# Patient Record
Sex: FEMALE | Race: WHITE | NOT HISPANIC OR LATINO | Employment: OTHER | URBAN - NONMETROPOLITAN AREA
[De-identification: names, ages, dates, MRNs, and addresses within clinical notes are randomized per-mention and may not be internally consistent; named-entity substitution may affect disease eponyms.]

---

## 2023-04-13 DIAGNOSIS — I10 BENIGN ESSENTIAL HYPERTENSION: ICD-10-CM

## 2023-04-13 RX ORDER — LOSARTAN POTASSIUM 100 MG/1
1 TABLET ORAL DAILY
COMMUNITY
Start: 2019-11-15 | End: 2023-04-13 | Stop reason: SDUPTHER

## 2023-04-14 RX ORDER — LOSARTAN POTASSIUM 100 MG/1
100 TABLET ORAL DAILY
Qty: 30 TABLET | Refills: 0 | Status: SHIPPED | OUTPATIENT
Start: 2023-04-14 | End: 2023-05-16 | Stop reason: SDUPTHER

## 2023-05-03 DIAGNOSIS — I10 BENIGN ESSENTIAL HYPERTENSION: ICD-10-CM

## 2023-05-03 RX ORDER — PROPRANOLOL HYDROCHLORIDE 120 MG/1
120 CAPSULE, EXTENDED RELEASE ORAL NIGHTLY
COMMUNITY
Start: 2023-02-06 | End: 2023-05-03 | Stop reason: SDUPTHER

## 2023-05-03 RX ORDER — PROPRANOLOL HYDROCHLORIDE 120 MG/1
120 CAPSULE, EXTENDED RELEASE ORAL NIGHTLY
Qty: 30 CAPSULE | Refills: 0 | Status: SHIPPED | OUTPATIENT
Start: 2023-05-03 | End: 2023-05-31 | Stop reason: SDUPTHER

## 2023-05-03 RX ORDER — AMLODIPINE BESYLATE 10 MG/1
10 TABLET ORAL DAILY
Qty: 30 TABLET | Refills: 0 | Status: SHIPPED | OUTPATIENT
Start: 2023-05-03 | End: 2023-05-25 | Stop reason: SDUPTHER

## 2023-05-03 RX ORDER — AMLODIPINE BESYLATE 10 MG/1
1 TABLET ORAL DAILY
COMMUNITY
Start: 2015-06-19 | End: 2023-05-03 | Stop reason: SDUPTHER

## 2023-05-16 DIAGNOSIS — I10 BENIGN ESSENTIAL HYPERTENSION: ICD-10-CM

## 2023-05-16 RX ORDER — LOSARTAN POTASSIUM 100 MG/1
100 TABLET ORAL DAILY
Qty: 90 TABLET | Refills: 0 | Status: SHIPPED | OUTPATIENT
Start: 2023-05-16 | End: 2023-05-31 | Stop reason: SDUPTHER

## 2023-05-18 PROBLEM — K57.90 DIVERTICULOSIS: Status: ACTIVE | Noted: 2023-05-18

## 2023-05-18 PROBLEM — I73.9 PVD (PERIPHERAL VASCULAR DISEASE) (CMS-HCC): Status: ACTIVE | Noted: 2023-05-18

## 2023-05-18 PROBLEM — E87.6 HYPOKALEMIA: Status: ACTIVE | Noted: 2023-05-18

## 2023-05-18 PROBLEM — F41.9 ANXIETY AND DEPRESSION: Status: ACTIVE | Noted: 2023-05-18

## 2023-05-18 PROBLEM — E55.9 VITAMIN D DEFICIENCY: Status: ACTIVE | Noted: 2023-05-18

## 2023-05-18 PROBLEM — R73.9 HYPERGLYCEMIA: Status: ACTIVE | Noted: 2023-05-18

## 2023-05-18 PROBLEM — F32.A ANXIETY AND DEPRESSION: Status: ACTIVE | Noted: 2023-05-18

## 2023-05-18 PROBLEM — K21.9 GERD (GASTROESOPHAGEAL REFLUX DISEASE): Status: ACTIVE | Noted: 2023-05-18

## 2023-05-18 PROBLEM — E78.5 HYPERLIPIDEMIA: Status: ACTIVE | Noted: 2023-05-18

## 2023-05-18 PROBLEM — I50.32 CHRONIC DIASTOLIC CONGESTIVE HEART FAILURE (MULTI): Status: ACTIVE | Noted: 2023-05-18

## 2023-05-18 RX ORDER — ACETAMINOPHEN 500 MG
1 TABLET ORAL DAILY
COMMUNITY
Start: 2013-09-19

## 2023-05-18 RX ORDER — PYRIDOXINE HCL (VITAMIN B6) 100 MG
1 TABLET ORAL DAILY
COMMUNITY

## 2023-05-18 RX ORDER — ASPIRIN 81 MG/1
1 TABLET ORAL DAILY
COMMUNITY
Start: 2013-09-19

## 2023-05-18 RX ORDER — LANOLIN ALCOHOL/MO/W.PET/CERES
1 CREAM (GRAM) TOPICAL DAILY
COMMUNITY

## 2023-05-18 RX ORDER — FOLIC ACID 1 MG/1
1 TABLET ORAL DAILY
COMMUNITY
Start: 2013-09-19

## 2023-05-18 RX ORDER — POTASSIUM CHLORIDE 750 MG/1
1 CAPSULE, EXTENDED RELEASE ORAL DAILY
COMMUNITY
Start: 2018-01-24 | End: 2023-10-26 | Stop reason: SINTOL

## 2023-05-18 RX ORDER — FUROSEMIDE 20 MG/1
1 TABLET ORAL DAILY
COMMUNITY
Start: 2019-11-07 | End: 2023-05-31 | Stop reason: SDUPTHER

## 2023-05-18 RX ORDER — CARBOXYMETHYLCELLULOSE SODIUM 5 MG/ML
SOLUTION/ DROPS OPHTHALMIC
COMMUNITY
Start: 2021-07-12

## 2023-05-25 DIAGNOSIS — I10 BENIGN ESSENTIAL HYPERTENSION: ICD-10-CM

## 2023-05-25 RX ORDER — AMLODIPINE BESYLATE 10 MG/1
10 TABLET ORAL DAILY
Qty: 90 TABLET | Refills: 1 | Status: SHIPPED | OUTPATIENT
Start: 2023-05-25 | End: 2023-05-31 | Stop reason: SDUPTHER

## 2023-05-31 ENCOUNTER — OFFICE VISIT (OUTPATIENT)
Dept: PRIMARY CARE | Facility: CLINIC | Age: 78
End: 2023-05-31
Payer: MEDICARE

## 2023-05-31 VITALS
WEIGHT: 250.4 LBS | SYSTOLIC BLOOD PRESSURE: 132 MMHG | BODY MASS INDEX: 40.42 KG/M2 | OXYGEN SATURATION: 98 % | TEMPERATURE: 97.3 F | HEART RATE: 66 BPM | DIASTOLIC BLOOD PRESSURE: 72 MMHG

## 2023-05-31 DIAGNOSIS — I73.9 PVD (PERIPHERAL VASCULAR DISEASE) (CMS-HCC): ICD-10-CM

## 2023-05-31 DIAGNOSIS — E78.5 HYPERLIPIDEMIA, UNSPECIFIED HYPERLIPIDEMIA TYPE: ICD-10-CM

## 2023-05-31 DIAGNOSIS — I10 HYPERTENSION, UNSPECIFIED TYPE: ICD-10-CM

## 2023-05-31 DIAGNOSIS — F41.9 ANXIETY AND DEPRESSION: ICD-10-CM

## 2023-05-31 DIAGNOSIS — E53.8 VITAMIN B12 DEFICIENCY: ICD-10-CM

## 2023-05-31 DIAGNOSIS — E66.01 OBESITY, MORBID (MULTI): Primary | ICD-10-CM

## 2023-05-31 DIAGNOSIS — Z79.899 HIGH RISK MEDICATION USE: ICD-10-CM

## 2023-05-31 DIAGNOSIS — F32.A ANXIETY AND DEPRESSION: ICD-10-CM

## 2023-05-31 DIAGNOSIS — R53.83 OTHER FATIGUE: ICD-10-CM

## 2023-05-31 DIAGNOSIS — I10 BENIGN ESSENTIAL HYPERTENSION: ICD-10-CM

## 2023-05-31 DIAGNOSIS — D17.0 LIPOMA OF FACE: ICD-10-CM

## 2023-05-31 DIAGNOSIS — E87.6 HYPOKALEMIA: ICD-10-CM

## 2023-05-31 DIAGNOSIS — E55.9 VITAMIN D DEFICIENCY, UNSPECIFIED: ICD-10-CM

## 2023-05-31 DIAGNOSIS — E78.00 HYPERCHOLESTEREMIA: ICD-10-CM

## 2023-05-31 DIAGNOSIS — I50.32 CHRONIC DIASTOLIC CONGESTIVE HEART FAILURE (MULTI): ICD-10-CM

## 2023-05-31 PROCEDURE — 1036F TOBACCO NON-USER: CPT | Performed by: INTERNAL MEDICINE

## 2023-05-31 PROCEDURE — 3078F DIAST BP <80 MM HG: CPT | Performed by: INTERNAL MEDICINE

## 2023-05-31 PROCEDURE — 3075F SYST BP GE 130 - 139MM HG: CPT | Performed by: INTERNAL MEDICINE

## 2023-05-31 PROCEDURE — 1159F MED LIST DOCD IN RCRD: CPT | Performed by: INTERNAL MEDICINE

## 2023-05-31 PROCEDURE — 99214 OFFICE O/P EST MOD 30 MIN: CPT | Performed by: INTERNAL MEDICINE

## 2023-05-31 PROCEDURE — 1160F RVW MEDS BY RX/DR IN RCRD: CPT | Performed by: INTERNAL MEDICINE

## 2023-05-31 RX ORDER — LOSARTAN POTASSIUM 100 MG/1
100 TABLET ORAL DAILY
Qty: 90 TABLET | Refills: 0 | Status: SHIPPED | OUTPATIENT
Start: 2023-05-31 | End: 2023-08-31 | Stop reason: SDUPTHER

## 2023-05-31 RX ORDER — FUROSEMIDE 20 MG/1
20 TABLET ORAL DAILY
Qty: 90 TABLET | Refills: 1 | Status: SHIPPED | OUTPATIENT
Start: 2023-05-31 | End: 2023-10-26 | Stop reason: SDUPTHER

## 2023-05-31 RX ORDER — AMLODIPINE BESYLATE 10 MG/1
10 TABLET ORAL DAILY
Qty: 90 TABLET | Refills: 1 | Status: SHIPPED | OUTPATIENT
Start: 2023-05-31 | End: 2023-10-26 | Stop reason: SDUPTHER

## 2023-05-31 RX ORDER — PROPRANOLOL HYDROCHLORIDE 120 MG/1
120 CAPSULE, EXTENDED RELEASE ORAL NIGHTLY
Qty: 90 CAPSULE | Refills: 1 | Status: SHIPPED | OUTPATIENT
Start: 2023-05-31 | End: 2023-10-26 | Stop reason: SDUPTHER

## 2023-05-31 ASSESSMENT — ENCOUNTER SYMPTOMS
HEADACHES: 0
FEVER: 0
ARTHRALGIAS: 0
BLOOD IN STOOL: 0
ABDOMINAL PAIN: 0
WHEEZING: 0
PALPITATIONS: 0
UNEXPECTED WEIGHT CHANGE: 0
FATIGUE: 0
HYPERTENSION: 1
DIZZINESS: 0
DIARRHEA: 0
SINUS PAIN: 0
DIFFICULTY URINATING: 0
BRUISES/BLEEDS EASILY: 0
COUGH: 0
SORE THROAT: 0

## 2023-05-31 NOTE — PROGRESS NOTES
Subjective   Patient ID: Elisha Flores is a 77 y.o. female who presents for Hypertension.    -Right-sided facial lump CT negative patient reassured 15 benign tumor continue monitoring conservatively  - Hypertension controlled  - Needs to complete blood work before next appointment  -Mammogram obtained within normal limits follow-up 1 year February 2024  - CHF and leg edema responded very well to compression hoses may increase Lasix to take 2 tablets daily if worsening edema for 1 week then to resume only 1 tablet a day after that follow-up progress closely  -Cervical disc disease and neuralgia seen by pain management started on topiramate patient did not tolerate it and discontinued the drug continue on conservative measures, no change  -, Arthritis and right shoulder arthropathy patient awaiting rheumatology for follow-up  -Hypercholesterolemia improving continue with current medication  -chronic migraines, controlled no recurrence continue on propranolol  -Hypokalemia on potassium 10 mEq daily blood work  Follow-up 4 months      Hypertension  Pertinent negatives include no chest pain, headaches or palpitations.          Review of Systems   Constitutional:  Negative for fatigue, fever and unexpected weight change.   HENT:  Negative for congestion, ear discharge, ear pain, mouth sores, sinus pain and sore throat.    Eyes:  Negative for visual disturbance.   Respiratory:  Negative for cough and wheezing.    Cardiovascular:  Negative for chest pain, palpitations and leg swelling.   Gastrointestinal:  Negative for abdominal pain, blood in stool and diarrhea.   Genitourinary:  Negative for difficulty urinating.   Musculoskeletal:  Negative for arthralgias.   Skin:  Negative for rash.   Neurological:  Negative for dizziness and headaches.   Hematological:  Does not bruise/bleed easily.   Psychiatric/Behavioral:  Negative for behavioral problems.    All other systems reviewed and are negative.      Objective   No  results found for: HGBA1C   /72   Pulse 66   Temp 36.3 °C (97.3 °F)   Wt 114 kg (250 lb 6.4 oz)   SpO2 98%   BMI 40.42 kg/m²   Lab Results   Component Value Date    WBC 9.9 10/12/2022    HGB 15.3 10/12/2022    HCT 44.2 10/12/2022     10/12/2022    CHOL 147 10/12/2022    TRIG 208 (H) 10/12/2022    HDL 37.0 (A) 10/12/2022    ALT 16 10/12/2022    AST 19 10/12/2022     10/12/2022    K 4.1 10/12/2022     10/12/2022    CREATININE 0.78 12/09/2022    BUN 15 12/09/2022    CO2 27 10/12/2022    TSH 2.02 10/12/2022     par   Physical Exam  Vitals and nursing note reviewed.   Constitutional:       Appearance: Normal appearance.   HENT:      Head: Normocephalic.      Nose: Nose normal.   Eyes:      Conjunctiva/sclera: Conjunctivae normal.      Pupils: Pupils are equal, round, and reactive to light.   Cardiovascular:      Rate and Rhythm: Regular rhythm.   Pulmonary:      Effort: Pulmonary effort is normal.      Breath sounds: Normal breath sounds.   Abdominal:      General: Abdomen is flat.      Palpations: Abdomen is soft.   Musculoskeletal:      Cervical back: Neck supple.   Skin:     General: Skin is warm.      Findings: Lesion (Right-sided facial lipoma) present.   Neurological:      General: No focal deficit present.      Mental Status: She is oriented to person, place, and time.   Psychiatric:         Mood and Affect: Mood normal.         Assessment/Plan   Elisha was seen today for hypertension.  Diagnoses and all orders for this visit:  Obesity, morbid (CMS/HCC) (Primary)  Benign essential hypertension  -     amLODIPine (Norvasc) 10 mg tablet; Take 1 tablet (10 mg) by mouth once daily.  -     furosemide (Lasix) 20 mg tablet; Take 1 tablet (20 mg) by mouth once daily.  -     losartan (Cozaar) 100 mg tablet; Take 1 tablet (100 mg) by mouth once daily.  -     propranolol LA (Inderal LA) 120 mg 24 hr capsule; Take 1 capsule (120 mg) by mouth once daily at bedtime.  Chronic diastolic congestive  heart failure (CMS/HCC)  PVD (peripheral vascular disease) (CMS/HCC)  High risk medication use  -     CBC and Auto Differential; Future  Hypertension, unspecified type  -     Comprehensive Metabolic Panel; Future  Hypercholesteremia  -     Lipid Panel; Future  Other fatigue  -     TSH with reflex to Free T4 if abnormal; Future  Vitamin B12 deficiency  -     Vitamin B12; Future  Vitamin D deficiency, unspecified  -     Vitamin D, Total; Future  Hyperlipidemia, unspecified hyperlipidemia type  Anxiety and depression  Hypokalemia  Lipoma of face  Other orders  -     Follow Up In Primary Care; Future  -     Follow Up In Primary Care; Future   -Right-sided facial lump CT negative patient reassured 15 benign tumor continue monitoring conservatively  - Hypertension controlled  - Needs to complete blood work before next appointment  -Mammogram obtained within normal limits follow-up 1 year February 2024  - CHF and leg edema responded very well to compression hoses may increase Lasix to take 2 tablets daily if worsening edema for 1 week then to resume only 1 tablet a day after that follow-up progress closely  -Cervical disc disease and neuralgia seen by pain management started on topiramate patient did not tolerate it and discontinued the drug continue on conservative measures, no change  -, Arthritis and right shoulder arthropathy patient awaiting rheumatology for follow-up  -Hypercholesterolemia improving continue with current medication  -chronic migraines, controlled no recurrence continue on propranolol  -Hypokalemia on potassium 10 mEq daily blood work  Follow-up 4 months

## 2023-08-31 DIAGNOSIS — I10 BENIGN ESSENTIAL HYPERTENSION: ICD-10-CM

## 2023-08-31 RX ORDER — LOSARTAN POTASSIUM 100 MG/1
100 TABLET ORAL DAILY
Qty: 90 TABLET | Refills: 0 | Status: SHIPPED | OUTPATIENT
Start: 2023-08-31 | End: 2023-10-26 | Stop reason: SDUPTHER

## 2023-09-03 PROBLEM — M79.674 PAINFUL LEGS AND MOVING TOES OF RIGHT FOOT: Status: ACTIVE | Noted: 2023-09-03

## 2023-09-03 PROBLEM — L30.9 ECZEMA: Status: ACTIVE | Noted: 2023-09-03

## 2023-09-03 PROBLEM — L03.90 CELLULITIS: Status: ACTIVE | Noted: 2023-09-03

## 2023-09-03 PROBLEM — M47.812 CERVICAL SPONDYLOARTHRITIS: Status: ACTIVE | Noted: 2023-09-03

## 2023-09-03 PROBLEM — M75.51 SUBDELTOID BURSITIS OF RIGHT SHOULDER JOINT: Status: ACTIVE | Noted: 2023-09-03

## 2023-09-03 PROBLEM — M25.512 CHRONIC PAIN OF BOTH SHOULDERS: Status: ACTIVE | Noted: 2023-09-03

## 2023-09-03 PROBLEM — M79.604 PAINFUL LEGS AND MOVING TOES OF RIGHT FOOT: Status: ACTIVE | Noted: 2023-09-03

## 2023-09-03 PROBLEM — G89.29 CHRONIC PAIN OF BOTH SHOULDERS: Status: ACTIVE | Noted: 2023-09-03

## 2023-09-03 PROBLEM — B37.2 CANDIDAL INTERTRIGO: Status: ACTIVE | Noted: 2023-09-03

## 2023-09-03 PROBLEM — N64.4 MASTODYNIA: Status: ACTIVE | Noted: 2023-09-03

## 2023-09-03 PROBLEM — G43.909 MIGRAINE, UNSPECIFIED, NOT INTRACTABLE, WITHOUT STATUS MIGRAINOSUS: Status: ACTIVE | Noted: 2023-09-03

## 2023-09-03 PROBLEM — M25.511 CHRONIC PAIN OF BOTH SHOULDERS: Status: ACTIVE | Noted: 2023-09-03

## 2023-09-03 PROBLEM — B35.1 MYCOTIC TOENAILS: Status: ACTIVE | Noted: 2023-09-03

## 2023-09-03 PROBLEM — J31.0 CHRONIC RHINITIS: Status: ACTIVE | Noted: 2023-09-03

## 2023-09-03 PROBLEM — R60.0 LOWER LEG EDEMA: Status: ACTIVE | Noted: 2023-09-03

## 2023-09-03 PROBLEM — M54.2 CERVICALGIA: Status: ACTIVE | Noted: 2023-09-03

## 2023-09-03 PROBLEM — M54.12 RADICULOPATHY, CERVICAL REGION: Status: ACTIVE | Noted: 2023-09-03

## 2023-09-03 PROBLEM — E66.812 CLASS 2 OBESITY WITH BODY MASS INDEX (BMI) OF 39.0 TO 39.9 IN ADULT: Status: ACTIVE | Noted: 2023-09-03

## 2023-09-03 PROBLEM — D48.5 NEOPLASM OF UNCERTAIN BEHAVIOR OF SKIN: Status: ACTIVE | Noted: 2022-03-22

## 2023-09-03 PROBLEM — M19.90 ARTHRITIS: Status: ACTIVE | Noted: 2023-09-03

## 2023-09-03 PROBLEM — E66.9 CLASS 2 OBESITY WITH BODY MASS INDEX (BMI) OF 39.0 TO 39.9 IN ADULT: Status: ACTIVE | Noted: 2023-09-03

## 2023-09-03 PROBLEM — L82.1 SEBORRHEIC KERATOSES: Status: ACTIVE | Noted: 2023-09-03

## 2023-09-03 RX ORDER — ROSUVASTATIN CALCIUM 10 MG/1
10 TABLET, COATED ORAL DAILY
COMMUNITY
End: 2023-10-26 | Stop reason: SDUPTHER

## 2023-09-03 RX ORDER — LOSARTAN POTASSIUM AND HYDROCHLOROTHIAZIDE 25; 100 MG/1; MG/1
1 TABLET ORAL DAILY
COMMUNITY
End: 2023-10-26 | Stop reason: ALTCHOICE

## 2023-09-03 RX ORDER — LIDOCAINE 50 MG/G
PATCH TOPICAL
COMMUNITY
Start: 2022-05-09 | End: 2023-10-26 | Stop reason: ALTCHOICE

## 2023-10-11 ENCOUNTER — LAB (OUTPATIENT)
Dept: LAB | Facility: LAB | Age: 78
End: 2023-10-11
Payer: MEDICARE

## 2023-10-11 DIAGNOSIS — I10 HYPERTENSION, UNSPECIFIED TYPE: ICD-10-CM

## 2023-10-11 DIAGNOSIS — E78.00 HYPERCHOLESTEREMIA: ICD-10-CM

## 2023-10-11 DIAGNOSIS — R53.83 OTHER FATIGUE: ICD-10-CM

## 2023-10-11 DIAGNOSIS — E53.8 VITAMIN B12 DEFICIENCY: ICD-10-CM

## 2023-10-11 DIAGNOSIS — E55.9 VITAMIN D DEFICIENCY, UNSPECIFIED: ICD-10-CM

## 2023-10-11 DIAGNOSIS — Z79.899 HIGH RISK MEDICATION USE: ICD-10-CM

## 2023-10-11 LAB
25(OH)D3 SERPL-MCNC: 57 NG/ML (ref 30–100)
ALBUMIN SERPL BCP-MCNC: 4.4 G/DL (ref 3.4–5)
ALP SERPL-CCNC: 80 U/L (ref 33–136)
ALT SERPL W P-5'-P-CCNC: 18 U/L (ref 7–45)
ANION GAP SERPL CALC-SCNC: 11 MMOL/L (ref 10–20)
AST SERPL W P-5'-P-CCNC: 23 U/L (ref 9–39)
BASOPHILS # BLD AUTO: 0.1 X10*3/UL (ref 0–0.1)
BASOPHILS NFR BLD AUTO: 1.2 %
BILIRUB SERPL-MCNC: 1.1 MG/DL (ref 0–1.2)
BUN SERPL-MCNC: 15 MG/DL (ref 6–23)
CALCIUM SERPL-MCNC: 9.4 MG/DL (ref 8.6–10.3)
CHLORIDE SERPL-SCNC: 104 MMOL/L (ref 98–107)
CHOLEST SERPL-MCNC: 151 MG/DL (ref 0–199)
CHOLESTEROL/HDL RATIO: 4.6
CO2 SERPL-SCNC: 29 MMOL/L (ref 21–32)
CREAT SERPL-MCNC: 0.7 MG/DL (ref 0.5–1.05)
EOSINOPHIL # BLD AUTO: 0.17 X10*3/UL (ref 0–0.4)
EOSINOPHIL NFR BLD AUTO: 2 %
ERYTHROCYTE [DISTWIDTH] IN BLOOD BY AUTOMATED COUNT: 14.2 % (ref 11.5–14.5)
GFR SERPL CREATININE-BSD FRML MDRD: 90 ML/MIN/1.73M*2
GLUCOSE SERPL-MCNC: 122 MG/DL (ref 74–99)
HCT VFR BLD AUTO: 45.5 % (ref 36–46)
HDLC SERPL-MCNC: 32.7 MG/DL
HGB BLD-MCNC: 15.4 G/DL (ref 12–16)
IMM GRANULOCYTES # BLD AUTO: 0.05 X10*3/UL (ref 0–0.5)
IMM GRANULOCYTES NFR BLD AUTO: 0.6 % (ref 0–0.9)
LDLC SERPL CALC-MCNC: 79 MG/DL (ref 140–190)
LYMPHOCYTES # BLD AUTO: 1.52 X10*3/UL (ref 0.8–3)
LYMPHOCYTES NFR BLD AUTO: 17.7 %
MCH RBC QN AUTO: 30.6 PG (ref 26–34)
MCHC RBC AUTO-ENTMCNC: 33.8 G/DL (ref 32–36)
MCV RBC AUTO: 90 FL (ref 80–100)
MONOCYTES # BLD AUTO: 0.76 X10*3/UL (ref 0.05–0.8)
MONOCYTES NFR BLD AUTO: 8.8 %
NEUTROPHILS # BLD AUTO: 6 X10*3/UL (ref 1.6–5.5)
NEUTROPHILS NFR BLD AUTO: 69.7 %
NON HDL CHOLESTEROL: 118 MG/DL (ref 0–149)
NRBC BLD-RTO: 0 /100 WBCS (ref 0–0)
PLATELET # BLD AUTO: 190 X10*3/UL (ref 150–450)
PMV BLD AUTO: 10 FL (ref 7.5–11.5)
POTASSIUM SERPL-SCNC: 4.3 MMOL/L (ref 3.5–5.3)
PROT SERPL-MCNC: 7.1 G/DL (ref 6.4–8.2)
RBC # BLD AUTO: 5.04 X10*6/UL (ref 4–5.2)
SODIUM SERPL-SCNC: 140 MMOL/L (ref 136–145)
TRIGL SERPL-MCNC: 197 MG/DL (ref 0–149)
TSH SERPL-ACNC: 2.14 MIU/L (ref 0.44–3.98)
VIT B12 SERPL-MCNC: 807 PG/ML (ref 211–911)
VLDL: 39 MG/DL (ref 0–40)
WBC # BLD AUTO: 8.6 X10*3/UL (ref 4.4–11.3)

## 2023-10-11 PROCEDURE — 36415 COLL VENOUS BLD VENIPUNCTURE: CPT

## 2023-10-11 PROCEDURE — 82607 VITAMIN B-12: CPT

## 2023-10-11 PROCEDURE — 82306 VITAMIN D 25 HYDROXY: CPT

## 2023-10-26 ENCOUNTER — OFFICE VISIT (OUTPATIENT)
Dept: PRIMARY CARE | Facility: CLINIC | Age: 78
End: 2023-10-26
Payer: MEDICARE

## 2023-10-26 VITALS
BODY MASS INDEX: 39.93 KG/M2 | SYSTOLIC BLOOD PRESSURE: 122 MMHG | TEMPERATURE: 96.4 F | DIASTOLIC BLOOD PRESSURE: 84 MMHG | OXYGEN SATURATION: 98 % | WEIGHT: 248.44 LBS | HEIGHT: 66 IN | HEART RATE: 71 BPM

## 2023-10-26 DIAGNOSIS — I10 BENIGN ESSENTIAL HYPERTENSION: Primary | ICD-10-CM

## 2023-10-26 DIAGNOSIS — Z78.0 ASYMPTOMATIC MENOPAUSAL STATE: ICD-10-CM

## 2023-10-26 DIAGNOSIS — I50.32 CHRONIC DIASTOLIC CONGESTIVE HEART FAILURE (MULTI): ICD-10-CM

## 2023-10-26 DIAGNOSIS — E78.5 HYPERLIPIDEMIA, UNSPECIFIED HYPERLIPIDEMIA TYPE: ICD-10-CM

## 2023-10-26 DIAGNOSIS — E78.00 HYPERCHOLESTEREMIA: ICD-10-CM

## 2023-10-26 DIAGNOSIS — E66.01 OBESITY, MORBID (MULTI): ICD-10-CM

## 2023-10-26 DIAGNOSIS — Z23 FLU VACCINE NEED: ICD-10-CM

## 2023-10-26 DIAGNOSIS — Z13.89 SCREENING FOR MULTIPLE CONDITIONS: ICD-10-CM

## 2023-10-26 DIAGNOSIS — Z12.31 ENCOUNTER FOR SCREENING MAMMOGRAM FOR BREAST CANCER: ICD-10-CM

## 2023-10-26 DIAGNOSIS — Z00.00 ROUTINE GENERAL MEDICAL EXAMINATION AT HEALTH CARE FACILITY: ICD-10-CM

## 2023-10-26 DIAGNOSIS — I10 HYPERTENSION, UNSPECIFIED TYPE: ICD-10-CM

## 2023-10-26 PROCEDURE — 1170F FXNL STATUS ASSESSED: CPT | Performed by: INTERNAL MEDICINE

## 2023-10-26 PROCEDURE — 1160F RVW MEDS BY RX/DR IN RCRD: CPT | Performed by: INTERNAL MEDICINE

## 2023-10-26 PROCEDURE — G0439 PPPS, SUBSEQ VISIT: HCPCS | Performed by: INTERNAL MEDICINE

## 2023-10-26 PROCEDURE — 99214 OFFICE O/P EST MOD 30 MIN: CPT | Performed by: INTERNAL MEDICINE

## 2023-10-26 PROCEDURE — G0008 ADMIN INFLUENZA VIRUS VAC: HCPCS | Performed by: INTERNAL MEDICINE

## 2023-10-26 PROCEDURE — 90662 IIV NO PRSV INCREASED AG IM: CPT | Performed by: INTERNAL MEDICINE

## 2023-10-26 PROCEDURE — G0444 DEPRESSION SCREEN ANNUAL: HCPCS | Performed by: INTERNAL MEDICINE

## 2023-10-26 PROCEDURE — 3079F DIAST BP 80-89 MM HG: CPT | Performed by: INTERNAL MEDICINE

## 2023-10-26 PROCEDURE — 1159F MED LIST DOCD IN RCRD: CPT | Performed by: INTERNAL MEDICINE

## 2023-10-26 PROCEDURE — 1036F TOBACCO NON-USER: CPT | Performed by: INTERNAL MEDICINE

## 2023-10-26 PROCEDURE — 3074F SYST BP LT 130 MM HG: CPT | Performed by: INTERNAL MEDICINE

## 2023-10-26 RX ORDER — PROPRANOLOL HYDROCHLORIDE 120 MG/1
120 CAPSULE, EXTENDED RELEASE ORAL NIGHTLY
Qty: 90 CAPSULE | Refills: 1 | Status: SHIPPED | OUTPATIENT
Start: 2023-10-26

## 2023-10-26 RX ORDER — FUROSEMIDE 20 MG/1
20 TABLET ORAL DAILY
Qty: 90 TABLET | Refills: 1 | Status: SHIPPED | OUTPATIENT
Start: 2023-10-26

## 2023-10-26 RX ORDER — LOSARTAN POTASSIUM 100 MG/1
100 TABLET ORAL DAILY
Qty: 90 TABLET | Refills: 0 | Status: SHIPPED | OUTPATIENT
Start: 2023-10-26 | End: 2024-01-29 | Stop reason: SDUPTHER

## 2023-10-26 RX ORDER — AMLODIPINE BESYLATE 10 MG/1
10 TABLET ORAL DAILY
Qty: 90 TABLET | Refills: 1 | Status: SHIPPED | OUTPATIENT
Start: 2023-10-26 | End: 2024-04-18 | Stop reason: SDUPTHER

## 2023-10-26 RX ORDER — ROSUVASTATIN CALCIUM 10 MG/1
10 TABLET, COATED ORAL DAILY
Qty: 90 TABLET | Refills: 1 | Status: SHIPPED | OUTPATIENT
Start: 2023-10-26

## 2023-10-26 ASSESSMENT — ACTIVITIES OF DAILY LIVING (ADL)
BATHING: INDEPENDENT
DOING_HOUSEWORK: INDEPENDENT
GROCERY_SHOPPING: INDEPENDENT
MANAGING_FINANCES: INDEPENDENT
TAKING_MEDICATION: INDEPENDENT
DRESSING: INDEPENDENT

## 2023-10-26 ASSESSMENT — ENCOUNTER SYMPTOMS
LOSS OF SENSATION IN FEET: 0
DIFFICULTY URINATING: 0
OCCASIONAL FEELINGS OF UNSTEADINESS: 0
BRUISES/BLEEDS EASILY: 0
DEPRESSION: 0
FEVER: 0
SINUS PAIN: 0
ABDOMINAL PAIN: 0
WHEEZING: 0
ARTHRALGIAS: 0
PALPITATIONS: 0
SORE THROAT: 0
COUGH: 0
DIARRHEA: 0
HYPERTENSION: 1
BLOOD IN STOOL: 0
HEADACHES: 0
UNEXPECTED WEIGHT CHANGE: 0
DIZZINESS: 0
FATIGUE: 0

## 2023-10-26 ASSESSMENT — PATIENT HEALTH QUESTIONNAIRE - PHQ9
SUM OF ALL RESPONSES TO PHQ9 QUESTIONS 1 AND 2: 0
1. LITTLE INTEREST OR PLEASURE IN DOING THINGS: NOT AT ALL
2. FEELING DOWN, DEPRESSED OR HOPELESS: NOT AT ALL

## 2023-10-26 NOTE — PROGRESS NOTES
Subjective   Reason for Visit: Elisha Flores is an 78 y.o. female here for a Medicare Wellness visit.     Past Medical, Surgical, and Family History reviewed and updated in chart.    Reviewed all medications by prescribing practitioner or clinical pharmacist (such as prescriptions, OTCs, herbal therapies and supplements) and documented in the medical record.    Annual preventive visit  - Vaccinations reviewed  High-dose flu vaccine today  Need to proceed with RSV and COVID booster  - Screening mammogram needed for February 2024 new order placed today  - Needs screening for the symmetric continue with calcium and vitamin D exercise weight lifting  - Screening for depression  I spent 15 minutes obtaining and discussing depression screening using PHQ-2 questions with results documented in the chart.  -Morbid obesity counseled about BMI  Counseled about weight loss  Follow-up  - Recent blood work reviewed with patient  -- Hypertension controlled  -- CHF and leg edema responded very well to compression hoses may increase Lasix to take 2 tablets daily if worsening edema for 1 week then to resume only 1 tablet a day after that follow-up progress closely controlled  -Cervical disc disease and neuralgia seen by pain management, doing well  -, Arthritis and right shoulder arthropathy patient awaiting rheumatology for follow-up  -Hypercholesterolemia improving continue with current medication continue low triglyceride diet continue with weight loss  -chronic migraines, controlled no recurrence continue on propranolol  -Hypokalemia on potassium 10 mEq daily blood work  Follow-up 3 months      Hypertension  Pertinent negatives include no chest pain, headaches or palpitations.   Med Refill  Pertinent negatives include no abdominal pain, arthralgias, chest pain, congestion, coughing, fatigue, fever, headaches, rash or sore throat.       Patient Care Team:  Shun Hall MD as PCP - General  Shun Hall MD as PCP - [facility]  "[facility] Attributed Provider     Review of Systems   Constitutional:  Negative for fatigue, fever and unexpected weight change.   HENT:  Negative for congestion, ear discharge, ear pain, mouth sores, sinus pain and sore throat.    Eyes:  Negative for visual disturbance.   Respiratory:  Negative for cough and wheezing.    Cardiovascular:  Negative for chest pain, palpitations and leg swelling.   Gastrointestinal:  Negative for abdominal pain, blood in stool and diarrhea.   Genitourinary:  Negative for difficulty urinating.   Musculoskeletal:  Negative for arthralgias.   Skin:  Negative for rash.   Neurological:  Negative for dizziness and headaches.   Hematological:  Does not bruise/bleed easily.   Psychiatric/Behavioral:  Negative for behavioral problems.    All other systems reviewed and are negative.      Objective   Vitals:  /84   Pulse 71   Temp 35.8 °C (96.4 °F)   Ht 1.676 m (5' 6\")   Wt 113 kg (248 lb 7 oz)   SpO2 98%   BMI 40.10 kg/m²     Lab Results   Component Value Date    WBC 8.6 10/11/2023    HGB 15.4 10/11/2023    HCT 45.5 10/11/2023     10/11/2023    CHOL 151 10/11/2023    TRIG 197 (H) 10/11/2023    HDL 32.7 10/11/2023    ALT 18 10/11/2023    AST 23 10/11/2023     10/11/2023    K 4.3 10/11/2023     10/11/2023    CREATININE 0.70 10/11/2023    BUN 15 10/11/2023    CO2 29 10/11/2023    TSH 2.14 10/11/2023     par   Physical Exam  Vitals and nursing note reviewed.   Constitutional:       Appearance: Normal appearance. She is obese.   HENT:      Head: Normocephalic.      Nose: Nose normal.   Eyes:      Conjunctiva/sclera: Conjunctivae normal.      Pupils: Pupils are equal, round, and reactive to light.   Cardiovascular:      Rate and Rhythm: Regular rhythm.   Pulmonary:      Effort: Pulmonary effort is normal.      Breath sounds: Normal breath sounds.   Abdominal:      General: Abdomen is flat.      Palpations: Abdomen is soft.   Musculoskeletal:      Cervical back: Neck supple. "   Skin:     General: Skin is warm.   Neurological:      General: No focal deficit present.      Mental Status: She is oriented to person, place, and time.   Psychiatric:         Mood and Affect: Mood normal.       Assessment/Plan   Problem List Items Addressed This Visit       Benign essential hypertension - Primary    Relevant Medications    amLODIPine (Norvasc) 10 mg tablet    furosemide (Lasix) 20 mg tablet    losartan (Cozaar) 100 mg tablet    propranolol LA (Inderal LA) 120 mg 24 hr capsule    Chronic diastolic congestive heart failure (CMS/HCC)    Relevant Medications    amLODIPine (Norvasc) 10 mg tablet    propranolol LA (Inderal LA) 120 mg 24 hr capsule    Hyperlipidemia    Relevant Medications    rosuvastatin (Crestor) 10 mg tablet    Obesity, morbid (CMS/HCC)     Other Visit Diagnoses       Flu vaccine need        Relevant Orders    Flu vaccine, quadrivalent, high-dose, preservative free, age 65y+ (FLUZONE) (Completed)    Screening for multiple conditions        Asymptomatic menopausal state        Relevant Orders    XR DEXA bone density    Encounter for screening mammogram for breast cancer        Relevant Orders    BI mammo bilateral screening tomosynthesis    Routine general medical examination at health care facility        Hypercholesteremia        Hypertension, unspecified type              Annual preventive visit  - Vaccinations reviewed  High-dose flu vaccine today  Need to proceed with RSV and COVID booster  - Screening mammogram needed for February 2024 new order placed today  - Needs screening for the symmetric continue with calcium and vitamin D exercise weight lifting  - Screening for depression  I spent 15 minutes obtaining and discussing depression screening using PHQ-2 questions with results documented in the chart.  -Morbid obesity counseled about BMI  Counseled about weight loss  Follow-up  - Recent blood work reviewed with patient  -- Hypertension controlled  -- CHF and leg edema  responded very well to compression hoses may increase Lasix to take 2 tablets daily if worsening edema for 1 week then to resume only 1 tablet a day after that follow-up progress closely controlled  -Cervical disc disease and neuralgia seen by pain management, doing well  -, Arthritis and right shoulder arthropathy patient awaiting rheumatology for follow-up  -Hypercholesterolemia improving continue with current medication continue low triglyceride diet continue with weight loss  -chronic migraines, controlled no recurrence continue on propranolol  -Hypokalemia on potassium 10 mEq daily blood work  Follow-up 3 months

## 2023-10-26 NOTE — PROGRESS NOTES
"Subjective   Patient ID: Elisha Flores is a 78 y.o. female who presents for Medicare Annual Wellness Visit Subsequent, Hypertension, Med Refill, Flu Vaccine (given), and Results.    HPI       Review of Systems    Objective   No results found for: \"HGBA1C\"   /84   Pulse 71   Temp 35.8 °C (96.4 °F)   Ht 1.676 m (5' 6\")   Wt 113 kg (248 lb 7 oz)   SpO2 98%   BMI 40.10 kg/m²     Physical Exam    Assessment/Plan   Elisha was seen today for medicare annual wellness visit subsequent, hypertension, med refill, flu vaccine and results.  Diagnoses and all orders for this visit:  Flu vaccine need  -     Flu vaccine, quadrivalent, high-dose, preservative free, age 65y+ (FLUZONE)  Benign essential hypertension  -     amLODIPine (Norvasc) 10 mg tablet; Take 1 tablet (10 mg) by mouth once daily.  -     furosemide (Lasix) 20 mg tablet; Take 1 tablet (20 mg) by mouth once daily.  -     losartan (Cozaar) 100 mg tablet; Take 1 tablet (100 mg) by mouth once daily.  -     propranolol LA (Inderal LA) 120 mg 24 hr capsule; Take 1 capsule (120 mg) by mouth once daily at bedtime.  Hyperlipidemia, unspecified hyperlipidemia type  -     rosuvastatin (Crestor) 10 mg tablet; Take 1 tablet (10 mg) by mouth once daily.     "

## 2024-01-29 ENCOUNTER — OFFICE VISIT (OUTPATIENT)
Dept: PRIMARY CARE | Facility: CLINIC | Age: 79
End: 2024-01-29
Payer: MEDICARE

## 2024-01-29 VITALS
HEART RATE: 63 BPM | OXYGEN SATURATION: 98 % | WEIGHT: 251 LBS | DIASTOLIC BLOOD PRESSURE: 90 MMHG | BODY MASS INDEX: 40.51 KG/M2 | SYSTOLIC BLOOD PRESSURE: 162 MMHG | TEMPERATURE: 96.9 F

## 2024-01-29 DIAGNOSIS — E66.01 OBESITY, MORBID (MULTI): ICD-10-CM

## 2024-01-29 DIAGNOSIS — I50.32 CHRONIC DIASTOLIC CONGESTIVE HEART FAILURE (MULTI): ICD-10-CM

## 2024-01-29 DIAGNOSIS — E78.5 HYPERLIPIDEMIA, UNSPECIFIED HYPERLIPIDEMIA TYPE: ICD-10-CM

## 2024-01-29 DIAGNOSIS — I10 BENIGN ESSENTIAL HYPERTENSION: Primary | ICD-10-CM

## 2024-01-29 DIAGNOSIS — I73.9 PVD (PERIPHERAL VASCULAR DISEASE) (CMS-HCC): ICD-10-CM

## 2024-01-29 DIAGNOSIS — E53.8 VITAMIN B12 DEFICIENCY: ICD-10-CM

## 2024-01-29 PROCEDURE — 99214 OFFICE O/P EST MOD 30 MIN: CPT | Performed by: INTERNAL MEDICINE

## 2024-01-29 PROCEDURE — 1160F RVW MEDS BY RX/DR IN RCRD: CPT | Performed by: INTERNAL MEDICINE

## 2024-01-29 PROCEDURE — 1159F MED LIST DOCD IN RCRD: CPT | Performed by: INTERNAL MEDICINE

## 2024-01-29 PROCEDURE — 3077F SYST BP >= 140 MM HG: CPT | Performed by: INTERNAL MEDICINE

## 2024-01-29 PROCEDURE — 1036F TOBACCO NON-USER: CPT | Performed by: INTERNAL MEDICINE

## 2024-01-29 PROCEDURE — 3080F DIAST BP >= 90 MM HG: CPT | Performed by: INTERNAL MEDICINE

## 2024-01-29 RX ORDER — LOSARTAN POTASSIUM 100 MG/1
100 TABLET ORAL DAILY
Qty: 90 TABLET | Refills: 1 | Status: SHIPPED | OUTPATIENT
Start: 2024-01-29

## 2024-01-29 ASSESSMENT — ENCOUNTER SYMPTOMS
HEADACHES: 0
DIARRHEA: 0
ARTHRALGIAS: 0
BRUISES/BLEEDS EASILY: 0
BLOOD IN STOOL: 0
FATIGUE: 0
PALPITATIONS: 0
SINUS PAIN: 0
HYPERTENSION: 1
COUGH: 0
ABDOMINAL PAIN: 0
DIFFICULTY URINATING: 0
UNEXPECTED WEIGHT CHANGE: 0
WHEEZING: 0
DIZZINESS: 0
SORE THROAT: 0
FEVER: 0

## 2024-01-29 NOTE — PROGRESS NOTES
Subjective   Patient ID: Elisha Flores is a 78 y.o. female who presents for Hypertension, Hyperlipidemia (Discuss Atorvastatin ), and Med Refill.    -- Hypertension uncontrolled patient using a decongestant for upper respiratory tract infection we will follow-up with patient closely and recheck patient blood pressure in 3 months patient was found to monitor blood pressure for reviewed  -- CHF and leg edema responded very well to compression hoses may increase Lasix to take 2 tablets daily if worsening edema for 1 week then to resume only 1 tablet a day after that follow-up progress closely controlled  -Cervical disc disease and neuralgia seen by pain management, doing well  -, Arthritis and right shoulder arthropathy patient awaiting rheumatology for follow-up  -Cardiovascular risk reviewed with patient risk analysis 33.4%  Hypercholesterolemia need to restart Crestor 10 mg as recommended  -chronic migraines, controlled no recurrence continue on propranolol  -Hypokalemia on potassium 10 mEq daily blood work  Morbid obesity patient BMI 40 need counseled about low-carb diet exercise  Follow-up 3 months      Hypertension  Pertinent negatives include no chest pain, headaches or palpitations.   Hyperlipidemia  Pertinent negatives include no chest pain.   Med Refill  Pertinent negatives include no abdominal pain, arthralgias, chest pain, congestion, coughing, fatigue, fever, headaches, rash or sore throat.          Review of Systems   Constitutional:  Negative for fatigue, fever and unexpected weight change.   HENT:  Negative for congestion, ear discharge, ear pain, mouth sores, sinus pain and sore throat.    Eyes:  Negative for visual disturbance.   Respiratory:  Negative for cough and wheezing.    Cardiovascular:  Negative for chest pain, palpitations and leg swelling.   Gastrointestinal:  Negative for abdominal pain, blood in stool and diarrhea.   Genitourinary:  Negative for difficulty urinating.   Musculoskeletal:   "Negative for arthralgias.   Skin:  Negative for rash.   Neurological:  Negative for dizziness and headaches.   Hematological:  Does not bruise/bleed easily.   Psychiatric/Behavioral:  Negative for behavioral problems.    All other systems reviewed and are negative.      Objective   No results found for: \"HGBA1C\"   /90   Pulse 63   Temp 36.1 °C (96.9 °F)   Wt 114 kg (251 lb)   SpO2 98%   BMI 40.51 kg/m²   Lab Results   Component Value Date    WBC 8.6 10/11/2023    HGB 15.4 10/11/2023    HCT 45.5 10/11/2023     10/11/2023    CHOL 151 10/11/2023    TRIG 197 (H) 10/11/2023    HDL 32.7 10/11/2023    ALT 18 10/11/2023    AST 23 10/11/2023     10/11/2023    K 4.3 10/11/2023     10/11/2023    CREATININE 0.70 10/11/2023    BUN 15 10/11/2023    CO2 29 10/11/2023    TSH 2.14 10/11/2023     par   Physical Exam  Vitals and nursing note reviewed.   Constitutional:       Appearance: Normal appearance.   HENT:      Head: Normocephalic.      Nose: Nose normal.   Eyes:      Conjunctiva/sclera: Conjunctivae normal.      Pupils: Pupils are equal, round, and reactive to light.   Cardiovascular:      Rate and Rhythm: Regular rhythm.   Pulmonary:      Effort: Pulmonary effort is normal.      Breath sounds: Normal breath sounds.   Abdominal:      General: Abdomen is flat.      Palpations: Abdomen is soft.   Musculoskeletal:      Cervical back: Neck supple.   Skin:     General: Skin is warm.   Neurological:      General: No focal deficit present.      Mental Status: She is oriented to person, place, and time.   Psychiatric:         Mood and Affect: Mood normal.         Assessment/Plan   Elisha was seen today for hypertension, hyperlipidemia and med refill.  Diagnoses and all orders for this visit:  Benign essential hypertension (Primary)  -     losartan (Cozaar) 100 mg tablet; Take 1 tablet (100 mg) by mouth once daily.  Chronic diastolic congestive heart failure (CMS/HCC)  PVD (peripheral vascular disease) " (CMS/[facility])  Obesity, morbid (CMS/[facility])  Hyperlipidemia, unspecified hyperlipidemia type  Vitamin B12 deficiency  Other orders  -     Follow Up In Primary Care - Established; Future   -- Hypertension uncontrolled patient using a decongestant for upper respiratory tract infection we will follow-up with patient closely and recheck patient blood pressure in 3 months patient was found to monitor blood pressure for reviewed  -- CHF and leg edema responded very well to compression hoses may increase Lasix to take 2 tablets daily if worsening edema for 1 week then to resume only 1 tablet a day after that follow-up progress closely controlled  -Cervical disc disease and neuralgia seen by pain management, doing well  -, Arthritis and right shoulder arthropathy patient awaiting rheumatology for follow-up  -Cardiovascular risk reviewed with patient risk analysis 33.4%  Hypercholesterolemia need to restart Crestor 10 mg as recommended  -chronic migraines, controlled no recurrence continue on propranolol  -Hypokalemia on potassium 10 mEq daily blood work  Morbid obesity patient BMI 40 need counseled about low-carb diet exercise  Follow-up 3 months

## 2024-02-27 ENCOUNTER — HOSPITAL ENCOUNTER (OUTPATIENT)
Dept: RADIOLOGY | Facility: HOSPITAL | Age: 79
Discharge: HOME | End: 2024-02-27
Payer: MEDICARE

## 2024-02-27 DIAGNOSIS — Z78.0 ASYMPTOMATIC MENOPAUSAL STATE: ICD-10-CM

## 2024-02-27 DIAGNOSIS — Z12.31 ENCOUNTER FOR SCREENING MAMMOGRAM FOR BREAST CANCER: ICD-10-CM

## 2024-02-27 PROCEDURE — 77063 BREAST TOMOSYNTHESIS BI: CPT | Performed by: RADIOLOGY

## 2024-02-27 PROCEDURE — 77085 DXA BONE DENSITY AXL VRT FX: CPT

## 2024-02-27 PROCEDURE — 77067 SCR MAMMO BI INCL CAD: CPT

## 2024-02-27 PROCEDURE — 77067 SCR MAMMO BI INCL CAD: CPT | Performed by: RADIOLOGY

## 2024-04-18 DIAGNOSIS — I10 BENIGN ESSENTIAL HYPERTENSION: ICD-10-CM

## 2024-04-18 RX ORDER — AMLODIPINE BESYLATE 10 MG/1
10 TABLET ORAL DAILY
Qty: 90 TABLET | Refills: 1 | Status: SHIPPED | OUTPATIENT
Start: 2024-04-18

## 2024-04-29 ENCOUNTER — OFFICE VISIT (OUTPATIENT)
Dept: PRIMARY CARE | Facility: CLINIC | Age: 79
End: 2024-04-29
Payer: MEDICARE

## 2024-04-29 VITALS
HEART RATE: 81 BPM | OXYGEN SATURATION: 99 % | WEIGHT: 253.2 LBS | DIASTOLIC BLOOD PRESSURE: 80 MMHG | SYSTOLIC BLOOD PRESSURE: 125 MMHG | TEMPERATURE: 96 F | BODY MASS INDEX: 40.87 KG/M2

## 2024-04-29 DIAGNOSIS — E78.5 HYPERLIPIDEMIA, UNSPECIFIED HYPERLIPIDEMIA TYPE: ICD-10-CM

## 2024-04-29 DIAGNOSIS — I10 BENIGN ESSENTIAL HYPERTENSION: Primary | ICD-10-CM

## 2024-04-29 DIAGNOSIS — I10 HYPERTENSION, UNSPECIFIED TYPE: ICD-10-CM

## 2024-04-29 DIAGNOSIS — E78.00 HYPERCHOLESTEREMIA: ICD-10-CM

## 2024-04-29 DIAGNOSIS — I50.32 CHRONIC DIASTOLIC CONGESTIVE HEART FAILURE (MULTI): ICD-10-CM

## 2024-04-29 DIAGNOSIS — E55.9 VITAMIN D DEFICIENCY, UNSPECIFIED: ICD-10-CM

## 2024-04-29 PROCEDURE — 1159F MED LIST DOCD IN RCRD: CPT | Performed by: INTERNAL MEDICINE

## 2024-04-29 PROCEDURE — 99214 OFFICE O/P EST MOD 30 MIN: CPT | Performed by: INTERNAL MEDICINE

## 2024-04-29 PROCEDURE — 1036F TOBACCO NON-USER: CPT | Performed by: INTERNAL MEDICINE

## 2024-04-29 PROCEDURE — 1160F RVW MEDS BY RX/DR IN RCRD: CPT | Performed by: INTERNAL MEDICINE

## 2024-04-29 PROCEDURE — 3079F DIAST BP 80-89 MM HG: CPT | Performed by: INTERNAL MEDICINE

## 2024-04-29 PROCEDURE — 3074F SYST BP LT 130 MM HG: CPT | Performed by: INTERNAL MEDICINE

## 2024-04-29 ASSESSMENT — ENCOUNTER SYMPTOMS
HEADACHES: 0
UNEXPECTED WEIGHT CHANGE: 0
DIZZINESS: 0
ARTHRALGIAS: 0
ABDOMINAL PAIN: 0
FATIGUE: 0
DIARRHEA: 0
WHEEZING: 0
HYPERTENSION: 1
DIFFICULTY URINATING: 0
SORE THROAT: 0
FEVER: 0
SINUS PAIN: 0
PALPITATIONS: 0
BRUISES/BLEEDS EASILY: 0
BLOOD IN STOOL: 0
COUGH: 0

## 2024-04-29 NOTE — PROGRESS NOTES
"Subjective   Patient ID: Elisha Flores is a 78 y.o. female who presents for Hypertension.    - Hypertension now being much better controlled patient off decongestants avoid NSAIDs increase fluid intake  --- CHF and leg edema responded very well to compression hoses may increase Lasix to take 2 tablets daily if worsening edema for 1 week then to resume only 1 tablet a day after that follow-up progress closely controlled  -Cervical disc disease and neuralgia seen by pain management, doing well  -Hypercholesterolemia need to restart Crestor 10 mg as recommended  -chronic migraines, controlled no recurrence continue on propranolol  -Hypokalemia on potassium 10 mEq daily blood work  Morbid obesity patient BMI 40 need counseled about low-carb diet exercise  Follow-up 3 months      Hypertension  Pertinent negatives include no chest pain, headaches or palpitations.          Review of Systems   Constitutional:  Negative for fatigue, fever and unexpected weight change.   HENT:  Negative for congestion, ear discharge, ear pain, mouth sores, sinus pain and sore throat.    Eyes:  Negative for visual disturbance.   Respiratory:  Negative for cough and wheezing.    Cardiovascular:  Negative for chest pain, palpitations and leg swelling.   Gastrointestinal:  Negative for abdominal pain, blood in stool and diarrhea.   Genitourinary:  Negative for difficulty urinating.   Musculoskeletal:  Negative for arthralgias.   Skin:  Negative for rash.   Neurological:  Negative for dizziness and headaches.   Hematological:  Does not bruise/bleed easily.   Psychiatric/Behavioral:  Negative for behavioral problems.    All other systems reviewed and are negative.      Objective   No results found for: \"HGBA1C\"   /80   Pulse 81   Temp 35.6 °C (96 °F)   Wt 115 kg (253 lb 3.2 oz)   SpO2 99%   BMI 40.87 kg/m²   Lab Results   Component Value Date    WBC 8.6 10/11/2023    HGB 15.4 10/11/2023    HCT 45.5 10/11/2023     10/11/2023    " CHOL 151 10/11/2023    TRIG 197 (H) 10/11/2023    HDL 32.7 10/11/2023    ALT 18 10/11/2023    AST 23 10/11/2023     10/11/2023    K 4.3 10/11/2023     10/11/2023    CREATININE 0.70 10/11/2023    BUN 15 10/11/2023    CO2 29 10/11/2023    TSH 2.14 10/11/2023     par   Physical Exam  Vitals and nursing note reviewed.   Constitutional:       Appearance: Normal appearance.   HENT:      Head: Normocephalic.      Nose: Nose normal.   Eyes:      Conjunctiva/sclera: Conjunctivae normal.      Pupils: Pupils are equal, round, and reactive to light.   Cardiovascular:      Rate and Rhythm: Regular rhythm.   Pulmonary:      Effort: Pulmonary effort is normal.      Breath sounds: Normal breath sounds.   Abdominal:      General: Abdomen is flat.      Palpations: Abdomen is soft.   Musculoskeletal:      Cervical back: Neck supple.   Skin:     General: Skin is warm.   Neurological:      General: No focal deficit present.      Mental Status: She is oriented to person, place, and time.   Psychiatric:         Mood and Affect: Mood normal.         Assessment/Plan   Elisha was seen today for hypertension.  Diagnoses and all orders for this visit:  Benign essential hypertension (Primary)  Chronic diastolic congestive heart failure (Multi)  Hypercholesteremia  Hypertension, unspecified type  Vitamin D deficiency, unspecified  Hyperlipidemia, unspecified hyperlipidemia type  Other orders  -     Follow Up In Primary Care - Established   - Hypertension now being much better controlled patient off decongestants avoid NSAIDs increase fluid intake  --- CHF and leg edema responded very well to compression hoses may increase Lasix to take 2 tablets daily if worsening edema for 1 week then to resume only 1 tablet a day after that follow-up progress closely controlled  -Cervical disc disease and neuralgia seen by pain management, doing well  -Hypercholesterolemia need to restart Crestor 10 mg as recommended  -chronic migraines,  controlled no recurrence continue on propranolol  -Hypokalemia on potassium 10 mEq daily blood work  Morbid obesity patient BMI 40 need counseled about low-carb diet exercise  Follow-up 3 months

## 2024-06-12 DIAGNOSIS — I10 BENIGN ESSENTIAL HYPERTENSION: ICD-10-CM

## 2024-06-13 RX ORDER — PROPRANOLOL HYDROCHLORIDE 120 MG/1
120 CAPSULE, EXTENDED RELEASE ORAL NIGHTLY
Qty: 90 CAPSULE | Refills: 1 | Status: SHIPPED | OUTPATIENT
Start: 2024-06-13

## 2024-07-29 ENCOUNTER — APPOINTMENT (OUTPATIENT)
Dept: PRIMARY CARE | Facility: CLINIC | Age: 79
End: 2024-07-29
Payer: MEDICARE

## 2024-07-29 VITALS
DIASTOLIC BLOOD PRESSURE: 80 MMHG | WEIGHT: 253.6 LBS | BODY MASS INDEX: 40.93 KG/M2 | OXYGEN SATURATION: 97 % | HEART RATE: 69 BPM | SYSTOLIC BLOOD PRESSURE: 148 MMHG | TEMPERATURE: 95.9 F

## 2024-07-29 DIAGNOSIS — R53.83 OTHER FATIGUE: ICD-10-CM

## 2024-07-29 DIAGNOSIS — I50.32 CHRONIC DIASTOLIC CONGESTIVE HEART FAILURE (MULTI): ICD-10-CM

## 2024-07-29 DIAGNOSIS — E53.8 VITAMIN B12 DEFICIENCY: ICD-10-CM

## 2024-07-29 DIAGNOSIS — I10 HYPERTENSION, UNSPECIFIED TYPE: ICD-10-CM

## 2024-07-29 DIAGNOSIS — Z12.31 ENCOUNTER FOR SCREENING MAMMOGRAM FOR MALIGNANT NEOPLASM OF BREAST: ICD-10-CM

## 2024-07-29 DIAGNOSIS — I10 BENIGN ESSENTIAL HYPERTENSION: ICD-10-CM

## 2024-07-29 DIAGNOSIS — E78.00 HYPERCHOLESTEREMIA: ICD-10-CM

## 2024-07-29 DIAGNOSIS — J32.9 CHRONIC SINUSITIS, UNSPECIFIED LOCATION: Primary | ICD-10-CM

## 2024-07-29 DIAGNOSIS — Z79.899 HIGH RISK MEDICATION USE: ICD-10-CM

## 2024-07-29 DIAGNOSIS — E78.5 HYPERLIPIDEMIA, UNSPECIFIED HYPERLIPIDEMIA TYPE: ICD-10-CM

## 2024-07-29 DIAGNOSIS — E55.9 VITAMIN D DEFICIENCY, UNSPECIFIED: ICD-10-CM

## 2024-07-29 PROCEDURE — 1036F TOBACCO NON-USER: CPT | Performed by: INTERNAL MEDICINE

## 2024-07-29 PROCEDURE — 3079F DIAST BP 80-89 MM HG: CPT | Performed by: INTERNAL MEDICINE

## 2024-07-29 PROCEDURE — 1159F MED LIST DOCD IN RCRD: CPT | Performed by: INTERNAL MEDICINE

## 2024-07-29 PROCEDURE — 1160F RVW MEDS BY RX/DR IN RCRD: CPT | Performed by: INTERNAL MEDICINE

## 2024-07-29 PROCEDURE — 3077F SYST BP >= 140 MM HG: CPT | Performed by: INTERNAL MEDICINE

## 2024-07-29 PROCEDURE — 99214 OFFICE O/P EST MOD 30 MIN: CPT | Performed by: INTERNAL MEDICINE

## 2024-07-29 RX ORDER — FLUTICASONE PROPIONATE 50 MCG
1 SPRAY, SUSPENSION (ML) NASAL DAILY
Qty: 16 G | Refills: 11 | Status: SHIPPED | OUTPATIENT
Start: 2024-07-29 | End: 2025-07-29

## 2024-07-29 RX ORDER — CETIRIZINE HYDROCHLORIDE 10 MG/1
10 TABLET ORAL DAILY
Qty: 30 TABLET | Refills: 2 | Status: SHIPPED | OUTPATIENT
Start: 2024-07-29 | End: 2024-10-27

## 2024-07-29 ASSESSMENT — ENCOUNTER SYMPTOMS
SINUS PAIN: 0
WHEEZING: 0
SORE THROAT: 0
FATIGUE: 0
DIFFICULTY URINATING: 0
DIARRHEA: 0
HEADACHES: 0
BRUISES/BLEEDS EASILY: 0
FEVER: 0
BLOOD IN STOOL: 0
ABDOMINAL PAIN: 0
ARTHRALGIAS: 0
HYPERTENSION: 1
PALPITATIONS: 0
UNEXPECTED WEIGHT CHANGE: 0
COUGH: 0
DIZZINESS: 1

## 2024-07-29 NOTE — PROGRESS NOTES
Subjective   Patient ID: Elisha Flores is a 78 y.o. female who presents for Hypertension, Hyperlipidemia, and Fog (Seems to be in a fog).    - Bone densitometry showed normal results continue with calcium and vitamin D exercise patient reassured  - Mammogram within normal limits repeat in 6 months  - Needs to complete blood work next appointment  -Chronic vertigo and dizziness underlying chronic sinusitis patient to start on Flonase Zyrtec every night follow-up if no improvement  -Hypertension controlled continue with current medication  --- CHF and leg edema responded very well to compression hoses may increase Lasix to take 2 tablets daily if worsening edema for 1 week then to resume only 1 tablet a day after that follow-up progress closely controlled.  -Cervical disc disease and neuralgia seen by pain management, doing well.  -Hypercholesterolemia need to restart Crestor 10 mg as recommended.  -chronic migraines, controlled no recurrence continue on propranolol.  -Hypokalemia on potassium 10 mEq daily .  Morbid obesity patient BMI 40 need counseled about low-carb diet exercisel.  Follow-up 3 months            Hypertension  Pertinent negatives include no chest pain, headaches or palpitations.   Hyperlipidemia  Pertinent negatives include no chest pain.          Review of Systems   Constitutional:  Negative for fatigue, fever and unexpected weight change.   HENT:  Negative for congestion, ear discharge, ear pain, mouth sores, sinus pain and sore throat.    Eyes:  Negative for visual disturbance.   Respiratory:  Negative for cough and wheezing.    Cardiovascular:  Negative for chest pain, palpitations and leg swelling.   Gastrointestinal:  Negative for abdominal pain, blood in stool and diarrhea.   Genitourinary:  Negative for difficulty urinating.   Musculoskeletal:  Negative for arthralgias.   Skin:  Negative for rash.   Neurological:  Positive for dizziness. Negative for headaches.   Hematological:  Does not  "bruise/bleed easily.   Psychiatric/Behavioral:  Negative for behavioral problems.    All other systems reviewed and are negative.      Objective   No results found for: \"HGBA1C\"   /80   Pulse 69   Temp 35.5 °C (95.9 °F)   Wt 115 kg (253 lb 9.6 oz)   SpO2 97%   BMI 40.93 kg/m²   Lab Results   Component Value Date    WBC 8.6 10/11/2023    HGB 15.4 10/11/2023    HCT 45.5 10/11/2023     10/11/2023    CHOL 151 10/11/2023    TRIG 197 (H) 10/11/2023    HDL 32.7 10/11/2023    ALT 18 10/11/2023    AST 23 10/11/2023     10/11/2023    K 4.3 10/11/2023     10/11/2023    CREATININE 0.70 10/11/2023    BUN 15 10/11/2023    CO2 29 10/11/2023    TSH 2.14 10/11/2023     par   Physical Exam  Vitals and nursing note reviewed.   Constitutional:       Appearance: Normal appearance. She is obese.   HENT:      Head: Normocephalic.      Nose: Nose normal.   Eyes:      Conjunctiva/sclera: Conjunctivae normal.      Pupils: Pupils are equal, round, and reactive to light.   Cardiovascular:      Rate and Rhythm: Regular rhythm.   Pulmonary:      Effort: Pulmonary effort is normal.      Breath sounds: Normal breath sounds.   Abdominal:      General: Abdomen is flat.      Palpations: Abdomen is soft.   Musculoskeletal:      Cervical back: Neck supple.      Right lower leg: Edema present.      Left lower leg: Edema present.   Skin:     General: Skin is warm.   Neurological:      General: No focal deficit present.      Mental Status: She is oriented to person, place, and time.   Psychiatric:         Mood and Affect: Mood normal.         Assessment/Plan   Elisha was seen today for hypertension, hyperlipidemia and fog.  Diagnoses and all orders for this visit:  Chronic sinusitis, unspecified location (Primary)  -     fluticasone (Flonase) 50 mcg/actuation nasal spray; Administer 1 spray into each nostril once daily. Shake gently. Before first use, prime pump. After use, clean tip and replace cap.  -     cetirizine " (ZyrTEC) 10 mg tablet; Take 1 tablet (10 mg) by mouth once daily.  Hypertension, unspecified type  -     Comprehensive Metabolic Panel; Future  Chronic diastolic congestive heart failure (Multi)  Encounter for screening mammogram for malignant neoplasm of breast  -     BI mammo bilateral screening tomosynthesis; Future  High risk medication use  -     CBC and Auto Differential; Future  Hypercholesteremia  -     Lipid Panel; Future  Other fatigue  -     TSH with reflex to Free T4 if abnormal; Future  Vitamin B12 deficiency  -     Vitamin B12; Future  Vitamin D deficiency, unspecified  -     Vitamin D 25-Hydroxy,Total (for eval of Vitamin D levels); Future  Hyperlipidemia, unspecified hyperlipidemia type  Benign essential hypertension  Other orders  -     Follow Up In Primary Care - Medicare Annual; Future   - Bone densitometry showed normal results continue with calcium and vitamin D exercise patient reassured  - Mammogram within normal limits repeat in 6 months  - Needs to complete blood work next appointment  -Chronic vertigo and dizziness underlying chronic sinusitis patient to start on Flonase Zyrtec every night follow-up if no improvement  -Hypertension controlled continue with current medication  --- CHF and leg edema responded very well to compression hoses may increase Lasix to take 2 tablets daily if worsening edema for 1 week then to resume only 1 tablet a day after that follow-up progress closely controlled.  -Cervical disc disease and neuralgia seen by pain management, doing well.  -Hypercholesterolemia need to restart Crestor 10 mg as recommended.  -chronic migraines, controlled no recurrence continue on propranolol.  -Hypokalemia on potassium 10 mEq daily .  Morbid obesity patient BMI 40 need counseled about low-carb diet exercisel.  Follow-up 3 months

## 2024-08-29 DIAGNOSIS — I10 BENIGN ESSENTIAL HYPERTENSION: ICD-10-CM

## 2024-08-30 RX ORDER — FUROSEMIDE 20 MG/1
20 TABLET ORAL DAILY
Qty: 90 TABLET | Refills: 1 | Status: SHIPPED | OUTPATIENT
Start: 2024-08-30

## 2024-08-30 RX ORDER — LOSARTAN POTASSIUM 100 MG/1
100 TABLET ORAL DAILY
Qty: 90 TABLET | Refills: 1 | Status: SHIPPED | OUTPATIENT
Start: 2024-08-30

## 2024-09-19 DIAGNOSIS — I10 BENIGN ESSENTIAL HYPERTENSION: ICD-10-CM

## 2024-09-20 RX ORDER — AMLODIPINE BESYLATE 10 MG/1
10 TABLET ORAL DAILY
Qty: 90 TABLET | Refills: 1 | Status: SHIPPED | OUTPATIENT
Start: 2024-09-20

## 2024-10-15 ENCOUNTER — LAB (OUTPATIENT)
Dept: LAB | Facility: LAB | Age: 79
End: 2024-10-15
Payer: COMMERCIAL

## 2024-10-15 DIAGNOSIS — R53.83 OTHER FATIGUE: ICD-10-CM

## 2024-10-15 DIAGNOSIS — I10 HYPERTENSION, UNSPECIFIED TYPE: ICD-10-CM

## 2024-10-15 DIAGNOSIS — E78.00 HYPERCHOLESTEREMIA: ICD-10-CM

## 2024-10-15 DIAGNOSIS — E55.9 VITAMIN D DEFICIENCY, UNSPECIFIED: ICD-10-CM

## 2024-10-15 DIAGNOSIS — E53.8 VITAMIN B12 DEFICIENCY: ICD-10-CM

## 2024-10-15 DIAGNOSIS — Z79.899 HIGH RISK MEDICATION USE: ICD-10-CM

## 2024-10-15 LAB
ALBUMIN SERPL BCP-MCNC: 4.5 G/DL (ref 3.4–5)
ALP SERPL-CCNC: 71 U/L (ref 33–136)
ALT SERPL W P-5'-P-CCNC: 18 U/L (ref 7–45)
ANION GAP SERPL CALC-SCNC: 11 MMOL/L (ref 10–20)
AST SERPL W P-5'-P-CCNC: 22 U/L (ref 9–39)
BASOPHILS # BLD AUTO: 0.09 X10*3/UL (ref 0–0.1)
BASOPHILS NFR BLD AUTO: 1 %
BILIRUB SERPL-MCNC: 1.1 MG/DL (ref 0–1.2)
BUN SERPL-MCNC: 15 MG/DL (ref 6–23)
CALCIUM SERPL-MCNC: 9.4 MG/DL (ref 8.6–10.3)
CHLORIDE SERPL-SCNC: 103 MMOL/L (ref 98–107)
CHOLEST SERPL-MCNC: 106 MG/DL (ref 0–199)
CHOLESTEROL/HDL RATIO: 2.6
CO2 SERPL-SCNC: 28 MMOL/L (ref 21–32)
CREAT SERPL-MCNC: 0.65 MG/DL (ref 0.5–1.05)
EGFRCR SERPLBLD CKD-EPI 2021: >90 ML/MIN/1.73M*2
EOSINOPHIL # BLD AUTO: 0.18 X10*3/UL (ref 0–0.4)
EOSINOPHIL NFR BLD AUTO: 1.9 %
ERYTHROCYTE [DISTWIDTH] IN BLOOD BY AUTOMATED COUNT: 15 % (ref 11.5–14.5)
GLUCOSE SERPL-MCNC: 113 MG/DL (ref 74–99)
HCT VFR BLD AUTO: 44.8 % (ref 36–46)
HDLC SERPL-MCNC: 40.5 MG/DL
HGB BLD-MCNC: 15.7 G/DL (ref 12–16)
IMM GRANULOCYTES # BLD AUTO: 0.07 X10*3/UL (ref 0–0.5)
IMM GRANULOCYTES NFR BLD AUTO: 0.8 % (ref 0–0.9)
LDLC SERPL CALC-MCNC: 35 MG/DL
LYMPHOCYTES # BLD AUTO: 2 X10*3/UL (ref 0.8–3)
LYMPHOCYTES NFR BLD AUTO: 21.6 %
MCH RBC QN AUTO: 31.5 PG (ref 26–34)
MCHC RBC AUTO-ENTMCNC: 35 G/DL (ref 32–36)
MCV RBC AUTO: 90 FL (ref 80–100)
MONOCYTES # BLD AUTO: 0.72 X10*3/UL (ref 0.05–0.8)
MONOCYTES NFR BLD AUTO: 7.8 %
NEUTROPHILS # BLD AUTO: 6.22 X10*3/UL (ref 1.6–5.5)
NEUTROPHILS NFR BLD AUTO: 66.9 %
NON HDL CHOLESTEROL: 66 MG/DL (ref 0–149)
NRBC BLD-RTO: 0 /100 WBCS (ref 0–0)
PLATELET # BLD AUTO: 174 X10*3/UL (ref 150–450)
POTASSIUM SERPL-SCNC: 4 MMOL/L (ref 3.5–5.3)
PROT SERPL-MCNC: 6.8 G/DL (ref 6.4–8.2)
RBC # BLD AUTO: 4.99 X10*6/UL (ref 4–5.2)
SODIUM SERPL-SCNC: 138 MMOL/L (ref 136–145)
TRIGL SERPL-MCNC: 155 MG/DL (ref 0–149)
TSH SERPL-ACNC: 2.06 MIU/L (ref 0.44–3.98)
VLDL: 31 MG/DL (ref 0–40)
WBC # BLD AUTO: 9.3 X10*3/UL (ref 4.4–11.3)

## 2024-10-15 PROCEDURE — 82306 VITAMIN D 25 HYDROXY: CPT

## 2024-10-15 PROCEDURE — 82607 VITAMIN B-12: CPT

## 2024-10-15 PROCEDURE — 36415 COLL VENOUS BLD VENIPUNCTURE: CPT

## 2024-10-16 LAB
25(OH)D3 SERPL-MCNC: 67 NG/ML (ref 30–100)
VIT B12 SERPL-MCNC: 756 PG/ML (ref 211–911)

## 2024-10-28 ENCOUNTER — APPOINTMENT (OUTPATIENT)
Dept: PRIMARY CARE | Facility: CLINIC | Age: 79
End: 2024-10-28
Payer: MEDICARE

## 2024-10-28 VITALS
DIASTOLIC BLOOD PRESSURE: 80 MMHG | SYSTOLIC BLOOD PRESSURE: 138 MMHG | HEART RATE: 75 BPM | TEMPERATURE: 96.3 F | BODY MASS INDEX: 40.76 KG/M2 | OXYGEN SATURATION: 98 % | WEIGHT: 253.6 LBS | HEIGHT: 66 IN

## 2024-10-28 DIAGNOSIS — Z00.00 ROUTINE GENERAL MEDICAL EXAMINATION AT HEALTH CARE FACILITY: Primary | ICD-10-CM

## 2024-10-28 DIAGNOSIS — E55.9 VITAMIN D DEFICIENCY, UNSPECIFIED: ICD-10-CM

## 2024-10-28 DIAGNOSIS — E78.5 HYPERLIPIDEMIA, UNSPECIFIED HYPERLIPIDEMIA TYPE: ICD-10-CM

## 2024-10-28 DIAGNOSIS — I10 HYPERTENSION, UNSPECIFIED TYPE: ICD-10-CM

## 2024-10-28 DIAGNOSIS — I10 BENIGN ESSENTIAL HYPERTENSION: ICD-10-CM

## 2024-10-28 DIAGNOSIS — I50.32 CHRONIC DIASTOLIC CONGESTIVE HEART FAILURE: ICD-10-CM

## 2024-10-28 DIAGNOSIS — J32.9 CHRONIC SINUSITIS, UNSPECIFIED LOCATION: ICD-10-CM

## 2024-10-28 DIAGNOSIS — Z23 IMMUNIZATION DUE: ICD-10-CM

## 2024-10-28 DIAGNOSIS — E66.01 MORBID OBESITY WITH BODY MASS INDEX (BMI) OF 40.0 TO 44.9 IN ADULT (MULTI): ICD-10-CM

## 2024-10-28 PROCEDURE — G0439 PPPS, SUBSEQ VISIT: HCPCS | Performed by: INTERNAL MEDICINE

## 2024-10-28 PROCEDURE — 3079F DIAST BP 80-89 MM HG: CPT | Performed by: INTERNAL MEDICINE

## 2024-10-28 PROCEDURE — 1170F FXNL STATUS ASSESSED: CPT | Performed by: INTERNAL MEDICINE

## 2024-10-28 PROCEDURE — 1159F MED LIST DOCD IN RCRD: CPT | Performed by: INTERNAL MEDICINE

## 2024-10-28 PROCEDURE — 1036F TOBACCO NON-USER: CPT | Performed by: INTERNAL MEDICINE

## 2024-10-28 PROCEDURE — G0008 ADMIN INFLUENZA VIRUS VAC: HCPCS | Performed by: INTERNAL MEDICINE

## 2024-10-28 PROCEDURE — 1160F RVW MEDS BY RX/DR IN RCRD: CPT | Performed by: INTERNAL MEDICINE

## 2024-10-28 PROCEDURE — 3075F SYST BP GE 130 - 139MM HG: CPT | Performed by: INTERNAL MEDICINE

## 2024-10-28 PROCEDURE — 1124F ACP DISCUSS-NO DSCNMKR DOCD: CPT | Performed by: INTERNAL MEDICINE

## 2024-10-28 PROCEDURE — 90662 IIV NO PRSV INCREASED AG IM: CPT | Performed by: INTERNAL MEDICINE

## 2024-10-28 RX ORDER — AMLODIPINE BESYLATE 10 MG/1
10 TABLET ORAL DAILY
Qty: 90 TABLET | Refills: 1 | Status: SHIPPED | OUTPATIENT
Start: 2024-10-28 | End: 2024-10-28 | Stop reason: SDUPTHER

## 2024-10-28 RX ORDER — CETIRIZINE HYDROCHLORIDE 10 MG/1
10 TABLET ORAL DAILY
Qty: 30 TABLET | Refills: 2 | Status: SHIPPED | OUTPATIENT
Start: 2024-10-28 | End: 2025-01-26

## 2024-10-28 RX ORDER — AMLODIPINE BESYLATE 10 MG/1
10 TABLET ORAL DAILY
Qty: 90 TABLET | Refills: 1 | Status: SHIPPED | OUTPATIENT
Start: 2024-10-28

## 2024-10-28 RX ORDER — FUROSEMIDE 20 MG/1
20 TABLET ORAL DAILY
Qty: 90 TABLET | Refills: 1 | Status: SHIPPED | OUTPATIENT
Start: 2024-10-28

## 2024-10-28 RX ORDER — LOSARTAN POTASSIUM 100 MG/1
100 TABLET ORAL DAILY
Qty: 90 TABLET | Refills: 1 | Status: SHIPPED | OUTPATIENT
Start: 2024-10-28

## 2024-10-28 ASSESSMENT — ENCOUNTER SYMPTOMS
BRUISES/BLEEDS EASILY: 0
DIARRHEA: 0
FEVER: 0
UNEXPECTED WEIGHT CHANGE: 0
DIFFICULTY URINATING: 0
PALPITATIONS: 0
SORE THROAT: 0
BLOOD IN STOOL: 0
WHEEZING: 0
ARTHRALGIAS: 0
FATIGUE: 0
ABDOMINAL PAIN: 0
SINUS PAIN: 0
COUGH: 0
HEADACHES: 0
DIZZINESS: 0

## 2024-10-28 ASSESSMENT — PATIENT HEALTH QUESTIONNAIRE - PHQ9
SUM OF ALL RESPONSES TO PHQ9 QUESTIONS 1 AND 2: 0
2. FEELING DOWN, DEPRESSED OR HOPELESS: NOT AT ALL
1. LITTLE INTEREST OR PLEASURE IN DOING THINGS: NOT AT ALL

## 2024-10-28 ASSESSMENT — ACTIVITIES OF DAILY LIVING (ADL)
MANAGING_FINANCES: INDEPENDENT
GROCERY_SHOPPING: INDEPENDENT
DOING_HOUSEWORK: INDEPENDENT
DRESSING: INDEPENDENT
BATHING: INDEPENDENT
TAKING_MEDICATION: INDEPENDENT

## 2024-10-31 DIAGNOSIS — E78.5 HYPERLIPIDEMIA, UNSPECIFIED HYPERLIPIDEMIA TYPE: ICD-10-CM

## 2024-10-31 RX ORDER — ROSUVASTATIN CALCIUM 10 MG/1
10 TABLET, COATED ORAL DAILY
Qty: 90 TABLET | Refills: 1 | Status: SHIPPED | OUTPATIENT
Start: 2024-10-31

## 2024-12-19 DIAGNOSIS — I10 BENIGN ESSENTIAL HYPERTENSION: ICD-10-CM

## 2024-12-19 RX ORDER — PROPRANOLOL HYDROCHLORIDE 120 MG/1
120 CAPSULE, EXTENDED RELEASE ORAL NIGHTLY
Qty: 90 CAPSULE | Refills: 1 | Status: SHIPPED | OUTPATIENT
Start: 2024-12-19

## 2025-03-03 ENCOUNTER — APPOINTMENT (OUTPATIENT)
Dept: RADIOLOGY | Facility: HOSPITAL | Age: 80
End: 2025-03-03
Payer: MEDICARE

## 2025-03-17 ENCOUNTER — HOSPITAL ENCOUNTER (OUTPATIENT)
Dept: RADIOLOGY | Facility: HOSPITAL | Age: 80
Discharge: HOME | End: 2025-03-17
Payer: MEDICARE

## 2025-03-17 VITALS — HEIGHT: 65 IN | BODY MASS INDEX: 42.15 KG/M2 | WEIGHT: 253 LBS

## 2025-03-17 DIAGNOSIS — Z12.31 ENCOUNTER FOR SCREENING MAMMOGRAM FOR MALIGNANT NEOPLASM OF BREAST: ICD-10-CM

## 2025-03-17 PROCEDURE — 77063 BREAST TOMOSYNTHESIS BI: CPT | Performed by: RADIOLOGY

## 2025-03-17 PROCEDURE — 77067 SCR MAMMO BI INCL CAD: CPT | Performed by: RADIOLOGY

## 2025-03-17 PROCEDURE — 77063 BREAST TOMOSYNTHESIS BI: CPT

## 2025-04-03 ENCOUNTER — HOSPITAL ENCOUNTER (OUTPATIENT)
Dept: RADIOLOGY | Facility: HOSPITAL | Age: 80
Discharge: HOME | End: 2025-04-03
Payer: MEDICARE

## 2025-04-03 DIAGNOSIS — R92.8 ABNORMAL MAMMOGRAM: ICD-10-CM

## 2025-04-03 DIAGNOSIS — N63.20 MASS OF LEFT BREAST, UNSPECIFIED QUADRANT: Primary | ICD-10-CM

## 2025-04-03 PROCEDURE — 76642 ULTRASOUND BREAST LIMITED: CPT | Mod: LT

## 2025-04-03 PROCEDURE — 76982 USE 1ST TARGET LESION: CPT | Mod: LT

## 2025-04-03 PROCEDURE — 77065 DX MAMMO INCL CAD UNI: CPT | Mod: LT

## 2025-04-07 ENCOUNTER — APPOINTMENT (OUTPATIENT)
Dept: RADIOLOGY | Facility: HOSPITAL | Age: 80
End: 2025-04-07
Payer: MEDICARE

## 2025-04-09 NOTE — PROGRESS NOTES
Subjective   Patient ID: Elisha Flores is a 79 y.o. female who presents for Left US breast biopsy consult, 2 lesions in Left breast, bx on 4/23.  HPI  Patient is new to clinic and presents for Left US breast biopsy consult, 2 lesions in Left breast, bx on 4/23.  Patient is referred by Dr. Shun Hall.  Patient last seen Dr. Covington in the clinic on 7/20/2017 for left breast pain.  Patient had BI mammogram bilateral screening tomosynthesis completed on 3/17/2025. Impression was A subcentimeter left anterior depth focal asymmetry should be further evaluated with ultrasound.  Patient had BI US breat limited left and BI mammogram left diagnostic tomosynthesis completed on 4/3/2025. Impression was Irregular 1.2 cm mass in the left breast at 12 o'clock 5 cm from the nipple is suspicious for malignancy. Small 7 mm mass in the left breast at 1 o'clock 1 cm from the  nipple is also suspicious. No left axillary lymphadenopathy   Patient was treated by me between 9/2006 and 1/2011 for abnormal mammograms that were benign and therefore, treated conservatively. She was offered a Mammotome biopsy from the beginning, but declined.  She recently underwent a screening mammogram with natalie at [facility] (we have disc and reports). Noted are scattered fibroglandular tissues. Left breast was WNL. Right breast revealed a possible area of focal architectural distortion involving the lateral right breast on 3D CC view. However, compression views on the right showed no evidence of findings on screening mammogram. Birads category 1-negative.    With family history of colon cancer in her mother.  Patient last colonoscopy 2019 at which time she was found to have diverticulosis.  Remotely she had a colonoscopy that revealed hyperplastic polyps.  Patient's last colonoscopy 2019 was told she did not need any more colonoscopies.    BI MAMMO LEFT DIAGNOSTIC TOMOSYNTHESIS; BI US BREAST LIMITED LEFT;     INDICATION:  Recall from screening for a  left breast finding.      ,R92.8 Other abnormal and inconclusive findings on diagnostic imaging  of breast      COMPARISON:  Prior mammograms between 02/23/2023 and 03/17/2025.      FINDINGS:  MAMMOGRAPHY: 2D and tomosynthesis images were reviewed at 1 mm slice  thickness.      Density:  There are scattered areas of fibroglandular density.      There is a new 1.2 cm irregular mass with lobulated and spiculated  margins in the left breast at 12 o'clock middle depth 5 cm from the  nipple.      There is also a small 7 mm oval, circumscribed and gently lobulated  mass with a couple of calcifications in the left breast at 12-1  o'clock anterior depth 1 cm from the nipple.      Targeted ultrasound was obtained for further evaluation of the above  findings.      ULTRASOUND: Targeted ultrasound was performed of the left breast and  left axilla by a registered sonographer with elastography.      In the left breast at 12 o'clock 5 cm from the nipple, there is 1.2 x  0.7 x 0.7 cm irregular hypoechoic mass with angular and indistinct  margins, internal vascularity on color Doppler and hard on  elastography. A subtle surrounding echogenic halo is noted. This  correlates with the mammographic finding.      In the left breast at 1 o'clock 1 cm from the nipple, there is a  taller than wide hypoechoic mass measuring 7 x 5 x 4 mm. It has  lobulated and indistinct margins and peripheral vascularity on color  Doppler. A few punctate echogenic foci in the mass seen on cine clip  images correspond with the associated calcifications seen  mammographically.      Left axillary lymph node is normal in size and morphology.      IMPRESSION:  1. Irregular 1.2 cm mass in the left breast at 12 o'clock 5 cm from  the nipple is suspicious for malignancy.  2. Small 7 mm mass in the left breast at 1 o'clock 1 cm from the  nipple is also suspicious.  3. No left axillary lymphadenopathy.      Ultrasound-guided biopsy of the masses in the left breast at  12  o'clock and 1 o'clock is recommended. Results and recommendations  were discussed with the patient by Dr. Nation at the conclusion of the  exam.      BI-RADS CATEGORY:  BI-RADS Category:  4 Suspicious.  Recommendation:  Surgical Consultation and Biopsy x 2 sites left  breast. Recommended Date:  Immediate.  Laterality:  Left.      MACRO:  Critical Finding:  See findings. Notification was initiated on  4/3/2025 at 4:59 pm by Dr. Clare Nation.  (**-YCF-**) Instructions:    BI MAMMO BILATERAL SCREENING TOMOSYNTHESIS;  3/17/2025 11:30 am      INDICATION:  Screening. History of a right breast injury from a fall 2 weeks ago  and a sister and maternal aunt with breast cancer.      Z12.31 Encounter for screening mammogram for malignant neoplasm of  breast      COMPARISON:  02/27/2024 and 02/23/2023 bilateral screening mammograms with  tomosynthesis.      FINDINGS:  2D and tomosynthesis images were reviewed at 1 mm slice thickness.      Density: There are scattered areas of fibroglandular density.      A 6-8 mm lobular circumscribed equal density focal asymmetry that  contains a few punctate calcifications near the left nipple plane at  anterior depth was not apparent in February 2023 and should be  evaluated with ultrasound, and additional views if necessary.  Innumerable right and much fewer left punctate, round and rim  calcifications are widespread. No suspicious calcifications or right  breast masses are identified.      IMPRESSION:  A subcentimeter left anterior depth focal asymmetry should be further  evaluated with ultrasound.      BI-RADS CATEGORY:  BI-RADS Category:  0 Incomplete; Need Additional Imaging Evaluation  Recommendation:  Additional Imaging.  Recommended Date:  Immediate.  Laterality:  Left.     MACRO:  None    Previous Biopsy: No. Menarche age: 15. Age of first delivery: 22. Breastfeed: No. Menopause age: 40's. HRT: No. Family history of breast cancer: Yes, see history. Family history of ovarian  cancer: No. Family history of pancreatic cancer: No.     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    Bursitis of right shoulder 11/25/2019    Subdeltoid bursitis of right shoulder joint    Encounter for general adult medical examination without abnormal findings 04/26/2018    Encounter for Medicare annual wellness exam    Halitosis 05/22/2019    Halitosis    Localized swelling, mass and lump, head 12/28/2016    Left facial swelling    Localized swelling, mass and lump, head 12/29/2022    Facial mass    Other specified anxiety disorders 12/19/2014    Depression with anxiety    Other specified disorders of nose and nasal sinuses 04/18/2017    Jacque bullosa    Personal history of diseases of the skin and subcutaneous tissue     History of psoriasis    Personal history of other diseases of the circulatory system     History of congestive heart failure    Personal history of other diseases of the circulatory system 11/15/2019    History of uncontrolled hypertension    Personal history of other diseases of the circulatory system     History of hypertension    Personal history of other diseases of the nervous system and sense organs     History of sciatica    Personal history of other diseases of the respiratory system 04/18/2017    History of deviated nasal septum    Personal history of other diseases of the respiratory system 02/27/2017    History of sinusitis    Personal history of other diseases of the respiratory system 05/28/2019    History of chronic sinusitis    Personal  "history of other diseases of the respiratory system 03/19/2014    History of acute sinusitis    Personal history of other specified conditions 03/26/2013    History of insomnia    Personal history of other specified conditions 07/09/2019    History of nasal congestion    Personal history of other specified conditions 11/17/2016    History of diarrhea    Unspecified blepharitis right eye, unspecified eyelid 01/22/2019    Blepharitis of eyelid of right eye    Unspecified mycosis 06/18/2019    Fungal sinusitis     Family History   Problem Relation Name Age of Onset    Cancer Sister branden rain 65    Breast cancer Sister branden rain 50 - 59    Breast cancer Mother's Sister  70 - 79     Past Surgical History:   Procedure Laterality Date    HYSTERECTOMY  04/04/2013    Hysterectomy    OTHER SURGICAL HISTORY  11/22/2022    Eye surgery    OTHER SURGICAL HISTORY  11/22/2022    Nose surgery    OTHER SURGICAL HISTORY  11/22/2022    Back surgery     Allergies   Allergen Reactions    Ciprofloxacin Unknown    Codeine Unknown and Swelling    Hydrochlorothiazide Unknown    Penicillins Unknown and Swelling    Tetanus Vaccines And Toxoid Swelling       Review of Systems  /90 (BP Location: Left arm, Patient Position: Sitting, BP Cuff Size: Adult)   Pulse 67   Temp 36.3 °C (97.4 °F) (Temporal)   Resp 17   Ht 1.651 m (5' 5\")   Wt 115 kg (253 lb)   SpO2 98%   BMI 42.10 kg/m²    Objective   Physical Exam  Physical Exam:  /90 (BP Location: Left arm, Patient Position: Sitting, BP Cuff Size: Adult)   Pulse 67   Temp 36.3 °C (97.4 °F) (Temporal)   Resp 17   Ht 1.651 m (5' 5\")   Wt 115 kg (253 lb)   SpO2 98%   BMI 42.10 kg/m²    GENERAL APPEARANCE: Patient appears in no acute distress.   EYES: Sclera non-icteric, PERRLA.   ENT Normal appearance of ears and nose.   NECK/THYROID: Neck: no masses. Thyroid: no masses.   LYMPH NODES: No cervical or supraclavicular lymphadenopathy.   CARDIOVASCULAR Heart: RRR, no murmurs; " Carotid bruits: none; Peripheral edema: none.   RESPIRATORY: Lungs: Bilateral inspiratory and expiratory wheezing; no respiratory distress.   BREASTS: Exam done both in upright and supine position. On inspection no skin dimpling, no peau d'orange, large pendulous breasts; Masses: none palpable in either breast.  Axillary lymphadenopathy: none.   GI (ABDOMEN) No intraabdominal mass appreciated, no hepatosplenomegaly; Hernia: small at umbilicus; Tenderness: none.   PSYCH: Patient oriented to time, place and person, normal affect.    Assessment/Plan   Problem List Items Addressed This Visit           ICD-10-CM    Personal history of hyperplastic colon polyps - Primary Z86.0102    With a remote history of hyperplastic polyps on colonoscopy most recent colonoscopy 2019 without polyps but sigmoid diverticulosis.  Patient's mother was diagnosed with colon cancer.  Patient was told by her last endoscopy she does not need any further colonoscopies.  She has not had any polyps since the first 1 remotely.  At this time recommend she continue at least with Cologuard test as she is very healthy at 79 and has a family history of colon cancer         Mass of upper outer quadrant of left breast N63.21    Patient with a 1.2 cm mass left breast 12 o'clock position and a 7 mm mass left breast 1 o'clock position.  Recommend ultrasound-guided core biopsy.  Patient to be scheduled for ultrasound guided  biopsy of the breast in the radiology department. Risk, benefits and alternatives to this plan were thoroughly discussed with the patient who agrees to proceed.  The procedure was also thoroughly discussed as well as her follow-up. She is to see me in the office in one week but I also will call as soon as I see the pathology which is usually 4 working days from procedure.          Umbilical hernia K42.9    Patient with asymptomatic umbilical hernia on exam.  Treat conservatively          Other Visit Diagnoses         Codes      Mass of  left breast, unspecified quadrant     N63.20             Breast History    Patient was treated by me between 9/2006 and 1/2011 for abnormal mammograms that were benign and therefore, treated conservatively. She was offered a Mammotome biopsy from the beginning, but declined.  2017 pain in the left breast. It has been going on for 6 months, and experiences occasional shooting pain and achiness. Patient with probable Coopers ligamet inflammation. There is no evidence of lesions on exam or on imaging. She was advised to wear a bra at night, and use heat and warm compresses. She is to follow-up with her primary for yearly mamnmograms and breast checks.   Previous Biopsy: No. Menarche age: 15. Age of first delivery: 22. Breastfeed: No. Menopause age: 40's. HRT: No. Family history of breast cancer: Yes, see history. Family history of ovarian cancer: No. Family history of pancreatic cancer: No.     Eden Covington MD 04/18/25 11:16 AM

## 2025-04-18 ENCOUNTER — OFFICE VISIT (OUTPATIENT)
Dept: SURGERY | Facility: CLINIC | Age: 80
End: 2025-04-18
Payer: MEDICARE

## 2025-04-18 VITALS
TEMPERATURE: 97.4 F | RESPIRATION RATE: 17 BRPM | WEIGHT: 253 LBS | OXYGEN SATURATION: 98 % | HEIGHT: 65 IN | SYSTOLIC BLOOD PRESSURE: 170 MMHG | HEART RATE: 67 BPM | DIASTOLIC BLOOD PRESSURE: 90 MMHG | BODY MASS INDEX: 42.15 KG/M2

## 2025-04-18 DIAGNOSIS — Z86.0102 PERSONAL HISTORY OF HYPERPLASTIC COLON POLYPS: Primary | ICD-10-CM

## 2025-04-18 DIAGNOSIS — N63.21 MASS OF UPPER OUTER QUADRANT OF LEFT BREAST: ICD-10-CM

## 2025-04-18 DIAGNOSIS — N63.20 MASS OF LEFT BREAST, UNSPECIFIED QUADRANT: ICD-10-CM

## 2025-04-18 DIAGNOSIS — K42.0 UMBILICAL HERNIA WITH OBSTRUCTION, WITHOUT GANGRENE: ICD-10-CM

## 2025-04-18 PROBLEM — K42.9 UMBILICAL HERNIA: Status: ACTIVE | Noted: 2025-04-18

## 2025-04-18 PROCEDURE — 3077F SYST BP >= 140 MM HG: CPT | Performed by: SURGERY

## 2025-04-18 PROCEDURE — 3080F DIAST BP >= 90 MM HG: CPT | Performed by: SURGERY

## 2025-04-18 PROCEDURE — 1159F MED LIST DOCD IN RCRD: CPT | Performed by: SURGERY

## 2025-04-18 PROCEDURE — 1036F TOBACCO NON-USER: CPT | Performed by: SURGERY

## 2025-04-18 PROCEDURE — 99214 OFFICE O/P EST MOD 30 MIN: CPT | Performed by: SURGERY

## 2025-04-18 PROCEDURE — 1126F AMNT PAIN NOTED NONE PRSNT: CPT | Performed by: SURGERY

## 2025-04-18 PROCEDURE — 99204 OFFICE O/P NEW MOD 45 MIN: CPT | Performed by: SURGERY

## 2025-04-18 ASSESSMENT — COLUMBIA-SUICIDE SEVERITY RATING SCALE - C-SSRS
2. HAVE YOU ACTUALLY HAD ANY THOUGHTS OF KILLING YOURSELF?: NO
6. HAVE YOU EVER DONE ANYTHING, STARTED TO DO ANYTHING, OR PREPARED TO DO ANYTHING TO END YOUR LIFE?: NO
1. IN THE PAST MONTH, HAVE YOU WISHED YOU WERE DEAD OR WISHED YOU COULD GO TO SLEEP AND NOT WAKE UP?: NO

## 2025-04-18 ASSESSMENT — LIFESTYLE VARIABLES
SKIP TO QUESTIONS 9-10: 1
AUDIT-C TOTAL SCORE: 0
HOW MANY STANDARD DRINKS CONTAINING ALCOHOL DO YOU HAVE ON A TYPICAL DAY: PATIENT DOES NOT DRINK
HOW OFTEN DO YOU HAVE SIX OR MORE DRINKS ON ONE OCCASION: NEVER
HOW OFTEN DO YOU HAVE A DRINK CONTAINING ALCOHOL: NEVER

## 2025-04-18 ASSESSMENT — ENCOUNTER SYMPTOMS
DEPRESSION: 0
OCCASIONAL FEELINGS OF UNSTEADINESS: 0
LOSS OF SENSATION IN FEET: 0

## 2025-04-18 ASSESSMENT — PATIENT HEALTH QUESTIONNAIRE - PHQ9
1. LITTLE INTEREST OR PLEASURE IN DOING THINGS: NOT AT ALL
SUM OF ALL RESPONSES TO PHQ9 QUESTIONS 1 & 2: 0
2. FEELING DOWN, DEPRESSED OR HOPELESS: NOT AT ALL

## 2025-04-18 ASSESSMENT — PAIN SCALES - GENERAL: PAINLEVEL_OUTOF10: 0-NO PAIN

## 2025-04-18 NOTE — ASSESSMENT & PLAN NOTE
With a remote history of hyperplastic polyps on colonoscopy most recent colonoscopy 2019 without polyps but sigmoid diverticulosis.  Patient's mother was diagnosed with colon cancer.  Patient was told by her last endoscopy she does not need any further colonoscopies.  She has not had any polyps since the first 1 remotely.  At this time recommend she continue at least with Cologuard test as she is very healthy at 79 and has a family history of colon cancer

## 2025-04-18 NOTE — ASSESSMENT & PLAN NOTE
Patient with a 1.2 cm mass left breast 12 o'clock position and a 7 mm mass left breast 1 o'clock position.  Recommend ultrasound-guided core biopsy.  Patient to be scheduled for ultrasound guided  biopsy of the breast in the radiology department. Risk, benefits and alternatives to this plan were thoroughly discussed with the patient who agrees to proceed.  The procedure was also thoroughly discussed as well as her follow-up. She is to see me in the office in one week but I also will call as soon as I see the pathology which is usually 4 working days from procedure.

## 2025-04-23 ENCOUNTER — HOSPITAL ENCOUNTER (OUTPATIENT)
Dept: RADIOLOGY | Facility: HOSPITAL | Age: 80
Discharge: HOME | End: 2025-04-23
Payer: MEDICARE

## 2025-04-23 DIAGNOSIS — R92.8 OTHER ABNORMAL AND INCONCLUSIVE FINDINGS ON DIAGNOSTIC IMAGING OF BREAST: ICD-10-CM

## 2025-04-23 DIAGNOSIS — N63.20 LEFT BREAST MASS: ICD-10-CM

## 2025-04-23 PROCEDURE — 19084 BX BREAST ADD LESION US IMAG: CPT | Mod: LT

## 2025-04-23 PROCEDURE — 19083 BX BREAST 1ST LESION US IMAG: CPT | Mod: LT

## 2025-04-23 PROCEDURE — 2720000007 HC OR 272 NO HCPCS

## 2025-04-23 PROCEDURE — C1819 TISSUE LOCALIZATION-EXCISION: HCPCS

## 2025-04-23 PROCEDURE — 19084 BX BREAST ADD LESION US IMAG: CPT | Mod: LEFT SIDE | Performed by: STUDENT IN AN ORGANIZED HEALTH CARE EDUCATION/TRAINING PROGRAM

## 2025-04-23 PROCEDURE — 77065 DX MAMMO INCL CAD UNI: CPT | Mod: LEFT SIDE | Performed by: STUDENT IN AN ORGANIZED HEALTH CARE EDUCATION/TRAINING PROGRAM

## 2025-04-23 PROCEDURE — 2500000004 HC RX 250 GENERAL PHARMACY W/ HCPCS (ALT 636 FOR OP/ED): Mod: JZ | Performed by: STUDENT IN AN ORGANIZED HEALTH CARE EDUCATION/TRAINING PROGRAM

## 2025-04-23 PROCEDURE — C1739 HC OR 272 NO HCPCS: HCPCS

## 2025-04-23 PROCEDURE — 2780000003 HC OR 278 NO HCPCS

## 2025-04-23 PROCEDURE — 19083 BX BREAST 1ST LESION US IMAG: CPT | Mod: LEFT SIDE | Performed by: STUDENT IN AN ORGANIZED HEALTH CARE EDUCATION/TRAINING PROGRAM

## 2025-04-23 PROCEDURE — C1739 HC OR 278 NO HCPCS: HCPCS

## 2025-04-23 RX ADMIN — Medication 8 ML: at 14:33

## 2025-04-23 ASSESSMENT — PAIN SCALES - GENERAL
PAINLEVEL_OUTOF10: 0 - NO PAIN

## 2025-04-28 ENCOUNTER — APPOINTMENT (OUTPATIENT)
Dept: PRIMARY CARE | Facility: CLINIC | Age: 80
End: 2025-04-28
Payer: MEDICARE

## 2025-04-28 VITALS
DIASTOLIC BLOOD PRESSURE: 72 MMHG | SYSTOLIC BLOOD PRESSURE: 136 MMHG | HEIGHT: 65 IN | WEIGHT: 259.2 LBS | OXYGEN SATURATION: 98 % | BODY MASS INDEX: 43.18 KG/M2 | TEMPERATURE: 96.6 F | HEART RATE: 74 BPM

## 2025-04-28 DIAGNOSIS — I50.32 CHRONIC DIASTOLIC CONGESTIVE HEART FAILURE: ICD-10-CM

## 2025-04-28 DIAGNOSIS — E78.5 HYPERLIPIDEMIA, UNSPECIFIED HYPERLIPIDEMIA TYPE: ICD-10-CM

## 2025-04-28 DIAGNOSIS — I10 HYPERTENSION, UNSPECIFIED TYPE: Primary | ICD-10-CM

## 2025-04-28 DIAGNOSIS — I10 BENIGN ESSENTIAL HYPERTENSION: ICD-10-CM

## 2025-04-28 DIAGNOSIS — E55.9 VITAMIN D DEFICIENCY, UNSPECIFIED: ICD-10-CM

## 2025-04-28 DIAGNOSIS — E66.01 OBESITY, MORBID (MULTI): ICD-10-CM

## 2025-04-28 PROCEDURE — 1036F TOBACCO NON-USER: CPT | Performed by: INTERNAL MEDICINE

## 2025-04-28 PROCEDURE — 3075F SYST BP GE 130 - 139MM HG: CPT | Performed by: INTERNAL MEDICINE

## 2025-04-28 PROCEDURE — 1160F RVW MEDS BY RX/DR IN RCRD: CPT | Performed by: INTERNAL MEDICINE

## 2025-04-28 PROCEDURE — 3078F DIAST BP <80 MM HG: CPT | Performed by: INTERNAL MEDICINE

## 2025-04-28 PROCEDURE — 1159F MED LIST DOCD IN RCRD: CPT | Performed by: INTERNAL MEDICINE

## 2025-04-28 PROCEDURE — 99214 OFFICE O/P EST MOD 30 MIN: CPT | Performed by: INTERNAL MEDICINE

## 2025-04-28 PROCEDURE — G2211 COMPLEX E/M VISIT ADD ON: HCPCS | Performed by: INTERNAL MEDICINE

## 2025-04-28 ASSESSMENT — PATIENT HEALTH QUESTIONNAIRE - PHQ9
2. FEELING DOWN, DEPRESSED OR HOPELESS: NOT AT ALL
SUM OF ALL RESPONSES TO PHQ9 QUESTIONS 1 AND 2: 0
1. LITTLE INTEREST OR PLEASURE IN DOING THINGS: NOT AT ALL

## 2025-04-28 ASSESSMENT — ENCOUNTER SYMPTOMS
WHEEZING: 0
UNEXPECTED WEIGHT CHANGE: 0
DIARRHEA: 0
SINUS PAIN: 0
PALPITATIONS: 0
BLOOD IN STOOL: 0
FATIGUE: 0
COUGH: 0
SORE THROAT: 0
BRUISES/BLEEDS EASILY: 0
HEADACHES: 0
FEVER: 0
ARTHRALGIAS: 0
DIZZINESS: 0
ABDOMINAL PAIN: 0
DIFFICULTY URINATING: 0

## 2025-04-28 NOTE — PROGRESS NOTES
"Subjective   Patient ID: Elisha Flores is a 79 y.o. female who presents for Follow-up (6 mth).    - Recent blood work and plan of care reviewed with patient  - Patient had abnormal mammogram followed up by left breast biopsy results still pending awaiting follow-up with Dr. Prado  -Increased leg edema and swelling patient comes about doubling Lasix to take 2 tablets daily for 3 days then to resume once a day symptom most likely related to salt and fluid retention and amlodipine side effect  Continue monitoring closely  -Hypercholesterolemia patient back now on cholesterol medication Crestor hyperlipidemia improved continue low-fat diet continues Crestor continue with current medication  - Chronic dizziness continue Zyrtec continue Flonase symptoms are controlled  -Hypertension controlled continue with current medication  -Cervical disc disease and neuralgia seen by pain management, doing well.  -Hypercholesterolemia need to restart Crestor 10 mg as recommended.  -chronic migraines, controlled no recurrence continue on propranolol.  -Hypokalemia on potassium 10 mEq daily .  Follow-up 3 months           Review of Systems   Constitutional:  Negative for fatigue, fever and unexpected weight change.   HENT:  Negative for congestion, ear discharge, ear pain, mouth sores, sinus pain and sore throat.    Eyes:  Negative for visual disturbance.   Respiratory:  Negative for cough and wheezing.    Cardiovascular:  Negative for chest pain, palpitations and leg swelling.   Gastrointestinal:  Negative for abdominal pain, blood in stool and diarrhea.   Genitourinary:  Negative for difficulty urinating.   Musculoskeletal:  Negative for arthralgias.   Skin:  Negative for rash.   Neurological:  Negative for dizziness and headaches.   Hematological:  Does not bruise/bleed easily.   Psychiatric/Behavioral:  Negative for behavioral problems.    All other systems reviewed and are negative.      Objective   No results found for: \"HGBA1C\" " "  /72   Pulse 74   Temp 35.9 °C (96.6 °F)   Ht 1.651 m (5' 5\")   Wt 118 kg (259 lb 3.2 oz)   SpO2 98%   BMI 43.13 kg/m²   Lab Results   Component Value Date    WBC 9.3 10/15/2024    HGB 15.7 10/15/2024    HCT 44.8 10/15/2024     10/15/2024    CHOL 106 10/15/2024    TRIG 155 (H) 10/15/2024    HDL 40.5 10/15/2024    ALT 18 10/15/2024    AST 22 10/15/2024     10/15/2024    K 4.0 10/15/2024     10/15/2024    CREATININE 0.65 10/15/2024    BUN 15 10/15/2024    CO2 28 10/15/2024    TSH 2.06 10/15/2024     par   Physical Exam  Vitals and nursing note reviewed.   Constitutional:       Appearance: Normal appearance.   HENT:      Head: Normocephalic.      Nose: Nose normal.   Eyes:      Conjunctiva/sclera: Conjunctivae normal.      Pupils: Pupils are equal, round, and reactive to light.   Cardiovascular:      Rate and Rhythm: Regular rhythm.   Pulmonary:      Effort: Pulmonary effort is normal.      Breath sounds: Normal breath sounds.   Abdominal:      General: Abdomen is flat.      Palpations: Abdomen is soft.   Musculoskeletal:      Cervical back: Neck supple.   Skin:     General: Skin is warm.   Neurological:      General: No focal deficit present.      Mental Status: She is oriented to person, place, and time.   Psychiatric:         Mood and Affect: Mood normal.         Assessment/Plan   Elisha was seen today for follow-up.  Diagnoses and all orders for this visit:  Hypertension, unspecified type (Primary)  Chronic diastolic congestive heart failure  Obesity, morbid (Multi)  Body mass index (BMI) 40.0-44.9, adult (Multi)  Benign essential hypertension  Hyperlipidemia, unspecified hyperlipidemia type  Vitamin D deficiency, unspecified  Other orders  -     Follow Up In Primary Care - Established  -     Follow Up In Primary Care - Established; Future   - Recent blood work and plan of care reviewed with patient  - Patient had abnormal mammogram followed up by left breast biopsy results still " pending awaiting follow-up with Dr. Prado  -Increased leg edema and swelling patient comes about doubling Lasix to take 2 tablets daily for 3 days then to resume once a day symptom most likely related to salt and fluid retention and amlodipine side effect  Continue monitoring closely  -Hypercholesterolemia patient back now on cholesterol medication Crestor hyperlipidemia improved continue low-fat diet continues Crestor continue with current medication  - Chronic dizziness continue Zyrtec continue Flonase symptoms are controlled  -Hypertension controlled continue with current medication  -Cervical disc disease and neuralgia seen by pain management, doing well.  -Hypercholesterolemia need to restart Crestor 10 mg as recommended.  -chronic migraines, controlled no recurrence continue on propranolol.  -Hypokalemia on potassium 10 mEq daily .  Follow-up 3 months

## 2025-04-30 DIAGNOSIS — E78.5 HYPERLIPIDEMIA, UNSPECIFIED HYPERLIPIDEMIA TYPE: ICD-10-CM

## 2025-04-30 LAB
LAB AP ASR DISCLAIMER: NORMAL
LAB AP BLOCK FOR ADDITIONAL STUDIES: NORMAL
LABORATORY COMMENT REPORT: NORMAL
PATH REPORT.FINAL DX SPEC: NORMAL
PATH REPORT.GROSS SPEC: NORMAL
PATH REPORT.RELEVANT HX SPEC: NORMAL
PATH REPORT.TOTAL CANCER: NORMAL
PATHOLOGY SYNOPTIC REPORT: NORMAL

## 2025-04-30 RX ORDER — ROSUVASTATIN CALCIUM 10 MG/1
10 TABLET, COATED ORAL DAILY
Qty: 90 TABLET | Refills: 1 | Status: SHIPPED | OUTPATIENT
Start: 2025-04-30

## 2025-05-02 DIAGNOSIS — I10 BENIGN ESSENTIAL HYPERTENSION: ICD-10-CM

## 2025-05-02 RX ORDER — AMLODIPINE BESYLATE 10 MG/1
10 TABLET ORAL DAILY
Qty: 90 TABLET | Refills: 0 | Status: SHIPPED | OUTPATIENT
Start: 2025-05-02

## 2025-05-02 NOTE — PROGRESS NOTES
Subjective   Patient ID: Elisha Flores is a 79 y.o. female who presents for follow up to discuss US guided breast localization and biopsy left results.    HPI  Patient returns to the clinic to discuss her US guided breast localization and biopsy left results from 04/23/2025.  Patient was last seen in the clinic on 04/18/2025 for a biopsy consultation.     FINAL DIAGNOSIS   A. Left breast mass, 12:00 5 cm from nipple, ultrasound guided core biopsy:   -- Invasive ductal carcinoma with mucin production, grade 3, see note.     Note:  In this limited sample, the invasive carcinoma measures up to 9 mm in greatest dimension.  ER, WV and HER2 will be reported in a synoptic report.        B. Left breast mass, 1:00 1 cm from nipple, ultrasound guided core biopsy:   -- Invasive ductal carcinoma with mucin production, grade 3, see note.     Note:  In this limited sample, the invasive carcinoma measures up to 4 mm in greatest dimension.  Immunohistochemical stains for p63 and SMMHC are negative for myoepithelial cells surrounding the invasive carcinoma.  ER, WV and HER2 will be reported in a synoptic report.      Electronically signed by Edward Myles MD on 4/30/2025 at [address] [place] [address]   By the signature on this report, the individual or group listed as making the Final Interpretation/Diagnosis certifies that they have reviewed this case.    Case Summary Report   Breast Biomarker Reporting Template   Protocol posted: 12/13/2023BREAST BIOMARKER REPORTING TEMPLATE - A  Test(s) Performed     Estrogen Receptor (ER) Status  Negative (less than 1%)     Internal control cells present and stain as expected   Test Type  Food and Drug Administration (FDA) cleared (test / vendor): Roche CONFIRM anti-(ER) (SP1) Rabbit Monoclonal Primary Antibody, Roche Multimer Detection   Primary Antibody  SP1   Scoring System  No separate scoring system used   Test(s) Performed     Progesterone Receptor (PgR) Status  Negative (less than 1%)      Internal control cells present and stain as expected   Test Type  Food and Drug Administration (FDA) cleared (test / vendor): Roche CONFIRM anti-(IA) (1E2) Rabbit Monoclonal Primary Anitbody, Roche Multimer Detection   Primary Antibody  1E2   Scoring System  No separate scoring system used   Test(s) Performed     HER2 by Immunohistochemistry  Positive (Score 3+)   Percentage of Cells with Uniform Intense Complete Membrane Staining   %   Test Type  Food and Drug Administration (FDA) cleared (test / vendor): Roche Pathway anti-HER-2/ila (4B5) Rabbit Monoclonal Primary Antibody, Roche Multimer Detection   Primary Antibody  4B5   Cold Ischemia and Fixation Times  Meet requirements specified in latest version of the ASCO / CAP Guidelines   Testing Performed on Block Number(s)  A1   METHODS   Fixative  Formalin   Image Analysis  Not performed   .   Breast Biomarker Reporting Template   Protocol posted: 12/13/2023BREAST BIOMARKER REPORTING TEMPLATE - B  Test(s) Performed     Estrogen Receptor (ER) Status  Negative (less than 1%)     Internal control cells present and stain as expected   Test Type  Food and Drug Administration (FDA) cleared (test / vendor): Roche CONFIRM anti-(ER) (SP1) Rabbit Monoclonal Primary Antibody, Roche Multimer Detection   Primary Antibody  SP1   Scoring System  No separate scoring system used   Test(s) Performed     Progesterone Receptor (PgR) Status  Negative (less than 1%)     Internal control cells present and stain as expected   Test Type  Food and Drug Administration (FDA) cleared (test / vendor): Roche CONFIRM anti-(IA) (1E2) Rabbit Monoclonal Primary Anitbody, Roche Multimer Detection   Primary Antibody  1E2   Scoring System  No separate scoring system used   Test(s) Performed     HER2 by Immunohistochemistry  Positive (Score 3+)   Percentage of Cells with Uniform Intense Complete Membrane Staining   %   Test Type  Food and Drug Administration (FDA) cleared (test / vendor):  "Roche Pathway anti-HER-2/ila (4B5) Rabbit Monoclonal Primary Antibody, Roche Multimer Detection   Primary Antibody  4B5   Cold Ischemia and Fixation Times  Meet requirements specified in latest version of the ASCO / CAP Guidelines   Testing Performed on Block Number(s)  B1   METHODS   Fixative  Formalin   Image Analysis  Not performed   .      Block for Additional Biomarkers/Molecular Studies  [facility]   Tumor Block: A1, B1   Clinical History  TRIP   N63.20, R92.8 - Left breast mass, other abnormal and inconclusive findings on diagnostic imaging of breast   Gross Description  [facility]   A: Received in formalin, labeled with the patient's name and hospital number and \"1, left breast 12:00 5 cm from nipple\", are multiple irregular/cylindrical segments of yellow-white fatty and hemorrhagic soft tissue aggregating to 1.6 x 0.5 x 0.2 cm.  The specimen is submitted in toto in one cassette.  SMS     NOTE:  Ischemia time: Not provided.  This specimen was placed into formalin at: 4/23/2025 1437.  B: Received in formalin, labeled with the patient's name and hospital number and \"2, left breast 1:00 1 cm from nipple\", are multiple irregular/cylindrical segments of yellow-white fatty soft tissue aggregating to 1.5 x 0.8 x 0.3 cm.  The specimen is submitted in toto in one cassette.  SMS     NOTE:  Ischemia time: Not provided.  This specimen was placed into formalin at: 4/23/2025 1443.     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    Bursitis of right shoulder 11/25/2019    Subdeltoid bursitis of " right shoulder joint    Encounter for general adult medical examination without abnormal findings 04/26/2018    Encounter for Medicare annual wellness exam    Halitosis 05/22/2019    Halitosis    Localized swelling, mass and lump, head 12/28/2016    Left facial swelling    Localized swelling, mass and lump, head 12/29/2022    Facial mass    Other specified anxiety disorders 12/19/2014    Depression with anxiety    Other specified disorders of nose and nasal sinuses 04/18/2017    Jacque bullosa    Personal history of diseases of the skin and subcutaneous tissue     History of psoriasis    Personal history of other diseases of the circulatory system     History of congestive heart failure    Personal history of other diseases of the circulatory system 11/15/2019    History of uncontrolled hypertension    Personal history of other diseases of the circulatory system     History of hypertension    Personal history of other diseases of the nervous system and sense organs     History of sciatica    Personal history of other diseases of the respiratory system 04/18/2017    History of deviated nasal septum    Personal history of other diseases of the respiratory system 02/27/2017    History of sinusitis    Personal history of other diseases of the respiratory system 05/28/2019    History of chronic sinusitis    Personal history of other diseases of the respiratory system 03/19/2014    History of acute sinusitis    Personal history of other specified conditions 03/26/2013    History of insomnia    Personal history of other specified conditions 07/09/2019    History of nasal congestion    Personal history of other specified conditions 11/17/2016    History of diarrhea    Unspecified blepharitis right eye, unspecified eyelid 01/22/2019    Blepharitis of eyelid of right eye    Unspecified mycosis 06/18/2019    Fungal sinusitis     Past Surgical History:   Procedure Laterality Date    BREAST BIOPSY Left 04/23/2025    HYSTERECTOMY  " 04/04/2013    Hysterectomy    OTHER SURGICAL HISTORY  11/22/2022    Eye surgery    OTHER SURGICAL HISTORY  11/22/2022    Nose surgery    OTHER SURGICAL HISTORY  11/22/2022    Back surgery     Family History   Problem Relation Name Age of Onset    Cancer Sister branden rain 65    Breast cancer Sister branden rain 50 - 59    Breast cancer Mother's Sister  70 - 79     Allergies   Allergen Reactions    Ciprofloxacin Unknown    Codeine Unknown and Swelling    Hydrochlorothiazide Unknown    Penicillins Unknown and Swelling    Tetanus Vaccines And Toxoid Swelling       Review of Systems  /86 (BP Location: Left arm, Patient Position: Sitting, BP Cuff Size: Adult)   Pulse 68   Temp 36.3 °C (97.4 °F) (Temporal)   Resp 17   Ht 1.651 m (5' 5\")   Wt 117 kg (259 lb)   SpO2 98%   BMI 43.10 kg/m²     Objective   Physical Exam  Biopsy sites healed nicely  Assessment/Plan   Problem List Items Addressed This Visit           ICD-10-CM    Invasive ductal carcinoma of breast, female, left - Primary C50.912    Patient with multi focal invasive ductal carcinoma grade 3 out of 3 ER/CO negative HER2/ila positive.  12:00 tumor measures 1.2 cm 1:00 tumor measures 0.7 cm and these 2 tumors are 3 cm apart on imaging.  At this time recommend referral to oncology for discussion of neoadjuvant therapy.  Patient to follow-up with me for surgical intervention.             Breast History     Patient was treated by me between 9/2006 and 1/2011 for abnormal mammograms that were benign and therefore, treated conservatively. She was offered a Mammotome biopsy from the beginning, but declined.  2017 pain in the left breast. It has been going on for 6 months, and experiences occasional shooting pain and achiness. Patient with probable Coopers ligamet inflammation. There is no evidence of lesions on exam or on imaging. She was advised to wear a bra at night, and use heat and warm compresses. She is to follow-up with her primary for yearly " mamnmograms and breast checks.   Previous Biopsy: No. Menarche age: 15. Age of first delivery: 22. Breastfeed: No. Menopause age: 40's. HRT: No. Family history of breast cancer: Yes, see history. Family history of ovarian cancer: No. Family history of pancreatic cancer: No.     Patient is new to clinic and presents for Left US breast biopsy consult, 2 lesions in Left breast, bx on 4/23.  Patient is referred by Dr. Shun Hall.  Patient last seen Dr. Covington in the clinic on 7/20/2017 for left breast pain.  Patient had BI mammogram bilateral screening tomosynthesis completed on 3/17/2025. Impression was A subcentimeter left anterior depth focal asymmetry should be further evaluated with ultrasound.  Patient had BI US breat limited left and BI mammogram left diagnostic tomosynthesis completed on 4/3/2025. Impression was Irregular 1.2 cm mass in the left breast at 12 o'clock 5 cm from the nipple is suspicious for malignancy. Small 7 mm mass in the left breast at 1 o'clock 1 cm from the  nipple is also suspicious. No left axillary lymphadenopathy   Patient was treated by me between 9/2006 and 1/2011 for abnormal mammograms that were benign and therefore, treated conservatively. She was offered a Mammotome biopsy from the beginning, but declined.  She recently underwent a screening mammogram with natalie at [facility] (we have disc and reports). Noted are scattered fibroglandular tissues. Left breast was WNL. Right breast revealed a possible area of focal architectural distortion involving the lateral right breast on 3D CC view. However, compression views on the right showed no evidence of findings on screening mammogram. Birads category 1-negative.  Patient with a 1.2 cm mass left breast 12 o'clock position and a 7 mm mass left breast 1 o'clock position. Recommend ultrasound-guided core biopsy. Patient to be scheduled for ultrasound guided biopsy of the breast in the radiology department. Risk, benefits and alternatives  to this plan were thoroughly discussed with the patient who agrees to proceed. The procedure was also thoroughly discussed as well as her follow-up. She is to see me in the office in one week but I also will call as soon as I see the pathology which is usually 4 working days from procedure.     Albertina Souza MA 05/08/25 1:46 PM

## 2025-05-05 ENCOUNTER — TELEPHONE (OUTPATIENT)
Dept: SURGERY | Facility: CLINIC | Age: 80
End: 2025-05-05
Payer: MEDICARE

## 2025-05-08 ENCOUNTER — OFFICE VISIT (OUTPATIENT)
Dept: SURGERY | Facility: CLINIC | Age: 80
End: 2025-05-08
Payer: MEDICARE

## 2025-05-08 VITALS
HEART RATE: 68 BPM | HEIGHT: 65 IN | SYSTOLIC BLOOD PRESSURE: 151 MMHG | TEMPERATURE: 97.4 F | BODY MASS INDEX: 43.15 KG/M2 | WEIGHT: 259 LBS | OXYGEN SATURATION: 98 % | RESPIRATION RATE: 17 BRPM | DIASTOLIC BLOOD PRESSURE: 86 MMHG

## 2025-05-08 DIAGNOSIS — C50.912 INVASIVE DUCTAL CARCINOMA OF BREAST, FEMALE, LEFT: Primary | ICD-10-CM

## 2025-05-08 PROCEDURE — 1126F AMNT PAIN NOTED NONE PRSNT: CPT | Performed by: SURGERY

## 2025-05-08 PROCEDURE — 1159F MED LIST DOCD IN RCRD: CPT | Performed by: SURGERY

## 2025-05-08 PROCEDURE — 99214 OFFICE O/P EST MOD 30 MIN: CPT | Performed by: SURGERY

## 2025-05-08 PROCEDURE — 1036F TOBACCO NON-USER: CPT | Performed by: SURGERY

## 2025-05-08 PROCEDURE — 3077F SYST BP >= 140 MM HG: CPT | Performed by: SURGERY

## 2025-05-08 PROCEDURE — 3079F DIAST BP 80-89 MM HG: CPT | Performed by: SURGERY

## 2025-05-08 ASSESSMENT — PATIENT HEALTH QUESTIONNAIRE - PHQ9
1. LITTLE INTEREST OR PLEASURE IN DOING THINGS: NOT AT ALL
2. FEELING DOWN, DEPRESSED OR HOPELESS: NOT AT ALL
SUM OF ALL RESPONSES TO PHQ9 QUESTIONS 1 & 2: 0

## 2025-05-08 ASSESSMENT — LIFESTYLE VARIABLES
HOW MANY STANDARD DRINKS CONTAINING ALCOHOL DO YOU HAVE ON A TYPICAL DAY: PATIENT DOES NOT DRINK
AUDIT-C TOTAL SCORE: 0
HOW OFTEN DO YOU HAVE A DRINK CONTAINING ALCOHOL: NEVER
SKIP TO QUESTIONS 9-10: 1
HOW OFTEN DO YOU HAVE SIX OR MORE DRINKS ON ONE OCCASION: NEVER

## 2025-05-08 ASSESSMENT — COLUMBIA-SUICIDE SEVERITY RATING SCALE - C-SSRS
6. HAVE YOU EVER DONE ANYTHING, STARTED TO DO ANYTHING, OR PREPARED TO DO ANYTHING TO END YOUR LIFE?: NO
1. IN THE PAST MONTH, HAVE YOU WISHED YOU WERE DEAD OR WISHED YOU COULD GO TO SLEEP AND NOT WAKE UP?: NO
2. HAVE YOU ACTUALLY HAD ANY THOUGHTS OF KILLING YOURSELF?: NO

## 2025-05-08 ASSESSMENT — ENCOUNTER SYMPTOMS
DEPRESSION: 0
LOSS OF SENSATION IN FEET: 0
OCCASIONAL FEELINGS OF UNSTEADINESS: 0

## 2025-05-08 ASSESSMENT — PAIN SCALES - GENERAL: PAINLEVEL_OUTOF10: 0-NO PAIN

## 2025-05-08 NOTE — ASSESSMENT & PLAN NOTE
Patient with multi focal invasive ductal carcinoma grade 3 out of 3 ER/HI negative HER2/ila positive.  12:00 tumor measures 1.2 cm 1:00 tumor measures 0.7 cm and these 2 tumors are 3 cm apart on imaging.  At this time recommend referral to oncology for discussion of neoadjuvant therapy.  Based on the size of the area of abnormality she would be amendable to a Magseed localization x 2 lumpectomy and sentinel node biopsy for surgical treatment.   Because the tumor is ER/HI negative and HER2/ila positive and over 1 cm she qualifies for neoadjuvant therapy.  Patient to follow-up after referral to oncology

## 2025-05-13 ENCOUNTER — SOCIAL WORK (OUTPATIENT)
Dept: CASE MANAGEMENT | Facility: HOSPITAL | Age: 80
End: 2025-05-13

## 2025-05-13 ENCOUNTER — OFFICE VISIT (OUTPATIENT)
Dept: HEMATOLOGY/ONCOLOGY | Facility: CLINIC | Age: 80
End: 2025-05-13
Payer: MEDICARE

## 2025-05-13 ENCOUNTER — PATIENT OUTREACH (OUTPATIENT)
Dept: HEMATOLOGY/ONCOLOGY | Facility: CLINIC | Age: 80
End: 2025-05-13
Payer: MEDICARE

## 2025-05-13 VITALS
OXYGEN SATURATION: 96 % | RESPIRATION RATE: 18 BRPM | WEIGHT: 257 LBS | HEART RATE: 72 BPM | DIASTOLIC BLOOD PRESSURE: 63 MMHG | BODY MASS INDEX: 42.77 KG/M2 | TEMPERATURE: 96.6 F | SYSTOLIC BLOOD PRESSURE: 131 MMHG

## 2025-05-13 DIAGNOSIS — C50.912 INVASIVE DUCTAL CARCINOMA OF BREAST, FEMALE, LEFT: Primary | ICD-10-CM

## 2025-05-13 LAB
ALBUMIN SERPL BCP-MCNC: 4.6 G/DL (ref 3.4–5)
ALP SERPL-CCNC: 80 U/L (ref 33–136)
ALT SERPL W P-5'-P-CCNC: 18 U/L (ref 7–45)
ANION GAP SERPL CALC-SCNC: 15 MMOL/L (ref 10–20)
AST SERPL W P-5'-P-CCNC: 23 U/L (ref 9–39)
BASOPHILS # BLD AUTO: 0.08 X10*3/UL (ref 0–0.1)
BASOPHILS NFR BLD AUTO: 1 %
BILIRUB SERPL-MCNC: 1.1 MG/DL (ref 0–1.2)
BUN SERPL-MCNC: 18 MG/DL (ref 6–23)
CALCIUM SERPL-MCNC: 9.3 MG/DL (ref 8.6–10.3)
CHLORIDE SERPL-SCNC: 103 MMOL/L (ref 98–107)
CO2 SERPL-SCNC: 25 MMOL/L (ref 21–32)
CREAT SERPL-MCNC: 0.63 MG/DL (ref 0.5–1.05)
EGFRCR SERPLBLD CKD-EPI 2021: >90 ML/MIN/1.73M*2
EOSINOPHIL # BLD AUTO: 0.11 X10*3/UL (ref 0–0.4)
EOSINOPHIL NFR BLD AUTO: 1.4 %
ERYTHROCYTE [DISTWIDTH] IN BLOOD BY AUTOMATED COUNT: 14.7 % (ref 11.5–14.5)
FERRITIN SERPL-MCNC: 167 NG/ML (ref 8–150)
GLUCOSE SERPL-MCNC: 102 MG/DL (ref 74–99)
HBV SURFACE AG SERPL QL IA: NONREACTIVE
HCT VFR BLD AUTO: 45 % (ref 36–46)
HCV AB SER QL: NONREACTIVE
HGB BLD-MCNC: 15.7 G/DL (ref 12–16)
IMM GRANULOCYTES # BLD AUTO: 0.03 X10*3/UL (ref 0–0.5)
IMM GRANULOCYTES NFR BLD AUTO: 0.4 % (ref 0–0.9)
IRON SATN MFR SERPL: 34 % (ref 25–45)
IRON SERPL-MCNC: 135 UG/DL (ref 35–150)
LYMPHOCYTES # BLD AUTO: 2 X10*3/UL (ref 0.8–3)
LYMPHOCYTES NFR BLD AUTO: 25 %
MCH RBC QN AUTO: 31.1 PG (ref 26–34)
MCHC RBC AUTO-ENTMCNC: 34.9 G/DL (ref 32–36)
MCV RBC AUTO: 89 FL (ref 80–100)
MONOCYTES # BLD AUTO: 0.64 X10*3/UL (ref 0.05–0.8)
MONOCYTES NFR BLD AUTO: 8 %
NEUTROPHILS # BLD AUTO: 5.13 X10*3/UL (ref 1.6–5.5)
NEUTROPHILS NFR BLD AUTO: 64.2 %
NRBC BLD-RTO: 0 /100 WBCS (ref 0–0)
PLATELET # BLD AUTO: 143 X10*3/UL (ref 150–450)
POTASSIUM SERPL-SCNC: 3.8 MMOL/L (ref 3.5–5.3)
PROT SERPL-MCNC: 7.1 G/DL (ref 6.4–8.2)
RBC # BLD AUTO: 5.05 X10*6/UL (ref 4–5.2)
SODIUM SERPL-SCNC: 139 MMOL/L (ref 136–145)
TIBC SERPL-MCNC: 400 UG/DL (ref 240–445)
UIBC SERPL-MCNC: 265 UG/DL (ref 110–370)
WBC # BLD AUTO: 8 X10*3/UL (ref 4.4–11.3)

## 2025-05-13 PROCEDURE — 86803 HEPATITIS C AB TEST: CPT | Performed by: INTERNAL MEDICINE

## 2025-05-13 PROCEDURE — 99205 OFFICE O/P NEW HI 60 MIN: CPT | Performed by: INTERNAL MEDICINE

## 2025-05-13 PROCEDURE — 83540 ASSAY OF IRON: CPT | Performed by: INTERNAL MEDICINE

## 2025-05-13 PROCEDURE — 85025 COMPLETE CBC W/AUTO DIFF WBC: CPT | Performed by: INTERNAL MEDICINE

## 2025-05-13 PROCEDURE — 99215 OFFICE O/P EST HI 40 MIN: CPT | Performed by: INTERNAL MEDICINE

## 2025-05-13 PROCEDURE — 87340 HEPATITIS B SURFACE AG IA: CPT | Performed by: INTERNAL MEDICINE

## 2025-05-13 PROCEDURE — 84075 ASSAY ALKALINE PHOSPHATASE: CPT | Performed by: INTERNAL MEDICINE

## 2025-05-13 PROCEDURE — 1036F TOBACCO NON-USER: CPT | Performed by: INTERNAL MEDICINE

## 2025-05-13 PROCEDURE — 82746 ASSAY OF FOLIC ACID SERUM: CPT | Performed by: INTERNAL MEDICINE

## 2025-05-13 PROCEDURE — 1159F MED LIST DOCD IN RCRD: CPT | Performed by: INTERNAL MEDICINE

## 2025-05-13 PROCEDURE — 3078F DIAST BP <80 MM HG: CPT | Performed by: INTERNAL MEDICINE

## 2025-05-13 PROCEDURE — 82607 VITAMIN B-12: CPT | Performed by: INTERNAL MEDICINE

## 2025-05-13 PROCEDURE — 1126F AMNT PAIN NOTED NONE PRSNT: CPT | Performed by: INTERNAL MEDICINE

## 2025-05-13 PROCEDURE — 86706 HEP B SURFACE ANTIBODY: CPT | Performed by: INTERNAL MEDICINE

## 2025-05-13 PROCEDURE — 86704 HEP B CORE ANTIBODY TOTAL: CPT | Performed by: INTERNAL MEDICINE

## 2025-05-13 PROCEDURE — 3075F SYST BP GE 130 - 139MM HG: CPT | Performed by: INTERNAL MEDICINE

## 2025-05-13 PROCEDURE — 82728 ASSAY OF FERRITIN: CPT | Performed by: INTERNAL MEDICINE

## 2025-05-13 RX ORDER — ALBUTEROL SULFATE 0.83 MG/ML
3 SOLUTION RESPIRATORY (INHALATION) AS NEEDED
OUTPATIENT
Start: 2025-06-27

## 2025-05-13 RX ORDER — EPINEPHRINE 0.3 MG/.3ML
0.3 INJECTION SUBCUTANEOUS EVERY 5 MIN PRN
OUTPATIENT
Start: 2025-06-06

## 2025-05-13 RX ORDER — EPINEPHRINE 0.3 MG/.3ML
0.3 INJECTION SUBCUTANEOUS EVERY 5 MIN PRN
OUTPATIENT
Start: 2025-07-18

## 2025-05-13 RX ORDER — PALONOSETRON 0.05 MG/ML
0.25 INJECTION, SOLUTION INTRAVENOUS ONCE
OUTPATIENT
Start: 2025-08-08

## 2025-05-13 RX ORDER — DIPHENHYDRAMINE HYDROCHLORIDE 50 MG/ML
50 INJECTION, SOLUTION INTRAMUSCULAR; INTRAVENOUS AS NEEDED
OUTPATIENT
Start: 2025-08-08

## 2025-05-13 RX ORDER — ALBUTEROL SULFATE 0.83 MG/ML
3 SOLUTION RESPIRATORY (INHALATION) AS NEEDED
OUTPATIENT
Start: 2025-07-18

## 2025-05-13 RX ORDER — FAMOTIDINE 10 MG/ML
20 INJECTION, SOLUTION INTRAVENOUS ONCE AS NEEDED
OUTPATIENT
Start: 2025-07-18

## 2025-05-13 RX ORDER — PROCHLORPERAZINE MALEATE 10 MG
10 TABLET ORAL EVERY 6 HOURS PRN
OUTPATIENT
Start: 2025-08-08

## 2025-05-13 RX ORDER — PROCHLORPERAZINE MALEATE 10 MG
10 TABLET ORAL EVERY 6 HOURS PRN
OUTPATIENT
Start: 2025-07-18

## 2025-05-13 RX ORDER — DIPHENHYDRAMINE HYDROCHLORIDE 50 MG/ML
50 INJECTION, SOLUTION INTRAMUSCULAR; INTRAVENOUS AS NEEDED
OUTPATIENT
Start: 2025-06-06

## 2025-05-13 RX ORDER — PALONOSETRON 0.05 MG/ML
0.25 INJECTION, SOLUTION INTRAVENOUS ONCE
OUTPATIENT
Start: 2025-06-06

## 2025-05-13 RX ORDER — PROCHLORPERAZINE EDISYLATE 5 MG/ML
10 INJECTION INTRAMUSCULAR; INTRAVENOUS EVERY 6 HOURS PRN
OUTPATIENT
Start: 2025-06-27

## 2025-05-13 RX ORDER — DIPHENHYDRAMINE HCL 25 MG
25 CAPSULE ORAL ONCE
OUTPATIENT
Start: 2025-06-27

## 2025-05-13 RX ORDER — DIPHENHYDRAMINE HYDROCHLORIDE 50 MG/ML
50 INJECTION, SOLUTION INTRAMUSCULAR; INTRAVENOUS AS NEEDED
OUTPATIENT
Start: 2025-06-27

## 2025-05-13 RX ORDER — DIPHENHYDRAMINE HYDROCHLORIDE 50 MG/ML
50 INJECTION, SOLUTION INTRAMUSCULAR; INTRAVENOUS AS NEEDED
OUTPATIENT
Start: 2025-07-18

## 2025-05-13 RX ORDER — FAMOTIDINE 10 MG/ML
20 INJECTION, SOLUTION INTRAVENOUS ONCE AS NEEDED
OUTPATIENT
Start: 2025-06-27

## 2025-05-13 RX ORDER — PROCHLORPERAZINE EDISYLATE 5 MG/ML
10 INJECTION INTRAMUSCULAR; INTRAVENOUS EVERY 6 HOURS PRN
OUTPATIENT
Start: 2025-07-18

## 2025-05-13 RX ORDER — PROCHLORPERAZINE MALEATE 10 MG
10 TABLET ORAL EVERY 6 HOURS PRN
OUTPATIENT
Start: 2025-06-27

## 2025-05-13 RX ORDER — EPINEPHRINE 0.3 MG/.3ML
0.3 INJECTION SUBCUTANEOUS EVERY 5 MIN PRN
OUTPATIENT
Start: 2025-06-27

## 2025-05-13 RX ORDER — PALONOSETRON 0.05 MG/ML
0.25 INJECTION, SOLUTION INTRAVENOUS ONCE
OUTPATIENT
Start: 2025-07-18

## 2025-05-13 RX ORDER — ALBUTEROL SULFATE 0.83 MG/ML
3 SOLUTION RESPIRATORY (INHALATION) AS NEEDED
OUTPATIENT
Start: 2025-06-06

## 2025-05-13 RX ORDER — EPINEPHRINE 0.3 MG/.3ML
0.3 INJECTION SUBCUTANEOUS EVERY 5 MIN PRN
OUTPATIENT
Start: 2025-08-08

## 2025-05-13 RX ORDER — PROCHLORPERAZINE EDISYLATE 5 MG/ML
10 INJECTION INTRAMUSCULAR; INTRAVENOUS EVERY 6 HOURS PRN
OUTPATIENT
Start: 2025-08-08

## 2025-05-13 RX ORDER — DIPHENHYDRAMINE HCL 25 MG
25 CAPSULE ORAL ONCE
OUTPATIENT
Start: 2025-07-18

## 2025-05-13 RX ORDER — PALONOSETRON 0.05 MG/ML
0.25 INJECTION, SOLUTION INTRAVENOUS ONCE
OUTPATIENT
Start: 2025-06-27

## 2025-05-13 RX ORDER — DIPHENHYDRAMINE HCL 25 MG
25 CAPSULE ORAL ONCE
OUTPATIENT
Start: 2025-08-08

## 2025-05-13 RX ORDER — PROCHLORPERAZINE EDISYLATE 5 MG/ML
10 INJECTION INTRAMUSCULAR; INTRAVENOUS EVERY 6 HOURS PRN
OUTPATIENT
Start: 2025-06-06

## 2025-05-13 RX ORDER — PROCHLORPERAZINE MALEATE 10 MG
10 TABLET ORAL EVERY 6 HOURS PRN
OUTPATIENT
Start: 2025-06-06

## 2025-05-13 RX ORDER — DIPHENHYDRAMINE HCL 25 MG
25 CAPSULE ORAL ONCE
OUTPATIENT
Start: 2025-06-06

## 2025-05-13 RX ORDER — FAMOTIDINE 10 MG/ML
20 INJECTION, SOLUTION INTRAVENOUS ONCE AS NEEDED
OUTPATIENT
Start: 2025-06-06

## 2025-05-13 RX ORDER — ALBUTEROL SULFATE 0.83 MG/ML
3 SOLUTION RESPIRATORY (INHALATION) AS NEEDED
OUTPATIENT
Start: 2025-08-08

## 2025-05-13 RX ORDER — FAMOTIDINE 10 MG/ML
20 INJECTION, SOLUTION INTRAVENOUS ONCE AS NEEDED
OUTPATIENT
Start: 2025-08-08

## 2025-05-13 SDOH — HEALTH STABILITY: PHYSICAL HEALTH: ON AVERAGE, HOW MANY DAYS PER WEEK DO YOU ENGAGE IN MODERATE TO STRENUOUS EXERCISE (LIKE A BRISK WALK)?: 7 DAYS

## 2025-05-13 SDOH — ECONOMIC STABILITY: GENERAL
WHICH OF THE FOLLOWING DO YOU KNOW HOW TO USE AND HAVE ACCESS TO EVERY DAY? (CHOOSE ALL THAT APPLY): DESKTOP COMPUTER, LAPTOP COMPUTER, OR TABLET WITH BROADBAND INTERNET CONNECTION;SMARTPHONE WITH CELLULAR DATA PLAN

## 2025-05-13 SDOH — ECONOMIC STABILITY: FOOD INSECURITY: WITHIN THE PAST 12 MONTHS, THE FOOD YOU BOUGHT JUST DIDN'T LAST AND YOU DIDN'T HAVE MONEY TO GET MORE.: NEVER TRUE

## 2025-05-13 SDOH — HEALTH STABILITY: PHYSICAL HEALTH: ON AVERAGE, HOW MANY MINUTES DO YOU ENGAGE IN EXERCISE AT THIS LEVEL?: 30 MIN

## 2025-05-13 SDOH — ECONOMIC STABILITY: FOOD INSECURITY: WITHIN THE PAST 12 MONTHS, YOU WORRIED THAT YOUR FOOD WOULD RUN OUT BEFORE YOU GOT MONEY TO BUY MORE.: NEVER TRUE

## 2025-05-13 SDOH — ECONOMIC STABILITY: INCOME INSECURITY: IN THE LAST 12 MONTHS, WAS THERE A TIME WHEN YOU WERE NOT ABLE TO PAY THE MORTGAGE OR RENT ON TIME?: NO

## 2025-05-13 SDOH — ECONOMIC STABILITY: TRANSPORTATION INSECURITY
IN THE PAST 12 MONTHS, HAS THE LACK OF TRANSPORTATION KEPT YOU FROM MEDICAL APPOINTMENTS OR FROM GETTING MEDICATIONS?: NO

## 2025-05-13 SDOH — ECONOMIC STABILITY: GENERAL
WHICH OF THE FOLLOWING WOULD YOU LIKE TO GET CONNECTED TO IN ORDER TO RECEIVE A DISCOUNT OR FOR FREE? (CHOOSE ALL THAT APPLY): NO ASSISTANCE NEEDED

## 2025-05-13 SDOH — ECONOMIC STABILITY: TRANSPORTATION INSECURITY
IN THE PAST 12 MONTHS, HAS LACK OF TRANSPORTATION KEPT YOU FROM MEETINGS, WORK, OR FROM GETTING THINGS NEEDED FOR DAILY LIVING?: NO

## 2025-05-13 ASSESSMENT — SOCIAL DETERMINANTS OF HEALTH (SDOH)
IN THE PAST 12 MONTHS, HAS THE ELECTRIC, GAS, OIL, OR WATER COMPANY THREATENED TO SHUT OFF SERVICE IN YOUR HOME?: NO
HOW HARD IS IT FOR YOU TO PAY FOR THE VERY BASICS LIKE FOOD, HOUSING, MEDICAL CARE, AND HEATING?: NOT HARD AT ALL
WITHIN THE LAST YEAR, HAVE YOU BEEN KICKED, HIT, SLAPPED, OR OTHERWISE PHYSICALLY HURT BY YOUR PARTNER OR EX-PARTNER?: NO
HOW OFTEN DO YOU GET TOGETHER WITH FRIENDS OR RELATIVES?: MORE THAN THREE TIMES A WEEK
WITHIN THE LAST YEAR, HAVE YOU BEEN AFRAID OF YOUR PARTNER OR EX-PARTNER?: NO
DO YOU BELONG TO ANY CLUBS OR ORGANIZATIONS SUCH AS CHURCH GROUPS UNIONS, FRATERNAL OR ATHLETIC GROUPS, OR SCHOOL GROUPS?: YES
IN A TYPICAL WEEK, HOW MANY TIMES DO YOU TALK ON THE PHONE WITH FAMILY, FRIENDS, OR NEIGHBORS?: MORE THAN THREE TIMES A WEEK
WITHIN THE LAST YEAR, HAVE TO BEEN RAPED OR FORCED TO HAVE ANY KIND OF SEXUAL ACTIVITY BY YOUR PARTNER OR EX-PARTNER?: NO
HOW OFTEN DO YOU ATTENT MEETINGS OF THE CLUB OR ORGANIZATION YOU BELONG TO?: MORE THAN 4 TIMES PER YEAR
HOW OFTEN DO YOU ATTEND CHURCH OR RELIGIOUS SERVICES?: NEVER
WITHIN THE LAST YEAR, HAVE YOU BEEN HUMILIATED OR EMOTIONALLY ABUSED IN OTHER WAYS BY YOUR PARTNER OR EX-PARTNER?: NO

## 2025-05-13 ASSESSMENT — PAIN SCALES - GENERAL: PAINLEVEL_OUTOF10: 0-NO PAIN

## 2025-05-13 ASSESSMENT — LIFESTYLE VARIABLES
HOW OFTEN DO YOU HAVE A DRINK CONTAINING ALCOHOL: MONTHLY OR LESS
AUDIT-C TOTAL SCORE: 1
HOW OFTEN DO YOU HAVE SIX OR MORE DRINKS ON ONE OCCASION: NEVER
SKIP TO QUESTIONS 9-10: 1
HOW MANY STANDARD DRINKS CONTAINING ALCOHOL DO YOU HAVE ON A TYPICAL DAY: 1 OR 2

## 2025-05-13 NOTE — PROGRESS NOTES
Patient ID: Elisha Flores is a 79 y.o. female.  Referring Physician: Eden Covington MD  [address] Physician Mario Burnham [address] [place], [place] [id number]  Primary Care Provider: Shun Hall MD  Referral Reason: breast cancer    Subjective:  No complaints    Heme/Onc History:    Left breast US 04/3/25 IMPRESSION:  1. Irregular 1.2 cm mass in the left breast at 12 o'clock 5 cm from  the nipple is suspicious for malignancy.  2. Small 7 mm mass in the left breast at 1 o'clock 1 cm from the  nipple is also suspicious.  3. No left axillary lymphadenopathy.    2025 breast biopsy   FINAL DIAGNOSIS   A. Left breast mass, 12:00 5 cm from nipple, ultrasound guided core biopsy:   -- Invasive ductal carcinoma with mucin production, grade 3, see note.     Note:  In this limited sample, the invasive carcinoma measures up to 9 mm in greatest dimension.  ER, CO and HER2 will be reported in a synoptic report.        B. Left breast mass, 1:00 1 cm from nipple, ultrasound guided core biopsy:   -- Invasive ductal carcinoma with mucin production, grade 3, see note.     Note:  In this limited sample, the invasive carcinoma measures up to 4 mm in greatest dimension.  Immunohistochemical stains for p63 and SMMHC are negative for myoepithelial cells surrounding the invasive carcinoma.  ER, CO and HER2 will be reported in a synoptic report.           Past Medical History: Medical History[1]  Social History:   Social History     Socioeconomic History    Marital status:      Spouse name: Not on file    Number of children: 2    Years of education: Not on file    Highest education level: Not on file   Occupational History    Not on file   Tobacco Use    Smoking status: Former     Current packs/day: 0.00     Types: Cigarettes     Quit date:      Years since quittin.3     Passive exposure: Past    Smokeless tobacco: Never   Vaping Use    Vaping status: Never Used   Substance and Sexual Activity    Alcohol use:  Never    Drug use: Never    Sexual activity: Defer   Other Topics Concern    Not on file   Social History Narrative    Not on file     Social Drivers of Health     Financial Resource Strain: Low Risk  (5/13/2025)    Overall Financial Resource Strain (CARDIA)     Difficulty of Paying Living Expenses: Not hard at all   Food Insecurity: No Food Insecurity (5/13/2025)    Hunger Vital Sign     Worried About Running Out of Food in the Last Year: Never true     Ran Out of Food in the Last Year: Never true   Transportation Needs: No Transportation Needs (5/13/2025)    PRAPARE - Transportation     Lack of Transportation (Medical): No     Lack of Transportation (Non-Medical): No   Physical Activity: Sufficiently Active (5/13/2025)    Exercise Vital Sign     Days of Exercise per Week: 7 days     Minutes of Exercise per Session: 30 min   Stress: No Stress Concern Present (5/13/2025)    [employer] [place] [employer] - Occupational Stress Questionnaire     Feeling of Stress : Not at all   Social Connections: Moderately Isolated (5/13/2025)    Social Connection and Isolation Panel [NHANES]     Frequency of Communication with Friends and Family: More than three times a week     Frequency of Social Gatherings with Friends and Family: More than three times a week     Attends Rastafarian Services: Never     Active Member of Clubs or Organizations: Yes     Attends Club or Organization Meetings: More than 4 times per year     Marital Status:    Intimate Partner Violence: Not At Risk (5/13/2025)    Humiliation, Afraid, Rape, and Kick questionnaire     Fear of Current or Ex-Partner: No     Emotionally Abused: No     Physically Abused: No     Sexually Abused: No   Housing Stability: Low Risk  (5/13/2025)    Housing Stability Vital Sign     Unable to Pay for Housing in the Last Year: No     Number of Times Moved in the Last Year: 0     Homeless in the Last Year: No     Surgical History: Surgical History[2]  Family History:  Family History[3]   reports that she quit smoking about 38 years ago. Her smoking use included cigarettes. She has been exposed to tobacco smoke. She has never used smokeless tobacco.  Oncology Family history: Cancer-related family history includes Breast cancer (age of onset: 50 - 59) in her sister; Breast cancer (age of onset: 70 - 79) in her mother's sister; Cancer (age of onset: 65) in her sister.    Review Of Systems:  As stated per in HPI; otherwise all other 12 point ROS are negative    Physical Exam:  /63   Pulse 72   Temp 35.9 °C (96.6 °F)   Resp 18   Wt 117 kg (257 lb)   SpO2 96%   BMI 42.77 kg/m²   BSA: 2.32 meters squared  General: awake/alert/oriented x3, no distress, alert and cooperative  Head: Short hair fully covering scalp. Symmetric facial expressions  Eyes: PERRL, EOMI, clear sclera, eyebrows present.  Ears/Nose/Mouth/Throat:  Oral mucous membranes moist. No oral ulcers. No palpable pre/post-auricular lymph nodes  Neck: No palpable cervical chain lymph nodes  Respiratory: unlabored breathing on room air, good chest expansion, thorax symmetric  Cardio: Regular rate and rhythm, normal S1 and S2, radial pulses symmetric  GI: Nondistended, soft, non-tender abdomen  Musculoskeletal: Normal muscle bulk and tone, ROM intact, no joint swelling.  Rises from chair and walks unassisted.  Extremities: No ankle swelling, no arm or leg wounds  Neuro: Alert, cognition intact, speech normal. Facial expressions symmetric.  No motor deficits noted. Sensation intact to touch and hot/cold.   Able to stand from seated position unassisted and walks around the room unassisted.  Psychological: Appropriate mood and behavior.  Skin: Warm and dry, no lesions, no rashes    Results:  Diagnostic Results   Lab Results   Component Value Date    WBC 9.3 10/15/2024    HGB 15.7 10/15/2024    HCT 44.8 10/15/2024    MCV 90 10/15/2024     10/15/2024     Lab Results   Component Value Date    CALCIUM 9.4 10/15/2024      10/15/2024    K 4.0 10/15/2024    CO2 28 10/15/2024     10/15/2024    BUN 15 10/15/2024    CREATININE 0.65 10/15/2024    ALT 18 10/15/2024    AST 22 10/15/2024     Current Medications[4]     Assessment/Plan:  ? Breast Cancer:    78 yo F was found to have 2 abnormal foci in left breast. 1.2 cm and 0.7 cm and 3-4 cm apart from each other based on breast US. Biopsied from both breasts showed ER/KY negative and Her2 positive, IDC, grade 3.    She is referred to oncology for neoadj tx. [id number], stage 1A.    Her sister was dx with breast cancer at age 48 and her aunt at age 55. Ambry ordered    Baseline CT CAP and NM bone scan are ordered. Port requested by IR. Baseline labs and CA 27 29 ordered    Echo baseline and repeat q 3 months    TCH vs TH is discussed. Tumor is grade 3. Her2Dx would be useful to assess prognosis but not available. She is very fit and has ECOG 0. If echo and labs come back normal, then TCH will be proceeded    NST with TCH (Carbo AUC 5, reduced from AUC 6; and Taxotere 75 mg/m2) q 3 weeks with Neulasta support followed by surgery and adjuvant RT and completion of 1 year of Herceptin maintenance is recommended    Will restage with breast US after C#3 and after C#6    RTC in 06/6/25    Diagnoses and all orders for this visit:  Invasive ductal carcinoma of breast, female, left  -     Referral To Hematology and Oncology       Performance Status: Asymptomatic    I spent more than 60 minutes for the patient today, including face-to-face conversation, pre-visit preparation, post-visit orders, and others.   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    Bursitis of right shoulder  11/25/2019    Subdeltoid bursitis of right shoulder joint    Encounter for general adult medical examination without abnormal findings 04/26/2018    Encounter for Medicare annual wellness exam    Halitosis 05/22/2019    Halitosis    Localized swelling, mass and lump, head 12/28/2016    Left facial swelling    Localized swelling, mass and lump, head 12/29/2022    Facial mass    Other specified anxiety disorders 12/19/2014    Depression with anxiety    Other specified disorders of nose and nasal sinuses 04/18/2017    Jacque bullosa    Personal history of diseases of the skin and subcutaneous tissue     History of psoriasis    Personal history of other diseases of the circulatory system     History of congestive heart failure    Personal history of other diseases of the circulatory system 11/15/2019    History of uncontrolled hypertension    Personal history of other diseases of the circulatory system     History of hypertension    Personal history of other diseases of the nervous system and sense organs     History of sciatica    Personal history of other diseases of the respiratory system 04/18/2017    History of deviated nasal septum    Personal history of other diseases of the respiratory system 02/27/2017    History of sinusitis    Personal history of other diseases of the respiratory system 05/28/2019    History of chronic sinusitis    Personal history of other diseases of the respiratory system 03/19/2014    History of acute sinusitis    Personal history of other specified conditions 03/26/2013    History of insomnia    Personal history of other specified conditions 07/09/2019    History of nasal congestion    Personal history of other specified conditions 11/17/2016    History of diarrhea    Unspecified blepharitis right eye, unspecified eyelid 01/22/2019    Blepharitis of eyelid of right eye    Unspecified mycosis 06/18/2019    Fungal sinusitis   [2]   Past Surgical History:  Procedure Laterality Date     BREAST BIOPSY Left 04/23/2025    HYSTERECTOMY  04/04/2013    Hysterectomy    OTHER SURGICAL HISTORY  11/22/2022    Eye surgery    OTHER SURGICAL HISTORY  11/22/2022    Nose surgery    OTHER SURGICAL HISTORY  11/22/2022    Back surgery   [3]   Family History  Problem Relation Name Age of Onset    Cancer Sister branden rain 65    Breast cancer Sister branden rain 50 - 59    Breast cancer Mother's Sister  70 - 79   [4]   Current Outpatient Medications:     amLODIPine (Norvasc) 10 mg tablet, Take 1 tablet (10 mg) by mouth once daily., Disp: 90 tablet, Rfl: 0    aspirin 81 mg EC tablet, Take 1 tablet (81 mg) by mouth once daily., Disp: , Rfl:     carboxymethylcellulose (Refresh Plus) 0.5 % ophthalmic solution, Administer into affected eye(s)., Disp: , Rfl:     cetirizine (ZyrTEC) 10 mg tablet, Take 1 tablet (10 mg) by mouth once daily., Disp: 30 tablet, Rfl: 2    cholecalciferol (Vitamin D-3) 5,000 Units tablet, Take 1 tablet (125 mcg) by mouth once daily., Disp: , Rfl:     cyanocobalamin (Vitamin B-12) 1,000 mcg tablet, Take 1 tablet (1,000 mcg) by mouth once daily., Disp: , Rfl:     fluticasone (Flonase) 50 mcg/actuation nasal spray, Administer 1 spray into each nostril once daily. Shake gently. Before first use, prime pump. After use, clean tip and replace cap., Disp: 16 g, Rfl: 11    folic acid (Folvite) 1 mg tablet, Take 1 tablet (1 mg) by mouth once daily., Disp: , Rfl:     furosemide (Lasix) 20 mg tablet, Take 1 tablet (20 mg) by mouth once daily., Disp: 90 tablet, Rfl: 1    losartan (Cozaar) 100 mg tablet, Take 1 tablet (100 mg) by mouth once daily., Disp: 90 tablet, Rfl: 1    MULTIVITAMIN ORAL, Take 1 tablet by mouth once daily. With food, Disp: , Rfl:     propranolol LA (Inderal LA) 120 mg 24 hr capsule, Take 1 capsule (120 mg) by mouth once daily at bedtime., Disp: 90 capsule, Rfl: 1    pyridoxine (Vitamin B-6) 100 mg tablet, Take 1 tablet (100 mg) by mouth once daily., Disp: , Rfl:     rosuvastatin (Crestor)  10 mg tablet, Take 1 tablet (10 mg) by mouth once daily., Disp: 90 tablet, Rfl: 1

## 2025-05-13 NOTE — PROGRESS NOTES
Pt seen in office today for a new patient  visit with Dr. Faustino Bustillos for management of her breast cancer.  She has been referred to our office by Dr. Eden Covington.She is without complaints today and denies pain.     Medications, pharmacy preference and allergies were reviewed with patient and updated in the medical record by MD.     Per orders, she has been consented to receive treatment TCH every 3 weeks for a total of 6 cycles, followed by surgery and then 1 year of Herceptin. She is to have a mediport placed and is to have an echo pretreatment and a bone scan and a CT c/a/p. She is to have labs obtained today including an Ambry. She is to start treatment on Friday, 6/6/25 at 7:30 chair 11.     Our contact information was given to patient and they were encouraged to contact us with any questions or concerns.    Patient verbalized understanding and agreement regarding discussed information via verbal feedback. Pt escorted to scheduling.

## 2025-05-13 NOTE — PATIENT INSTRUCTIONS
Please watch our drug therapies class video before your first treatment visit.   To watch the class, scan the QR code or go to [email or web address] and enter the code [email or web address]

## 2025-05-13 NOTE — H&P (VIEW-ONLY)
Patient ID: Elisha Flores is a 79 y.o. female.  Referring Physician: Eden Covington MD  [id number] [facility] Physician Mario Burnham [address] [place], [place] [id number]  Primary Care Provider: Shun Hall MD  Referral Reason: breast cancer    Subjective:  No complaints    Heme/Onc History:    Left breast US 04/3/25 IMPRESSION:  1. Irregular 1.2 cm mass in the left breast at 12 o'clock 5 cm from  the nipple is suspicious for malignancy.  2. Small 7 mm mass in the left breast at 1 o'clock 1 cm from the  nipple is also suspicious.  3. No left axillary lymphadenopathy.    2025 breast biopsy   FINAL DIAGNOSIS   A. Left breast mass, 12:00 5 cm from nipple, ultrasound guided core biopsy:   -- Invasive ductal carcinoma with mucin production, grade 3, see note.     Note:  In this limited sample, the invasive carcinoma measures up to 9 mm in greatest dimension.  ER, NV and HER2 will be reported in a synoptic report.        B. Left breast mass, 1:00 1 cm from nipple, ultrasound guided core biopsy:   -- Invasive ductal carcinoma with mucin production, grade 3, see note.     Note:  In this limited sample, the invasive carcinoma measures up to 4 mm in greatest dimension.  Immunohistochemical stains for p63 and SMMHC are negative for myoepithelial cells surrounding the invasive carcinoma.  ER, NV and HER2 will be reported in a synoptic report.           Past Medical History: Medical History[1]  Social History:   Social History     Socioeconomic History    Marital status:      Spouse name: Not on file    Number of children: 2    Years of education: Not on file    Highest education level: Not on file   Occupational History    Not on file   Tobacco Use    Smoking status: Former     Current packs/day: 0.00     Types: Cigarettes     Quit date:      Years since quittin.3     Passive exposure: Past    Smokeless tobacco: Never   Vaping Use    Vaping status: Never Used   Substance and Sexual Activity    Alcohol use:  Never    Drug use: Never    Sexual activity: Defer   Other Topics Concern    Not on file   Social History Narrative    Not on file     Social Drivers of Health     Financial Resource Strain: Low Risk  (5/13/2025)    Overall Financial Resource Strain (CARDIA)     Difficulty of Paying Living Expenses: Not hard at all   Food Insecurity: No Food Insecurity (5/13/2025)    Hunger Vital Sign     Worried About Running Out of Food in the Last Year: Never true     Ran Out of Food in the Last Year: Never true   Transportation Needs: No Transportation Needs (5/13/2025)    PRAPARE - Transportation     Lack of Transportation (Medical): No     Lack of Transportation (Non-Medical): No   Physical Activity: Sufficiently Active (5/13/2025)    Exercise Vital Sign     Days of Exercise per Week: 7 days     Minutes of Exercise per Session: 30 min   Stress: No Stress Concern Present (5/13/2025)    [employer] [place] [employer] - Occupational Stress Questionnaire     Feeling of Stress : Not at all   Social Connections: Moderately Isolated (5/13/2025)    Social Connection and Isolation Panel [NHANES]     Frequency of Communication with Friends and Family: More than three times a week     Frequency of Social Gatherings with Friends and Family: More than three times a week     Attends Judaism Services: Never     Active Member of Clubs or Organizations: Yes     Attends Club or Organization Meetings: More than 4 times per year     Marital Status:    Intimate Partner Violence: Not At Risk (5/13/2025)    Humiliation, Afraid, Rape, and Kick questionnaire     Fear of Current or Ex-Partner: No     Emotionally Abused: No     Physically Abused: No     Sexually Abused: No   Housing Stability: Low Risk  (5/13/2025)    Housing Stability Vital Sign     Unable to Pay for Housing in the Last Year: No     Number of Times Moved in the Last Year: 0     Homeless in the Last Year: No     Surgical History: Surgical History[2]  Family History:  Family History[3]   reports that she quit smoking about 38 years ago. Her smoking use included cigarettes. She has been exposed to tobacco smoke. She has never used smokeless tobacco.  Oncology Family history: Cancer-related family history includes Breast cancer (age of onset: 50 - 59) in her sister; Breast cancer (age of onset: 70 - 79) in her mother's sister; Cancer (age of onset: 65) in her sister.    Review Of Systems:  As stated per in HPI; otherwise all other 12 point ROS are negative    Physical Exam:  /63   Pulse 72   Temp 35.9 °C (96.6 °F)   Resp 18   Wt 117 kg (257 lb)   SpO2 96%   BMI 42.77 kg/m²   BSA: 2.32 meters squared  General: awake/alert/oriented x3, no distress, alert and cooperative  Head: Short hair fully covering scalp. Symmetric facial expressions  Eyes: PERRL, EOMI, clear sclera, eyebrows present.  Ears/Nose/Mouth/Throat:  Oral mucous membranes moist. No oral ulcers. No palpable pre/post-auricular lymph nodes  Neck: No palpable cervical chain lymph nodes  Respiratory: unlabored breathing on room air, good chest expansion, thorax symmetric  Cardio: Regular rate and rhythm, normal S1 and S2, radial pulses symmetric  GI: Nondistended, soft, non-tender abdomen  Musculoskeletal: Normal muscle bulk and tone, ROM intact, no joint swelling.  Rises from chair and walks unassisted.  Extremities: No ankle swelling, no arm or leg wounds  Neuro: Alert, cognition intact, speech normal. Facial expressions symmetric.  No motor deficits noted. Sensation intact to touch and hot/cold.   Able to stand from seated position unassisted and walks around the room unassisted.  Psychological: Appropriate mood and behavior.  Skin: Warm and dry, no lesions, no rashes    Results:  Diagnostic Results   Lab Results   Component Value Date    WBC 9.3 10/15/2024    HGB 15.7 10/15/2024    HCT 44.8 10/15/2024    MCV 90 10/15/2024     10/15/2024     Lab Results   Component Value Date    CALCIUM 9.4 10/15/2024      10/15/2024    K 4.0 10/15/2024    CO2 28 10/15/2024     10/15/2024    BUN 15 10/15/2024    CREATININE 0.65 10/15/2024    ALT 18 10/15/2024    AST 22 10/15/2024     Current Medications[4]     Assessment/Plan:  ? Breast Cancer:    78 yo F was found to have 2 abnormal foci in left breast. 1.2 cm and 0.7 cm and 3-4 cm apart from each other based on breast US. Biopsied from both breasts showed ER/ND negative and Her2 positive, IDC, grade 3.    She is referred to oncology for neoadj tx. [id number], stage 1A.    Her sister was dx with breast cancer at age 48 and her aunt at age 55. Ambry ordered    Baseline CT CAP and NM bone scan are ordered. Port requested by IR. Baseline labs and CA 27 29 ordered    Echo baseline and repeat q 3 months    TCH vs TH is discussed. Tumor is grade 3. Her2Dx would be useful to assess prognosis but not available. She is very fit and has ECOG 0. If echo and labs come back normal, then TCH will be proceeded    NST with TCH (Carbo AUC 5, reduced from AUC 6; and Taxotere 75 mg/m2) q 3 weeks with Neulasta support followed by surgery and adjuvant RT and completion of 1 year of Herceptin maintenance is recommended    Will restage with breast US after C#3 and after C#6    RTC in 06/6/25    Diagnoses and all orders for this visit:  Invasive ductal carcinoma of breast, female, left  -     Referral To Hematology and Oncology       Performance Status: Asymptomatic    I spent more than 60 minutes for the patient today, including face-to-face conversation, pre-visit preparation, post-visit orders, and others.   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    Bursitis of right shoulder  11/25/2019    Subdeltoid bursitis of right shoulder joint    Encounter for general adult medical examination without abnormal findings 04/26/2018    Encounter for Medicare annual wellness exam    Halitosis 05/22/2019    Halitosis    Localized swelling, mass and lump, head 12/28/2016    Left facial swelling    Localized swelling, mass and lump, head 12/29/2022    Facial mass    Other specified anxiety disorders 12/19/2014    Depression with anxiety    Other specified disorders of nose and nasal sinuses 04/18/2017    Jacque bullosa    Personal history of diseases of the skin and subcutaneous tissue     History of psoriasis    Personal history of other diseases of the circulatory system     History of congestive heart failure    Personal history of other diseases of the circulatory system 11/15/2019    History of uncontrolled hypertension    Personal history of other diseases of the circulatory system     History of hypertension    Personal history of other diseases of the nervous system and sense organs     History of sciatica    Personal history of other diseases of the respiratory system 04/18/2017    History of deviated nasal septum    Personal history of other diseases of the respiratory system 02/27/2017    History of sinusitis    Personal history of other diseases of the respiratory system 05/28/2019    History of chronic sinusitis    Personal history of other diseases of the respiratory system 03/19/2014    History of acute sinusitis    Personal history of other specified conditions 03/26/2013    History of insomnia    Personal history of other specified conditions 07/09/2019    History of nasal congestion    Personal history of other specified conditions 11/17/2016    History of diarrhea    Unspecified blepharitis right eye, unspecified eyelid 01/22/2019    Blepharitis of eyelid of right eye    Unspecified mycosis 06/18/2019    Fungal sinusitis   [2]   Past Surgical History:  Procedure Laterality Date     BREAST BIOPSY Left 04/23/2025    HYSTERECTOMY  04/04/2013    Hysterectomy    OTHER SURGICAL HISTORY  11/22/2022    Eye surgery    OTHER SURGICAL HISTORY  11/22/2022    Nose surgery    OTHER SURGICAL HISTORY  11/22/2022    Back surgery   [3]   Family History  Problem Relation Name Age of Onset    Cancer Sister branden rain 65    Breast cancer Sister branden rain 50 - 59    Breast cancer Mother's Sister  70 - 79   [4]   Current Outpatient Medications:     amLODIPine (Norvasc) 10 mg tablet, Take 1 tablet (10 mg) by mouth once daily., Disp: 90 tablet, Rfl: 0    aspirin 81 mg EC tablet, Take 1 tablet (81 mg) by mouth once daily., Disp: , Rfl:     carboxymethylcellulose (Refresh Plus) 0.5 % ophthalmic solution, Administer into affected eye(s)., Disp: , Rfl:     cetirizine (ZyrTEC) 10 mg tablet, Take 1 tablet (10 mg) by mouth once daily., Disp: 30 tablet, Rfl: 2    cholecalciferol (Vitamin D-3) 5,000 Units tablet, Take 1 tablet (125 mcg) by mouth once daily., Disp: , Rfl:     cyanocobalamin (Vitamin B-12) 1,000 mcg tablet, Take 1 tablet (1,000 mcg) by mouth once daily., Disp: , Rfl:     fluticasone (Flonase) 50 mcg/actuation nasal spray, Administer 1 spray into each nostril once daily. Shake gently. Before first use, prime pump. After use, clean tip and replace cap., Disp: 16 g, Rfl: 11    folic acid (Folvite) 1 mg tablet, Take 1 tablet (1 mg) by mouth once daily., Disp: , Rfl:     furosemide (Lasix) 20 mg tablet, Take 1 tablet (20 mg) by mouth once daily., Disp: 90 tablet, Rfl: 1    losartan (Cozaar) 100 mg tablet, Take 1 tablet (100 mg) by mouth once daily., Disp: 90 tablet, Rfl: 1    MULTIVITAMIN ORAL, Take 1 tablet by mouth once daily. With food, Disp: , Rfl:     propranolol LA (Inderal LA) 120 mg 24 hr capsule, Take 1 capsule (120 mg) by mouth once daily at bedtime., Disp: 90 capsule, Rfl: 1    pyridoxine (Vitamin B-6) 100 mg tablet, Take 1 tablet (100 mg) by mouth once daily., Disp: , Rfl:     rosuvastatin (Crestor)  10 mg tablet, Take 1 tablet (10 mg) by mouth once daily., Disp: 90 tablet, Rfl: 1

## 2025-05-13 NOTE — PROGRESS NOTES
Patient here for follow up visit with Dr Faustino Bustillos for Dx of breast cancer   Patient here     Medications and Allergies reviewed and reconciled this visit by MD per her request     No concerns or complaints noted at this time.     Full chemo class for THC , prn meds, port placement, bone scan reviewed.    !st treatment set for June 6th.  Education Documentation  Shortness of Breath, taught by Kristine Billy RN at 5/13/2025  3:39 PM.  Learner: Patient  Readiness: Acceptance  Method: Explanation, Teach-back  Response: Verbalizes Understanding    Changes in Appetite, taught by Kristine Billy RN at 5/13/2025  3:39 PM.  Learner: Patient  Readiness: Acceptance  Method: Explanation, Teach-back  Response: Verbalizes Understanding    Fertility Issues, taught by Kristine Billy RN at 5/13/2025  3:39 PM.  Learner: Patient  Readiness: Acceptance  Method: Explanation, Teach-back  Response: Verbalizes Understanding    Memory Problems, taught by Kristine Billy RN at 5/13/2025  3:39 PM.  Learner: Patient  Readiness: Acceptance  Method: Explanation, Teach-back  Response: Verbalizes Understanding    Skin and Nail Changes, taught by Kristine Billy RN at 5/13/2025  3:39 PM.  Learner: Patient  Readiness: Acceptance  Method: Explanation, Teach-back  Response: Verbalizes Understanding    Changes in Urination, taught by Kristine Billy RN at 5/13/2025  3:39 PM.  Learner: Patient  Readiness: Acceptance  Method: Explanation, Teach-back  Response: Verbalizes Understanding    Bleeding Precautions, taught by Kristine Billy RN at 5/13/2025  3:39 PM.  Learner: Patient  Readiness: Acceptance  Method: Explanation, Teach-back  Response: Verbalizes Understanding    Edema Management, taught by Kristine Billy RN at 5/13/2025  3:39 PM.  Learner: Patient  Readiness: Acceptance  Method: Explanation, Teach-back  Response: Verbalizes Understanding    Care of Neuropathy, taught by Kristine Billy RN at 5/13/2025  3:39 PM.  Learner: Patient  Readiness: Acceptance  Method:  Explanation, Teach-back  Response: Verbalizes Understanding    Infection Control, taught by Kristine Billy RN at 5/13/2025  3:39 PM.  Learner: Patient  Readiness: Acceptance  Method: Explanation, Teach-back  Response: Verbalizes Understanding    Alopecia, taught by Kristine Billy RN at 5/13/2025  3:39 PM.  Learner: Patient  Readiness: Acceptance  Method: Explanation, Teach-back  Response: Verbalizes Understanding    Diarrhea, taught by Kristine Billy RN at 5/13/2025  3:39 PM.  Learner: Patient  Readiness: Acceptance  Method: Explanation, Teach-back  Response: Verbalizes Understanding    Constipation, taught by Kristine Billy RN at 5/13/2025  3:39 PM.  Learner: Patient  Readiness: Acceptance  Method: Explanation, Teach-back  Response: Verbalizes Understanding    Mouth Sores, taught by Kristine Billy RN at 5/13/2025  3:39 PM.  Learner: Patient  Readiness: Acceptance  Method: Explanation, Teach-back  Response: Verbalizes Understanding    Nausea Management, taught by Kristine Billy RN at 5/13/2025  3:39 PM.  Learner: Patient  Readiness: Acceptance  Method: Explanation, Teach-back  Response: Verbalizes Understanding    Fatigue, taught by Kristine Billy RN at 5/13/2025  3:39 PM.  Learner: Patient  Readiness: Acceptance  Method: Explanation, Teach-back  Response: Verbalizes Understanding    Radiation Therapy, taught by Kristine Billy RN at 5/13/2025  3:39 PM.  Learner: Patient  Readiness: Acceptance  Method: Explanation, Teach-back  Response: Verbalizes Understanding    Post-Chemotherapy Education, taught by Kristine Billy RN at 5/13/2025  3:39 PM.  Learner: Patient  Readiness: Acceptance  Method: Explanation, Teach-back  Response: Verbalizes Understanding    Pregnancy Prevention, taught by Kristine Billy RN at 5/13/2025  3:39 PM.  Learner: Patient  Readiness: Acceptance  Method: Explanation, Teach-back  Response: Verbalizes Understanding    Chemotherapy Safety at Home, taught by Kristine Billy RN at 5/13/2025  3:39 PM.  Learner:  Patient  Readiness: Acceptance  Method: Explanation, Teach-back  Response: Verbalizes Understanding    Treatment Plan and Schedule, taught by Kristine Billy RN at 5/13/2025  3:39 PM.  Learner: Patient  Readiness: Acceptance  Method: Explanation, Teach-back  Response: Verbalizes Understanding    General Medication Information, taught by Kristine Billy RN at 5/13/2025  3:39 PM.  Learner: Patient  Readiness: Acceptance  Method: Explanation, Teach-back  Response: Verbalizes Understanding    Supportive Medications, taught by Kristine Billy RN at 5/13/2025  3:39 PM.  Learner: Patient  Readiness: Acceptance  Method: Explanation, Teach-back  Response: Verbalizes Understanding    Instructions on Use Provided, taught by Kristine Billy RN at 5/13/2025  3:39 PM.  Learner: Patient  Readiness: Acceptance  Method: Explanation, Teach-back  Response: Verbalizes Understanding    Education Comments  No comments found.           Follow up per  MD  request.    Pt. reports availability and use of mychart, Reviewed this is a good place to communicate with the team as well as review labs and upcoming orders.       No barriers to education noted, patient agrees to current plan and verbalized understanding using teach back method.

## 2025-05-14 ENCOUNTER — TELEPHONE (OUTPATIENT)
Dept: HEMATOLOGY/ONCOLOGY | Facility: CLINIC | Age: 80
End: 2025-05-14
Payer: MEDICARE

## 2025-05-14 LAB
FOLATE SERPL-MCNC: >24 NG/ML
HBV CORE AB SER QL: NONREACTIVE
HBV SURFACE AB SER-ACNC: <3.1 MIU/ML
VIT B12 SERPL-MCNC: 451 PG/ML (ref 211–911)

## 2025-05-15 ENCOUNTER — LAB (OUTPATIENT)
Dept: LAB | Facility: CLINIC | Age: 80
End: 2025-05-15
Payer: MEDICARE

## 2025-05-15 DIAGNOSIS — C50.912 INVASIVE DUCTAL CARCINOMA OF BREAST, FEMALE, LEFT: ICD-10-CM

## 2025-05-15 DIAGNOSIS — C50.912 INVASIVE DUCTAL CARCINOMA OF BREAST, FEMALE, LEFT: Primary | ICD-10-CM

## 2025-05-15 PROCEDURE — 86300 IMMUNOASSAY TUMOR CA 15-3: CPT

## 2025-05-15 PROCEDURE — 36415 COLL VENOUS BLD VENIPUNCTURE: CPT

## 2025-05-16 LAB — CANCER AG27-29 SERPL-ACNC: 18.1 U/ML (ref 0–38.6)

## 2025-05-29 ENCOUNTER — HOSPITAL ENCOUNTER (OUTPATIENT)
Dept: RADIOLOGY | Facility: HOSPITAL | Age: 80
Discharge: HOME | End: 2025-05-29
Payer: MEDICARE

## 2025-05-29 DIAGNOSIS — C50.912 INVASIVE DUCTAL CARCINOMA OF BREAST, FEMALE, LEFT: ICD-10-CM

## 2025-05-29 PROCEDURE — A9503 TC99M MEDRONATE: HCPCS | Performed by: INTERNAL MEDICINE

## 2025-05-29 PROCEDURE — 3430000001 HC RX 343 DIAGNOSTIC RADIOPHARMACEUTICALS: Performed by: INTERNAL MEDICINE

## 2025-05-29 PROCEDURE — 78306 BONE IMAGING WHOLE BODY: CPT

## 2025-05-29 RX ADMIN — TECHNETIUM TC 99M MEDRONATE 25.1 MILLICURIE: 25 INJECTION, POWDER, FOR SOLUTION INTRAVENOUS at 11:15

## 2025-06-02 ENCOUNTER — TELEPHONE (OUTPATIENT)
Dept: PRIMARY CARE | Facility: CLINIC | Age: 80
End: 2025-06-02
Payer: MEDICARE

## 2025-06-02 NOTE — PROGRESS NOTES
SW met with patient while she was here for a consultation. She resides in [place] alone. She is retired. She has 2 sons who are supportive. She has a very best friend, who she has known since age 7, that is her main support. She reported that she had 2 sisters who also have had breast cancer but who she doesn't feel are very supportive. She is independent in all areas at this time.   Pt reports that she believes she will be having chemotherapy then surgery followed possibly by more chemotherapy based on the outcome of the surgery. Possible radiation as well.   Pt does have a Living Will and SW asked her to make this available so that it can be added to her chart. SW does not find a distress screen so unsure about this. Pt does not seem overly distressed but is wary due to diagnosis and what treatment is to come. She is not asking for and referrals at this time. SW provided a business card and will remain available for future needs.

## 2025-06-02 NOTE — TELEPHONE ENCOUNTER
Patient is starting chemo infusion on Friday. Asking if she should still take her losartan 100mg, amlodipine 10mg, rosuvastatin 10mg, furosemide 20mg, and propranolol ER 120mg? States she will be doing infusions once every three weeks.

## 2025-06-03 ENCOUNTER — APPOINTMENT (OUTPATIENT)
Dept: CARDIOLOGY | Facility: HOSPITAL | Age: 80
End: 2025-06-03
Payer: MEDICARE

## 2025-06-03 ENCOUNTER — HOSPITAL ENCOUNTER (OUTPATIENT)
Dept: CARDIOLOGY | Facility: HOSPITAL | Age: 80
Discharge: HOME | End: 2025-06-03
Payer: MEDICARE

## 2025-06-03 ENCOUNTER — APPOINTMENT (OUTPATIENT)
Dept: RADIOLOGY | Facility: CLINIC | Age: 80
End: 2025-06-03
Payer: MEDICARE

## 2025-06-03 ENCOUNTER — HOSPITAL ENCOUNTER (OUTPATIENT)
Dept: RADIOLOGY | Facility: HOSPITAL | Age: 80
Discharge: HOME | End: 2025-06-03
Payer: MEDICARE

## 2025-06-03 ENCOUNTER — APPOINTMENT (OUTPATIENT)
Dept: CARDIOLOGY | Facility: CLINIC | Age: 80
End: 2025-06-03
Payer: MEDICARE

## 2025-06-03 DIAGNOSIS — C50.912 INVASIVE DUCTAL CARCINOMA OF BREAST, FEMALE, LEFT: ICD-10-CM

## 2025-06-03 LAB
AORTIC VALVE PEAK VELOCITY: 1.16 M/S
AV PEAK GRADIENT: 5 MMHG
AVA (PEAK VEL): 2.17 CM2
EJECTION FRACTION APICAL 4 CHAMBER: 66.9
EJECTION FRACTION: 63 %
LEFT ATRIUM VOLUME AREA LENGTH INDEX BSA: 34.3 ML/M2
LEFT VENTRICLE INTERNAL DIMENSION DIASTOLE: 4.72 CM (ref 3.5–6)
LEFT VENTRICULAR OUTFLOW TRACT DIAMETER: 1.8 CM
MITRAL VALVE E/A RATIO: 0.85
RIGHT VENTRICLE FREE WALL PEAK S': 8.94 CM/S
RIGHT VENTRICLE PEAK SYSTOLIC PRESSURE: 30 MMHG
TRICUSPID ANNULAR PLANE SYSTOLIC EXCURSION: 2.1 CM

## 2025-06-03 PROCEDURE — 2550000001 HC RX 255 CONTRASTS: Performed by: INTERNAL MEDICINE

## 2025-06-03 PROCEDURE — 93306 TTE W/DOPPLER COMPLETE: CPT

## 2025-06-03 PROCEDURE — 93306 TTE W/DOPPLER COMPLETE: CPT | Performed by: INTERNAL MEDICINE

## 2025-06-03 PROCEDURE — 71260 CT THORAX DX C+: CPT | Performed by: RADIOLOGY

## 2025-06-03 PROCEDURE — 74177 CT ABD & PELVIS W/CONTRAST: CPT | Performed by: RADIOLOGY

## 2025-06-03 PROCEDURE — 71260 CT THORAX DX C+: CPT

## 2025-06-03 RX ADMIN — IOHEXOL 75 ML: 350 INJECTION, SOLUTION INTRAVENOUS at 11:00

## 2025-06-04 ENCOUNTER — HOSPITAL ENCOUNTER (OUTPATIENT)
Dept: CARDIOLOGY | Facility: HOSPITAL | Age: 80
Discharge: HOME | End: 2025-06-04
Payer: MEDICARE

## 2025-06-04 VITALS
TEMPERATURE: 96.4 F | DIASTOLIC BLOOD PRESSURE: 73 MMHG | OXYGEN SATURATION: 95 % | HEART RATE: 65 BPM | BODY MASS INDEX: 42.82 KG/M2 | WEIGHT: 257 LBS | HEIGHT: 65 IN | SYSTOLIC BLOOD PRESSURE: 139 MMHG | RESPIRATION RATE: 12 BRPM

## 2025-06-04 DIAGNOSIS — C50.912 INVASIVE DUCTAL CARCINOMA OF BREAST, FEMALE, LEFT: ICD-10-CM

## 2025-06-04 LAB
POCT INTERNATIONAL NORMALIZATION RATIO: 1.1
POCT PROTHROMBIN TIME: 13.5 SECONDS

## 2025-06-04 PROCEDURE — 2500000004 HC RX 250 GENERAL PHARMACY W/ HCPCS (ALT 636 FOR OP/ED)

## 2025-06-04 PROCEDURE — 7100000009 HC PHASE TWO TIME - INITIAL BASE CHARGE

## 2025-06-04 PROCEDURE — C1788 PORT, INDWELLING, IMP: HCPCS

## 2025-06-04 PROCEDURE — 2500000004 HC RX 250 GENERAL PHARMACY W/ HCPCS (ALT 636 FOR OP/ED): Performed by: NURSE PRACTITIONER

## 2025-06-04 PROCEDURE — 76937 US GUIDE VASCULAR ACCESS: CPT

## 2025-06-04 PROCEDURE — 77001 FLUOROGUIDE FOR VEIN DEVICE: CPT

## 2025-06-04 PROCEDURE — 7100000010 HC PHASE TWO TIME - EACH INCREMENTAL 1 MINUTE

## 2025-06-04 PROCEDURE — 2500000002 HC RX 250 W HCPCS SELF ADMINISTERED DRUGS (ALT 637 FOR MEDICARE OP, ALT 636 FOR OP/ED): Performed by: NURSE PRACTITIONER

## 2025-06-04 PROCEDURE — 99152 MOD SED SAME PHYS/QHP 5/>YRS: CPT

## 2025-06-04 PROCEDURE — 36561 INSERT TUNNELED CV CATH: CPT | Mod: RT

## 2025-06-04 PROCEDURE — 2500000004 HC RX 250 GENERAL PHARMACY W/ HCPCS (ALT 636 FOR OP/ED): Performed by: RADIOLOGY

## 2025-06-04 PROCEDURE — 2500000005 HC RX 250 GENERAL PHARMACY W/O HCPCS: Performed by: RADIOLOGY

## 2025-06-04 PROCEDURE — 2720000007 HC OR 272 NO HCPCS

## 2025-06-04 PROCEDURE — 2780000003 HC OR 278 NO HCPCS

## 2025-06-04 PROCEDURE — C1894 INTRO/SHEATH, NON-LASER: HCPCS

## 2025-06-04 RX ORDER — HEPARIN SODIUM 1000 [USP'U]/ML
INJECTION, SOLUTION INTRAVENOUS; SUBCUTANEOUS AS NEEDED
Status: DISCONTINUED | OUTPATIENT
Start: 2025-06-04 | End: 2025-06-04 | Stop reason: HOSPADM

## 2025-06-04 RX ORDER — MECLIZINE HCL 25MG 25 MG/1
50 TABLET, CHEWABLE ORAL ONCE
Status: COMPLETED | OUTPATIENT
Start: 2025-06-04 | End: 2025-06-04

## 2025-06-04 RX ORDER — VANCOMYCIN 1.5 G/300ML
1500 INJECTION, SOLUTION INTRAVENOUS ONCE
Status: COMPLETED | OUTPATIENT
Start: 2025-06-04 | End: 2025-06-04

## 2025-06-04 RX ORDER — ONDANSETRON HYDROCHLORIDE 2 MG/ML
INJECTION, SOLUTION INTRAVENOUS
Status: COMPLETED
Start: 2025-06-04 | End: 2025-06-04

## 2025-06-04 RX ORDER — MIDAZOLAM HYDROCHLORIDE 1 MG/ML
INJECTION, SOLUTION INTRAMUSCULAR; INTRAVENOUS AS NEEDED
Status: DISCONTINUED | OUTPATIENT
Start: 2025-06-04 | End: 2025-06-04 | Stop reason: HOSPADM

## 2025-06-04 RX ORDER — LIDOCAINE HYDROCHLORIDE 10 MG/ML
INJECTION, SOLUTION EPIDURAL; INFILTRATION; INTRACAUDAL; PERINEURAL AS NEEDED
Status: DISCONTINUED | OUTPATIENT
Start: 2025-06-04 | End: 2025-06-04 | Stop reason: HOSPADM

## 2025-06-04 RX ORDER — FENTANYL CITRATE 50 UG/ML
INJECTION, SOLUTION INTRAMUSCULAR; INTRAVENOUS AS NEEDED
Status: DISCONTINUED | OUTPATIENT
Start: 2025-06-04 | End: 2025-06-04 | Stop reason: HOSPADM

## 2025-06-04 RX ORDER — SODIUM CHLORIDE 9 MG/ML
200 INJECTION, SOLUTION INTRAVENOUS CONTINUOUS
Status: ACTIVE | OUTPATIENT
Start: 2025-06-04 | End: 2025-06-04

## 2025-06-04 RX ORDER — ONDANSETRON HYDROCHLORIDE 2 MG/ML
4 INJECTION, SOLUTION INTRAVENOUS ONCE
Status: DISCONTINUED | OUTPATIENT
Start: 2025-06-04 | End: 2025-06-05 | Stop reason: HOSPADM

## 2025-06-04 RX ADMIN — SODIUM CHLORIDE 200 ML/HR: 9 INJECTION, SOLUTION INTRAVENOUS at 14:39

## 2025-06-04 RX ADMIN — ONDANSETRON 4 MG: 2 INJECTION, SOLUTION INTRAMUSCULAR; INTRAVENOUS at 13:51

## 2025-06-04 RX ADMIN — MIDAZOLAM 2 MG: 1 INJECTION INTRAMUSCULAR; INTRAVENOUS at 12:54

## 2025-06-04 RX ADMIN — HEPARIN SODIUM 200 UNITS: 1000 INJECTION INTRAVENOUS; SUBCUTANEOUS at 13:23

## 2025-06-04 RX ADMIN — LIDOCAINE HYDROCHLORIDE 5 ML: 10 INJECTION, SOLUTION EPIDURAL; INFILTRATION; INTRACAUDAL; PERINEURAL at 12:55

## 2025-06-04 RX ADMIN — FENTANYL CITRATE 50 MCG: 50 INJECTION, SOLUTION INTRAMUSCULAR; INTRAVENOUS at 12:54

## 2025-06-04 RX ADMIN — Medication 3 L/MIN: at 12:47

## 2025-06-04 RX ADMIN — MECLIZINE HYDROCHLORIDE 50 MG: 25 TABLET, CHEWABLE ORAL at 15:43

## 2025-06-04 RX ADMIN — VANCOMYCIN 1.5 G: 1.5 INJECTION, SOLUTION INTRAVENOUS at 12:04

## 2025-06-04 ASSESSMENT — COLUMBIA-SUICIDE SEVERITY RATING SCALE - C-SSRS
1. IN THE PAST MONTH, HAVE YOU WISHED YOU WERE DEAD OR WISHED YOU COULD GO TO SLEEP AND NOT WAKE UP?: NO
2. HAVE YOU ACTUALLY HAD ANY THOUGHTS OF KILLING YOURSELF?: NO
6. HAVE YOU EVER DONE ANYTHING, STARTED TO DO ANYTHING, OR PREPARED TO DO ANYTHING TO END YOUR LIFE?: NO

## 2025-06-04 NOTE — DISCHARGE INSTRUCTIONS
What is a Central Venous Access Port? Patient and Family Education    What is a Central Venous Access Port?  A central venous access port is a small device made up of 2 parts:  ? The port, which is a hollow metal or plastic disk with a rubber center and  ? The tube (also called a catheter) that connects to the port. The catheter is  threaded through a vein that leads to your heart.  A doctor places the port under the skin in the chest near the collarbone, or in the arm. The port  feels like a small bump. Once your port site heals it should not hurt. A port provides easy  access to a vein. A port is useful if you need frequent intravenous (IV) treatments, medicines,  blood tests or blood products. A port can stay in for months or years if it is cared for correctly  and working as it should. A port may also be called a Mediport, Power Port or Port-a-cath.    Before your Port is Placed:  ? If you take any medicine that thins your blood or helps prevent clots, talk with  your doctor. Before your port is placed, ask your doctor if your dose of these  medicines needs to be changed to help prevent bleeding problems. If your doctor tells  you to stop taking these medicines, ask him or her when you should restart them. If  your port placement is cancelled, call your doctor and ask if you need to restart your  medicine or change your dose. These medicines include, but are not limited to:  Warfarin (Coumadin), Lovenox (enoxaparin), Eliquis (apixaban), Plavix (clopidogrel),  Pradaxa (dabigatran) and Xarelto (rivaroxaban).  ? Do not eat or drink anything 8 hours before your port placement. If you have  been told to take certain medicines, take them with a sip of water.  ? Take your daily medicines, especially any cardiac (heart), high blood pressure, seizure,  and antibiotic medicines. If you take insulin for diabetes, ask your doctor how to adjust  your insulin dose the morning of your port placement. Be sure to tell your  doctor that  you have to fast for 8 hours before the port is placed.  ? Talk with your doctor, nurse or dietitian before taking probiotics. Ask if it’s safe for  you to take them when you have a central line. Some patients should avoid taking  certain probiotics because they may increase their chance of getting an infection.    The Day your Port is Placed:  ? A doctor in Radiology will place your port. The port placement takes about 60 to 90  minutes but plan on being here for 3 to 4 hours. This allows time for your check-in and  recovery.  ? A staff member will talk with you about the procedure, ask you questions and help  answer your questions. For women: Tell your doctor or technologist if there is any  chance you are pregnant.  ? You will be asked to change into a hospital gown for the procedure. You must remove  necklaces and earrings because they can get in the way during your port placement. An  IV will be placed in your arm and you will be asked to lay on a cart. Once we are  ready, we will take you to the procedure room. A family member may stay in our  waiting room while your port is placed.    In the Procedure Room:  You will get medicine through your IV to help you relax. While the port is placed, some  people fall asleep and some are awake but feel very relaxed. We will wash the area where the  port is being placed with a germ killing solution. This solution helps lower your chances of  getting an infection. Next, the doctor injects a numbing medicine into your skin, around the  port site. Once your skin is numb, the doctor makes 2 small incisions (cuts) to place the port  under your skin. The incisions are closed with skin glue or sutures and paper Band-Aids called  steri-strips. A gauze bandage is then placed over the port site.    After the Port is Placed:  ? You will be taken to the recovery area. We will check your pulse and blood pressure  often and check your port site for bleeding and swelling.  Some bruising is normal. If  you see bleeding, apply pressure with your fingertips and call your nurse right away. If  you feel swelling or more pain at the site, call your nurse.  ? You may have some soreness where your port is placed but it should improve each  day as the site heals. The incisions used to place the port are small and should heal in  10 to 14 days.  ? Once healed, you do not need to have a dressing over the site unless the port has a  needle in it.    If You go Home After Your Port is Placed:  ? You must have someone drive you home. We cannot let you drive or travel home alone in  a cab or on a bus. Do not drive for 24 hours after your port is placed.  ? Someone should stay with you through the night.  ? Resume your routine normal diet. You may resume your normal medicines but if you  take blood-thinning medicine, ask your doctor when you should start taking it again.  ? Rest until morning.   ? Lifting your arm on the same side of the mediport should be restricted to 10-15 pounds   or less for 1 week.  ? Avoid pushing, pulling, straining, or any strenuous activities.  ? You may climb stairs.  ? You may return to work when you feel you are ready at any point after surgery.  If you are not able to contact your doctor, go to the nearest Emergency Room.    Caring for Your Incisions:  ? Remove the dressing after 7 days. You do not need to replace it. Don’t try to peel  off the surgical glue or steri-strips. Let them fall off on their own, which often happens  after 2 weeks.  ? Do not let your incisions get wet until they are fully healed, which often takes 10 to 14  days. When you shower, cover your incisions with a dressing made from plastic wrap  (like Saran wrap or Glad Press n’ Seal) or a plastic bag and tape to keep the area dry.  After you shower, remove the plastic wrap and pat the incisions dry. Don’t swim or soak  in a tub or Jacuzzi until your incisions are fully healed.  ? Do not get your  port site wet if it has a needle in it. Follow the steps above to make  sure your port site is covered with plastic wrap or a plastic bag when you shower.  ? Look at your incisions each day. Call your doctor right away if you have any of the signs  of infection that are listed on the next page.    Caring for Your Port:  -A trained nurse must use a special non-coring needle, called a Navarro needle, each time they  access your port. The needle is placed through your skin and into the rubber center of the port.  -You will likely feel slight pricking when your nurse inserts the needle. The needle stays in  place for your treatments and can be removed afterwards.  -Your port needs to be flushed every 4 to 6 weeks to help prevent blood clots  from forming in the catheter. If blood clots form and cause a blockage, your  port may need to be removed. Your nurse will flush your port with saline. If  needed, they may also flush it with a blood thinning medicine called heparin.  -Port flushes are done right before the needle is taken out. Some patients are  taught how to do these flushes at home. If you need to flush your port at home,  your nurse will show you how. If your port is not being used at least once  every 4 to 6 weeks, talk with your doctor or nurse about scheduling port  flushes.    Sedation:  If you received medication for sedation, it will be active in your body for approximately 24  hours. So you may feel a little sleepy. Therefore, for the next 24 hours:  ? DO NOT drive a car, operate machinery or power tools. It is recommended that a   responsible adult be with you for the first 24 hours.  ? DO NOT drink any alcoholic beverages or take any non-prescriptive medications that   contain alcohol.  ? DO NOT make any important decisions or sign any legal/important documents.    When to Call Your Doctor:  ? Redness, swelling or drainage near the port or over the port site.  ? Drainage from the port site.  ? Shake or  chills.  ? Temperature of 100.4°F (38°C) or higher.  ? Pain, warm or soreness at the port site.  ? Swelling of your arm, check at the port site.  ? Also call if the port has been moved  ? If you have any questions about your port.     How to Reach your Doctor:    Call Dr. Bustillos at [phone] with problems or questions    This info is a general resource. It is not meant to replace your health care provider’s advice.  Ask your doctor or health care team any questions. Always follow their instructions.

## 2025-06-04 NOTE — NURSING NOTE
Pt c/o dizziness, states has intermittent vertigo at home and takes meclizine. Vitals stable, Dr. Pérez made aware. meclizine 50 po ordered

## 2025-06-05 ENCOUNTER — LAB (OUTPATIENT)
Dept: LAB | Facility: CLINIC | Age: 80
End: 2025-06-05
Payer: MEDICARE

## 2025-06-05 DIAGNOSIS — C50.912 INVASIVE DUCTAL CARCINOMA OF BREAST, FEMALE, LEFT: ICD-10-CM

## 2025-06-05 LAB
ALBUMIN SERPL BCP-MCNC: 4.3 G/DL (ref 3.4–5)
ALP SERPL-CCNC: 62 U/L (ref 33–136)
ALT SERPL W P-5'-P-CCNC: 16 U/L (ref 7–45)
ANION GAP SERPL CALC-SCNC: 12 MMOL/L (ref 10–20)
AST SERPL W P-5'-P-CCNC: 20 U/L (ref 9–39)
BASOPHILS # BLD AUTO: 0.07 X10*3/UL (ref 0–0.1)
BASOPHILS NFR BLD AUTO: 0.5 %
BILIRUB SERPL-MCNC: 1.1 MG/DL (ref 0–1.2)
BUN SERPL-MCNC: 11 MG/DL (ref 6–23)
CALCIUM SERPL-MCNC: 9 MG/DL (ref 8.6–10.3)
CHLORIDE SERPL-SCNC: 105 MMOL/L (ref 98–107)
CO2 SERPL-SCNC: 27 MMOL/L (ref 21–32)
CREAT SERPL-MCNC: 0.69 MG/DL (ref 0.5–1.05)
EGFRCR SERPLBLD CKD-EPI 2021: 90 ML/MIN/1.73M*2
EOSINOPHIL # BLD AUTO: 0.08 X10*3/UL (ref 0–0.4)
EOSINOPHIL NFR BLD AUTO: 0.6 %
ERYTHROCYTE [DISTWIDTH] IN BLOOD BY AUTOMATED COUNT: 14.6 % (ref 11.5–14.5)
GLUCOSE SERPL-MCNC: 121 MG/DL (ref 74–99)
HBV CORE AB SER QL: NONREACTIVE
HBV SURFACE AB SER-ACNC: <3.1 MIU/ML
HBV SURFACE AG SERPL QL IA: NONREACTIVE
HCT VFR BLD AUTO: 40.7 % (ref 36–46)
HGB BLD-MCNC: 14.4 G/DL (ref 12–16)
IMM GRANULOCYTES # BLD AUTO: 0.04 X10*3/UL (ref 0–0.5)
IMM GRANULOCYTES NFR BLD AUTO: 0.3 % (ref 0–0.9)
LYMPHOCYTES # BLD AUTO: 2.63 X10*3/UL (ref 0.8–3)
LYMPHOCYTES NFR BLD AUTO: 18.9 %
MCH RBC QN AUTO: 31 PG (ref 26–34)
MCHC RBC AUTO-ENTMCNC: 35.4 G/DL (ref 32–36)
MCV RBC AUTO: 88 FL (ref 80–100)
MONOCYTES # BLD AUTO: 1.22 X10*3/UL (ref 0.05–0.8)
MONOCYTES NFR BLD AUTO: 8.7 %
NEUTROPHILS # BLD AUTO: 9.91 X10*3/UL (ref 1.6–5.5)
NEUTROPHILS NFR BLD AUTO: 71 %
NRBC BLD-RTO: 0 /100 WBCS (ref 0–0)
PLATELET # BLD AUTO: 175 X10*3/UL (ref 150–450)
POTASSIUM SERPL-SCNC: 3.6 MMOL/L (ref 3.5–5.3)
PROT SERPL-MCNC: 6.5 G/DL (ref 6.4–8.2)
RBC # BLD AUTO: 4.65 X10*6/UL (ref 4–5.2)
SODIUM SERPL-SCNC: 140 MMOL/L (ref 136–145)
WBC # BLD AUTO: 14 X10*3/UL (ref 4.4–11.3)

## 2025-06-05 PROCEDURE — 86704 HEP B CORE ANTIBODY TOTAL: CPT

## 2025-06-05 PROCEDURE — 36415 COLL VENOUS BLD VENIPUNCTURE: CPT

## 2025-06-05 PROCEDURE — 87340 HEPATITIS B SURFACE AG IA: CPT

## 2025-06-05 PROCEDURE — 86706 HEP B SURFACE ANTIBODY: CPT

## 2025-06-05 PROCEDURE — 80053 COMPREHEN METABOLIC PANEL: CPT

## 2025-06-05 PROCEDURE — 85025 COMPLETE CBC W/AUTO DIFF WBC: CPT

## 2025-06-06 ENCOUNTER — INFUSION (OUTPATIENT)
Dept: HEMATOLOGY/ONCOLOGY | Facility: CLINIC | Age: 80
End: 2025-06-06
Payer: MEDICARE

## 2025-06-06 ENCOUNTER — OFFICE VISIT (OUTPATIENT)
Dept: HEMATOLOGY/ONCOLOGY | Facility: CLINIC | Age: 80
End: 2025-06-06
Payer: MEDICARE

## 2025-06-06 VITALS
WEIGHT: 255.29 LBS | OXYGEN SATURATION: 98 % | HEIGHT: 65 IN | BODY MASS INDEX: 42.53 KG/M2 | RESPIRATION RATE: 18 BRPM | SYSTOLIC BLOOD PRESSURE: 123 MMHG | DIASTOLIC BLOOD PRESSURE: 74 MMHG | TEMPERATURE: 97.7 F | HEART RATE: 62 BPM

## 2025-06-06 DIAGNOSIS — C50.912 INVASIVE DUCTAL CARCINOMA OF BREAST, FEMALE, LEFT: Primary | ICD-10-CM

## 2025-06-06 DIAGNOSIS — C50.912 INVASIVE DUCTAL CARCINOMA OF BREAST, FEMALE, LEFT: ICD-10-CM

## 2025-06-06 PROCEDURE — 2500000001 HC RX 250 WO HCPCS SELF ADMINISTERED DRUGS (ALT 637 FOR MEDICARE OP): Performed by: INTERNAL MEDICINE

## 2025-06-06 PROCEDURE — 99215 OFFICE O/P EST HI 40 MIN: CPT | Performed by: INTERNAL MEDICINE

## 2025-06-06 PROCEDURE — 96413 CHEMO IV INFUSION 1 HR: CPT

## 2025-06-06 PROCEDURE — 96375 TX/PRO/DX INJ NEW DRUG ADDON: CPT | Mod: INF

## 2025-06-06 PROCEDURE — 2500000004 HC RX 250 GENERAL PHARMACY W/ HCPCS (ALT 636 FOR OP/ED): Performed by: INTERNAL MEDICINE

## 2025-06-06 PROCEDURE — 2500000005 HC RX 250 GENERAL PHARMACY W/O HCPCS: Performed by: INTERNAL MEDICINE

## 2025-06-06 PROCEDURE — 96367 TX/PROPH/DG ADDL SEQ IV INF: CPT

## 2025-06-06 PROCEDURE — 96417 CHEMO IV INFUS EACH ADDL SEQ: CPT

## 2025-06-06 PROCEDURE — 1036F TOBACCO NON-USER: CPT | Performed by: INTERNAL MEDICINE

## 2025-06-06 PROCEDURE — 1160F RVW MEDS BY RX/DR IN RCRD: CPT | Performed by: INTERNAL MEDICINE

## 2025-06-06 PROCEDURE — 1159F MED LIST DOCD IN RCRD: CPT | Performed by: INTERNAL MEDICINE

## 2025-06-06 RX ORDER — PROCHLORPERAZINE MALEATE 10 MG
10 TABLET ORAL EVERY 6 HOURS PRN
Qty: 30 TABLET | Refills: 5 | Status: SHIPPED | OUTPATIENT
Start: 2025-06-06

## 2025-06-06 RX ORDER — OLANZAPINE 5 MG/1
5 TABLET, FILM COATED ORAL NIGHTLY
Qty: 4 TABLET | Refills: 5 | Status: SHIPPED | OUTPATIENT
Start: 2025-06-06

## 2025-06-06 RX ORDER — PROCHLORPERAZINE MALEATE 10 MG
10 TABLET ORAL EVERY 6 HOURS PRN
Status: DISCONTINUED | OUTPATIENT
Start: 2025-06-06 | End: 2025-06-06 | Stop reason: HOSPADM

## 2025-06-06 RX ORDER — HEPARIN SODIUM,PORCINE/PF 10 UNIT/ML
50 SYRINGE (ML) INTRAVENOUS AS NEEDED
Status: DISCONTINUED | OUTPATIENT
Start: 2025-06-06 | End: 2025-06-06 | Stop reason: HOSPADM

## 2025-06-06 RX ORDER — HEPARIN SODIUM,PORCINE/PF 10 UNIT/ML
50 SYRINGE (ML) INTRAVENOUS AS NEEDED
Status: CANCELLED | OUTPATIENT
Start: 2025-06-06

## 2025-06-06 RX ORDER — ALBUTEROL SULFATE 0.83 MG/ML
3 SOLUTION RESPIRATORY (INHALATION) AS NEEDED
Status: DISCONTINUED | OUTPATIENT
Start: 2025-06-06 | End: 2025-06-06 | Stop reason: HOSPADM

## 2025-06-06 RX ORDER — PALONOSETRON 0.05 MG/ML
0.25 INJECTION, SOLUTION INTRAVENOUS ONCE
Status: COMPLETED | OUTPATIENT
Start: 2025-06-06 | End: 2025-06-06

## 2025-06-06 RX ORDER — HEPARIN 100 UNIT/ML
500 SYRINGE INTRAVENOUS AS NEEDED
Status: DISCONTINUED | OUTPATIENT
Start: 2025-06-06 | End: 2025-06-06 | Stop reason: HOSPADM

## 2025-06-06 RX ORDER — FAMOTIDINE 10 MG/ML
20 INJECTION, SOLUTION INTRAVENOUS ONCE AS NEEDED
Status: DISCONTINUED | OUTPATIENT
Start: 2025-06-06 | End: 2025-06-06 | Stop reason: HOSPADM

## 2025-06-06 RX ORDER — EPINEPHRINE 0.3 MG/.3ML
0.3 INJECTION SUBCUTANEOUS EVERY 5 MIN PRN
Status: DISCONTINUED | OUTPATIENT
Start: 2025-06-06 | End: 2025-06-06 | Stop reason: HOSPADM

## 2025-06-06 RX ORDER — HEPARIN SODIUM,PORCINE/PF 10 UNIT/ML
50 SYRINGE (ML) INTRAVENOUS AS NEEDED
OUTPATIENT
Start: 2025-06-06

## 2025-06-06 RX ORDER — HEPARIN 100 UNIT/ML
500 SYRINGE INTRAVENOUS AS NEEDED
OUTPATIENT
Start: 2025-06-06

## 2025-06-06 RX ORDER — DEXAMETHASONE 4 MG/1
8 TABLET ORAL DAILY
Qty: 6 TABLET | Refills: 5 | Status: SHIPPED | OUTPATIENT
Start: 2025-06-06

## 2025-06-06 RX ORDER — DIPHENHYDRAMINE HCL 25 MG
25 CAPSULE ORAL ONCE
Status: COMPLETED | OUTPATIENT
Start: 2025-06-06 | End: 2025-06-06

## 2025-06-06 RX ORDER — HEPARIN 100 UNIT/ML
500 SYRINGE INTRAVENOUS AS NEEDED
Status: CANCELLED | OUTPATIENT
Start: 2025-06-06

## 2025-06-06 RX ORDER — PROCHLORPERAZINE EDISYLATE 5 MG/ML
10 INJECTION INTRAMUSCULAR; INTRAVENOUS EVERY 6 HOURS PRN
Status: DISCONTINUED | OUTPATIENT
Start: 2025-06-06 | End: 2025-06-06 | Stop reason: HOSPADM

## 2025-06-06 RX ORDER — ONDANSETRON 8 MG/1
8 TABLET, FILM COATED ORAL EVERY 8 HOURS PRN
Qty: 30 TABLET | Refills: 5 | Status: SHIPPED | OUTPATIENT
Start: 2025-06-06

## 2025-06-06 RX ORDER — DIPHENHYDRAMINE HYDROCHLORIDE 50 MG/ML
50 INJECTION, SOLUTION INTRAMUSCULAR; INTRAVENOUS AS NEEDED
Status: DISCONTINUED | OUTPATIENT
Start: 2025-06-06 | End: 2025-06-06 | Stop reason: HOSPADM

## 2025-06-06 RX ADMIN — PALONOSETRON HYDROCHLORIDE 0.25 MG: 0.25 INJECTION INTRAVENOUS at 08:20

## 2025-06-06 RX ADMIN — CARBOPLATIN 520 MG: 10 INJECTION, SOLUTION INTRAVENOUS at 12:05

## 2025-06-06 RX ADMIN — DEXAMETHASONE SODIUM PHOSPHATE 12 MG: 10 INJECTION, SOLUTION INTRAMUSCULAR; INTRAVENOUS at 08:20

## 2025-06-06 RX ADMIN — HEPARIN 500 UNITS: 100 SYRINGE at 12:44

## 2025-06-06 RX ADMIN — FOSAPREPITANT 150 MG: 150 INJECTION, POWDER, LYOPHILIZED, FOR SOLUTION INTRAVENOUS at 08:36

## 2025-06-06 RX ADMIN — DIPHENHYDRAMINE HYDROCHLORIDE 25 MG: 25 CAPSULE ORAL at 08:20

## 2025-06-06 RX ADMIN — TRASTUZUMAB-ANNS 900.9 MG: 420 INJECTION, POWDER, LYOPHILIZED, FOR SOLUTION INTRAVENOUS at 09:15

## 2025-06-06 RX ADMIN — DOCETAXEL 180 MG: 20 INJECTION, SOLUTION, CONCENTRATE INTRAVENOUS at 10:55

## 2025-06-06 ASSESSMENT — PAIN SCALES - GENERAL: PAINLEVEL_OUTOF10: 0-NO PAIN

## 2025-06-06 NOTE — PATIENT INSTRUCTIONS
You have antinausea medications avaliable to take at home:   Zofran (ondansetron) do not take until Monday after that may take every 8 hours as needed  Compazine (prochlorperazine) take every 6 hours as needed   (After Monday you may alternate the two medications as needed)    Zyprexa (Olanzapine) Take 1 tablet a night for next 4 nights, starting tonight   Dexamethasone (Decadron) Take 2 tablets a day for next 3 days, starting tomorrow     Call your cancer care team at [phone] if you have any of these problems. Do not send My Chart messages when you are sick.    1. Fever of 100.4°F (38°C) or higher, with or without shaking or chills  2. Nausea or vomiting that does not improve after you take your home anti-nausea medicines  3. 3 or more loose, watery bowel movements in 24 hours (diarrhea)  4. Not able to eat or drink for more than 12 hours  5. Symptoms or side effects that are new or concerning to you     Call 911 for any problems that you think are an emergency (ie.) chest pain or trouble breathing)

## 2025-06-06 NOTE — PROGRESS NOTES
Patient ID: Elisha Flores is a 79 y.o. female.  Referring Physician: Faustino Bustillos MD  [address]  [address] [place] [address]  Primary Care Provider: Shun Hall MD  Referral Reason: breast cancer    Subjective:  No complaints    Heme/Onc History:    Left breast US 04/3/25 IMPRESSION:  1. Irregular 1.2 cm mass in the left breast at 12 o'clock 5 cm from  the nipple is suspicious for malignancy.  2. Small 7 mm mass in the left breast at 1 o'clock 1 cm from the  nipple is also suspicious.  3. No left axillary lymphadenopathy.    2025 breast biopsy   FINAL DIAGNOSIS   A. Left breast mass, 12:00 5 cm from nipple, ultrasound guided core biopsy:   -- Invasive ductal carcinoma with mucin production, grade 3, see note.     Note:  In this limited sample, the invasive carcinoma measures up to 9 mm in greatest dimension.  ER, NV and HER2 will be reported in a synoptic report.        B. Left breast mass, 1:00 1 cm from nipple, ultrasound guided core biopsy:   -- Invasive ductal carcinoma with mucin production, grade 3, see note.     Note:  In this limited sample, the invasive carcinoma measures up to 4 mm in greatest dimension.  Immunohistochemical stains for p63 and SMMHC are negative for myoepithelial cells surrounding the invasive carcinoma.  ER, NV and HER2 will be reported in a synoptic report.           Past Medical History: Medical History[1]  Social History:   Social History     Socioeconomic History    Marital status:      Spouse name: Not on file    Number of children: 2    Years of education: Not on file    Highest education level: Not on file   Occupational History    Not on file   Tobacco Use    Smoking status: Former     Current packs/day: 0.00     Types: Cigarettes     Quit date:      Years since quittin.4     Passive exposure: Past    Smokeless tobacco: Never   Vaping Use    Vaping status: Never Used   Substance and Sexual Activity    Alcohol use: Never    Drug use: Never     Sexual activity: Defer   Other Topics Concern    Not on file   Social History Narrative    Not on file     Social Drivers of Health     Financial Resource Strain: Low Risk  (5/13/2025)    Overall Financial Resource Strain (CARDIA)     Difficulty of Paying Living Expenses: Not hard at all   Food Insecurity: No Food Insecurity (5/13/2025)    Hunger Vital Sign     Worried About Running Out of Food in the Last Year: Never true     Ran Out of Food in the Last Year: Never true   Transportation Needs: No Transportation Needs (5/13/2025)    PRAPARE - Transportation     Lack of Transportation (Medical): No     Lack of Transportation (Non-Medical): No   Physical Activity: Sufficiently Active (5/13/2025)    Exercise Vital Sign     Days of Exercise per Week: 7 days     Minutes of Exercise per Session: 30 min   Stress: No Stress Concern Present (5/13/2025)    [employer] [place] [place] [employer] - Occupational Stress Questionnaire     Feeling of Stress : Not at all   Social Connections: Moderately Isolated (5/13/2025)    Social Connection and Isolation Panel [NHANES]     Frequency of Communication with Friends and Family: More than three times a week     Frequency of Social Gatherings with Friends and Family: More than three times a week     Attends Hinduism Services: Never     Active Member of Clubs or Organizations: Yes     Attends Club or Organization Meetings: More than 4 times per year     Marital Status:    Intimate Partner Violence: Not At Risk (5/13/2025)    Humiliation, Afraid, Rape, and Kick questionnaire     Fear of Current or Ex-Partner: No     Emotionally Abused: No     Physically Abused: No     Sexually Abused: No   Housing Stability: Low Risk  (5/13/2025)    Housing Stability Vital Sign     Unable to Pay for Housing in the Last Year: No     Number of Times Moved in the Last Year: 0     Homeless in the Last Year: No     Surgical History: Surgical History[2]  Family History: Family History[3]   reports  that she quit smoking about 38 years ago. Her smoking use included cigarettes. She has been exposed to tobacco smoke. She has never used smokeless tobacco.  Oncology Family history: Cancer-related family history includes Breast cancer (age of onset: 50 - 59) in her sister; Breast cancer (age of onset: 70 - 79) in her mother's sister; Cancer (age of onset: 65) in her sister.    Review Of Systems:  As stated per in HPI; otherwise all other 12 point ROS are negative    Physical Exam:  There were no vitals taken for this visit.  BSA: There is no height or weight on file to calculate BSA.  General: awake/alert/oriented x3, no distress, alert and cooperative  Head: Short hair fully covering scalp. Symmetric facial expressions  Eyes: PERRL, EOMI, clear sclera, eyebrows present.  Ears/Nose/Mouth/Throat:  Oral mucous membranes moist. No oral ulcers. No palpable pre/post-auricular lymph nodes  Neck: No palpable cervical chain lymph nodes  Respiratory: unlabored breathing on room air, good chest expansion, thorax symmetric  Cardio: Regular rate and rhythm, normal S1 and S2, radial pulses symmetric  GI: Nondistended, soft, non-tender abdomen  Musculoskeletal: Normal muscle bulk and tone, ROM intact, no joint swelling.  Rises from chair and walks unassisted.  Extremities: No ankle swelling, no arm or leg wounds  Neuro: Alert, cognition intact, speech normal. Facial expressions symmetric.  No motor deficits noted. Sensation intact to touch and hot/cold.   Able to stand from seated position unassisted and walks around the room unassisted.  Psychological: Appropriate mood and behavior.  Skin: Warm and dry, no lesions, no rashes    Results:  Diagnostic Results   Lab Results   Component Value Date    WBC 14.0 (H) 06/05/2025    HGB 14.4 06/05/2025    HCT 40.7 06/05/2025    MCV 88 06/05/2025     06/05/2025     Lab Results   Component Value Date    CALCIUM 9.0 06/05/2025     06/05/2025    K 3.6 06/05/2025    CO2 27  06/05/2025     06/05/2025    BUN 11 06/05/2025    CREATININE 0.69 06/05/2025    ALT 16 06/05/2025    AST 20 06/05/2025     Current Medications[4]     Assessment/Plan:  ? Breast Cancer:    80 yo F was found to have 2 abnormal foci in left breast. 1.2 cm and 0.7 cm and 3-4 cm apart from each other based on breast US. Biopsied from both breasts showed ER/CA negative and Her2 positive, IDC, grade 3.    She is referred to oncology for neoadj tx. [id number], stage 1A.    Her sister was dx with breast cancer at age 48 and her aunt at age 55. Ambry shows POLE VUS. Genetic referral is recommended but she declined    Baseline CT CAP and NM bone scan do not show evidence of distant metastases    NST with TCH (Carbo AUC 5, reduced from AUC 6; and Taxotere 75 mg/m2) q 3 weeks with Neulasta support followed by surgery and adjuvant RT and completion of 1 year of Herceptin maintenance vs Kadcycla is recommended based on pCR status    Will restage with breast US after C#3 and after C#6    Echo due 09/3/25.    RTC ion C2D1    2- Thyroid nodule: Indeterminate 3.5 cm left lower thyroid heterogeneously enhancing  nodule. Thyroid US is ordered    Diagnoses and all orders for this visit:  Invasive ductal carcinoma of breast, female, left  -     Clinic Appointment Request       Performance Status: Asymptomatic    I spent more than 60 minutes for the patient today, including face-to-face conversation, pre-visit preparation, post-visit orders, and others.   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    Bursitis of right shoulder 11/25/2019    Subdeltoid bursitis of right shoulder joint    Encounter for general adult medical  examination without abnormal findings 04/26/2018    Encounter for Medicare annual wellness exam    Halitosis 05/22/2019    Halitosis    Localized swelling, mass and lump, head 12/28/2016    Left facial swelling    Localized swelling, mass and lump, head 12/29/2022    Facial mass    Other specified anxiety disorders 12/19/2014    Depression with anxiety    Other specified disorders of nose and nasal sinuses 04/18/2017    Jacque bullosa    Personal history of diseases of the skin and subcutaneous tissue     History of psoriasis    Personal history of other diseases of the circulatory system     History of congestive heart failure    Personal history of other diseases of the circulatory system 11/15/2019    History of uncontrolled hypertension    Personal history of other diseases of the circulatory system     History of hypertension    Personal history of other diseases of the nervous system and sense organs     History of sciatica    Personal history of other diseases of the respiratory system 04/18/2017    History of deviated nasal septum    Personal history of other diseases of the respiratory system 02/27/2017    History of sinusitis    Personal history of other diseases of the respiratory system 05/28/2019    History of chronic sinusitis    Personal history of other diseases of the respiratory system 03/19/2014    History of acute sinusitis    Personal history of other specified conditions 03/26/2013    History of insomnia    Personal history of other specified conditions 07/09/2019    History of nasal congestion    Personal history of other specified conditions 11/17/2016    History of diarrhea    Unspecified blepharitis right eye, unspecified eyelid 01/22/2019    Blepharitis of eyelid of right eye    Unspecified mycosis 06/18/2019    Fungal sinusitis   [2]   Past Surgical History:  Procedure Laterality Date    BREAST BIOPSY Left 04/23/2025    HYSTERECTOMY  04/04/2013    Hysterectomy    OTHER SURGICAL HISTORY   11/22/2022    Eye surgery    OTHER SURGICAL HISTORY  11/22/2022    Nose surgery    OTHER SURGICAL HISTORY  11/22/2022    Back surgery   [3]   Family History  Problem Relation Name Age of Onset    Cancer Sister branden rain 65    Breast cancer Sister branden rain 50 - 59    Breast cancer Mother's Sister  70 - 79   [4]   Current Outpatient Medications:     amLODIPine (Norvasc) 10 mg tablet, Take 1 tablet (10 mg) by mouth once daily., Disp: 90 tablet, Rfl: 0    aspirin 81 mg EC tablet, Take 1 tablet (81 mg) by mouth once daily., Disp: , Rfl:     carboxymethylcellulose (Refresh Plus) 0.5 % ophthalmic solution, Administer into affected eye(s)., Disp: , Rfl:     cetirizine (ZyrTEC) 10 mg tablet, Take 1 tablet (10 mg) by mouth once daily., Disp: 30 tablet, Rfl: 2    cholecalciferol (Vitamin D-3) 5,000 Units tablet, Take 1 tablet (125 mcg) by mouth once daily., Disp: , Rfl:     cyanocobalamin (Vitamin B-12) 1,000 mcg tablet, Take 1 tablet (1,000 mcg) by mouth once daily., Disp: , Rfl:     dexAMETHasone (Decadron) 4 mg tablet, Take 2 tablets (8 mg) by mouth once daily. For 3 days starting the day after treatment, Disp: 6 tablet, Rfl: 5    fluticasone (Flonase) 50 mcg/actuation nasal spray, Administer 1 spray into each nostril once daily. Shake gently. Before first use, prime pump. After use, clean tip and replace cap., Disp: 16 g, Rfl: 11    folic acid (Folvite) 1 mg tablet, Take 1 tablet (1 mg) by mouth once daily., Disp: , Rfl:     furosemide (Lasix) 20 mg tablet, Take 1 tablet (20 mg) by mouth once daily., Disp: 90 tablet, Rfl: 1    losartan (Cozaar) 100 mg tablet, Take 1 tablet (100 mg) by mouth once daily., Disp: 90 tablet, Rfl: 1    MULTIVITAMIN ORAL, Take 1 tablet by mouth once daily. With food, Disp: , Rfl:     OLANZapine (ZyPREXA) 5 mg tablet, Take 1 tablet (5 mg) by mouth once daily at bedtime. For 4 days starting the evening of treatment, Disp: 4 tablet, Rfl: 5    ondansetron (Zofran) 8 mg tablet, Take 1 tablet (8  mg) by mouth every 8 hours if needed for nausea or vomiting., Disp: 30 tablet, Rfl: 5    prochlorperazine (Compazine) 10 mg tablet, Take 1 tablet (10 mg) by mouth every 6 hours if needed for nausea or vomiting., Disp: 30 tablet, Rfl: 5    propranolol LA (Inderal LA) 120 mg 24 hr capsule, Take 1 capsule (120 mg) by mouth once daily at bedtime., Disp: 90 capsule, Rfl: 1    pyridoxine (Vitamin B-6) 100 mg tablet, Take 1 tablet (100 mg) by mouth once daily., Disp: , Rfl:     rosuvastatin (Crestor) 10 mg tablet, Take 1 tablet (10 mg) by mouth once daily., Disp: 90 tablet, Rfl: 1  No current facility-administered medications for this visit.    Facility-Administered Medications Ordered in Other Visits:     albuterol 2.5 mg /3 mL (0.083 %) nebulizer solution 3 mL, 3 mL, nebulization, PRN, Faustino Bustillos MD    CARBOplatin (Paraplatin) 520 mg in sodium chloride 0.9% 162 mL IV, 520 mg, intravenous, Once, Faustino Bustillos MD    dextrose 5 % in water (D5W) bolus 500 mL, 500 mL, intravenous, PRN, Faustino Bustillos MD    diphenhydrAMINE (BENADryl) injection 50 mg, 50 mg, intravenous, PRN, Faustino Bustillos MD    DOCEtaxeL (Taxotere) 180 mg in dextrose 5% 276 mL IV, 75 mg/m2 (Treatment Plan Recorded), intravenous, Once, Faustino Bustillos MD    EPINEPHrine (Epipen) injection syringe 0.3 mg, 0.3 mg, intramuscular, q5 min PRN, Faustino Bustillos MD    famotidine PF (Pepcid) injection 20 mg, 20 mg, intravenous, Once PRN, Faustino Bustillos MD    fosaprepitant 150 mg in sodium chloride 0.9% 250 mL IV, 150 mg, intravenous, Once, Faustino Bustillos MD, Last Rate: 500 mL/hr at 06/06/25 0836, 150 mg at 06/06/25 0836    methylPREDNISolone sod succinate (SOLU-Medrol) 40 mg/mL injection 40 mg, 40 mg, intravenous, PRN, Faustino Bustillos MD    pegfilgrastim-cbqv (Udenyca On-body) injection 6 mg, 6 mg, subcutaneous, Once, Faustino Bustillos MD    prochlorperazine (Compazine) injection 10 mg, 10 mg, intravenous, q6h PRN, Faustino Bustillos MD     prochlorperazine (Compazine) tablet 10 mg, 10 mg, oral, q6h PRN, Faustino Bustillos MD    sodium chloride 0.9 % bolus 500 mL, 500 mL, intravenous, PRN, Faustino Bustillos MD    trastuzumab-anns (Kanjinti) 900.9 mg in sodium chloride 0.9% 309.9 mL IV, 8 mg/kg (Treatment Plan Recorded), intravenous, Once, Faustino Bustillos MD

## 2025-06-06 NOTE — PROGRESS NOTES
Patient tolerated day 1 cycle 1 of TCH without adverse reactions, reviewed potential side effects and management. Reviewed AVS and schedule as well as when and how to contact the clinic if needed. All questions answered.

## 2025-06-10 ENCOUNTER — TELEPHONE (OUTPATIENT)
Dept: HEMATOLOGY/ONCOLOGY | Facility: CLINIC | Age: 80
End: 2025-06-10
Payer: MEDICARE

## 2025-06-10 NOTE — TELEPHONE ENCOUNTER
Left VM for Elisha.   I will forward this to Dr. Harmon.  IF diarrhea returns you can take 2 imodium at first liquid diarrheal stool and 1 after every stool to follow up to 8 Imodium in 24 hours.  Call back if needed.

## 2025-06-10 NOTE — TELEPHONE ENCOUNTER
Reason for Conversation  Medication Problem    Background   Patient called and said she has started diarrhea today and it is manageable but wanted to make our office aware. Patient had her first treatment on Friday- Please advise if anything needs to be done. Thank you.      Disposition   No disposition on file.

## 2025-06-13 ENCOUNTER — PHARMACY VISIT (OUTPATIENT)
Dept: PHARMACY | Facility: CLINIC | Age: 80
End: 2025-06-13
Payer: COMMERCIAL

## 2025-06-13 ENCOUNTER — HOSPITAL ENCOUNTER (EMERGENCY)
Facility: HOSPITAL | Age: 80
Discharge: HOME | End: 2025-06-13
Attending: EMERGENCY MEDICINE
Payer: MEDICARE

## 2025-06-13 VITALS
WEIGHT: 252.65 LBS | RESPIRATION RATE: 18 BRPM | TEMPERATURE: 99.3 F | SYSTOLIC BLOOD PRESSURE: 135 MMHG | OXYGEN SATURATION: 98 % | DIASTOLIC BLOOD PRESSURE: 73 MMHG | BODY MASS INDEX: 42.09 KG/M2 | HEART RATE: 82 BPM | HEIGHT: 65 IN

## 2025-06-13 DIAGNOSIS — T45.1X5A CHEMOTHERAPY INDUCED DIARRHEA: Primary | ICD-10-CM

## 2025-06-13 DIAGNOSIS — K52.1 CHEMOTHERAPY INDUCED DIARRHEA: Primary | ICD-10-CM

## 2025-06-13 DIAGNOSIS — B37.0 ORAL CANDIDIASIS: ICD-10-CM

## 2025-06-13 DIAGNOSIS — D70.9 NEUTROPENIA, UNSPECIFIED TYPE: ICD-10-CM

## 2025-06-13 LAB
ALBUMIN SERPL BCP-MCNC: 3.7 G/DL (ref 3.4–5)
ALP SERPL-CCNC: 44 U/L (ref 33–136)
ALT SERPL W P-5'-P-CCNC: 32 U/L (ref 7–45)
ANION GAP SERPL CALCULATED.3IONS-SCNC: 12 MMOL/L (ref 10–20)
APPEARANCE UR: ABNORMAL
AST SERPL W P-5'-P-CCNC: 23 U/L (ref 9–39)
BACTERIA #/AREA URNS AUTO: ABNORMAL /HPF
BASOPHILS # BLD AUTO: 0.01 X10*3/UL (ref 0–0.1)
BASOPHILS NFR BLD AUTO: 1.2 %
BILIRUB SERPL-MCNC: 1.8 MG/DL (ref 0–1.2)
BILIRUB UR STRIP.AUTO-MCNC: NEGATIVE MG/DL
BUN SERPL-MCNC: 14 MG/DL (ref 6–23)
CALCIUM SERPL-MCNC: 8.2 MG/DL (ref 8.6–10.3)
CHLORIDE SERPL-SCNC: 100 MMOL/L (ref 98–107)
CO2 SERPL-SCNC: 24 MMOL/L (ref 21–32)
COLOR UR: ABNORMAL
CREAT SERPL-MCNC: 0.56 MG/DL (ref 0.5–1.05)
EGFRCR SERPLBLD CKD-EPI 2021: >90 ML/MIN/1.73M*2
EOSINOPHIL # BLD AUTO: 0.01 X10*3/UL (ref 0–0.4)
EOSINOPHIL NFR BLD AUTO: 1.2 %
ERYTHROCYTE [DISTWIDTH] IN BLOOD BY AUTOMATED COUNT: 13 % (ref 11.5–14.5)
GLUCOSE SERPL-MCNC: 153 MG/DL (ref 74–99)
GLUCOSE UR STRIP.AUTO-MCNC: ABNORMAL MG/DL
HCT VFR BLD AUTO: 41.8 % (ref 36–46)
HGB BLD-MCNC: 15 G/DL (ref 12–16)
IMM GRANULOCYTES # BLD AUTO: 0.04 X10*3/UL (ref 0–0.5)
IMM GRANULOCYTES NFR BLD AUTO: 4.8 % (ref 0–0.9)
KETONES UR STRIP.AUTO-MCNC: ABNORMAL MG/DL
LACTATE SERPL-SCNC: 0.9 MMOL/L (ref 0.4–2)
LEUKOCYTE ESTERASE UR QL STRIP.AUTO: ABNORMAL
LYMPHOCYTES # BLD AUTO: 0.46 X10*3/UL (ref 0.8–3)
LYMPHOCYTES NFR BLD AUTO: 54.8 %
MAGNESIUM SERPL-MCNC: 1.76 MG/DL (ref 1.6–2.4)
MCH RBC QN AUTO: 30.7 PG (ref 26–34)
MCHC RBC AUTO-ENTMCNC: 35.9 G/DL (ref 32–36)
MCV RBC AUTO: 86 FL (ref 80–100)
MONOCYTES # BLD AUTO: 0.23 X10*3/UL (ref 0.05–0.8)
MONOCYTES NFR BLD AUTO: 27.4 %
MUCOUS THREADS #/AREA URNS AUTO: ABNORMAL /LPF
NEUTROPHILS # BLD AUTO: 0.09 X10*3/UL (ref 1.6–5.5)
NEUTROPHILS NFR BLD AUTO: 10.6 %
NITRITE UR QL STRIP.AUTO: NEGATIVE
NRBC BLD-RTO: 0 /100 WBCS (ref 0–0)
PH UR STRIP.AUTO: 6 [PH]
PLATELET # BLD AUTO: 169 X10*3/UL (ref 150–450)
POTASSIUM SERPL-SCNC: 4 MMOL/L (ref 3.5–5.3)
PROT SERPL-MCNC: 5.8 G/DL (ref 6.4–8.2)
PROT UR STRIP.AUTO-MCNC: ABNORMAL MG/DL
RBC # BLD AUTO: 4.89 X10*6/UL (ref 4–5.2)
RBC # UR STRIP.AUTO: ABNORMAL MG/DL
RBC #/AREA URNS AUTO: >20 /HPF
SODIUM SERPL-SCNC: 132 MMOL/L (ref 136–145)
SP GR UR STRIP.AUTO: 1.02
SQUAMOUS #/AREA URNS AUTO: ABNORMAL /HPF
UROBILINOGEN UR STRIP.AUTO-MCNC: NORMAL MG/DL
WBC # BLD AUTO: 0.8 X10*3/UL (ref 4.4–11.3)
WBC #/AREA URNS AUTO: ABNORMAL /HPF

## 2025-06-13 PROCEDURE — 85025 COMPLETE CBC W/AUTO DIFF WBC: CPT | Performed by: EMERGENCY MEDICINE

## 2025-06-13 PROCEDURE — 87075 CULTR BACTERIA EXCEPT BLOOD: CPT | Mod: TRILAB,59 | Performed by: EMERGENCY MEDICINE

## 2025-06-13 PROCEDURE — 2500000001 HC RX 250 WO HCPCS SELF ADMINISTERED DRUGS (ALT 637 FOR MEDICARE OP): Performed by: EMERGENCY MEDICINE

## 2025-06-13 PROCEDURE — 2500000004 HC RX 250 GENERAL PHARMACY W/ HCPCS (ALT 636 FOR OP/ED): Performed by: EMERGENCY MEDICINE

## 2025-06-13 PROCEDURE — 81001 URINALYSIS AUTO W/SCOPE: CPT | Performed by: EMERGENCY MEDICINE

## 2025-06-13 PROCEDURE — 87086 URINE CULTURE/COLONY COUNT: CPT | Mod: TRILAB | Performed by: EMERGENCY MEDICINE

## 2025-06-13 PROCEDURE — 83605 ASSAY OF LACTIC ACID: CPT | Performed by: EMERGENCY MEDICINE

## 2025-06-13 PROCEDURE — 99284 EMERGENCY DEPT VISIT MOD MDM: CPT | Mod: 25 | Performed by: EMERGENCY MEDICINE

## 2025-06-13 PROCEDURE — 83735 ASSAY OF MAGNESIUM: CPT | Performed by: EMERGENCY MEDICINE

## 2025-06-13 PROCEDURE — RXMED WILLOW AMBULATORY MEDICATION CHARGE

## 2025-06-13 PROCEDURE — 80053 COMPREHEN METABOLIC PANEL: CPT | Performed by: EMERGENCY MEDICINE

## 2025-06-13 PROCEDURE — 36415 COLL VENOUS BLD VENIPUNCTURE: CPT | Performed by: EMERGENCY MEDICINE

## 2025-06-13 PROCEDURE — 96360 HYDRATION IV INFUSION INIT: CPT

## 2025-06-13 PROCEDURE — 96361 HYDRATE IV INFUSION ADD-ON: CPT

## 2025-06-13 RX ORDER — NYSTATIN 100000 [USP'U]/ML
5 SUSPENSION ORAL 4 TIMES DAILY
Qty: 200 ML | Refills: 0 | Status: SHIPPED | OUTPATIENT
Start: 2025-06-13 | End: 2025-06-23

## 2025-06-13 RX ORDER — DIPHENOXYLATE HYDROCHLORIDE AND ATROPINE SULFATE 2.5; .025 MG/1; MG/1
1 TABLET ORAL 4 TIMES DAILY PRN
Qty: 40 TABLET | Refills: 0 | Status: SHIPPED | OUTPATIENT
Start: 2025-06-13 | End: 2025-06-23

## 2025-06-13 RX ORDER — NYSTATIN 100000 [USP'U]/ML
5 SUSPENSION ORAL ONCE
Status: COMPLETED | OUTPATIENT
Start: 2025-06-13 | End: 2025-06-13

## 2025-06-13 RX ADMIN — SODIUM CHLORIDE 1000 ML: 900 INJECTION, SOLUTION INTRAVENOUS at 14:28

## 2025-06-13 RX ADMIN — NYSTATIN 500000 UNITS: 100000 SUSPENSION ORAL at 14:52

## 2025-06-13 ASSESSMENT — PAIN - FUNCTIONAL ASSESSMENT: PAIN_FUNCTIONAL_ASSESSMENT: 0-10

## 2025-06-13 ASSESSMENT — PAIN SCALES - GENERAL
PAINLEVEL_OUTOF10: 0 - NO PAIN
PAINLEVEL_OUTOF10: 0 - NO PAIN

## 2025-06-13 NOTE — TELEPHONE ENCOUNTER
"Reason for Conversation  Medication Problem and Diarrhea    Background   Tct pt. I advised pt to go to ER due to bright red blood per rectum, weakness, \"body and hand shakes\", continued diarrhea. Temp 99.9 . She is going to [facility].  Dr leo aware. Teach back done     Disposition   No disposition on file.    "

## 2025-06-13 NOTE — PROGRESS NOTES
Pharmacy Medication History Review    Elisha Flores is a 79 y.o. female admitted for diarrhea. Pharmacy reviewed the patient's [id number] medications and allergies for accuracy.    Medications ADDED:  N/A  Medications CHANGED:  N/A  Medications REMOVED:   Vitamin D3 5000 units tablet   Vitamin B12 1000 mcg tablet   Multivitamin oral  Asprin 81 mg  Cetirizine 10 mg tablet    The list below reflects the updated PTA list.   Prior to Admission Medications   Prescriptions Last Dose Informant Patient Reported? Taking?   MULTIVITAMIN ORAL Not Taking  Yes No   Sig: Take 1 tablet by mouth once daily. With food   Patient not taking: Reported on 6/13/2025   OLANZapine (ZyPREXA) 5 mg tablet   No No   Sig: Take 1 tablet (5 mg) by mouth once daily at bedtime. For 4 days starting the evening of treatment   amLODIPine (Norvasc) 10 mg tablet Past Week  No Yes   Sig: Take 1 tablet (10 mg) by mouth once daily.   aspirin 81 mg EC tablet Not Taking  Yes No   Sig: Take 1 tablet (81 mg) by mouth once daily.   Patient not taking: Reported on 6/13/2025   carboxymethylcellulose (Refresh Plus) 0.5 % ophthalmic solution Past Week  Yes Yes   Sig: Administer into affected eye(s).   cetirizine (ZyrTEC) 10 mg tablet Not Taking  No No   Sig: Take 1 tablet (10 mg) by mouth once daily.   Patient not taking: Reported on 6/13/2025   cholecalciferol (Vitamin D-3) 5,000 Units tablet Not Taking  Yes No   Sig: Take 1 tablet (125 mcg) by mouth once daily.   Patient not taking: Reported on 6/13/2025   cyanocobalamin (Vitamin B-12) 1,000 mcg tablet Not Taking  Yes No   Sig: Take 1 tablet (1,000 mcg) by mouth once daily.   Patient not taking: Reported on 6/13/2025   dexAMETHasone (Decadron) 4 mg tablet Past Week  No No   Sig: Take 2 tablets (8 mg) by mouth once daily. For 3 days starting the day after treatment   fluticasone (Flonase) 50 mcg/actuation nasal spray Past Month  No Yes   Sig: Administer 1 spray into each nostril once daily. Shake gently.  Smiley Rushing, with [facility] called stating she went to complete in home eval today and pt will be starting OT services 1x/week for the next 6 weeks  Pt bp was elevated today: At rest - 156/96  With activity - 168/95    However, nurse states pt had not yet taken her morning medications including her bp medication  Nurse encouraged pt to take all of her morning medications once they were done with evaluation  Before first use, prime pump. After use, clean tip and replace cap.   folic acid (Folvite) 1 mg tablet Past Week  Yes Yes   Sig: Take 1 tablet (1 mg) by mouth once daily.   furosemide (Lasix) 20 mg tablet Past Week  No Yes   Sig: Take 1 tablet (20 mg) by mouth once daily.   losartan (Cozaar) 100 mg tablet Past Week  No Yes   Sig: Take 1 tablet (100 mg) by mouth once daily.   ondansetron (Zofran) 8 mg tablet Past Week  No Yes   Sig: Take 1 tablet (8 mg) by mouth every 8 hours if needed for nausea or vomiting.   prochlorperazine (Compazine) 10 mg tablet   No Yes   Sig: Take 1 tablet (10 mg) by mouth every 6 hours if needed for nausea or vomiting.   propranolol LA (Inderal LA) 120 mg 24 hr capsule Past Week  No Yes   Sig: Take 1 capsule (120 mg) by mouth once daily at bedtime.   pyridoxine (Vitamin B-6) 100 mg tablet Not Taking  Yes No   Sig: Take 1 tablet (100 mg) by mouth once daily.   Patient not taking: Reported on 6/13/2025   rosuvastatin (Crestor) 10 mg tablet Past Week  No Yes   Sig: Take 1 tablet (10 mg) by mouth once daily.      Facility-Administered Medications: None        The list below reflects the updated allergy list. Please review each documented allergy for additional clarification and justification.  Allergies  Reviewed by Bill Chacko, EMT on 6/13/2025        Severity Reactions Comments    Ciprofloxacin Not Specified Unknown     Codeine Not Specified Unknown, Swelling     Hydrochlorothiazide Not Specified Unknown     Penicillins Not Specified Unknown, Swelling     Tetanus Vaccines And Toxoid Not Specified Swelling             Patient accepts M2B at discharge.     Sources:   Patient Interview Moderate historian     Additional Comments:  Patient states no vitamins are being taken  Patient recently started treatment plan   Patient recently completed short-term chemotherapy related olanzapine and dexamethasone 5 mg course.      Sarai Banda  Pharmacy Technician  06/13/25

## 2025-06-13 NOTE — ED PROVIDER NOTES
EMERGENCY DEPARTMENT ENCOUNTER      Pt Name: Elisha Flores  MRN: [id number]  Birthdate 1945  Date of evaluation: 6/13/2025  ED Provider: Stephy Jorgensen DO     CHIEF COMPLAINT       Chief Complaint   Patient presents with    Diarrhea     Since Monday , had 1st round of chemo on Friday for breast cancer, nausea, diarrhea, dizzy, no vomit, no sob, no cp, has had fever and chills, no cough or congestion        HISTORY OF PRESENT ILLNESS    Elisha Flores is a 79 y.o. who presents to the emergency department with complaint of diarrhea.  She was diagnosed with stage IV breast cancer and started chemotherapy last Friday approximately 8 days ago.  Starting 3 days after the chemotherapy she started having profuse diarrhea.  She has been taking Imodium and Lomotil with minimal improvement.  She thought it felt better today however she then started to have the episodes again.  She did have 2 episodes of bloody diarrhea earlier but the diarrhea since has been nonbloody.  She has no vomiting but states her mouth is sore and her lips are cracked.  She does endorse nausea.  She feels as though she has had a fever with a Tmax of 99.5.    REVIEW OF SYSTEMS     Focused ROS performed and negative other than as listed in HPI    PAST MEDICAL HISTORY   Medical History[1]    SURGICAL HISTORY     Surgical History[2]    CURRENT MEDICATIONS       Discharge Medication List as of 6/13/2025  4:57 PM        CONTINUE these medications which have NOT CHANGED    Details   amLODIPine (Norvasc) 10 mg tablet Take 1 tablet (10 mg) by mouth once daily., Starting Fri 5/2/2025, Normal      carboxymethylcellulose (Refresh Plus) 0.5 % ophthalmic solution Administer into affected eye(s)., Starting Mon 7/12/2021, Historical Med      dexAMETHasone (Decadron) 4 mg tablet Take 2 tablets (8 mg) by mouth once daily. For 3 days starting the day after treatment, Starting Fri 6/6/2025, Normal      fluticasone (Flonase) 50 mcg/actuation nasal spray Administer 1  spray into each nostril once daily. Shake gently. Before first use, prime pump. After use, clean tip and replace cap., Starting Mon 7/29/2024, Until Tue 7/29/2025, Normal      folic acid (Folvite) 1 mg tablet Take 1 tablet (1 mg) by mouth once daily., Starting Thu 9/19/2013, Historical Med      furosemide (Lasix) 20 mg tablet Take 1 tablet (20 mg) by mouth once daily., Starting Mon 10/28/2024, Normal      losartan (Cozaar) 100 mg tablet Take 1 tablet (100 mg) by mouth once daily., Starting Mon 10/28/2024, Normal      OLANZapine (ZyPREXA) 5 mg tablet Take 1 tablet (5 mg) by mouth once daily at bedtime. For 4 days starting the evening of treatment, Starting Fri 6/6/2025, Normal      ondansetron (Zofran) 8 mg tablet Take 1 tablet (8 mg) by mouth every 8 hours if needed for nausea or vomiting., Starting Fri 6/6/2025, Normal      prochlorperazine (Compazine) 10 mg tablet Take 1 tablet (10 mg) by mouth every 6 hours if needed for nausea or vomiting., Starting Fri 6/6/2025, Normal      propranolol LA (Inderal LA) 120 mg 24 hr capsule Take 1 capsule (120 mg) by mouth once daily at bedtime., Starting Thu 12/19/2024, Normal      rosuvastatin (Crestor) 10 mg tablet Take 1 tablet (10 mg) by mouth once daily., Starting Wed 4/30/2025, Normal      aspirin 81 mg EC tablet Take 1 tablet (81 mg) by mouth once daily., Starting Thu 9/19/2013, Historical Med      cetirizine (ZyrTEC) 10 mg tablet Take 1 tablet (10 mg) by mouth once daily., Starting Mon 10/28/2024, Until Mon 4/28/2025, Normal      cholecalciferol (Vitamin D-3) 5,000 Units tablet Take 1 tablet (125 mcg) by mouth once daily., Starting Thu 9/19/2013, Historical Med      cyanocobalamin (Vitamin B-12) 1,000 mcg tablet Take 1 tablet (1,000 mcg) by mouth once daily., Historical Med      MULTIVITAMIN ORAL Take 1 tablet by mouth once daily. With food, Historical Med      pyridoxine (Vitamin B-6) 100 mg tablet Take 1 tablet (100 mg) by mouth once daily., Historical Med              ALLERGIES     Ciprofloxacin, Codeine, Hydrochlorothiazide, Penicillins, and Tetanus vaccines and toxoid    FAMILY HISTORY     Family History[3]     SOCIAL HISTORY     Social History[4]    PHYSICAL EXAM       ED Triage Vitals [06/13/25 1336]   Temperature Heart Rate Respirations BP   37 °C (98.6 °F) 88 18 (!) 183/89      Pulse Ox Temp Source Heart Rate Source Patient Position   98 % Tympanic Monitor Sitting      BP Location FiO2 (%)     Right arm --        General: Appears nontoxic no acute distress, alert  Head: Head atraumatic; normocephalic  Eyes: normal inspection; no icterus  ENT: White plaques on the tongue with scattered posterior pharyngeal petechiae on the soft palate consistent with thrush candidiasis  Neck: Normal inspection, no meningeal signs  Resp: Normal breath sounds, no wheeze or crackles; No respiratory distress  Chest Wall: no tenderness or deformity  Heart: Heart rate and rhythm regular; No Murmurs  Abdomen: Soft, Non-tender; No distention, guarding, rigidity, or rebound  MSK: Normal appearance; Moves all extremities; No Pedal edema  Neuro: Alert; no focal deficits, moves all extremities  Psych: Mood and Affect normal  Skin: Color appropriate; warm; Dry    DIAGNOSTIC RESULTS   Lab and radiology results are independently interpreted unless noted below.  RADIOLOGY (Per Emergency Physician):     Interpretation per the Radiologist below, if available at the time of this note:  No orders to display       LABS:  Abnormal Labs Reviewed   CBC WITH AUTO DIFFERENTIAL - Abnormal; Notable for the following components:       Result Value    WBC 0.8 (*)     Immature Granulocytes %, Automated 4.8 (*)     Neutrophils Absolute 0.09 (*)     Lymphocytes Absolute 0.46 (*)     All other components within normal limits   COMPREHENSIVE METABOLIC PANEL - Abnormal; Notable for the following components:    Glucose 153 (*)     Sodium 132 (*)     Calcium 8.2 (*)     Total Protein 5.8 (*)     Bilirubin, Total 1.8 (*)      All other components within normal limits   URINALYSIS WITH REFLEX CULTURE AND MICROSCOPIC - Abnormal; Notable for the following components:    Color, Urine Light-Orange (*)     Appearance, Urine Turbid (*)     Protein, Urine 50 (1+) (*)     Glucose, Urine 30 (TRACE) (*)     Blood, Urine 0.2 (2+) (*)     Ketones, Urine 40 (2+) (*)     Leukocyte Esterase, Urine 25 Devon/uL (*)     All other components within normal limits   MICROSCOPIC ONLY, URINE - Abnormal; Notable for the following components:    WBC, Urine 11-20 (*)     RBC, Urine >20 (*)     Bacteria, Urine 1+ (*)     All other components within normal limits       All other labs were within normal range or not returned as of this dictation.    EMERGENCY DEPARTMENT COURSE/MDM   Workup is initiated for this patient with likely chemotherapy-induced diarrhea.  She is given nystatin for the oral thrush.  Fluids are ordered.  She is agreeable with this plan of care.    ED Course as of 06/13/25 2213 Fri Jun 13, 2025   1658 Lab returned showing neutropenia which is expected post chemotherapy 1 week ago.  Electrolytes are stable.  Did reach out to the patient's oncologist who recommended Lomotil and discharged the patient is stable.  On reevaluation patient states that she is comfortable in no acute distress.  She is offered admission for observation for continued IV therapy however I am concerned about the risk of iatrogenic infection given her new neutropenia and no true need for admission.  Patient agrees and wishes to be discharged.  She will be discharged with prescription for Lomotil.  She does have nausea medicine at home.  She is also given a prescription for nystatin for her thrush.  She is to return if symptoms change or worsen in any way.  Discharged in stable condition. [EF]      ED Course User Index  [EF] Stephy Jorgensen DO         Diagnoses as of 06/13/25 2213   Chemotherapy induced diarrhea   Oral candidiasis   Neutropenia, unspecified type       Meds  Administered:  Medications   nystatin (Mycostatin) 100,000 unit/mL suspension 500,000 Units (500,000 Units Swish & Swallow Given 6/13/25 1552)   sodium chloride 0.9 % bolus 1,000 mL (0 mL intravenous Stopped 6/13/25 3574)       PROCEDURES   Unless otherwise noted below, none  Procedures      FINAL IMPRESSION      1. Chemotherapy induced diarrhea    2. Oral candidiasis    3. Neutropenia, unspecified type          DISPOSITION    Discharge 06/13/2025 04:55:55 PM  As a result of the work-up, the patient was discharged home.  she was informed of her diagnosis and instructed to come back with any concerns or worsening of condition.  she and was agreeable to the plan as discussed above.  she was given the opportunity to ask questions.  All of the patient's questions were answered.    Critical Care time: Not Indicated    (Comment: Please note this report has been produced using speech recognition software and may contain errors related to that system including errors in grammar, punctuation, and spelling, as well as words and phrases that may be inappropriate.  If there are any questions or concerns please feel free to contact the dictating provider for clarification.)    Stephy Jorgensen DO, MPH (electronically signed)  Emergency Medicine Physician    History provided by: Patient and Family Member  Limitations to History: None  External Records Reviewed with Brief Summary: Outpatient progress note from 6/6/25 which showed started chemotherapy for stage I breast ca  Social Determinants of Health which Significantly Impact Care: None identified   EKG Independent Interpretation: EKG not obtained  Independent Result Review and Interpretation: Relevant laboratory and radiographic results were reviewed and independently interpreted by myself.  As necessary, they are commented on in the ED Course.  Chronic conditions affecting the patient's care: As documented above in MDM  The patient was discussed with the following  consultants/services: Dr. 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    Bursitis of right shoulder 11/25/2019    Subdeltoid bursitis of right shoulder joint    Encounter for general adult medical examination without abnormal findings 04/26/2018    Encounter for Medicare annual wellness exam    Halitosis 05/22/2019    Halitosis    Localized swelling, mass and lump, head 12/28/2016    Left facial swelling    Localized swelling, mass and lump, head 12/29/2022    Facial mass    Other specified anxiety disorders 12/19/2014    Depression with anxiety    Other specified disorders of nose and nasal sinuses 04/18/2017    Jacque bullosa    Personal history of diseases of the skin and subcutaneous tissue     History of psoriasis    Personal history of other diseases of the circulatory system     History of congestive heart failure    Personal history of other diseases of the circulatory system 11/15/2019    History of uncontrolled hypertension    Personal history of other diseases of the circulatory system     History of hypertension    Personal history of other diseases of the nervous system and sense organs     History of sciatica    Personal history of other diseases of the respiratory system 04/18/2017    History of deviated nasal septum    Personal history of other diseases of the respiratory system 02/27/2017    History of sinusitis    Personal history of other diseases of the respiratory system 05/28/2019    History of chronic sinusitis    Personal history of other diseases of the respiratory system 03/19/2014    History of acute sinusitis    Personal history of other specified  conditions 2013    History of insomnia    Personal history of other specified conditions 2019    History of nasal congestion    Personal history of other specified conditions 2016    History of diarrhea    Unspecified blepharitis right eye, unspecified eyelid 2019    Blepharitis of eyelid of right eye    Unspecified mycosis 2019    Fungal sinusitis   [2]   Past Surgical History:  Procedure Laterality Date    BREAST BIOPSY Left 2025    HYSTERECTOMY  2013    Hysterectomy    OTHER SURGICAL HISTORY  2022    Eye surgery    OTHER SURGICAL HISTORY  2022    Nose surgery    OTHER SURGICAL HISTORY  2022    Back surgery   [3]   Family History  Problem Relation Name Age of Onset    Cancer Sister branden rain 65    Breast cancer Sister branden rain 50 - 59    Breast cancer Mother's Sister  70 - 79   [4]   Social History  Socioeconomic History    Marital status:     Number of children: 2   Tobacco Use    Smoking status: Former     Current packs/day: 0.00     Types: Cigarettes     Quit date:      Years since quittin.4     Passive exposure: Past    Smokeless tobacco: Never   Vaping Use    Vaping status: Never Used   Substance and Sexual Activity    Alcohol use: Never    Drug use: Never    Sexual activity: Defer     Social Drivers of Health     Financial Resource Strain: Low Risk  (2025)    Overall Financial Resource Strain (CARDIA)     Difficulty of Paying Living Expenses: Not hard at all   Food Insecurity: No Food Insecurity (2025)    Hunger Vital Sign     Worried About Running Out of Food in the Last Year: Never true     Ran Out of Food in the Last Year: Never true   Transportation Needs: No Transportation Needs (2025)    PRAPARE - Transportation     Lack of Transportation (Medical): No     Lack of Transportation (Non-Medical): No   Physical Activity: Sufficiently Active (2025)    Exercise Vital Sign     Days of Exercise per Week: 7  days     Minutes of Exercise per Session: 30 min   Stress: No Stress Concern Present (5/13/2025)    [employer] - Occupational Stress Questionnaire     Feeling of Stress : Not at all   Social Connections: Moderately Isolated (5/13/2025)    Social Connection and Isolation Panel [NHANES]     Frequency of Communication with Friends and Family: More than three times a week     Frequency of Social Gatherings with Friends and Family: More than three times a week     Attends Zoroastrianism Services: Never     Active Member of Clubs or Organizations: Yes     Attends Club or Organization Meetings: More than 4 times per year     Marital Status:    Intimate Partner Violence: Not At Risk (5/13/2025)    Humiliation, Afraid, Rape, and Kick questionnaire     Fear of Current or Ex-Partner: No     Emotionally Abused: No     Physically Abused: No     Sexually Abused: No   Housing Stability: Low Risk  (5/13/2025)    Housing Stability Vital Sign     Unable to Pay for Housing in the Last Year: No     Number of Times Moved in the Last Year: 0     Homeless in the Last Year: No        Stephy Jorgensen DO  06/13/25 1771

## 2025-06-13 NOTE — PROGRESS NOTES
Pharmacy Medication History Review    Elisha Flores is a 79 y.o. female admitted for diarrhea. Pharmacy reviewed the patient's [id number] medications and allergies for accuracy.    Medications ADDED:  N/A  Medications CHANGED:  N/A  Medications REMOVED:   Vitamin D3 5000 units tablet   Vitamin B12 1000 mcg tablet   Multivitamin oral  Asprin 81 mg  Cetirizine 10 mg tablet    The list below reflects the updated PTA list.   Prior to Admission Medications   Prescriptions Last Dose Informant Patient Reported? Taking?   MULTIVITAMIN ORAL Not Taking  Yes No   Sig: Take 1 tablet by mouth once daily. With food   Patient not taking: Reported on 6/13/2025   OLANZapine (ZyPREXA) 5 mg tablet   No No   Sig: Take 1 tablet (5 mg) by mouth once daily at bedtime. For 4 days starting the evening of treatment   amLODIPine (Norvasc) 10 mg tablet Past Week  No Yes   Sig: Take 1 tablet (10 mg) by mouth once daily.   aspirin 81 mg EC tablet Not Taking  Yes No   Sig: Take 1 tablet (81 mg) by mouth once daily.   Patient not taking: Reported on 6/13/2025   carboxymethylcellulose (Refresh Plus) 0.5 % ophthalmic solution Past Week  Yes Yes   Sig: Administer into affected eye(s).   cetirizine (ZyrTEC) 10 mg tablet Not Taking  No No   Sig: Take 1 tablet (10 mg) by mouth once daily.   Patient not taking: Reported on 6/13/2025   cholecalciferol (Vitamin D-3) 5,000 Units tablet Not Taking  Yes No   Sig: Take 1 tablet (125 mcg) by mouth once daily.   Patient not taking: Reported on 6/13/2025   cyanocobalamin (Vitamin B-12) 1,000 mcg tablet Not Taking  Yes No   Sig: Take 1 tablet (1,000 mcg) by mouth once daily.   Patient not taking: Reported on 6/13/2025   dexAMETHasone (Decadron) 4 mg tablet Past Week  No No   Sig: Take 2 tablets (8 mg) by mouth once daily. For 3 days starting the day after treatment   fluticasone (Flonase) 50 mcg/actuation nasal spray Past Month  No Yes   Sig: Administer 1 spray into each nostril once daily. Shake gently.  Before first use, prime pump. After use, clean tip and replace cap.   folic acid (Folvite) 1 mg tablet Past Week  Yes Yes   Sig: Take 1 tablet (1 mg) by mouth once daily.   furosemide (Lasix) 20 mg tablet Past Week  No Yes   Sig: Take 1 tablet (20 mg) by mouth once daily.   losartan (Cozaar) 100 mg tablet Past Week  No Yes   Sig: Take 1 tablet (100 mg) by mouth once daily.   ondansetron (Zofran) 8 mg tablet Past Week  No Yes   Sig: Take 1 tablet (8 mg) by mouth every 8 hours if needed for nausea or vomiting.   prochlorperazine (Compazine) 10 mg tablet   No Yes   Sig: Take 1 tablet (10 mg) by mouth every 6 hours if needed for nausea or vomiting.   propranolol LA (Inderal LA) 120 mg 24 hr capsule Past Week  No Yes   Sig: Take 1 capsule (120 mg) by mouth once daily at bedtime.   pyridoxine (Vitamin B-6) 100 mg tablet Not Taking  Yes No   Sig: Take 1 tablet (100 mg) by mouth once daily.   Patient not taking: Reported on 6/13/2025   rosuvastatin (Crestor) 10 mg tablet Past Week  No Yes   Sig: Take 1 tablet (10 mg) by mouth once daily.      Facility-Administered Medications: None        The list below reflects the updated allergy list. Please review each documented allergy for additional clarification and justification.  Allergies  Reviewed by Bill Chacko, EMT on 6/13/2025        Severity Reactions Comments    Ciprofloxacin Not Specified Unknown     Codeine Not Specified Unknown, Swelling     Hydrochlorothiazide Not Specified Unknown     Penicillins Not Specified Unknown, Swelling     Tetanus Vaccines And Toxoid Not Specified Swelling             Patient accepts M2B at discharge.     Sources:   Patient Interview Moderate historian     Additional Comments:  Patient states no vitamins are being taken  Patient recently started treatment plan   Patient recently completed short-term chemotherapy related olanzapine and dexamethasone 5 mg course.      Sarai Banda  Pharmacy Technician  06/13/25

## 2025-06-13 NOTE — TELEPHONE ENCOUNTER
Reason for Conversation  Medication Problem    Background   Patient has diarrhea x 3 days, looks like there is blood in her stool and has body shakes. Please advise.     Disposition   No disposition on file.

## 2025-06-15 LAB
BACTERIA BLD CULT: NORMAL
BACTERIA BLD CULT: NORMAL
BACTERIA UR CULT: NORMAL

## 2025-06-18 DIAGNOSIS — I10 BENIGN ESSENTIAL HYPERTENSION: ICD-10-CM

## 2025-06-18 LAB
BACTERIA BLD CULT: NORMAL
BACTERIA BLD CULT: NORMAL

## 2025-06-18 RX ORDER — PROPRANOLOL HYDROCHLORIDE 120 MG/1
120 CAPSULE, EXTENDED RELEASE ORAL NIGHTLY
Qty: 90 CAPSULE | Refills: 0 | Status: SHIPPED | OUTPATIENT
Start: 2025-06-18

## 2025-06-19 ENCOUNTER — HOSPITAL ENCOUNTER (EMERGENCY)
Facility: HOSPITAL | Age: 80
Discharge: HOME | End: 2025-06-19
Attending: STUDENT IN AN ORGANIZED HEALTH CARE EDUCATION/TRAINING PROGRAM
Payer: MEDICARE

## 2025-06-19 ENCOUNTER — APPOINTMENT (OUTPATIENT)
Dept: RADIOLOGY | Facility: HOSPITAL | Age: 80
End: 2025-06-19
Payer: MEDICARE

## 2025-06-19 ENCOUNTER — TELEPHONE (OUTPATIENT)
Dept: HEMATOLOGY/ONCOLOGY | Facility: CLINIC | Age: 80
End: 2025-06-19
Payer: MEDICARE

## 2025-06-19 ENCOUNTER — APPOINTMENT (OUTPATIENT)
Dept: CARDIOLOGY | Facility: HOSPITAL | Age: 80
End: 2025-06-19
Payer: MEDICARE

## 2025-06-19 VITALS
OXYGEN SATURATION: 93 % | HEART RATE: 68 BPM | BODY MASS INDEX: 42.24 KG/M2 | HEIGHT: 65 IN | RESPIRATION RATE: 19 BRPM | SYSTOLIC BLOOD PRESSURE: 149 MMHG | TEMPERATURE: 98.1 F | WEIGHT: 253.53 LBS | DIASTOLIC BLOOD PRESSURE: 75 MMHG

## 2025-06-19 DIAGNOSIS — R19.7 DIARRHEA, UNSPECIFIED TYPE: ICD-10-CM

## 2025-06-19 DIAGNOSIS — R53.1 GENERALIZED WEAKNESS: Primary | ICD-10-CM

## 2025-06-19 LAB
ALBUMIN SERPL BCP-MCNC: 3.3 G/DL (ref 3.4–5)
ALP SERPL-CCNC: 62 U/L (ref 33–136)
ALT SERPL W P-5'-P-CCNC: 54 U/L (ref 7–45)
ANION GAP SERPL CALCULATED.3IONS-SCNC: 13 MMOL/L (ref 10–20)
APPEARANCE UR: ABNORMAL
AST SERPL W P-5'-P-CCNC: 29 U/L (ref 9–39)
ATRIAL RATE: 69 BPM
BASOPHILS # BLD MANUAL: 0.19 X10*3/UL (ref 0–0.1)
BASOPHILS NFR BLD MANUAL: 1 %
BILIRUB SERPL-MCNC: 1.1 MG/DL (ref 0–1.2)
BILIRUB UR STRIP.AUTO-MCNC: NEGATIVE MG/DL
BUN SERPL-MCNC: 7 MG/DL (ref 6–23)
CALCIUM SERPL-MCNC: 8.1 MG/DL (ref 8.6–10.3)
CHLORIDE SERPL-SCNC: 101 MMOL/L (ref 98–107)
CO2 SERPL-SCNC: 22 MMOL/L (ref 21–32)
COLOR UR: ABNORMAL
CREAT SERPL-MCNC: 0.48 MG/DL (ref 0.5–1.05)
EGFRCR SERPLBLD CKD-EPI 2021: >90 ML/MIN/1.73M*2
EOSINOPHIL # BLD MANUAL: 0 X10*3/UL (ref 0–0.4)
EOSINOPHIL NFR BLD MANUAL: 0 %
ERYTHROCYTE [DISTWIDTH] IN BLOOD BY AUTOMATED COUNT: 14.5 % (ref 11.5–14.5)
GLUCOSE SERPL-MCNC: 121 MG/DL (ref 74–99)
GLUCOSE UR STRIP.AUTO-MCNC: NORMAL MG/DL
HCT VFR BLD AUTO: 40.4 % (ref 36–46)
HGB BLD-MCNC: 13.3 G/DL (ref 12–16)
IMM GRANULOCYTES # BLD AUTO: 1.43 X10*3/UL (ref 0–0.5)
IMM GRANULOCYTES NFR BLD AUTO: 7.5 % (ref 0–0.9)
KETONES UR STRIP.AUTO-MCNC: NEGATIVE MG/DL
LEUKOCYTE ESTERASE UR QL STRIP.AUTO: ABNORMAL
LIPASE SERPL-CCNC: 91 U/L (ref 9–82)
LYMPHOCYTES # BLD MANUAL: 2.47 X10*3/UL (ref 0.8–3)
LYMPHOCYTES NFR BLD MANUAL: 13 %
MAGNESIUM SERPL-MCNC: 2.06 MG/DL (ref 1.6–2.4)
MCH RBC QN AUTO: 30.4 PG (ref 26–34)
MCHC RBC AUTO-ENTMCNC: 32.9 G/DL (ref 32–36)
MCV RBC AUTO: 92 FL (ref 80–100)
MONOCYTES # BLD MANUAL: 1.71 X10*3/UL (ref 0.05–0.8)
MONOCYTES NFR BLD MANUAL: 9 %
MYELOCYTES # BLD MANUAL: 0.19 X10*3/UL
MYELOCYTES NFR BLD MANUAL: 1 %
NEUTROPHILS # BLD MANUAL: 14.44 X10*3/UL (ref 1.6–5.5)
NEUTS BAND # BLD MANUAL: 0.95 X10*3/UL (ref 0–0.5)
NEUTS BAND NFR BLD MANUAL: 5 %
NEUTS SEG # BLD MANUAL: 13.49 X10*3/UL (ref 1.6–5)
NEUTS SEG NFR BLD MANUAL: 71 %
NITRITE UR QL STRIP.AUTO: NEGATIVE
NRBC BLD-RTO: 0 /100 WBCS (ref 0–0)
P AXIS: 36 DEGREES
P OFFSET: 171 MS
P ONSET: 119 MS
PH UR STRIP.AUTO: 6.5 [PH]
PLATELET # BLD AUTO: 155 X10*3/UL (ref 150–450)
POTASSIUM SERPL-SCNC: 3.5 MMOL/L (ref 3.5–5.3)
PR INTERVAL: 176 MS
PROT SERPL-MCNC: 5.6 G/DL (ref 6.4–8.2)
PROT UR STRIP.AUTO-MCNC: ABNORMAL MG/DL
Q ONSET: 207 MS
QRS COUNT: 12 BEATS
QRS DURATION: 94 MS
QT INTERVAL: 384 MS
QTC CALCULATION(BAZETT): 411 MS
QTC FREDERICIA: 402 MS
R AXIS: -33 DEGREES
RBC # BLD AUTO: 4.38 X10*6/UL (ref 4–5.2)
RBC # UR STRIP.AUTO: ABNORMAL MG/DL
RBC #/AREA URNS AUTO: ABNORMAL /HPF
RBC MORPH BLD: ABNORMAL
SARS-COV-2 RNA RESP QL NAA+PROBE: NOT DETECTED
SODIUM SERPL-SCNC: 132 MMOL/L (ref 136–145)
SP GR UR STRIP.AUTO: 1.02
SQUAMOUS #/AREA URNS AUTO: ABNORMAL /HPF
T AXIS: -15 DEGREES
T OFFSET: 399 MS
TOTAL CELLS COUNTED BLD: 100
TRANS CELLS #/AREA UR COMP ASSIST: ABNORMAL /HPF
UROBILINOGEN UR STRIP.AUTO-MCNC: NORMAL MG/DL
VENTRICULAR RATE: 69 BPM
WBC # BLD AUTO: 19 X10*3/UL (ref 4.4–11.3)
WBC #/AREA URNS AUTO: ABNORMAL /HPF

## 2025-06-19 PROCEDURE — 2550000001 HC RX 255 CONTRASTS: Performed by: STUDENT IN AN ORGANIZED HEALTH CARE EDUCATION/TRAINING PROGRAM

## 2025-06-19 PROCEDURE — 71045 X-RAY EXAM CHEST 1 VIEW: CPT

## 2025-06-19 PROCEDURE — 2500000004 HC RX 250 GENERAL PHARMACY W/ HCPCS (ALT 636 FOR OP/ED): Performed by: NURSE PRACTITIONER

## 2025-06-19 PROCEDURE — 85007 BL SMEAR W/DIFF WBC COUNT: CPT | Performed by: NURSE PRACTITIONER

## 2025-06-19 PROCEDURE — 80053 COMPREHEN METABOLIC PANEL: CPT | Performed by: NURSE PRACTITIONER

## 2025-06-19 PROCEDURE — 96361 HYDRATE IV INFUSION ADD-ON: CPT

## 2025-06-19 PROCEDURE — 87086 URINE CULTURE/COLONY COUNT: CPT | Mod: TRILAB | Performed by: NURSE PRACTITIONER

## 2025-06-19 PROCEDURE — 99285 EMERGENCY DEPT VISIT HI MDM: CPT | Mod: 25 | Performed by: STUDENT IN AN ORGANIZED HEALTH CARE EDUCATION/TRAINING PROGRAM

## 2025-06-19 PROCEDURE — 74177 CT ABD & PELVIS W/CONTRAST: CPT | Mod: FOREIGN READ | Performed by: RADIOLOGY

## 2025-06-19 PROCEDURE — 81003 URINALYSIS AUTO W/O SCOPE: CPT | Performed by: NURSE PRACTITIONER

## 2025-06-19 PROCEDURE — 83690 ASSAY OF LIPASE: CPT | Performed by: NURSE PRACTITIONER

## 2025-06-19 PROCEDURE — 71045 X-RAY EXAM CHEST 1 VIEW: CPT | Mod: FOREIGN READ | Performed by: RADIOLOGY

## 2025-06-19 PROCEDURE — 87635 SARS-COV-2 COVID-19 AMP PRB: CPT | Performed by: NURSE PRACTITIONER

## 2025-06-19 PROCEDURE — 74177 CT ABD & PELVIS W/CONTRAST: CPT

## 2025-06-19 PROCEDURE — 83735 ASSAY OF MAGNESIUM: CPT | Performed by: NURSE PRACTITIONER

## 2025-06-19 PROCEDURE — 85027 COMPLETE CBC AUTOMATED: CPT | Performed by: NURSE PRACTITIONER

## 2025-06-19 PROCEDURE — 96374 THER/PROPH/DIAG INJ IV PUSH: CPT | Mod: 59

## 2025-06-19 PROCEDURE — 93005 ELECTROCARDIOGRAM TRACING: CPT

## 2025-06-19 PROCEDURE — 2500000004 HC RX 250 GENERAL PHARMACY W/ HCPCS (ALT 636 FOR OP/ED): Performed by: STUDENT IN AN ORGANIZED HEALTH CARE EDUCATION/TRAINING PROGRAM

## 2025-06-19 RX ORDER — ALBUTEROL SULFATE 0.83 MG/ML
3 SOLUTION RESPIRATORY (INHALATION) AS NEEDED
OUTPATIENT
Start: 2025-06-23

## 2025-06-19 RX ORDER — SODIUM CHLORIDE 9 MG/ML
1000 INJECTION, SOLUTION INTRAVENOUS CONTINUOUS
OUTPATIENT
Start: 2025-06-23

## 2025-06-19 RX ORDER — DIPHENHYDRAMINE HYDROCHLORIDE 50 MG/ML
50 INJECTION, SOLUTION INTRAMUSCULAR; INTRAVENOUS AS NEEDED
OUTPATIENT
Start: 2025-06-23

## 2025-06-19 RX ORDER — FAMOTIDINE 10 MG/ML
20 INJECTION, SOLUTION INTRAVENOUS ONCE AS NEEDED
OUTPATIENT
Start: 2025-06-23

## 2025-06-19 RX ORDER — EPINEPHRINE 0.3 MG/.3ML
0.3 INJECTION SUBCUTANEOUS EVERY 5 MIN PRN
OUTPATIENT
Start: 2025-06-23

## 2025-06-19 RX ORDER — ONDANSETRON HYDROCHLORIDE 2 MG/ML
4 INJECTION, SOLUTION INTRAVENOUS ONCE
Status: COMPLETED | OUTPATIENT
Start: 2025-06-19 | End: 2025-06-19

## 2025-06-19 RX ADMIN — SODIUM CHLORIDE 1000 ML: 900 INJECTION, SOLUTION INTRAVENOUS at 13:26

## 2025-06-19 RX ADMIN — ONDANSETRON 4 MG: 2 INJECTION, SOLUTION INTRAMUSCULAR; INTRAVENOUS at 16:08

## 2025-06-19 RX ADMIN — SODIUM CHLORIDE 1000 ML: 900 INJECTION, SOLUTION INTRAVENOUS at 15:49

## 2025-06-19 RX ADMIN — IOHEXOL 75 ML: 350 INJECTION, SOLUTION INTRAVENOUS at 14:52

## 2025-06-19 ASSESSMENT — PAIN DESCRIPTION - PAIN TYPE: TYPE: ACUTE PAIN

## 2025-06-19 ASSESSMENT — PAIN - FUNCTIONAL ASSESSMENT: PAIN_FUNCTIONAL_ASSESSMENT: 0-10

## 2025-06-19 ASSESSMENT — PAIN SCALES - GENERAL: PAINLEVEL_OUTOF10: 1

## 2025-06-19 ASSESSMENT — PAIN DESCRIPTION - LOCATION: LOCATION: OTHER (COMMENT)

## 2025-06-19 NOTE — TELEPHONE ENCOUNTER
Reason for Conversation  Fatigue    Background   Patient calling. She was in the ER Saturday. She had to get fluids. She is still feeling miserable and fatigued. She states she has a painful sore on her bottom, she has thrush and bloody stools. She just doesn't know what to do. [facility] advise [phone]    Disposition   No disposition on file.

## 2025-06-19 NOTE — ED PROVIDER NOTES
Emergency Department Provider Note              [facility] EMERGENCY MEDICINE  Emergency Department        Pt Name: Elisha Flores  MRN: [id number]  Birthdate 1945  Date of evaluation: [unfilled]    CHIEF COMPLAINT       Chief Complaint   Patient presents with    Weakness, Gen     Pt reports general weakness, body aches, was seen Friday last week, hx of breast cancer. Pt received trx for dehydration. Pt a/ox4 on arrival, NAD, VSS       OF PRESENT ILLNESS  (Location/Symptom, Timing/Onset, Context/Setting, Quality, Duration, ModifyingFactors, Severity.)      Elisha Flores is a 79 y.o. female who presents with generalized weakness, diarrhea, discharge from her nose.  Patient is a breast cancer patient follows with Dr. Harmon.  States that she has been sick for about a week and a half with diffuse diarrhea that is forceful and comes out spontaneously she states the diarrhea is actually getting better she still want to be evaluated because she has generalized weakness and fatigue.    PAST MEDICAL / SURGICAL / SOCIAL / FAMILY HISTORY      has a past medical history of Abnormal finding of blood chemistry, unspecified (09/19/2014), Allergic dermatitis of unspecified eye, unspecified eyelid (03/12/2018), Benign neoplasm, unspecified site (03/29/2017), Bursitis of right shoulder (11/25/2019), Encounter for general adult medical examination without abnormal findings (04/26/2018), Halitosis (05/22/2019), Localized swelling, mass and lump, head (12/28/2016), Localized swelling, mass and lump, head (12/29/2022), Other specified anxiety disorders (12/19/2014), Other specified disorders of nose and nasal sinuses (04/18/2017), Personal history of diseases of the skin and subcutaneous tissue, Personal history of other diseases of the circulatory system, Personal history of other diseases of the circulatory system (11/15/2019), Personal history of other diseases of the circulatory system, Personal history of other  diseases of the nervous system and sense organs, Personal history of other diseases of the respiratory system (2017), Personal history of other diseases of the respiratory system (2017), Personal history of other diseases of the respiratory system (2019), Personal history of other diseases of the respiratory system (2014), Personal history of other specified conditions (2013), Personal history of other specified conditions (2019), Personal history of other specified conditions (2016), Unspecified blepharitis right eye, unspecified eyelid (2019), and Unspecified mycosis (2019).    She has no past medical history of Personal history of irradiation.     has a past surgical history that includes Hysterectomy (2013); Other surgical history (2022); Other surgical history (2022); Other surgical history (2022); and Breast biopsy (Left, 2025).    Social History     Socioeconomic History    Marital status:      Spouse name: Not on file    Number of children: 2    Years of education: Not on file    Highest education level: Not on file   Occupational History    Not on file   Tobacco Use    Smoking status: Former     Current packs/day: 0.00     Types: Cigarettes     Quit date:      Years since quittin.4     Passive exposure: Past    Smokeless tobacco: Never   Vaping Use    Vaping status: Never Used   Substance and Sexual Activity    Alcohol use: Never    Drug use: Never    Sexual activity: Defer   Other Topics Concern    Not on file   Social History Narrative    Not on file     Social Drivers of Health     Financial Resource Strain: Low Risk  (2025)    Overall Financial Resource Strain (CARDIA)     Difficulty of Paying Living Expenses: Not hard at all   Food Insecurity: No Food Insecurity (2025)    Hunger Vital Sign     Worried About Running Out of Food in the Last Year: Never true     Ran Out of Food in the Last Year:  Never true   Transportation Needs: No Transportation Needs (5/13/2025)    PRAPARE - Transportation     Lack of Transportation (Medical): No     Lack of Transportation (Non-Medical): No   Physical Activity: Sufficiently Active (5/13/2025)    Exercise Vital Sign     Days of Exercise per Week: 7 days     Minutes of Exercise per Session: 30 min   Stress: No Stress Concern Present (5/13/2025)    [employer] - Occupational Stress Questionnaire     Feeling of Stress : Not at all   Social Connections: Moderately Isolated (5/13/2025)    Social Connection and Isolation Panel [NHANES]     Frequency of Communication with Friends and Family: More than three times a week     Frequency of Social Gatherings with Friends and Family: More than three times a week     Attends Sikhism Services: Never     Active Member of Clubs or Organizations: Yes     Attends Club or Organization Meetings: More than 4 times per year     Marital Status:    Intimate Partner Violence: Not At Risk (5/13/2025)    Humiliation, Afraid, Rape, and Kick questionnaire     Fear of Current or Ex-Partner: No     Emotionally Abused: No     Physically Abused: No     Sexually Abused: No   Housing Stability: Low Risk  (5/13/2025)    Housing Stability Vital Sign     Unable to Pay for Housing in the Last Year: No     Number of Times Moved in the Last Year: 0     Homeless in the Last Year: No       Family History[1]    Allergies:  Ciprofloxacin, Codeine, Hydrochlorothiazide, Penicillins, and Tetanus vaccines and toxoid    Home Medications:  Prior to Admission medications    Medication Sig Start Date End Date Taking? Authorizing Provider   amLODIPine (Norvasc) 10 mg tablet Take 1 tablet (10 mg) by mouth once daily. 5/2/25   Shun Hall MD   aspirin 81 mg EC tablet Take 1 tablet (81 mg) by mouth once daily.  Patient not taking: Reported on 6/13/2025 9/19/13   Historical Provider, MD   carboxymethylcellulose (Refresh Plus) 0.5 %  ophthalmic solution Administer into affected eye(s). 7/12/21   Historical Provider, MD   cetirizine (ZyrTEC) 10 mg tablet Take 1 tablet (10 mg) by mouth once daily.  Patient not taking: Reported on 6/13/2025 10/28/24 4/28/25  Shun Hall MD   cholecalciferol (Vitamin D-3) 5,000 Units tablet Take 1 tablet (125 mcg) by mouth once daily.  Patient not taking: Reported on 6/13/2025 9/19/13   Historical Provider, MD   cyanocobalamin (Vitamin B-12) 1,000 mcg tablet Take 1 tablet (1,000 mcg) by mouth once daily.  Patient not taking: Reported on 6/13/2025    Historical Provider, MD   dexAMETHasone (Decadron) 4 mg tablet Take 2 tablets (8 mg) by mouth once daily. For 3 days starting the day after treatment 6/6/25   Faustino Bustillos MD   diphenoxylate-atropine (Lomotil) 2.5-0.025 mg tablet Take 1 tablet by mouth 4 times a day as needed for diarrhea for up to 10 days. 6/13/25 6/23/25  Stephy Jorgensen DO   fluticasone (Flonase) 50 mcg/actuation nasal spray Administer 1 spray into each nostril once daily. Shake gently. Before first use, prime pump. After use, clean tip and replace cap. 7/29/24 7/29/25  Shun Hall MD   folic acid (Folvite) 1 mg tablet Take 1 tablet (1 mg) by mouth once daily. 9/19/13   Historical Provider, MD   furosemide (Lasix) 20 mg tablet Take 1 tablet (20 mg) by mouth once daily. 10/28/24   Shun Hall MD   losartan (Cozaar) 100 mg tablet Take 1 tablet (100 mg) by mouth once daily. 10/28/24   Shun Hall MD   MULTIVITAMIN ORAL Take 1 tablet by mouth once daily. With food  Patient not taking: Reported on 6/13/2025    Historical Provider, MD   nystatin (Mycostatin) 100,000 unit/mL suspension Take 5 mL (500,000 Units) by mouth 4 times a day for 10 days. 6/13/25 6/23/25  Stephy Jorgensen DO   OLANZapine (ZyPREXA) 5 mg tablet Take 1 tablet (5 mg) by mouth once daily at bedtime. For 4 days starting the evening of treatment 6/6/25   Faustino Bustillos MD   ondansetron (Zofran) 8 mg tablet Take 1  "tablet (8 mg) by mouth every 8 hours if needed for nausea or vomiting. 6/6/25   Faustino Bustillos MD   prochlorperazine (Compazine) 10 mg tablet Take 1 tablet (10 mg) by mouth every 6 hours if needed for nausea or vomiting. 6/6/25   Faustino Bustillos MD   propranolol LA (Inderal LA) 120 mg 24 hr capsule Take 1 capsule (120 mg) by mouth once daily at bedtime. 6/18/25   Shun Hall MD   pyridoxine (Vitamin B-6) 100 mg tablet Take 1 tablet (100 mg) by mouth once daily.  Patient not taking: Reported on 6/13/2025    Historical Provider, MD   rosuvastatin (Crestor) 10 mg tablet Take 1 tablet (10 mg) by mouth once daily. 4/30/25   Shun Hall MD   propranolol LA (Inderal LA) 120 mg 24 hr capsule Take 1 capsule (120 mg) by mouth once daily at bedtime. 12/19/24 6/18/25  Shun Hall MD       REVIEW OF SYSTEMS    (2-9 systems for level 4, 10 or more for level 5)     Review of Systems    PHYSICAL EXAM   (up to 7 for level 4, 8 or more for level 5)     INITIAL VITALS:   /73   Pulse 70   Temp 36.7 °C (98.1 °F) (Tympanic)   Resp 17   Ht 1.651 m (5' 5\")   Wt 115 kg (253 lb 8.5 oz)   SpO2 99%   BMI 42.19 kg/m²     Physical Exam    PHYSICAL EXAM    General: Well Appearing, non toxic, speaking in full sentences  Skin: dry, no rash  Head: Normocephalic/ atraumatic  Neck: Supple  Eye: EOMI, normal conjunctiva  ENT: Moist oral mucosa, nares patent  CVS: RRR, 2+ pulses radial  Pulm: Non labored  Back: Normal ROM  MSK: strength symmetrical upper and lower limbs  GI: non distended  Neuro: A&O x 4, no focal neuro deficits  Pysch: Appropriate affect       DIAGNOSIS         (LABS / IMAGING / EKG):  Orders Placed This Encounter   Procedures    XR chest 1 view    CT abdomen pelvis w IV contrast    CBC and Auto Differential    Comprehensive Metabolic Panel    Lipase    Urinalysis with Reflex Culture and Microscopic    Magnesium    Urinalysis with Reflex Culture and Microscopic    Extra Urine Gray Tube    SARS-CoV-2 RT " PCR    ECG 12 lead       MEDICATIONS ORDERED:  [unfilled]    RESULTS / EMERGENCY DEPARTMENT COURSE / MDM   :  Results for orders placed or performed during the hospital encounter of 06/19/25   ECG 12 lead    Collection Time: 06/19/25  1:13 PM   Result Value Ref Range    Ventricular Rate 69 BPM    Atrial Rate 69 BPM    VA Interval 176 ms    QRS Duration 94 ms    QT Interval 384 ms    QTC Calculation(Bazett) 411 ms    P Axis 36 degrees    R Axis -33 degrees    T Axis -15 degrees    QRS Count 12 beats    Q Onset 207 ms    P Onset 119 ms    P Offset 171 ms    T Offset 399 ms    QTC Fredericia 402 ms   SARS-CoV-2 RT PCR    Collection Time: 06/19/25  1:14 PM   Result Value Ref Range    Coronavirus 2019, PCR Not Detected Not Detected   CBC and Auto Differential    Collection Time: 06/19/25  1:22 PM   Result Value Ref Range    WBC 19.0 (H) 4.4 - 11.3 x10*3/uL    nRBC 0.0 0.0 - 0.0 /100 WBCs    RBC 4.38 4.00 - 5.20 x10*6/uL    Hemoglobin 13.3 12.0 - 16.0 g/dL    Hematocrit 40.4 36.0 - 46.0 %    MCV 92 80 - 100 fL    MCH 30.4 26.0 - 34.0 pg    MCHC 32.9 32.0 - 36.0 g/dL    RDW 14.5 11.5 - 14.5 %    Platelets 155 150 - 450 x10*3/uL    Immature Granulocytes %, Automated 7.5 (H) 0.0 - 0.9 %    Immature Granulocytes Absolute, Automated 1.43 (H) 0.00 - 0.50 x10*3/uL   Comprehensive Metabolic Panel    Collection Time: 06/19/25  1:22 PM   Result Value Ref Range    Glucose 121 (H) 74 - 99 mg/dL    Sodium 132 (L) 136 - 145 mmol/L    Potassium 3.5 3.5 - 5.3 mmol/L    Chloride 101 98 - 107 mmol/L    Bicarbonate 22 21 - 32 mmol/L    Anion Gap 13 10 - 20 mmol/L    Urea Nitrogen 7 6 - 23 mg/dL    Creatinine 0.48 (L) 0.50 - 1.05 mg/dL    eGFR >90 >60 mL/min/1.73m*2    Calcium 8.1 (L) 8.6 - 10.3 mg/dL    Albumin 3.3 (L) 3.4 - 5.0 g/dL    Alkaline Phosphatase 62 33 - 136 U/L    Total Protein 5.6 (L) 6.4 - 8.2 g/dL    AST 29 9 - 39 U/L    Bilirubin, Total 1.1 0.0 - 1.2 mg/dL    ALT 54 (H) 7 - 45 U/L   Lipase    Collection Time: 06/19/25  1:22 PM    Result Value Ref Range    Lipase 91 (H) 9 - 82 U/L   Magnesium    Collection Time: 06/19/25  1:22 PM   Result Value Ref Range    Magnesium 2.06 1.60 - 2.40 mg/dL   Manual Differential    Collection Time: 06/19/25  1:22 PM   Result Value Ref Range    Neutrophils %, Manual 71.0 40.0 - 80.0 %    Bands %, Manual 5.0 0.0 - 5.0 %    Lymphocytes %, Manual 13.0 13.0 - 44.0 %    Monocytes %, Manual 9.0 2.0 - 10.0 %    Eosinophils %, Manual 0.0 0.0 - 6.0 %    Basophils %, Manual 1.0 0.0 - 2.0 %    Myelocytes %, Manual 1.0 0.0 - 0.0 %    Seg Neutrophils Absolute, Manual 13.49 (H) 1.60 - 5.00 x10*3/uL    Bands Absolute, Manual 0.95 (H) 0.00 - 0.50 x10*3/uL    Lymphocytes Absolute, Manual 2.47 0.80 - 3.00 x10*3/uL    Monocytes Absolute, Manual 1.71 (H) 0.05 - 0.80 x10*3/uL    Eosinophils Absolute, Manual 0.00 0.00 - 0.40 x10*3/uL    Basophils Absolute, Manual 0.19 (H) 0.00 - 0.10 x10*3/uL    Myelocytes Absolute, Manual 0.19 0.00 - 0.00 x10*3/uL    Total Cells Counted 100     Neutrophils Absolute, Manual 14.44 (H) 1.60 - 5.50 x10*3/uL    RBC Morphology No significant RBC morphology present        IMPRESSION:     RADIOLOGY:      EKG:      POC ULTRASOUND:      EMERGENCY DEPARTMENT COURSE:  79-year-old female history of cancer active chemotherapy follows with hematology oncology Dr. Bustillos is her heme/onc  Patient has been having diarrheal illness x 1.5 weeks and feels dehydrated  Her vital signs are stable  Her abdominal exam is reassuring  However her labs show a leukocytosis and elevated lipase  CT scan was obtained for that reason  I did consult with her hematologist oncologist she agreed with the plan to rehydrate and they will see her as outpatient on Monday at 1215 chair 3 for a 2-hour hydration session  CT scan I reviewed it and do not see anything acute no toxic megacolon and she is not having any bloody diarrhea  She is feeling better we will give her a second liter bolus  I did check in her sacrum and she does have a  small ulcer that we put a Mepilex dressing  CT scan reviewed patient does have some bladder stones  She has some new right hydronephrosis without obvious urethral distraction  Slightly dilated CBD according to radiologist she has no correlating right upper quadrant pain  Patient is actually feeling better she will be discharged and she will follow-up on Monday at 1215 and she is stable to be discharged this time    ED Course as of 06/19/25 1608   Thu Jun 19, 2025   1559 EKG obtained at 1312 is normal sinus rhythm poor R wave progression left axis ventricular rate 70 [JW]      ED Course User Index  [JW] Andrew Kim MD         Diagnoses as of 06/19/25 1608   Generalized weakness   Diarrhea, unspecified type       PROCEDURES:      CONSULTS:  None    CRITICAL CARE:      FINAL IMPRESSION      1. Generalized weakness    2. Diarrhea, unspecified type          DISPOSITION / PLAN     [unfilled]    PATIENT REFERRED TO:  Shun Hall MD  [address]  [place] [address]  [phone]            DISCHARGE MEDICATIONS:  New Prescriptions    No medications on file       Andrew Kim MD  3:46 PM    Attending Emergency Physician  [facility] EMERGENCY MEDICINE    (Please note that portions of this note were completed with a voice recognition program.  Effortswere made to edit the dictations but occasionally words are mis-transcribed.)                                                                 [1]   Family History  Problem Relation Name Age of Onset    Cancer Sister branden rain 65    Breast cancer Sister branden rain 50 - 59    Breast cancer Mother's Sister  70 - 79        Andrew Kim MD  06/19/25 [id number]

## 2025-06-19 NOTE — ED TRIAGE NOTES
TRIAGE NOTE   I saw the patient as the Clinician in Triage and performed a brief history and physical exam, established acuity, and ordered appropriate tests to develop basic plan of care. Patient will be seen by an RACHEL, resident and/or physician who will independently evaluate the patient. Please see subsequent provider notes for further details and disposition.     Brief HPI: In brief, Elisha Flores is a 79 y.o. female that presents for generalized weakness.  On chemotherapy having diarrhea having a sore throat.  Was here last week diagnosed with Candida's and chemo induced diarrhea.  Was given IV fluids and felt better.    Focused Physical exam:   Nontoxic-appearing speaking in full sentences answering questions appropriately, alert and oriented x 3  Plan/MDM:   Labs urine EKG, IV fluids  Please see subsequent provider note for further details and disposition

## 2025-06-19 NOTE — DISCHARGE INSTRUCTIONS
Dr. Bustillos will have you get an infusion of saline on Monday, 2 hours long, at 12:15 pm CHAIR 3

## 2025-06-20 LAB — HOLD SPECIMEN: 293

## 2025-06-21 LAB — BACTERIA UR CULT: NORMAL

## 2025-06-23 ENCOUNTER — OFFICE VISIT (OUTPATIENT)
Dept: HEMATOLOGY/ONCOLOGY | Facility: CLINIC | Age: 80
End: 2025-06-23
Payer: MEDICARE

## 2025-06-23 ENCOUNTER — INFUSION (OUTPATIENT)
Dept: HEMATOLOGY/ONCOLOGY | Facility: CLINIC | Age: 80
End: 2025-06-23
Payer: MEDICARE

## 2025-06-23 VITALS
OXYGEN SATURATION: 97 % | BODY MASS INDEX: 42.15 KG/M2 | RESPIRATION RATE: 18 BRPM | WEIGHT: 253.31 LBS | SYSTOLIC BLOOD PRESSURE: 144 MMHG | TEMPERATURE: 97.2 F | HEART RATE: 85 BPM | DIASTOLIC BLOOD PRESSURE: 65 MMHG

## 2025-06-23 VITALS
WEIGHT: 253.31 LBS | BODY MASS INDEX: 42.15 KG/M2 | OXYGEN SATURATION: 97 % | RESPIRATION RATE: 18 BRPM | TEMPERATURE: 97.2 F

## 2025-06-23 DIAGNOSIS — I10 BENIGN ESSENTIAL HYPERTENSION: ICD-10-CM

## 2025-06-23 DIAGNOSIS — C50.912 INVASIVE DUCTAL CARCINOMA OF BREAST, FEMALE, LEFT: ICD-10-CM

## 2025-06-23 DIAGNOSIS — T45.1X5A CHEMOTHERAPY INDUCED DIARRHEA: ICD-10-CM

## 2025-06-23 DIAGNOSIS — K52.1 CHEMOTHERAPY INDUCED DIARRHEA: ICD-10-CM

## 2025-06-23 LAB
ALBUMIN SERPL BCP-MCNC: 3.2 G/DL (ref 3.4–5)
ALP SERPL-CCNC: 61 U/L (ref 33–136)
ALT SERPL W P-5'-P-CCNC: 36 U/L (ref 7–45)
ANION GAP SERPL CALC-SCNC: 12 MMOL/L (ref 10–20)
AST SERPL W P-5'-P-CCNC: 26 U/L (ref 9–39)
BASOPHILS # BLD AUTO: 0.05 X10*3/UL (ref 0–0.1)
BASOPHILS NFR BLD AUTO: 0.7 %
BILIRUB SERPL-MCNC: 0.9 MG/DL (ref 0–1.2)
BUN SERPL-MCNC: 7 MG/DL (ref 6–23)
CALCIUM SERPL-MCNC: 8.2 MG/DL (ref 8.6–10.3)
CHLORIDE SERPL-SCNC: 102 MMOL/L (ref 98–107)
CO2 SERPL-SCNC: 28 MMOL/L (ref 21–32)
CREAT SERPL-MCNC: 0.48 MG/DL (ref 0.5–1.05)
EGFRCR SERPLBLD CKD-EPI 2021: >90 ML/MIN/1.73M*2
EOSINOPHIL # BLD AUTO: 0 X10*3/UL (ref 0–0.4)
EOSINOPHIL NFR BLD AUTO: 0 %
ERYTHROCYTE [DISTWIDTH] IN BLOOD BY AUTOMATED COUNT: 14.2 % (ref 11.5–14.5)
GLUCOSE SERPL-MCNC: 114 MG/DL (ref 74–99)
HCT VFR BLD AUTO: 32.1 % (ref 36–46)
HGB BLD-MCNC: 11.4 G/DL (ref 12–16)
IMM GRANULOCYTES # BLD AUTO: 0.12 X10*3/UL (ref 0–0.5)
IMM GRANULOCYTES NFR BLD AUTO: 1.7 % (ref 0–0.9)
LYMPHOCYTES # BLD AUTO: 0.94 X10*3/UL (ref 0.8–3)
LYMPHOCYTES NFR BLD AUTO: 13.3 %
MCH RBC QN AUTO: 30.6 PG (ref 26–34)
MCHC RBC AUTO-ENTMCNC: 35.5 G/DL (ref 32–36)
MCV RBC AUTO: 86 FL (ref 80–100)
MONOCYTES # BLD AUTO: 0.5 X10*3/UL (ref 0.05–0.8)
MONOCYTES NFR BLD AUTO: 7.1 %
NEUTROPHILS # BLD AUTO: 5.46 X10*3/UL (ref 1.6–5.5)
NEUTROPHILS NFR BLD AUTO: 77.2 %
NRBC BLD-RTO: 0 /100 WBCS (ref 0–0)
PLATELET # BLD AUTO: 180 X10*3/UL (ref 150–450)
POTASSIUM SERPL-SCNC: 3.4 MMOL/L (ref 3.5–5.3)
PROT SERPL-MCNC: 5.3 G/DL (ref 6.4–8.2)
RBC # BLD AUTO: 3.73 X10*6/UL (ref 4–5.2)
SODIUM SERPL-SCNC: 139 MMOL/L (ref 136–145)
WBC # BLD AUTO: 7.1 X10*3/UL (ref 4.4–11.3)

## 2025-06-23 PROCEDURE — 1126F AMNT PAIN NOTED NONE PRSNT: CPT | Performed by: INTERNAL MEDICINE

## 2025-06-23 PROCEDURE — 2500000004 HC RX 250 GENERAL PHARMACY W/ HCPCS (ALT 636 FOR OP/ED): Performed by: INTERNAL MEDICINE

## 2025-06-23 PROCEDURE — 80053 COMPREHEN METABOLIC PANEL: CPT

## 2025-06-23 PROCEDURE — 99215 OFFICE O/P EST HI 40 MIN: CPT | Performed by: INTERNAL MEDICINE

## 2025-06-23 PROCEDURE — 96523 IRRIG DRUG DELIVERY DEVICE: CPT | Mod: 59

## 2025-06-23 PROCEDURE — 85025 COMPLETE CBC W/AUTO DIFF WBC: CPT

## 2025-06-23 PROCEDURE — 1036F TOBACCO NON-USER: CPT | Performed by: INTERNAL MEDICINE

## 2025-06-23 RX ORDER — HEPARIN SODIUM,PORCINE/PF 10 UNIT/ML
50 SYRINGE (ML) INTRAVENOUS AS NEEDED
Status: DISCONTINUED | OUTPATIENT
Start: 2025-06-23 | End: 2025-06-23 | Stop reason: HOSPADM

## 2025-06-23 RX ORDER — FUROSEMIDE 20 MG/1
20 TABLET ORAL DAILY
Qty: 90 TABLET | Refills: 1 | Status: SHIPPED | OUTPATIENT
Start: 2025-06-23

## 2025-06-23 RX ORDER — HEPARIN 100 UNIT/ML
500 SYRINGE INTRAVENOUS AS NEEDED
Status: CANCELLED | OUTPATIENT
Start: 2025-06-23

## 2025-06-23 RX ORDER — HEPARIN 100 UNIT/ML
500 SYRINGE INTRAVENOUS AS NEEDED
Status: DISCONTINUED | OUTPATIENT
Start: 2025-06-23 | End: 2025-06-23 | Stop reason: HOSPADM

## 2025-06-23 RX ORDER — HEPARIN SODIUM,PORCINE/PF 10 UNIT/ML
50 SYRINGE (ML) INTRAVENOUS AS NEEDED
Status: CANCELLED | OUTPATIENT
Start: 2025-06-23

## 2025-06-23 RX ORDER — DIPHENOXYLATE HYDROCHLORIDE AND ATROPINE SULFATE 2.5; .025 MG/1; MG/1
1 TABLET ORAL 4 TIMES DAILY PRN
Qty: 60 TABLET | Refills: 3 | Status: SHIPPED | OUTPATIENT
Start: 2025-06-23 | End: 2025-12-20

## 2025-06-23 RX ADMIN — Medication 500 UNITS: at 13:46

## 2025-06-23 ASSESSMENT — PAIN SCALES - GENERAL
PAINLEVEL_OUTOF10: 0-NO PAIN
PAINLEVEL_OUTOF10: 0-NO PAIN

## 2025-06-23 NOTE — PROGRESS NOTES
Patient ID: Elisha Flores is a 79 y.o. female.  Referring Physician: Faustino Bustillos MD  [id number]  [address] [place] [address]  Primary Care Provider: Shun Hall MD  Referral Reason: breast cancer    Subjective:  No complaints    Heme/Onc History:    Left breast US 04/3/25 IMPRESSION:  1. Irregular 1.2 cm mass in the left breast at 12 o'clock 5 cm from  the nipple is suspicious for malignancy.  2. Small 7 mm mass in the left breast at 1 o'clock 1 cm from the  nipple is also suspicious.  3. No left axillary lymphadenopathy.    2025 breast biopsy   FINAL DIAGNOSIS   A. Left breast mass, 12:00 5 cm from nipple, ultrasound guided core biopsy:   -- Invasive ductal carcinoma with mucin production, grade 3, see note.     Note:  In this limited sample, the invasive carcinoma measures up to 9 mm in greatest dimension.  ER, TN and HER2 will be reported in a synoptic report.        B. Left breast mass, 1:00 1 cm from nipple, ultrasound guided core biopsy:   -- Invasive ductal carcinoma with mucin production, grade 3, see note.     Note:  In this limited sample, the invasive carcinoma measures up to 4 mm in greatest dimension.  Immunohistochemical stains for p63 and SMMHC are negative for myoepithelial cells surrounding the invasive carcinoma.  ER, TN and HER2 will be reported in a synoptic report.           Past Medical History: Medical History[1]  Social History:   Social History     Socioeconomic History    Marital status:      Spouse name: Not on file    Number of children: 2    Years of education: Not on file    Highest education level: Not on file   Occupational History    Not on file   Tobacco Use    Smoking status: Former     Current packs/day: 0.00     Types: Cigarettes     Quit date:      Years since quittin.5     Passive exposure: Past    Smokeless tobacco: Never   Vaping Use    Vaping status: Never Used   Substance and Sexual Activity    Alcohol use: Never    Drug use: Never     Sexual activity: Defer   Other Topics Concern    Not on file   Social History Narrative    Not on file     Social Drivers of Health     Financial Resource Strain: Low Risk  (5/13/2025)    Overall Financial Resource Strain (CARDIA)     Difficulty of Paying Living Expenses: Not hard at all   Food Insecurity: No Food Insecurity (5/13/2025)    Hunger Vital Sign     Worried About Running Out of Food in the Last Year: Never true     Ran Out of Food in the Last Year: Never true   Transportation Needs: No Transportation Needs (5/13/2025)    PRAPARE - Transportation     Lack of Transportation (Medical): No     Lack of Transportation (Non-Medical): No   Physical Activity: Sufficiently Active (5/13/2025)    Exercise Vital Sign     Days of Exercise per Week: 7 days     Minutes of Exercise per Session: 30 min   Stress: No Stress Concern Present (5/13/2025)    [employer] [place] [employer] - Occupational Stress Questionnaire     Feeling of Stress : Not at all   Social Connections: Moderately Isolated (5/13/2025)    Social Connection and Isolation Panel [NHANES]     Frequency of Communication with Friends and Family: More than three times a week     Frequency of Social Gatherings with Friends and Family: More than three times a week     Attends [employer] Services: Never     Active Member of Clubs or Organizations: Yes     Attends Club or Organization Meetings: More than 4 times per year     Marital Status:    Intimate Partner Violence: Not At Risk (5/13/2025)    Humiliation, Afraid, Rape, and Kick questionnaire     Fear of Current or Ex-Partner: No     Emotionally Abused: No     Physically Abused: No     Sexually Abused: No   Housing Stability: Low Risk  (5/13/2025)    Housing Stability Vital Sign     Unable to Pay for Housing in the Last Year: No     Number of Times Moved in the Last Year: 0     Homeless in the Last Year: No     Surgical History: Surgical History[2]  Family History: Family History[3]   reports  that she quit smoking about 38 years ago. Her smoking use included cigarettes. She has been exposed to tobacco smoke. She has never used smokeless tobacco.  Oncology Family history: Cancer-related family history includes Breast cancer (age of onset: 50 - 59) in her sister; Breast cancer (age of onset: 70 - 79) in her mother's sister; Cancer (age of onset: 65) in her sister.    Review Of Systems:  As stated per in HPI; otherwise all other 12 point ROS are negative    Physical Exam:  Temp 36.2 °C (97.2 °F) (Temporal)   Resp 18   Wt 115 kg (253 lb 4.9 oz)   SpO2 97%   BMI 42.15 kg/m²   BSA: 2.3 meters squared  General: awake/alert/oriented x3, no distress, alert and cooperative  Head: Short hair fully covering scalp. Symmetric facial expressions  Eyes: PERRL, EOMI, clear sclera, eyebrows present.  Ears/Nose/Mouth/Throat:  Oral mucous membranes moist. No oral ulcers. No palpable pre/post-auricular lymph nodes  Neck: No palpable cervical chain lymph nodes  Respiratory: unlabored breathing on room air, good chest expansion, thorax symmetric  Cardio: Regular rate and rhythm, normal S1 and S2, radial pulses symmetric  GI: Nondistended, soft, non-tender abdomen  Musculoskeletal: Normal muscle bulk and tone, ROM intact, no joint swelling.  Rises from chair and walks unassisted.  Extremities: No ankle swelling, no arm or leg wounds  Neuro: Alert, cognition intact, speech normal. Facial expressions symmetric.  No motor deficits noted. Sensation intact to touch and hot/cold.   Able to stand from seated position unassisted and walks around the room unassisted.  Psychological: Appropriate mood and behavior.  Skin: Warm and dry, no lesions, no rashes    Results:  Diagnostic Results   Lab Results   Component Value Date    WBC 19.0 (H) 06/19/2025    HGB 13.3 06/19/2025    HCT 40.4 06/19/2025    MCV 92 06/19/2025     06/19/2025     Lab Results   Component Value Date    CALCIUM 8.1 (L) 06/19/2025     (L) 06/19/2025     K 3.5 06/19/2025    CO2 22 06/19/2025     06/19/2025    BUN 7 06/19/2025    CREATININE 0.48 (L) 06/19/2025    ALT 54 (H) 06/19/2025    AST 29 06/19/2025     Current Medications[4]     Assessment/Plan:  ? Breast Cancer:    78 yo F was found to have 2 abnormal foci in left breast. 1.2 cm and 0.7 cm and 3-4 cm apart from each other based on breast US. Biopsied from both breasts showed ER/UT negative and Her2 positive, IDC, grade 3.    She is referred to oncology for neoadj tx. [id number], stage 1A.    Her sister was dx with breast cancer at age 48 and her aunt at age 55. Ambry shows POLE VUS. Genetic referral is recommended but she declined    Baseline CT CAP and NM bone scan do not show evidence of distant metastases    NST with TCH (Carbo AUC 5, reduced from AUC 6; and Taxotere 75 mg/m2) q 3 weeks with Neulasta support followed by surgery and adjuvant RT and completion of 1 year of Herceptin maintenance vs Kadcycla is recommended based on pCR status    TCH C#1 was given on 06/6/25 06/19/25: presents to ER with generalized weakness, diarrhea, discharge from her nose. States that she has been sick for about a week and a half with diffuse diarrhea that is forceful and comes out spontaneously she states the diarrhea is actually getting better she still want to be evaluated because she has generalized weakness and fatigue.     Will restage with breast US after C#3 and after C#6    Echo due 09/3/25.    RTC ion C3D1    2- Thyroid nodule: Indeterminate 3.5 cm left lower thyroid heterogeneously enhancing  nodule. Thyroid US is ordered    Diagnoses and all orders for this visit:  Invasive ductal carcinoma of breast, female, left  -     Clinic Appointment Request       Performance Status: Asymptomatic    I spent more than 60 minutes for the patient today, including face-to-face conversation, pre-visit preparation, post-visit orders, and others.   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    Bursitis of right shoulder 11/25/2019    Subdeltoid bursitis of right shoulder joint    Encounter for general adult medical examination without abnormal findings 04/26/2018    Encounter for Medicare annual wellness exam    Halitosis 05/22/2019    Halitosis    Localized swelling, mass and lump, head 12/28/2016    Left facial swelling    Localized swelling, mass and lump, head 12/29/2022    Facial mass    Other specified anxiety disorders 12/19/2014    Depression with anxiety    Other specified disorders of nose and nasal sinuses 04/18/2017    Jacque bullosa    Personal history of diseases of the skin and subcutaneous tissue     History of psoriasis    Personal history of other diseases of the circulatory system     History of congestive heart failure    Personal history of other diseases of the circulatory system 11/15/2019    History of uncontrolled hypertension    Personal history of other diseases of the circulatory system     History of hypertension    Personal history of other diseases of the nervous system and sense organs     History of sciatica    Personal history of other diseases of the respiratory system 04/18/2017    History of deviated nasal septum    Personal history of other diseases of the respiratory system 02/27/2017    History of sinusitis    Personal history of other diseases of the respiratory system 05/28/2019    History of chronic sinusitis    Personal history of other diseases of the respiratory system 03/19/2014    History of acute sinusitis    Personal history of other specified conditions 03/26/2013    History of insomnia    Personal history of other specified conditions 07/09/2019    History of nasal  congestion    Personal history of other specified conditions 11/17/2016    History of diarrhea    Unspecified blepharitis right eye, unspecified eyelid 01/22/2019    Blepharitis of eyelid of right eye    Unspecified mycosis 06/18/2019    Fungal sinusitis   [2]   Past Surgical History:  Procedure Laterality Date    BREAST BIOPSY Left 04/23/2025    HYSTERECTOMY  04/04/2013    Hysterectomy    OTHER SURGICAL HISTORY  11/22/2022    Eye surgery    OTHER SURGICAL HISTORY  11/22/2022    Nose surgery    OTHER SURGICAL HISTORY  11/22/2022    Back surgery   [3]   Family History  Problem Relation Name Age of Onset    Cancer Sister branden rain 65    Breast cancer Sister branden rain 50 - 59    Breast cancer Mother's Sister  70 - 79   [4]   Current Outpatient Medications:     amLODIPine (Norvasc) 10 mg tablet, Take 1 tablet (10 mg) by mouth once daily., Disp: 90 tablet, Rfl: 0    aspirin 81 mg EC tablet, Take 1 tablet (81 mg) by mouth once daily. (Patient not taking: Reported on 6/13/2025), Disp: , Rfl:     carboxymethylcellulose (Refresh Plus) 0.5 % ophthalmic solution, Administer into affected eye(s)., Disp: , Rfl:     cetirizine (ZyrTEC) 10 mg tablet, Take 1 tablet (10 mg) by mouth once daily. (Patient not taking: Reported on 6/13/2025), Disp: 30 tablet, Rfl: 2    cholecalciferol (Vitamin D-3) 5,000 Units tablet, Take 1 tablet (125 mcg) by mouth once daily. (Patient not taking: Reported on 6/13/2025), Disp: , Rfl:     cyanocobalamin (Vitamin B-12) 1,000 mcg tablet, Take 1 tablet (1,000 mcg) by mouth once daily. (Patient not taking: Reported on 6/13/2025), Disp: , Rfl:     dexAMETHasone (Decadron) 4 mg tablet, Take 2 tablets (8 mg) by mouth once daily. For 3 days starting the day after treatment, Disp: 6 tablet, Rfl: 5    diphenoxylate-atropine (Lomotil) 2.5-0.025 mg tablet, Take 1 tablet by mouth 4 times a day as needed for diarrhea for up to 10 days., Disp: 40 tablet, Rfl: 0    fluticasone (Flonase) 50 mcg/actuation nasal  spray, Administer 1 spray into each nostril once daily. Shake gently. Before first use, prime pump. After use, clean tip and replace cap. (Patient not taking: Reported on 6/23/2025), Disp: 16 g, Rfl: 11    folic acid (Folvite) 1 mg tablet, Take 1 tablet (1 mg) by mouth once daily., Disp: , Rfl:     furosemide (Lasix) 20 mg tablet, Take 1 tablet (20 mg) by mouth once daily., Disp: 90 tablet, Rfl: 1    losartan (Cozaar) 100 mg tablet, Take 1 tablet (100 mg) by mouth once daily., Disp: 90 tablet, Rfl: 1    MULTIVITAMIN ORAL, Take 1 tablet by mouth once daily. With food (Patient not taking: Reported on 6/13/2025), Disp: , Rfl:     nystatin (Mycostatin) 100,000 unit/mL suspension, Take 5 mL (500,000 Units) by mouth 4 times a day for 10 days., Disp: 200 mL, Rfl: 0    OLANZapine (ZyPREXA) 5 mg tablet, Take 1 tablet (5 mg) by mouth once daily at bedtime. For 4 days starting the evening of treatment, Disp: 4 tablet, Rfl: 5    ondansetron (Zofran) 8 mg tablet, Take 1 tablet (8 mg) by mouth every 8 hours if needed for nausea or vomiting., Disp: 30 tablet, Rfl: 5    prochlorperazine (Compazine) 10 mg tablet, Take 1 tablet (10 mg) by mouth every 6 hours if needed for nausea or vomiting., Disp: 30 tablet, Rfl: 5    propranolol LA (Inderal LA) 120 mg 24 hr capsule, Take 1 capsule (120 mg) by mouth once daily at bedtime., Disp: 90 capsule, Rfl: 0    pyridoxine (Vitamin B-6) 100 mg tablet, Take 1 tablet (100 mg) by mouth once daily. (Patient not taking: Reported on 6/13/2025), Disp: , Rfl:     rosuvastatin (Crestor) 10 mg tablet, Take 1 tablet (10 mg) by mouth once daily., Disp: 90 tablet, Rfl: 1

## 2025-06-23 NOTE — PROGRESS NOTES
Patient presents to infusion today for hydration. Pt tells RN she has been to the ED 2 times in the past week, one time for rectal bleeding and again for bloody nose. Pt states the bleeding has since resolved. 3-4+ pitting edema noted to BL legs, per previous charting 6/6 edema 1+ at that time. Pt tells RN she has not been taking her lasix since end of May, because she thought she was dehydrated, pt has not been eating or drinking much d/t change in taste and loss of appetite.   Dr. Harmon in to see patient. Education provided on continuing to take Lasix and Imodium/lomotil. RN reinforced teaching and pt and friend provided teach back.

## 2025-06-24 ENCOUNTER — TELEPHONE (OUTPATIENT)
Dept: HEMATOLOGY/ONCOLOGY | Facility: CLINIC | Age: 80
End: 2025-06-24
Payer: MEDICARE

## 2025-06-24 NOTE — TELEPHONE ENCOUNTER
Reason for Conversation  Med Refill    Background   Refill Diphenoxylate Tammy Ponce Pt needs this before hr Friday infusion.    Disposition   No disposition on file.

## 2025-06-26 ENCOUNTER — APPOINTMENT (OUTPATIENT)
Dept: HEMATOLOGY/ONCOLOGY | Facility: HOSPITAL | Age: 80
End: 2025-06-26
Payer: MEDICARE

## 2025-06-27 ENCOUNTER — SOCIAL WORK (OUTPATIENT)
Dept: CASE MANAGEMENT | Facility: HOSPITAL | Age: 80
End: 2025-06-27

## 2025-06-27 ENCOUNTER — INFUSION (OUTPATIENT)
Dept: HEMATOLOGY/ONCOLOGY | Facility: CLINIC | Age: 80
End: 2025-06-27
Payer: MEDICARE

## 2025-06-27 ENCOUNTER — APPOINTMENT (OUTPATIENT)
Dept: HEMATOLOGY/ONCOLOGY | Facility: CLINIC | Age: 80
End: 2025-06-27
Payer: MEDICARE

## 2025-06-27 VITALS
BODY MASS INDEX: 41.29 KG/M2 | SYSTOLIC BLOOD PRESSURE: 123 MMHG | TEMPERATURE: 96.8 F | HEART RATE: 60 BPM | RESPIRATION RATE: 18 BRPM | OXYGEN SATURATION: 98 % | WEIGHT: 248.13 LBS | DIASTOLIC BLOOD PRESSURE: 70 MMHG

## 2025-06-27 DIAGNOSIS — C50.912 INVASIVE DUCTAL CARCINOMA OF BREAST, FEMALE, LEFT: ICD-10-CM

## 2025-06-27 PROCEDURE — 2500000005 HC RX 250 GENERAL PHARMACY W/O HCPCS: Performed by: INTERNAL MEDICINE

## 2025-06-27 PROCEDURE — 96377 APPLICATON ON-BODY INJECTOR: CPT

## 2025-06-27 PROCEDURE — 96375 TX/PRO/DX INJ NEW DRUG ADDON: CPT | Mod: INF

## 2025-06-27 PROCEDURE — 96417 CHEMO IV INFUS EACH ADDL SEQ: CPT

## 2025-06-27 PROCEDURE — 96367 TX/PROPH/DG ADDL SEQ IV INF: CPT

## 2025-06-27 PROCEDURE — 2500000001 HC RX 250 WO HCPCS SELF ADMINISTERED DRUGS (ALT 637 FOR MEDICARE OP): Performed by: INTERNAL MEDICINE

## 2025-06-27 PROCEDURE — 2500000004 HC RX 250 GENERAL PHARMACY W/ HCPCS (ALT 636 FOR OP/ED): Mod: TB | Performed by: INTERNAL MEDICINE

## 2025-06-27 PROCEDURE — 96413 CHEMO IV INFUSION 1 HR: CPT

## 2025-06-27 RX ORDER — HEPARIN 100 UNIT/ML
500 SYRINGE INTRAVENOUS AS NEEDED
OUTPATIENT
Start: 2025-06-27

## 2025-06-27 RX ORDER — DIPHENHYDRAMINE HCL 25 MG
25 CAPSULE ORAL ONCE
Status: COMPLETED | OUTPATIENT
Start: 2025-06-27 | End: 2025-06-27

## 2025-06-27 RX ORDER — EPINEPHRINE 0.3 MG/.3ML
0.3 INJECTION SUBCUTANEOUS EVERY 5 MIN PRN
Status: DISCONTINUED | OUTPATIENT
Start: 2025-06-27 | End: 2025-06-27 | Stop reason: HOSPADM

## 2025-06-27 RX ORDER — FAMOTIDINE 10 MG/ML
20 INJECTION, SOLUTION INTRAVENOUS ONCE AS NEEDED
Status: DISCONTINUED | OUTPATIENT
Start: 2025-06-27 | End: 2025-06-27 | Stop reason: HOSPADM

## 2025-06-27 RX ORDER — ALBUTEROL SULFATE 0.83 MG/ML
3 SOLUTION RESPIRATORY (INHALATION) AS NEEDED
Status: DISCONTINUED | OUTPATIENT
Start: 2025-06-27 | End: 2025-06-27 | Stop reason: HOSPADM

## 2025-06-27 RX ORDER — HEPARIN SODIUM,PORCINE/PF 10 UNIT/ML
50 SYRINGE (ML) INTRAVENOUS AS NEEDED
Status: DISCONTINUED | OUTPATIENT
Start: 2025-06-27 | End: 2025-06-27 | Stop reason: HOSPADM

## 2025-06-27 RX ORDER — PROCHLORPERAZINE MALEATE 10 MG
10 TABLET ORAL EVERY 6 HOURS PRN
Status: DISCONTINUED | OUTPATIENT
Start: 2025-06-27 | End: 2025-06-27 | Stop reason: HOSPADM

## 2025-06-27 RX ORDER — PALONOSETRON 0.05 MG/ML
0.25 INJECTION, SOLUTION INTRAVENOUS ONCE
Status: COMPLETED | OUTPATIENT
Start: 2025-06-27 | End: 2025-06-27

## 2025-06-27 RX ORDER — DIPHENHYDRAMINE HYDROCHLORIDE 50 MG/ML
50 INJECTION, SOLUTION INTRAMUSCULAR; INTRAVENOUS AS NEEDED
Status: DISCONTINUED | OUTPATIENT
Start: 2025-06-27 | End: 2025-06-27 | Stop reason: HOSPADM

## 2025-06-27 RX ORDER — PROCHLORPERAZINE EDISYLATE 5 MG/ML
10 INJECTION INTRAMUSCULAR; INTRAVENOUS EVERY 6 HOURS PRN
Status: DISCONTINUED | OUTPATIENT
Start: 2025-06-27 | End: 2025-06-27 | Stop reason: HOSPADM

## 2025-06-27 RX ORDER — HEPARIN 100 UNIT/ML
500 SYRINGE INTRAVENOUS AS NEEDED
Status: DISCONTINUED | OUTPATIENT
Start: 2025-06-27 | End: 2025-06-27 | Stop reason: HOSPADM

## 2025-06-27 RX ORDER — HEPARIN SODIUM,PORCINE/PF 10 UNIT/ML
50 SYRINGE (ML) INTRAVENOUS AS NEEDED
OUTPATIENT
Start: 2025-06-27

## 2025-06-27 RX ADMIN — DOCETAXEL 180 MG: 20 INJECTION, SOLUTION, CONCENTRATE INTRAVENOUS at 09:30

## 2025-06-27 RX ADMIN — PEGFILGRASTIM-CBQV 6 MG: 6 INJECTION, SOLUTION SUBCUTANEOUS at 11:15

## 2025-06-27 RX ADMIN — TRASTUZUMAB-ANNS 720.3 MG: 420 INJECTION, POWDER, LYOPHILIZED, FOR SOLUTION INTRAVENOUS at 08:53

## 2025-06-27 RX ADMIN — SODIUM CHLORIDE 150 MG: 0.9 INJECTION, SOLUTION INTRAVENOUS at 08:15

## 2025-06-27 RX ADMIN — DEXAMETHASONE SODIUM PHOSPHATE 12 MG: 10 INJECTION, SOLUTION INTRAMUSCULAR; INTRAVENOUS at 08:00

## 2025-06-27 RX ADMIN — DIPHENHYDRAMINE HYDROCHLORIDE 25 MG: 25 CAPSULE ORAL at 08:24

## 2025-06-27 RX ADMIN — CARBOPLATIN 520 MG: 10 INJECTION, SOLUTION INTRAVENOUS at 10:34

## 2025-06-27 RX ADMIN — HEPARIN 500 UNITS: 100 SYRINGE at 11:13

## 2025-06-27 RX ADMIN — PALONOSETRON HYDROCHLORIDE 0.25 MG: 0.25 INJECTION INTRAVENOUS at 08:00

## 2025-06-27 ASSESSMENT — PAIN SCALES - GENERAL: PAINLEVEL_OUTOF10: 0-NO PAIN

## 2025-06-27 NOTE — PROGRESS NOTES
DARCIE completed FMLA nd physician signed this. DARCIE called Pt for further instructions as there was no information with apers as to where they should be sent. SW left VM for patient.

## 2025-06-27 NOTE — PROGRESS NOTES
Patient here for treatment. Reviewed labs and weight, dose decrease per MD, due to fatigue and diarrhea. Reviewed signs and symptoms of when to call MD, and when to head to ER. Reviewed ciro hose and elevating BLE, and reviewed pressure ulcer relief techniques and to let md know if she can't get it to heal.   Tolerated infusion without complication. Reviewed schedule, and when to remove on body tomorrow, verbalized understanding via teachback.

## 2025-06-30 ENCOUNTER — TELEPHONE (OUTPATIENT)
Dept: HEMATOLOGY/ONCOLOGY | Facility: CLINIC | Age: 80
End: 2025-06-30
Payer: MEDICARE

## 2025-06-30 DIAGNOSIS — C50.912 INVASIVE DUCTAL CARCINOMA OF BREAST, FEMALE, LEFT: Primary | ICD-10-CM

## 2025-07-01 ENCOUNTER — HOSPITAL ENCOUNTER (EMERGENCY)
Facility: HOSPITAL | Age: 80
Discharge: HOME | End: 2025-07-01
Payer: MEDICARE

## 2025-07-01 ENCOUNTER — PATIENT MESSAGE (OUTPATIENT)
Dept: HEMATOLOGY/ONCOLOGY | Facility: CLINIC | Age: 80
End: 2025-07-01
Payer: MEDICARE

## 2025-07-01 ENCOUNTER — APPOINTMENT (OUTPATIENT)
Dept: RADIOLOGY | Facility: HOSPITAL | Age: 80
End: 2025-07-01
Payer: MEDICARE

## 2025-07-01 VITALS
TEMPERATURE: 98.6 F | SYSTOLIC BLOOD PRESSURE: 109 MMHG | HEIGHT: 65 IN | DIASTOLIC BLOOD PRESSURE: 60 MMHG | HEART RATE: 61 BPM | RESPIRATION RATE: 18 BRPM | WEIGHT: 248 LBS | OXYGEN SATURATION: 100 % | BODY MASS INDEX: 41.32 KG/M2

## 2025-07-01 DIAGNOSIS — I87.2 VENOUS STASIS DERMATITIS OF BOTH LOWER EXTREMITIES: ICD-10-CM

## 2025-07-01 DIAGNOSIS — N20.0 KIDNEY STONE: ICD-10-CM

## 2025-07-01 DIAGNOSIS — L89.152 PRESSURE INJURY OF SACRAL REGION, STAGE 2 (MULTI): ICD-10-CM

## 2025-07-01 DIAGNOSIS — L03.119 CELLULITIS OF LOWER EXTREMITY, UNSPECIFIED LATERALITY: Primary | ICD-10-CM

## 2025-07-01 LAB
ALBUMIN SERPL BCP-MCNC: 3.6 G/DL (ref 3.4–5)
ALP SERPL-CCNC: 84 U/L (ref 33–136)
ALT SERPL W P-5'-P-CCNC: 35 U/L (ref 7–45)
ANION GAP SERPL CALC-SCNC: 14 MMOL/L (ref 10–20)
APPEARANCE UR: CLEAR
AST SERPL W P-5'-P-CCNC: 32 U/L (ref 9–39)
BASO STIPL BLD QL SMEAR: PRESENT
BASOPHILS # BLD MANUAL: 0 X10*3/UL (ref 0–0.1)
BASOPHILS NFR BLD MANUAL: 0 %
BILIRUB SERPL-MCNC: 2.2 MG/DL (ref 0–1.2)
BILIRUB UR STRIP.AUTO-MCNC: NEGATIVE MG/DL
BNP SERPL-MCNC: 32 PG/ML (ref 0–99)
BUN SERPL-MCNC: 21 MG/DL (ref 6–23)
CALCIUM SERPL-MCNC: 8.6 MG/DL (ref 8.6–10.3)
CARDIAC TROPONIN I PNL SERPL HS: 5 NG/L (ref 0–13)
CARDIAC TROPONIN I PNL SERPL HS: 6 NG/L (ref 0–13)
CHLORIDE SERPL-SCNC: 94 MMOL/L (ref 98–107)
CO2 SERPL-SCNC: 27 MMOL/L (ref 21–32)
COLOR UR: YELLOW
CREAT SERPL-MCNC: 0.63 MG/DL (ref 0.5–1.05)
DACRYOCYTES BLD QL SMEAR: ABNORMAL
DOHLE BOD BLD QL SMEAR: PRESENT
EGFRCR SERPLBLD CKD-EPI 2021: >90 ML/MIN/1.73M*2
EOSINOPHIL # BLD MANUAL: 0 X10*3/UL (ref 0–0.4)
EOSINOPHIL NFR BLD MANUAL: 0 %
ERYTHROCYTE [DISTWIDTH] IN BLOOD BY AUTOMATED COUNT: 14.9 % (ref 11.5–14.5)
GIANT PLATELETS BLD QL SMEAR: ABNORMAL
GLUCOSE SERPL-MCNC: 140 MG/DL (ref 74–99)
GLUCOSE UR STRIP.AUTO-MCNC: NORMAL MG/DL
HCT VFR BLD AUTO: 32.9 % (ref 36–46)
HGB BLD-MCNC: 11.7 G/DL (ref 12–16)
HYALINE CASTS #/AREA URNS AUTO: ABNORMAL /LPF
IMM GRANULOCYTES # BLD AUTO: 1.58 X10*3/UL (ref 0–0.5)
IMM GRANULOCYTES NFR BLD AUTO: 20.9 % (ref 0–0.9)
KETONES UR STRIP.AUTO-MCNC: ABNORMAL MG/DL
LACTATE SERPL-SCNC: 1.6 MMOL/L (ref 0.4–2)
LEUKOCYTE ESTERASE UR QL STRIP.AUTO: ABNORMAL
LYMPHOCYTES # BLD MANUAL: 0.15 X10*3/UL (ref 0.8–3)
LYMPHOCYTES NFR BLD MANUAL: 2 %
MCH RBC QN AUTO: 31.1 PG (ref 26–34)
MCHC RBC AUTO-ENTMCNC: 35.6 G/DL (ref 32–36)
MCV RBC AUTO: 88 FL (ref 80–100)
METAMYELOCYTES # BLD MANUAL: 0.15 X10*3/UL
METAMYELOCYTES NFR BLD MANUAL: 2 %
MONOCYTES # BLD MANUAL: 0.38 X10*3/UL (ref 0.05–0.8)
MONOCYTES NFR BLD MANUAL: 5 %
MUCOUS THREADS #/AREA URNS AUTO: ABNORMAL /LPF
NEUTROPHILS # BLD MANUAL: 6.92 X10*3/UL (ref 1.6–5.5)
NEUTS BAND # BLD MANUAL: 0.46 X10*3/UL (ref 0–0.5)
NEUTS BAND NFR BLD MANUAL: 6 %
NEUTS SEG # BLD MANUAL: 6.46 X10*3/UL (ref 1.6–5)
NEUTS SEG NFR BLD MANUAL: 85 %
NITRITE UR QL STRIP.AUTO: NEGATIVE
NRBC BLD-RTO: 0 /100 WBCS (ref 0–0)
OVALOCYTES BLD QL SMEAR: ABNORMAL
PH UR STRIP.AUTO: 6.5 [PH]
PLATELET # BLD AUTO: 108 X10*3/UL (ref 150–450)
POTASSIUM SERPL-SCNC: 3.4 MMOL/L (ref 3.5–5.3)
PROT SERPL-MCNC: 5.8 G/DL (ref 6.4–8.2)
PROT UR STRIP.AUTO-MCNC: ABNORMAL MG/DL
RBC # BLD AUTO: 3.76 X10*6/UL (ref 4–5.2)
RBC # UR STRIP.AUTO: NEGATIVE MG/DL
RBC #/AREA URNS AUTO: ABNORMAL /HPF
RBC MORPH BLD: ABNORMAL
SCHISTOCYTES BLD QL SMEAR: ABNORMAL
SODIUM SERPL-SCNC: 132 MMOL/L (ref 136–145)
SP GR UR STRIP.AUTO: 1.05
SQUAMOUS #/AREA URNS AUTO: ABNORMAL /HPF
STOMATOCYTES BLD QL SMEAR: ABNORMAL
TOTAL CELLS COUNTED BLD: 100
TRANS CELLS #/AREA UR COMP ASSIST: ABNORMAL /HPF
UROBILINOGEN UR STRIP.AUTO-MCNC: ABNORMAL MG/DL
WBC # BLD AUTO: 7.6 X10*3/UL (ref 4.4–11.3)
WBC #/AREA URNS AUTO: ABNORMAL /HPF
WBC CLUMPS #/AREA URNS AUTO: ABNORMAL /HPF

## 2025-07-01 PROCEDURE — 85027 COMPLETE CBC AUTOMATED: CPT | Performed by: PHYSICIAN ASSISTANT

## 2025-07-01 PROCEDURE — 2500000002 HC RX 250 W HCPCS SELF ADMINISTERED DRUGS (ALT 637 FOR MEDICARE OP, ALT 636 FOR OP/ED): Performed by: PHYSICIAN ASSISTANT

## 2025-07-01 PROCEDURE — 84484 ASSAY OF TROPONIN QUANT: CPT | Performed by: PHYSICIAN ASSISTANT

## 2025-07-01 PROCEDURE — 2500000001 HC RX 250 WO HCPCS SELF ADMINISTERED DRUGS (ALT 637 FOR MEDICARE OP)

## 2025-07-01 PROCEDURE — 84075 ASSAY ALKALINE PHOSPHATASE: CPT | Performed by: PHYSICIAN ASSISTANT

## 2025-07-01 PROCEDURE — 2500000004 HC RX 250 GENERAL PHARMACY W/ HCPCS (ALT 636 FOR OP/ED)

## 2025-07-01 PROCEDURE — 85007 BL SMEAR W/DIFF WBC COUNT: CPT | Performed by: PHYSICIAN ASSISTANT

## 2025-07-01 PROCEDURE — 87086 URINE CULTURE/COLONY COUNT: CPT | Mod: GENLAB | Performed by: PHYSICIAN ASSISTANT

## 2025-07-01 PROCEDURE — 99285 EMERGENCY DEPT VISIT HI MDM: CPT | Mod: 25

## 2025-07-01 PROCEDURE — 36415 COLL VENOUS BLD VENIPUNCTURE: CPT | Performed by: PHYSICIAN ASSISTANT

## 2025-07-01 PROCEDURE — 87040 BLOOD CULTURE FOR BACTERIA: CPT | Mod: GENLAB | Performed by: PHYSICIAN ASSISTANT

## 2025-07-01 PROCEDURE — 81001 URINALYSIS AUTO W/SCOPE: CPT | Performed by: PHYSICIAN ASSISTANT

## 2025-07-01 PROCEDURE — 2550000001 HC RX 255 CONTRASTS: Performed by: PHYSICIAN ASSISTANT

## 2025-07-01 PROCEDURE — 83880 ASSAY OF NATRIURETIC PEPTIDE: CPT | Performed by: PHYSICIAN ASSISTANT

## 2025-07-01 PROCEDURE — 74177 CT ABD & PELVIS W/CONTRAST: CPT | Mod: FOREIGN READ | Performed by: RADIOLOGY

## 2025-07-01 PROCEDURE — 83605 ASSAY OF LACTIC ACID: CPT | Performed by: PHYSICIAN ASSISTANT

## 2025-07-01 PROCEDURE — 74177 CT ABD & PELVIS W/CONTRAST: CPT

## 2025-07-01 RX ORDER — POTASSIUM CHLORIDE 20 MEQ/1
40 TABLET, EXTENDED RELEASE ORAL DAILY
Status: DISCONTINUED | OUTPATIENT
Start: 2025-07-01 | End: 2025-07-01 | Stop reason: HOSPADM

## 2025-07-01 RX ORDER — DOXYCYCLINE HYCLATE 50 MG/1
100 CAPSULE, GELATIN COATED ORAL ONCE
Status: COMPLETED | OUTPATIENT
Start: 2025-07-01 | End: 2025-07-01

## 2025-07-01 RX ORDER — DOXYCYCLINE HYCLATE 50 MG/1
CAPSULE, GELATIN COATED ORAL
Status: COMPLETED
Start: 2025-07-01 | End: 2025-07-01

## 2025-07-01 RX ORDER — HEPARIN 100 UNIT/ML
5 SYRINGE INTRAVENOUS ONCE AS NEEDED
Status: COMPLETED | OUTPATIENT
Start: 2025-07-01 | End: 2025-07-01

## 2025-07-01 RX ORDER — HEPARIN 100 UNIT/ML
SYRINGE INTRAVENOUS
Status: COMPLETED
Start: 2025-07-01 | End: 2025-07-01

## 2025-07-01 RX ORDER — DOXYCYCLINE 100 MG/1
100 TABLET ORAL 2 TIMES DAILY
Qty: 14 TABLET | Refills: 0 | Status: ON HOLD | OUTPATIENT
Start: 2025-07-01 | End: 2025-07-08

## 2025-07-01 RX ADMIN — POTASSIUM CHLORIDE 40 MEQ: 1500 TABLET, EXTENDED RELEASE ORAL at 18:01

## 2025-07-01 RX ADMIN — IOHEXOL 75 ML: 350 INJECTION, SOLUTION INTRAVENOUS at 16:18

## 2025-07-01 RX ADMIN — HEPARIN 500 UNITS: 100 SYRINGE at 20:20

## 2025-07-01 RX ADMIN — DOXYCYCLINE HYCLATE 100 MG: 50 CAPSULE, GELATIN COATED ORAL at 18:01

## 2025-07-01 RX ADMIN — DOXYCYCLINE HYCLATE 100 MG: 50 CAPSULE ORAL at 18:01

## 2025-07-01 ASSESSMENT — PAIN DESCRIPTION - FREQUENCY: FREQUENCY: CONSTANT/CONTINUOUS

## 2025-07-01 ASSESSMENT — PAIN DESCRIPTION - PAIN TYPE: TYPE: ACUTE PAIN

## 2025-07-01 ASSESSMENT — PAIN DESCRIPTION - LOCATION: LOCATION: BUTTOCKS

## 2025-07-01 ASSESSMENT — PAIN - FUNCTIONAL ASSESSMENT: PAIN_FUNCTIONAL_ASSESSMENT: 0-10

## 2025-07-01 ASSESSMENT — PAIN SCALES - GENERAL: PAINLEVEL_OUTOF10: 5 - MODERATE PAIN

## 2025-07-01 ASSESSMENT — PAIN DESCRIPTION - ORIENTATION: ORIENTATION: MID

## 2025-07-01 ASSESSMENT — PAIN DESCRIPTION - DESCRIPTORS: DESCRIPTORS: BURNING

## 2025-07-01 NOTE — ED TRIAGE NOTES
Pt here with complaints of pain in her sacrum area where she has a wound. She is currently getting chemo treatment for breast cancer. The wound started about 4 weeks ago and she thinks it may be infected.

## 2025-07-01 NOTE — PROGRESS NOTES
"Spoke to Elisha..  Her wound pictures of her sacrum appear to be rather deep with exudate.  Per Dr. Harmon see your PCP ASAP.  We also made a referral to the [facility] at .  She denies fever or chills.  States she has had this wound for \"a long time.\"  Elisha stated she will call Dr. Camacho now.  "

## 2025-07-01 NOTE — TELEPHONE ENCOUNTER
"Spoke to Elisha   c/o open red oozing \"small amount of pus\" sore on coccyx area.  It is not healing.  Wanted to notify Dr. Harmon.  Denies fever, chills, etc.  Dr. Harmon notified.  "

## 2025-07-02 LAB — HOLD SPECIMEN: NORMAL

## 2025-07-02 NOTE — ED PROVIDER NOTES
HPI   Chief Complaint   Patient presents with   • Wound Infection     Pt here with complaints of pain in her sacrum area where she has a wound. She is currently getting chemo treatment for breast cancer. The wound started about 4 weeks ago and she thinks it may be infected.       History of present illness:  79-year-old female presents emergency room for complaints of pain over her sacrum and request for a wound check.  She states that she first noticed a wound on her sacrum a couple weeks ago playing possibly few weeks ago but she is not completely sure.  She states that she is concerned that might be infected at this time.  She states that she is currently being treated for breast cancer and states that she just finished her second round of chemotherapy.  She states it is made her very tired she states that she tries to get up and walk as much she can throughout the day but she spends a lot of time sitting down or laying in bed recently.  She denies any nausea vomiting chest pain shortness of breath or any other symptoms at this time.  Past medical history of hypertension and  heart failure per the EMR.  She states that she went to [facility] emergency room on 2 separate occasions and was told that she need to follow-up with her primary care doctor and that there is nothing for them to do?      Social history: Negative for alcohol and drug use.    Review of systems:   Gen.: No weight loss, fever.   Eyes: No vision loss, double vision, drainage, eye pain.   ENT: No pharyngitis, dry mouth.   Cardiac: No chest pain, palpitations, syncope, near syncope.   Pulmonary: No shortness of breath, cough, hemoptysis.   Heme/lymph: No swollen glands, fever, bleeding.   GI: No abdominal pain, change in bowel habits, melena, hematemesis, hematochezia, nausea, vomiting, diarrhea.   : No discharge, dysuria, frequency, urgency, hematuria.   Musculoskeletal: No limb pain, joint pain, joint swelling.   Skin: rash on legs  Review of  systems is otherwise negative unless stated above or in history of present illness.        Physical exam:  General: Vitals noted, no distress. Afebrile.  Morbidly obese  EENT: No lymphadenopathy appreciated  Cardiac: Regular, rate, rhythm, no murmur.   Pulmonary: Lungs clear bilaterally with good aeration. No adventitious breath sounds.   Abdomen: Soft, nonsurgical. Nontender. No peritoneal signs. Normoactive bowel sounds.   Back: There appears to be a pressure ulcer present as stage II at this time over the sacrum  Extremities: +2 pitting edema present in the legs at this time up to the mid calf, there appears to be some stasis dermatitis present as well but the areas are also warm to the touch concerning for possible early cellulitis no abscess appreciated no open wounds appreciated this time pulses are present in feet bilaterally  Skin: No rash.   Neuro: No focal neurologic deficits        Medical decision making:   Testing: Urinalysis showed 21-50 white blood cells as well as red blood cells present no bacteria seen troponin x 2 was negative CBC showed normal leukocytes BNP was 32 lactate 1.6 CMP showed potassium of 3.4 that was replaced with oral potassium  Treatment: CT scan abdomen pelvis with contrast showed a large stone in the ureter vascular junction at 1.1 cm x 1.9 cm  Plan: Home-going.  Discussed differential. Will follow-up with the primary physician in the next 2-3 days. Return if worse. They understand return precautions and discharge instructions. Patient and family/friend/caregiver are in agreement with this plan. 79-year-old female presents emergency room for complaints of pain over her sacrum and request for a wound check.  She states that she first noticed a wound on her sacrum a couple weeks ago playing possibly few weeks ago but she is not completely sure.  She states that she is concerned that might be infected at this time.  She states that she is currently being treated for breast cancer  and states that she just finished her second round of chemotherapy.  She states it is made her very tired she states that she tries to get up and walk as much she can throughout the day but she spends a lot of time sitting down or laying in bed recently.  She denies any nausea vomiting chest pain shortness of breath or any other symptoms at this time.  Past medical history of hypertension and  heart failure per the EMR.  She states that she went to [facility] emergency room on 2 separate occasions and was told that she need to follow-up with her primary care doctor and that there is nothing for them to do?  On physical exam there is a pressure ulcer that is 3 cm x 2 cm that is stage II present on the sacrum at this time no obvious signs of cellulitis present or any obvious infection present there, there appears to be some peripheral edema present which was initially concerning for fluid overload but the patient's vitals are completely normal and her lab work is also very comforting and does not show any concerns for heart failure at this time that is obvious.  The patient explained to me that she is taking Lasix at this time but she has not been wearing compression stockings during the day.  I explained to her that it be very important for her to please follow closely with wound care at this time and per the EMR it appears that her cancer team has been aware of this and is trying to get her into a wound care clinic at this time.  Asking for referral to general surgery at this time who also performs wound care at this hospital.  I explained to her that be important to wear the compression stockings during the day but is also very important that she does not lay on her back or sit down often and if she needed to lay down that she should do so on 1 side or the other and rotate frequently as well as get up and walk on a regular basis.  Nursing staff gave her Mirapex dressings at this time as well for home.  She was in  agreement of this plan and I encouraged her to please follow-up closely.  I also explained her to be setting her home for short course of doxycycline at this time.  I also spoke with urology in regards to her kidney stones and they explained to me that be happy to see the patient this week and schedule her for a lithotripsy as well to remove the stones.  We discussed signs and symptoms to return to the emergency room for.  Impression:   1.  Cellulitis  2.  Venous stasis dermatitis  3.  Kidney stones  4.  Pressure ulcer          History provided by:  Patient   used: No            Patient History   Medical History[1]  Surgical History[2]  Family History[3]  Social History[4]    Physical Exam   ED Triage Vitals [07/01/25 1459]   Temperature Heart Rate Respirations BP   37 °C (98.6 °F) 65 16 120/62      Pulse Ox Temp Source Heart Rate Source Patient Position   100 % Temporal Monitor --      BP Location FiO2 (%)     -- --       Physical Exam      ED Course & MDM   Diagnoses as of 07/02/25 1259   Cellulitis of lower extremity, unspecified laterality   Venous stasis dermatitis of both lower extremities   Kidney stone   Pressure injury of sacral region, stage 2 (Multi)                 No data recorded     Jackson Coma Scale Score: 15 (07/01/25 1601 : Sasha Oseguera, TANVI)                           Medical Decision Making      Procedure  Procedures         [1]  Past Medical History:  Diagnosis Date   • Abnormal finding of blood chemistry, unspecified 09/19/2014    Abnormal blood chemistry   • Allergic dermatitis of unspecified eye, unspecified eyelid 03/12/2018    Allergic blepharitis   • Benign neoplasm, 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   • Bursitis of right shoulder 11/25/2019    Subdeltoid bursitis of right shoulder joint   • Cancer (Multi)    • Encounter for general adult medical examination without abnormal findings 04/26/2018    Encounter for Medicare annual wellness exam   •  Halitosis 05/22/2019    Halitosis   • Localized swelling, mass and lump, head 12/28/2016    Left facial swelling   • Localized swelling, mass and lump, head 12/29/2022    Facial mass   • Other specified anxiety disorders 12/19/2014    Depression with anxiety   • Other specified disorders of nose and nasal sinuses 04/18/2017    Jacque bullosa   • Personal history of diseases of the skin and subcutaneous tissue     History of psoriasis   • Personal history of other diseases of the circulatory system     History of congestive heart failure   • Personal history of other diseases of the circulatory system 11/15/2019    History of uncontrolled hypertension   • Personal history of other diseases of the circulatory system     History of hypertension   • Personal history of other diseases of the nervous system and sense organs     History of sciatica   • Personal history of other diseases of the respiratory system 04/18/2017    History of deviated nasal septum   • Personal history of other diseases of the respiratory system 02/27/2017    History of sinusitis   • Personal history of other diseases of the respiratory system 05/28/2019    History of chronic sinusitis   • Personal history of other diseases of the respiratory system 03/19/2014    History of acute sinusitis   • Personal history of other specified conditions 03/26/2013    History of insomnia   • Personal history of other specified conditions 07/09/2019    History of nasal congestion   • Personal history of other specified conditions 11/17/2016    History of diarrhea   • Unspecified blepharitis right eye, unspecified eyelid 01/22/2019    Blepharitis of eyelid of right eye   • Unspecified mycosis 06/18/2019    Fungal sinusitis   [2]  Past Surgical History:  Procedure Laterality Date   • BREAST BIOPSY Left 04/23/2025   • HYSTERECTOMY  04/04/2013    Hysterectomy   • OTHER SURGICAL HISTORY  11/22/2022    Eye surgery   • OTHER SURGICAL HISTORY  11/22/2022    Nose surgery    • OTHER SURGICAL HISTORY  2022    Back surgery   [3]  Family History  Problem Relation Name Age of Onset   • Cancer Sister branden rain 65   • Breast cancer Sister branden rain 50 - 59   • Breast cancer Mother's Sister  70 - 79   [4]  Social History  Tobacco Use   • Smoking status: Former     Current packs/day: 0.00     Types: Cigarettes     Quit date:      Years since quittin.5     Passive exposure: Past   • Smokeless tobacco: Never   Vaping Use   • Vaping status: Never Used   Substance Use Topics   • Alcohol use: Never   • Drug use: Never      Hector Pina PA-C  25 1259

## 2025-07-03 ENCOUNTER — SOCIAL WORK (OUTPATIENT)
Dept: CASE MANAGEMENT | Facility: HOSPITAL | Age: 80
End: 2025-07-03
Payer: MEDICARE

## 2025-07-03 LAB — BACTERIA UR CULT: NORMAL

## 2025-07-03 NOTE — PROGRESS NOTES
SW called patient back again. Forms left for FMLA have been completed and [employer] has been awaiting information about where they wanted them sent. [employer] was able to reach Pt's son today who requested that they be emailed to him.

## 2025-07-06 ENCOUNTER — APPOINTMENT (OUTPATIENT)
Dept: RADIOLOGY | Facility: HOSPITAL | Age: 80
DRG: 871 | End: 2025-07-06
Payer: MEDICARE

## 2025-07-06 ENCOUNTER — HOSPITAL ENCOUNTER (INPATIENT)
Facility: HOSPITAL | Age: 80
DRG: 871 | End: 2025-07-06
Attending: EMERGENCY MEDICINE | Admitting: INTERNAL MEDICINE
Payer: MEDICARE

## 2025-07-06 ENCOUNTER — APPOINTMENT (OUTPATIENT)
Dept: CARDIOLOGY | Facility: HOSPITAL | Age: 80
DRG: 871 | End: 2025-07-06
Payer: MEDICARE

## 2025-07-06 DIAGNOSIS — K26.9 DUODENAL ULCER: ICD-10-CM

## 2025-07-06 DIAGNOSIS — E83.42 HYPOMAGNESEMIA: ICD-10-CM

## 2025-07-06 DIAGNOSIS — E66.01 OBESITY, MORBID (MULTI): ICD-10-CM

## 2025-07-06 DIAGNOSIS — R60.0 LOWER LEG EDEMA: ICD-10-CM

## 2025-07-06 DIAGNOSIS — K92.1 GASTROINTESTINAL HEMORRHAGE WITH MELENA: Primary | ICD-10-CM

## 2025-07-06 DIAGNOSIS — K52.9 COLITIS: ICD-10-CM

## 2025-07-06 DIAGNOSIS — R19.7 DIARRHEA, UNSPECIFIED TYPE: ICD-10-CM

## 2025-07-06 DIAGNOSIS — E87.6 HYPOKALEMIA: ICD-10-CM

## 2025-07-06 DIAGNOSIS — Z79.69 ON ANTINEOPLASTIC CHEMOTHERAPY: ICD-10-CM

## 2025-07-06 DIAGNOSIS — N20.0 KIDNEY STONE ON RIGHT SIDE: ICD-10-CM

## 2025-07-06 DIAGNOSIS — T45.1X5A CHEMOTHERAPY INDUCED DIARRHEA: ICD-10-CM

## 2025-07-06 DIAGNOSIS — A41.9 SEPSIS WITHOUT ACUTE ORGAN DYSFUNCTION, DUE TO UNSPECIFIED ORGANISM (MULTI): ICD-10-CM

## 2025-07-06 DIAGNOSIS — C50.912 INVASIVE DUCTAL CARCINOMA OF BREAST, FEMALE, LEFT: ICD-10-CM

## 2025-07-06 DIAGNOSIS — R53.1 GENERALIZED WEAKNESS: ICD-10-CM

## 2025-07-06 DIAGNOSIS — K52.1 CHEMOTHERAPY INDUCED DIARRHEA: ICD-10-CM

## 2025-07-06 LAB
ALBUMIN SERPL BCP-MCNC: 2.2 G/DL (ref 3.4–5)
ALP SERPL-CCNC: 64 U/L (ref 33–136)
ALT SERPL W P-5'-P-CCNC: 24 U/L (ref 7–45)
ANION GAP SERPL CALC-SCNC: 12 MMOL/L (ref 10–20)
AST SERPL W P-5'-P-CCNC: 19 U/L (ref 9–39)
BACTERIA BLD CULT: NORMAL
BACTERIA BLD CULT: NORMAL
BASOPHILS # BLD MANUAL: 0 X10*3/UL (ref 0–0.1)
BASOPHILS NFR BLD MANUAL: 0 %
BILIRUB SERPL-MCNC: 0.7 MG/DL (ref 0–1.2)
BUN SERPL-MCNC: 42 MG/DL (ref 6–23)
CALCIUM SERPL-MCNC: 6.1 MG/DL (ref 8.6–10.3)
CARDIAC TROPONIN I PNL SERPL HS: 13 NG/L (ref 0–13)
CARDIAC TROPONIN I PNL SERPL HS: 15 NG/L (ref 0–13)
CHLORIDE SERPL-SCNC: 105 MMOL/L (ref 98–107)
CO2 SERPL-SCNC: 19 MMOL/L (ref 21–32)
CREAT SERPL-MCNC: 0.9 MG/DL (ref 0.5–1.05)
EGFRCR SERPLBLD CKD-EPI 2021: 66 ML/MIN/1.73M*2
EOSINOPHIL # BLD MANUAL: 0 X10*3/UL (ref 0–0.4)
EOSINOPHIL NFR BLD MANUAL: 0 %
ERYTHROCYTE [DISTWIDTH] IN BLOOD BY AUTOMATED COUNT: 15.7 % (ref 11.5–14.5)
GLUCOSE SERPL-MCNC: 130 MG/DL (ref 74–99)
HCT VFR BLD AUTO: 29.9 % (ref 36–46)
HGB BLD-MCNC: 10.5 G/DL (ref 12–16)
IMM GRANULOCYTES # BLD AUTO: 3.02 X10*3/UL (ref 0–0.5)
IMM GRANULOCYTES NFR BLD AUTO: 11.6 % (ref 0–0.9)
LACTATE SERPL-SCNC: 0.7 MMOL/L (ref 0.4–2)
LIPASE SERPL-CCNC: 70 U/L (ref 9–82)
LYMPHOCYTES # BLD MANUAL: 2.61 X10*3/UL (ref 0.8–3)
LYMPHOCYTES NFR BLD MANUAL: 10 %
MAGNESIUM SERPL-MCNC: 1.17 MG/DL (ref 1.6–2.4)
MCH RBC QN AUTO: 31.1 PG (ref 26–34)
MCHC RBC AUTO-ENTMCNC: 35.1 G/DL (ref 32–36)
MCV RBC AUTO: 89 FL (ref 80–100)
METAMYELOCYTES # BLD MANUAL: 0.26 X10*3/UL
METAMYELOCYTES NFR BLD MANUAL: 1 %
MONOCYTES # BLD MANUAL: 1.83 X10*3/UL (ref 0.05–0.8)
MONOCYTES NFR BLD MANUAL: 7 %
MYELOCYTES # BLD MANUAL: 0.26 X10*3/UL
MYELOCYTES NFR BLD MANUAL: 1 %
NEUTROPHILS # BLD MANUAL: 21.14 X10*3/UL (ref 1.6–5.5)
NEUTS BAND # BLD MANUAL: 2.35 X10*3/UL (ref 0–0.5)
NEUTS BAND NFR BLD MANUAL: 9 %
NEUTS SEG # BLD MANUAL: 18.79 X10*3/UL (ref 1.6–5)
NEUTS SEG NFR BLD MANUAL: 72 %
NRBC BLD-RTO: 0.1 /100 WBCS (ref 0–0)
PLATELET # BLD AUTO: 147 X10*3/UL (ref 150–450)
POLYCHROMASIA BLD QL SMEAR: ABNORMAL
POTASSIUM SERPL-SCNC: 2.7 MMOL/L (ref 3.5–5.3)
PROT SERPL-MCNC: 3.7 G/DL (ref 6.4–8.2)
RBC # BLD AUTO: 3.38 X10*6/UL (ref 4–5.2)
RBC MORPH BLD: ABNORMAL
SCHISTOCYTES BLD QL SMEAR: ABNORMAL
SODIUM SERPL-SCNC: 133 MMOL/L (ref 136–145)
TOTAL CELLS COUNTED BLD: 100
TSH SERPL-ACNC: 2.19 MIU/L (ref 0.44–3.98)
WBC # BLD AUTO: 26.1 X10*3/UL (ref 4.4–11.3)

## 2025-07-06 PROCEDURE — 85027 COMPLETE CBC AUTOMATED: CPT | Performed by: EMERGENCY MEDICINE

## 2025-07-06 PROCEDURE — 83735 ASSAY OF MAGNESIUM: CPT | Performed by: EMERGENCY MEDICINE

## 2025-07-06 PROCEDURE — 99291 CRITICAL CARE FIRST HOUR: CPT | Mod: 25 | Performed by: EMERGENCY MEDICINE

## 2025-07-06 PROCEDURE — 2500000002 HC RX 250 W HCPCS SELF ADMINISTERED DRUGS (ALT 637 FOR MEDICARE OP, ALT 636 FOR OP/ED): Mod: IPSPLIT | Performed by: PHYSICIAN ASSISTANT

## 2025-07-06 PROCEDURE — 96367 TX/PROPH/DG ADDL SEQ IV INF: CPT

## 2025-07-06 PROCEDURE — 84484 ASSAY OF TROPONIN QUANT: CPT | Performed by: EMERGENCY MEDICINE

## 2025-07-06 PROCEDURE — 71260 CT THORAX DX C+: CPT | Performed by: RADIOLOGY

## 2025-07-06 PROCEDURE — 96365 THER/PROPH/DIAG IV INF INIT: CPT | Mod: 59

## 2025-07-06 PROCEDURE — 2550000001 HC RX 255 CONTRASTS: Performed by: EMERGENCY MEDICINE

## 2025-07-06 PROCEDURE — 2500000001 HC RX 250 WO HCPCS SELF ADMINISTERED DRUGS (ALT 637 FOR MEDICARE OP): Mod: IPSPLIT | Performed by: PHYSICIAN ASSISTANT

## 2025-07-06 PROCEDURE — 96366 THER/PROPH/DIAG IV INF ADDON: CPT

## 2025-07-06 PROCEDURE — 83605 ASSAY OF LACTIC ACID: CPT | Performed by: EMERGENCY MEDICINE

## 2025-07-06 PROCEDURE — 74177 CT ABD & PELVIS W/CONTRAST: CPT | Performed by: RADIOLOGY

## 2025-07-06 PROCEDURE — 2500000004 HC RX 250 GENERAL PHARMACY W/ HCPCS (ALT 636 FOR OP/ED): Performed by: EMERGENCY MEDICINE

## 2025-07-06 PROCEDURE — 83690 ASSAY OF LIPASE: CPT | Performed by: EMERGENCY MEDICINE

## 2025-07-06 PROCEDURE — 80053 COMPREHEN METABOLIC PANEL: CPT | Performed by: EMERGENCY MEDICINE

## 2025-07-06 PROCEDURE — 1200000002 HC GENERAL ROOM WITH TELEMETRY DAILY: Mod: IPSPLIT

## 2025-07-06 PROCEDURE — 2500000004 HC RX 250 GENERAL PHARMACY W/ HCPCS (ALT 636 FOR OP/ED)

## 2025-07-06 PROCEDURE — 93005 ELECTROCARDIOGRAM TRACING: CPT | Mod: IPSPLIT

## 2025-07-06 PROCEDURE — 96361 HYDRATE IV INFUSION ADD-ON: CPT

## 2025-07-06 PROCEDURE — 99291 CRITICAL CARE FIRST HOUR: CPT | Mod: 25

## 2025-07-06 PROCEDURE — 2500000001 HC RX 250 WO HCPCS SELF ADMINISTERED DRUGS (ALT 637 FOR MEDICARE OP): Performed by: EMERGENCY MEDICINE

## 2025-07-06 PROCEDURE — 74177 CT ABD & PELVIS W/CONTRAST: CPT

## 2025-07-06 PROCEDURE — 87040 BLOOD CULTURE FOR BACTERIA: CPT | Mod: GENLAB | Performed by: EMERGENCY MEDICINE

## 2025-07-06 PROCEDURE — 96375 TX/PRO/DX INJ NEW DRUG ADDON: CPT | Mod: 59

## 2025-07-06 PROCEDURE — 84443 ASSAY THYROID STIM HORMONE: CPT | Performed by: EMERGENCY MEDICINE

## 2025-07-06 PROCEDURE — 36415 COLL VENOUS BLD VENIPUNCTURE: CPT | Performed by: EMERGENCY MEDICINE

## 2025-07-06 PROCEDURE — 2500000004 HC RX 250 GENERAL PHARMACY W/ HCPCS (ALT 636 FOR OP/ED): Mod: IPSPLIT | Performed by: NURSE PRACTITIONER

## 2025-07-06 PROCEDURE — 94760 N-INVAS EAR/PLS OXIMETRY 1: CPT | Mod: IPSPLIT

## 2025-07-06 PROCEDURE — 2500000002 HC RX 250 W HCPCS SELF ADMINISTERED DRUGS (ALT 637 FOR MEDICARE OP, ALT 636 FOR OP/ED): Performed by: EMERGENCY MEDICINE

## 2025-07-06 PROCEDURE — 85007 BL SMEAR W/DIFF WBC COUNT: CPT | Performed by: EMERGENCY MEDICINE

## 2025-07-06 RX ORDER — PROPRANOLOL HYDROCHLORIDE 60 MG/1
120 CAPSULE, EXTENDED RELEASE ORAL NIGHTLY
Status: DISCONTINUED | OUTPATIENT
Start: 2025-07-06 | End: 2025-07-14 | Stop reason: HOSPADM

## 2025-07-06 RX ORDER — ACETAMINOPHEN 650 MG/1
650 SUPPOSITORY RECTAL EVERY 4 HOURS PRN
Status: DISCONTINUED | OUTPATIENT
Start: 2025-07-06 | End: 2025-07-14 | Stop reason: HOSPADM

## 2025-07-06 RX ORDER — POTASSIUM CHLORIDE 14.9 MG/ML
20 INJECTION INTRAVENOUS ONCE
Status: COMPLETED | OUTPATIENT
Start: 2025-07-06 | End: 2025-07-06

## 2025-07-06 RX ORDER — POTASSIUM CHLORIDE 20 MEQ/1
40 TABLET, EXTENDED RELEASE ORAL ONCE
Status: COMPLETED | OUTPATIENT
Start: 2025-07-06 | End: 2025-07-06

## 2025-07-06 RX ORDER — AMLODIPINE BESYLATE 10 MG/1
10 TABLET ORAL DAILY
Status: DISCONTINUED | OUTPATIENT
Start: 2025-07-06 | End: 2025-07-14 | Stop reason: HOSPADM

## 2025-07-06 RX ORDER — ACETAMINOPHEN 500 MG
10 TABLET ORAL DAILY PRN
Status: DISCONTINUED | OUTPATIENT
Start: 2025-07-06 | End: 2025-07-14 | Stop reason: HOSPADM

## 2025-07-06 RX ORDER — FOLIC ACID 1 MG/1
1 TABLET ORAL DAILY
Status: DISCONTINUED | OUTPATIENT
Start: 2025-07-06 | End: 2025-07-14 | Stop reason: HOSPADM

## 2025-07-06 RX ORDER — ACETAMINOPHEN 325 MG/1
650 TABLET ORAL ONCE
Status: COMPLETED | OUTPATIENT
Start: 2025-07-06 | End: 2025-07-06

## 2025-07-06 RX ORDER — VANCOMYCIN HYDROCHLORIDE 1 G/200ML
INJECTION, SOLUTION INTRAVENOUS
Status: COMPLETED
Start: 2025-07-06 | End: 2025-07-06

## 2025-07-06 RX ORDER — LOSARTAN POTASSIUM 50 MG/1
100 TABLET ORAL DAILY
Status: DISCONTINUED | OUTPATIENT
Start: 2025-07-06 | End: 2025-07-14 | Stop reason: HOSPADM

## 2025-07-06 RX ORDER — CEFEPIME HYDROCHLORIDE 2 G/1
INJECTION, POWDER, FOR SOLUTION INTRAVENOUS
Status: COMPLETED
Start: 2025-07-06 | End: 2025-07-06

## 2025-07-06 RX ORDER — ACETAMINOPHEN 325 MG/1
650 TABLET ORAL EVERY 4 HOURS PRN
Status: DISCONTINUED | OUTPATIENT
Start: 2025-07-06 | End: 2025-07-14 | Stop reason: HOSPADM

## 2025-07-06 RX ORDER — METRONIDAZOLE 500 MG/100ML
500 INJECTION, SOLUTION INTRAVENOUS ONCE
Status: COMPLETED | OUTPATIENT
Start: 2025-07-06 | End: 2025-07-06

## 2025-07-06 RX ORDER — ONDANSETRON HYDROCHLORIDE 2 MG/ML
4 INJECTION, SOLUTION INTRAVENOUS ONCE
Status: COMPLETED | OUTPATIENT
Start: 2025-07-06 | End: 2025-07-06

## 2025-07-06 RX ORDER — ASPIRIN 81 MG/1
81 TABLET ORAL DAILY
Status: DISCONTINUED | OUTPATIENT
Start: 2025-07-06 | End: 2025-07-14 | Stop reason: HOSPADM

## 2025-07-06 RX ORDER — ALUMINUM HYDROXIDE, MAGNESIUM HYDROXIDE, AND SIMETHICONE 1200; 120; 1200 MG/30ML; MG/30ML; MG/30ML
20 SUSPENSION ORAL EVERY 4 HOURS PRN
Status: DISCONTINUED | OUTPATIENT
Start: 2025-07-06 | End: 2025-07-14 | Stop reason: HOSPADM

## 2025-07-06 RX ORDER — ACETAMINOPHEN 160 MG/5ML
650 SOLUTION ORAL EVERY 4 HOURS PRN
Status: DISCONTINUED | OUTPATIENT
Start: 2025-07-06 | End: 2025-07-14 | Stop reason: HOSPADM

## 2025-07-06 RX ORDER — POLYETHYLENE GLYCOL 3350 17 G/17G
17 POWDER, FOR SOLUTION ORAL DAILY
Status: DISCONTINUED | OUTPATIENT
Start: 2025-07-06 | End: 2025-07-08

## 2025-07-06 RX ORDER — FUROSEMIDE 20 MG/1
20 TABLET ORAL DAILY
Status: DISCONTINUED | OUTPATIENT
Start: 2025-07-06 | End: 2025-07-14 | Stop reason: HOSPADM

## 2025-07-06 RX ORDER — MAGNESIUM SULFATE HEPTAHYDRATE 40 MG/ML
2 INJECTION, SOLUTION INTRAVENOUS ONCE
Status: COMPLETED | OUTPATIENT
Start: 2025-07-06 | End: 2025-07-06

## 2025-07-06 RX ORDER — VANCOMYCIN HYDROCHLORIDE 1 G/200ML
1 INJECTION, SOLUTION INTRAVENOUS
Status: COMPLETED | OUTPATIENT
Start: 2025-07-06 | End: 2025-07-06

## 2025-07-06 RX ORDER — ENOXAPARIN SODIUM 100 MG/ML
40 INJECTION SUBCUTANEOUS EVERY 12 HOURS SCHEDULED
Status: DISCONTINUED | OUTPATIENT
Start: 2025-07-06 | End: 2025-07-14 | Stop reason: HOSPADM

## 2025-07-06 RX ORDER — ONDANSETRON HYDROCHLORIDE 2 MG/ML
4 INJECTION, SOLUTION INTRAVENOUS EVERY 4 HOURS PRN
Status: DISCONTINUED | OUTPATIENT
Start: 2025-07-06 | End: 2025-07-14 | Stop reason: HOSPADM

## 2025-07-06 RX ORDER — ROSUVASTATIN CALCIUM 10 MG/1
10 TABLET, COATED ORAL NIGHTLY
Status: DISCONTINUED | OUTPATIENT
Start: 2025-07-06 | End: 2025-07-14 | Stop reason: HOSPADM

## 2025-07-06 RX ORDER — SODIUM CHLORIDE 9 MG/ML
150 INJECTION, SOLUTION INTRAVENOUS CONTINUOUS
Status: ACTIVE | OUTPATIENT
Start: 2025-07-06 | End: 2025-07-07

## 2025-07-06 RX ADMIN — SODIUM CHLORIDE 150 ML/HR: 9 INJECTION, SOLUTION INTRAVENOUS at 16:19

## 2025-07-06 RX ADMIN — FOLIC ACID 1 MG: 1 TABLET ORAL at 20:48

## 2025-07-06 RX ADMIN — VANCOMYCIN HYDROCHLORIDE 1 G: 1 INJECTION, SOLUTION INTRAVENOUS at 16:44

## 2025-07-06 RX ADMIN — MAGNESIUM SULFATE HEPTAHYDRATE 2 G: 40 INJECTION, SOLUTION INTRAVENOUS at 13:44

## 2025-07-06 RX ADMIN — ACETAMINOPHEN 650 MG: 325 TABLET, FILM COATED ORAL at 16:20

## 2025-07-06 RX ADMIN — ENOXAPARIN SODIUM 40 MG: 40 INJECTION SUBCUTANEOUS at 20:48

## 2025-07-06 RX ADMIN — ONDANSETRON 4 MG: 2 INJECTION INTRAMUSCULAR; INTRAVENOUS at 14:16

## 2025-07-06 RX ADMIN — VANCOMYCIN HYDROCHLORIDE 1 G: 1 INJECTION, SOLUTION INTRAVENOUS at 15:15

## 2025-07-06 RX ADMIN — SODIUM CHLORIDE 1000 ML: 9 INJECTION, SOLUTION INTRAVENOUS at 13:10

## 2025-07-06 RX ADMIN — ROSUVASTATIN CALCIUM 10 MG: 10 TABLET, FILM COATED ORAL at 20:48

## 2025-07-06 RX ADMIN — POTASSIUM CHLORIDE 40 MEQ: 1500 TABLET, EXTENDED RELEASE ORAL at 16:43

## 2025-07-06 RX ADMIN — CEFEPIME HYDROCHLORIDE 2 G: 2 INJECTION, POWDER, FOR SOLUTION INTRAVENOUS at 14:35

## 2025-07-06 RX ADMIN — POTASSIUM CHLORIDE 20 MEQ: 14.9 INJECTION, SOLUTION INTRAVENOUS at 13:53

## 2025-07-06 RX ADMIN — IOHEXOL 75 ML: 350 INJECTION, SOLUTION INTRAVENOUS at 12:43

## 2025-07-06 RX ADMIN — SODIUM CHLORIDE 150 ML/HR: 9 INJECTION, SOLUTION INTRAVENOUS at 20:50

## 2025-07-06 RX ADMIN — METRONIDAZOLE 500 MG: 500 INJECTION, SOLUTION INTRAVENOUS at 13:30

## 2025-07-06 SDOH — ECONOMIC STABILITY: FOOD INSECURITY: WITHIN THE PAST 12 MONTHS, YOU WORRIED THAT YOUR FOOD WOULD RUN OUT BEFORE YOU GOT THE MONEY TO BUY MORE.: NEVER TRUE

## 2025-07-06 SDOH — ECONOMIC STABILITY: HOUSING INSECURITY: IN THE PAST 12 MONTHS, HOW MANY TIMES HAVE YOU MOVED WHERE YOU WERE LIVING?: 0

## 2025-07-06 SDOH — HEALTH STABILITY: PHYSICAL HEALTH: ON AVERAGE, HOW MANY MINUTES DO YOU ENGAGE IN EXERCISE AT THIS LEVEL?: 30 MIN

## 2025-07-06 SDOH — ECONOMIC STABILITY: TRANSPORTATION INSECURITY: IN THE PAST 12 MONTHS, HAS LACK OF TRANSPORTATION KEPT YOU FROM MEDICAL APPOINTMENTS OR FROM GETTING MEDICATIONS?: NO

## 2025-07-06 SDOH — SOCIAL STABILITY: SOCIAL NETWORK: HOW OFTEN DO YOU ATTEND CHURCH OR RELIGIOUS SERVICES?: NEVER

## 2025-07-06 SDOH — SOCIAL STABILITY: SOCIAL INSECURITY: WITHIN THE LAST YEAR, HAVE YOU BEEN HUMILIATED OR EMOTIONALLY ABUSED IN OTHER WAYS BY YOUR PARTNER OR EX-PARTNER?: NO

## 2025-07-06 SDOH — ECONOMIC STABILITY: FOOD INSECURITY: HOW HARD IS IT FOR YOU TO PAY FOR THE VERY BASICS LIKE FOOD, HOUSING, MEDICAL CARE, AND HEATING?: NOT HARD AT ALL

## 2025-07-06 SDOH — SOCIAL STABILITY: SOCIAL INSECURITY: ARE YOU MARRIED, WIDOWED, DIVORCED, SEPARATED, NEVER MARRIED, OR LIVING WITH A PARTNER?: WIDOWED

## 2025-07-06 SDOH — SOCIAL STABILITY: SOCIAL INSECURITY
WITHIN THE LAST YEAR, HAVE YOU BEEN RAPED OR FORCED TO HAVE ANY KIND OF SEXUAL ACTIVITY BY YOUR PARTNER OR EX-PARTNER?: NO

## 2025-07-06 SDOH — SOCIAL STABILITY: SOCIAL NETWORK: HOW OFTEN DO YOU GET TOGETHER WITH FRIENDS OR RELATIVES?: MORE THAN THREE TIMES A WEEK

## 2025-07-06 SDOH — HEALTH STABILITY: PHYSICAL HEALTH: ON AVERAGE, HOW MANY DAYS PER WEEK DO YOU ENGAGE IN MODERATE TO STRENUOUS EXERCISE (LIKE A BRISK WALK)?: 7 DAYS

## 2025-07-06 SDOH — HEALTH STABILITY: MENTAL HEALTH
DO YOU FEEL STRESS - TENSE, RESTLESS, NERVOUS, OR ANXIOUS, OR UNABLE TO SLEEP AT NIGHT BECAUSE YOUR MIND IS TROUBLED ALL THE TIME - THESE DAYS?: NOT AT ALL

## 2025-07-06 SDOH — SOCIAL STABILITY: SOCIAL NETWORK
DO YOU BELONG TO ANY CLUBS OR ORGANIZATIONS SUCH AS CHURCH GROUPS, UNIONS, FRATERNAL OR ATHLETIC GROUPS, OR SCHOOL GROUPS?: YES

## 2025-07-06 SDOH — ECONOMIC STABILITY: HOUSING INSECURITY: AT ANY TIME IN THE PAST 12 MONTHS, WERE YOU HOMELESS OR LIVING IN A SHELTER (INCLUDING NOW)?: NO

## 2025-07-06 SDOH — SOCIAL STABILITY: SOCIAL INSECURITY: WITHIN THE LAST YEAR, HAVE YOU BEEN AFRAID OF YOUR PARTNER OR EX-PARTNER?: NO

## 2025-07-06 SDOH — ECONOMIC STABILITY: INCOME INSECURITY: IN THE PAST 12 MONTHS HAS THE ELECTRIC, GAS, OIL, OR WATER COMPANY THREATENED TO SHUT OFF SERVICES IN YOUR HOME?: NO

## 2025-07-06 SDOH — SOCIAL STABILITY: SOCIAL INSECURITY
WITHIN THE LAST YEAR, HAVE YOU BEEN KICKED, HIT, SLAPPED, OR OTHERWISE PHYSICALLY HURT BY YOUR PARTNER OR EX-PARTNER?: NO

## 2025-07-06 SDOH — SOCIAL STABILITY: SOCIAL INSECURITY: WERE YOU ABLE TO COMPLETE ALL THE BEHAVIORAL HEALTH SCREENINGS?: YES

## 2025-07-06 SDOH — ECONOMIC STABILITY: HOUSING INSECURITY: IN THE LAST 12 MONTHS, WAS THERE A TIME WHEN YOU WERE NOT ABLE TO PAY THE MORTGAGE OR RENT ON TIME?: NO

## 2025-07-06 SDOH — SOCIAL STABILITY: SOCIAL NETWORK: HOW OFTEN DO YOU ATTEND MEETINGS OF THE CLUBS OR ORGANIZATIONS YOU BELONG TO?: MORE THAN 4 TIMES PER YEAR

## 2025-07-06 SDOH — ECONOMIC STABILITY: FOOD INSECURITY: WITHIN THE PAST 12 MONTHS, THE FOOD YOU BOUGHT JUST DIDN'T LAST AND YOU DIDN'T HAVE MONEY TO GET MORE.: NEVER TRUE

## 2025-07-06 SDOH — SOCIAL STABILITY: SOCIAL INSECURITY: DO YOU FEEL UNSAFE GOING BACK TO THE PLACE WHERE YOU ARE LIVING?: NO

## 2025-07-06 SDOH — SOCIAL STABILITY: SOCIAL INSECURITY: DO YOU FEEL ANYONE HAS EXPLOITED OR TAKEN ADVANTAGE OF YOU FINANCIALLY OR OF YOUR PERSONAL PROPERTY?: NO

## 2025-07-06 SDOH — SOCIAL STABILITY: SOCIAL INSECURITY: HAVE YOU HAD THOUGHTS OF HARMING ANYONE ELSE?: YES

## 2025-07-06 SDOH — SOCIAL STABILITY: SOCIAL INSECURITY: ARE YOU OR HAVE YOU BEEN THREATENED OR ABUSED PHYSICALLY, EMOTIONALLY, OR SEXUALLY BY ANYONE?: NO

## 2025-07-06 SDOH — SOCIAL STABILITY: SOCIAL NETWORK
IN A TYPICAL WEEK, HOW MANY TIMES DO YOU TALK ON THE PHONE WITH FAMILY, FRIENDS, OR NEIGHBORS?: MORE THAN THREE TIMES A WEEK

## 2025-07-06 SDOH — SOCIAL STABILITY: SOCIAL INSECURITY: HAS ANYONE EVER THREATENED TO HURT YOUR FAMILY OR YOUR PETS?: NO

## 2025-07-06 SDOH — SOCIAL STABILITY: SOCIAL INSECURITY: ABUSE: ADULT

## 2025-07-06 SDOH — SOCIAL STABILITY: SOCIAL INSECURITY: HAVE YOU HAD ANY THOUGHTS OF HARMING ANYONE ELSE?: NO

## 2025-07-06 SDOH — SOCIAL STABILITY: SOCIAL INSECURITY: ARE THERE ANY APPARENT SIGNS OF INJURIES/BEHAVIORS THAT COULD BE RELATED TO ABUSE/NEGLECT?: NO

## 2025-07-06 ASSESSMENT — ACTIVITIES OF DAILY LIVING (ADL)
BATHING: NEEDS ASSISTANCE
HEARING - RIGHT EAR: FUNCTIONAL
HEARING - LEFT EAR: FUNCTIONAL
PATIENT'S MEMORY ADEQUATE TO SAFELY COMPLETE DAILY ACTIVITIES?: YES
WALKS IN HOME: NEEDS ASSISTANCE
FEEDING YOURSELF: NEEDS ASSISTANCE
ADEQUATE_TO_COMPLETE_ADL: NO
GROOMING: NEEDS ASSISTANCE
DRESSING YOURSELF: NEEDS ASSISTANCE
LACK_OF_TRANSPORTATION: NO
TOILETING: NEEDS ASSISTANCE
JUDGMENT_ADEQUATE_SAFELY_COMPLETE_DAILY_ACTIVITIES: NO

## 2025-07-06 ASSESSMENT — LIFESTYLE VARIABLES
HOW MANY STANDARD DRINKS CONTAINING ALCOHOL DO YOU HAVE ON A TYPICAL DAY: PATIENT DOES NOT DRINK
HOW OFTEN DO YOU HAVE A DRINK CONTAINING ALCOHOL: NEVER
AUDIT-C TOTAL SCORE: 0
SKIP TO QUESTIONS 9-10: 1
AUDIT-C TOTAL SCORE: 0
HOW OFTEN DO YOU HAVE 6 OR MORE DRINKS ON ONE OCCASION: NEVER

## 2025-07-06 ASSESSMENT — PAIN SCALES - GENERAL
PAINLEVEL_OUTOF10: 5 - MODERATE PAIN
PAINLEVEL_OUTOF10: 5 - MODERATE PAIN

## 2025-07-06 ASSESSMENT — PATIENT HEALTH QUESTIONNAIRE - PHQ9
2. FEELING DOWN, DEPRESSED OR HOPELESS: NOT AT ALL
1. LITTLE INTEREST OR PLEASURE IN DOING THINGS: NOT AT ALL
SUM OF ALL RESPONSES TO PHQ9 QUESTIONS 1 & 2: 0

## 2025-07-06 ASSESSMENT — PAIN DESCRIPTION - PAIN TYPE: TYPE: ACUTE PAIN

## 2025-07-06 ASSESSMENT — PAIN DESCRIPTION - DESCRIPTORS: DESCRIPTORS: DISCOMFORT

## 2025-07-06 ASSESSMENT — PAIN DESCRIPTION - LOCATION: LOCATION: ABDOMEN

## 2025-07-06 ASSESSMENT — PAIN - FUNCTIONAL ASSESSMENT
PAIN_FUNCTIONAL_ASSESSMENT: 0-10
PAIN_FUNCTIONAL_ASSESSMENT: 0-10

## 2025-07-06 ASSESSMENT — COGNITIVE AND FUNCTIONAL STATUS - GENERAL: PATIENT BASELINE BEDBOUND: YES

## 2025-07-06 NOTE — ED PROVIDER NOTES
HPI   Chief Complaint   Patient presents with    Weakness, Gen     Patient is a chemo patient who has been feeling very weak and having terrible diarrhea       79-year-old female with left breast cancer on chemotherapy (last chemo treatment was 6/27/2025) presents for evaluation of profuse diarrhea weakness and fatigue.  She has had copious amounts of diarrhea.  She has no energy and is weak fatigued and lightheaded with walking and standing.      History provided by:  Patient and medical records          Patient History   Medical History[1]  Surgical History[2]  Family History[3]  Social History[4]    Physical Exam   ED Triage Vitals [07/06/25 1131]   Temperature Heart Rate Respirations BP   36 °C (96.8 °F) 67 18 (!) 113/100      Pulse Ox Temp Source Heart Rate Source Patient Position   100 % Temporal -- --      BP Location FiO2 (%)     -- --       Physical Exam  Vitals and nursing note reviewed.   Constitutional:       General: She is not in acute distress.     Appearance: Normal appearance. She is ill-appearing.   HENT:      Head: Normocephalic and atraumatic.      Nose: Nose normal.      Mouth/Throat:      Mouth: Mucous membranes are moist.   Eyes:      Extraocular Movements: Extraocular movements intact.      Pupils: Pupils are equal, round, and reactive to light.   Cardiovascular:      Rate and Rhythm: Normal rate and regular rhythm.   Pulmonary:      Effort: Pulmonary effort is normal.      Breath sounds: Normal breath sounds.   Abdominal:      Palpations: Abdomen is soft.      Tenderness: There is no abdominal tenderness.   Musculoskeletal:         General: No deformity.      Cervical back: Normal range of motion. No rigidity.   Skin:     General: Skin is warm and dry.   Neurological:      General: No focal deficit present.      Mental Status: She is alert and oriented to person, place, and time.      Cranial Nerves: No cranial nerve deficit.      Motor: No weakness.   Psychiatric:         Mood and Affect:  Mood normal.           ED Course & MDM   ED Course as of 07/06/25 1527   Sun Jul 06, 2025   1240 79-year-old female presents with diarrhea weakness fatigue.  She is on chemo for breast cancer.  Labs urinalysis saline bolus.  CT chest abdomen pelvis.  Stool studies.  Placed in sepsis care path.  Will treat with empiric antibiotics with cefepime vancomycin and Flagyl pending workup.  IV Flagyl will treat for C. difficile. [BT]   1250 ECG 12 lead  EKG interpreted by me shows sinus rhythm with rate of 60.  Normal axis.  Normal intervals.  No acute injury pattern. [BT]   1250 WBC(!): 26.1  Leukocytosis noted with white count of 26.1. [BT]   1321 POTASSIUM(!!): 2.7  Potassium 2.7.  Replaced with oral and IV potassium. [BT]   1322 MAGNESIUM(!): 1.17  Magnesium 1.17.  Replaced with IV magnesium. [BT]   1447 CT shows [BT]   1455 CT chest abdomen pelvis w IV contrast  Colitis.    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 Several stones in aggregate measuring  2.1 cm transversely near the right UVJ again seen however increased  now moderate lower pole hydroureteronephrosis. Subcentimeter fat  attenuation lesion in the posterior right kidney is similar to the  prior exam. No new left renal mass or hydronephrosis within limits of  nephrogram phase images.   [BT]   1501 Patient requires hospitalization for colitis, likely C. difficile colitis.  She has white count 26,000 and potassium 2.7 and magnesium 1.17.  She on CT abdomen pelvis has evidence of colitis.  Additionally, she has several stones in aggregate measuring 2.1 cm near the right UVJ with increased moderate lower pole hydroureteronephrosis.  Will discuss the case with urology. [BT]   1521 I spoke with urology Dr. Zapata.  We reviewed imaging results with 2.1 cm right UVJ stone with upstream increasing moderate lower pole hydroureteronephrosis.  She has no flank or right lower quadrant abdominal pain.  Her  renal function is normal.  The stone does not need addressed urgently.  She can follow-up with him in the office after her colitis and electrolyte issues are addressed. [BT]   1522 I spoke with medicine hospitalist ADRIANA Bliss who accepted patient for admission on behalf of Dr. Meza [BT]   1524 Lactate: 0.7  Normal lactate [BT]      ED Course User Index  [BT] Phil An,          Diagnoses as of 07/06/25 1527   Generalized weakness   Diarrhea, unspecified type   On antineoplastic chemotherapy   Invasive ductal carcinoma of breast, female, left   Hypokalemia   Colitis   Kidney stone on right side   Sepsis without acute organ dysfunction, due to unspecified organism (Multi)     CT chest abdomen pelvis w IV contrast   Final Result   CHEST:        No CT evidence of acute chest process. Interval right anterior chest   wall port placement since 06/03/2025. additional findings as above   similar to the previous exam.        ABDOMEN AND PELVIS:        Rectal air-fluid level consistent with diarrhea and probable distal   sigmoid wall thickening/enhancement consistent with nonspecific   colitis. Clinical correlation suggested.        Multiple bladder calculi near the right UVJ again seen with increased   now moderate hydroureteronephrosis.        Flattening of the upper abdominal IVC which can be seen with   dehydration.        Other findings as described above.        MACRO:   None.        Signed by: Rosario Herr 7/6/2025 1:21 PM   Dictation workstation:   [id number]        SEP-1 CORE MEASURE DATA      I suspected infection with no organ dysfunction on 7/6/2025 12:13 PM.    Visit Vitals  /62   Pulse 61   Temp 36 °C (96.8 °F) (Temporal)   Resp 15   SpO2 100%        Lab Results   Component Value Date/Time    WBC 26.1 (H) 07/06/2025 1223    Bands %, Manual 9.0 07/06/2025 1223    Lactate 0.7 07/06/2025 1223    Creatinine 0.90 07/06/2025 1223    Bilirubin, Total 0.7 07/06/2025 1223    Platelets 147 (L) 07/06/2025  1223    Glucose 130 (H) 07/06/2025 1223        A targeted fluid bolus of 1000mL (as patient does not meet severe sepsis or septic shock criteria at this time) was given.    Suspected infection source   GI    Patient recently received an antibiotic (last 24 hours)       Date/Time Action Medication Dose Rate    07/06/25 1515 New Bag    vancomycin (Vancocin) 1 g in dextrose 5%  mL 1 g 200 mL/hr    07/06/25 1435 New Bag    cefepime (Maxipime) 2 g in dextrose 5% 100 mL IV 2 g     07/06/25 1330 New Bag    metroNIDAZOLE (Flagyl) 500 mg in sodium chloride (iso)  mL 500 mg             Medical decision making/complexity  Colitis, on chemo for breast cancer, leukocytosis, diarrhea      I performed a sepsis reperfusion exam on Elisha Gonzalezh on 07/06/25 at 1525.    Well perfused.  Normal capillary refill.  Normal BP.  No tachycardia.                No data recorded     Marcelino Coma Scale Score: 15 (07/06/25 1133 : Miroslava Sandoval RN)                           Medical Decision Making      Procedure  Critical Care    Performed by: Phil An DO  Authorized by: Phil An DO    Critical care provider statement:     Critical care time (minutes):  40    Critical care time was exclusive of:  Separately billable procedures and treating other patients and teaching time    Critical care was necessary to treat or prevent imminent or life-threatening deterioration of the following conditions:  Sepsis    Critical care was time spent personally by me on the following activities:  Ordering and performing treatments and interventions, ordering and review of laboratory studies, ordering and review of radiographic studies, pulse oximetry, review of old charts, re-evaluation of patient's condition, obtaining history from patient or surrogate, examination of patient, evaluation of patient's response to treatment, development of treatment plan with patient or surrogate and discussions with consultants    Care  discussed with: admitting provider             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    Bursitis of right shoulder 11/25/2019    Subdeltoid bursitis of right shoulder joint    Cancer (Multi)     Encounter for general adult medical examination without abnormal findings 04/26/2018    Encounter for Medicare annual wellness exam    Halitosis 05/22/2019    Halitosis    Localized swelling, mass and lump, head 12/28/2016    Left facial swelling    Localized swelling, mass and lump, head 12/29/2022    Facial mass    Other specified anxiety disorders 12/19/2014    Depression with anxiety    Other specified disorders of nose and nasal sinuses 04/18/2017    Jacque bullosa    Personal history of diseases of the skin and subcutaneous tissue     History of psoriasis    Personal history of other diseases of the circulatory system     History of congestive heart failure    Personal history of other diseases of the circulatory system 11/15/2019    History of uncontrolled hypertension    Personal history of other diseases of the circulatory system     History of hypertension    Personal history of other diseases of the nervous system and sense organs     History of sciatica    Personal history of other diseases of the respiratory system 04/18/2017    History of deviated nasal septum    Personal history of other diseases of the respiratory system 02/27/2017    History of sinusitis    Personal history of other diseases of the respiratory system 05/28/2019    History of chronic sinusitis    Personal history of other diseases of the respiratory system 03/19/2014    History of acute sinusitis    Personal history of other specified conditions 03/26/2013    History of insomnia     Personal history of other specified conditions 2019    History of nasal congestion    Personal history of other specified conditions 2016    History of diarrhea    Unspecified blepharitis right eye, unspecified eyelid 2019    Blepharitis of eyelid of right eye    Unspecified mycosis 2019    Fungal sinusitis   [2]   Past Surgical History:  Procedure Laterality Date    BREAST BIOPSY Left 2025    HYSTERECTOMY  2013    Hysterectomy    OTHER SURGICAL HISTORY  2022    Eye surgery    OTHER SURGICAL HISTORY  2022    Nose surgery    OTHER SURGICAL HISTORY  2022    Back surgery   [3]   Family History  Problem Relation Name Age of Onset    Cancer Sister branden rain 65    Breast cancer Sister branden rain 50 - 59    Breast cancer Mother's Sister  70 - 79   [4]   Social History  Tobacco Use    Smoking status: Former     Current packs/day: 0.00     Types: Cigarettes     Quit date:      Years since quittin.5     Passive exposure: Past    Smokeless tobacco: Never   Vaping Use    Vaping status: Never Used   Substance Use Topics    Alcohol use: Never    Drug use: Never        Phil An DO  25 1527

## 2025-07-07 PROBLEM — R53.1 WEAKNESS: Status: ACTIVE | Noted: 2025-07-07

## 2025-07-07 PROBLEM — N20.0 KIDNEY STONES: Status: ACTIVE | Noted: 2025-07-07

## 2025-07-07 PROBLEM — N39.0 URINARY TRACT INFECTION: Status: ACTIVE | Noted: 2025-07-07

## 2025-07-07 PROBLEM — E86.0 DEHYDRATION: Status: ACTIVE | Noted: 2025-07-07

## 2025-07-07 PROBLEM — K62.5 RECTAL BLEEDING: Status: ACTIVE | Noted: 2025-07-07

## 2025-07-07 PROBLEM — E83.42 HYPOMAGNESEMIA: Status: ACTIVE | Noted: 2025-07-07

## 2025-07-07 LAB
ABO GROUP (TYPE) IN BLOOD: NORMAL
ABO GROUP (TYPE) IN BLOOD: NORMAL
ALBUMIN SERPL BCP-MCNC: 2.2 G/DL (ref 3.4–5)
ALP SERPL-CCNC: 63 U/L (ref 33–136)
ALT SERPL W P-5'-P-CCNC: 23 U/L (ref 7–45)
ANION GAP SERPL CALC-SCNC: 10 MMOL/L (ref 10–20)
ANION GAP SERPL CALC-SCNC: 10 MMOL/L (ref 10–20)
ANTIBODY SCREEN: NORMAL
APPEARANCE UR: CLEAR
AST SERPL W P-5'-P-CCNC: 21 U/L (ref 9–39)
BACTERIA #/AREA URNS AUTO: ABNORMAL /HPF
BILIRUB SERPL-MCNC: 0.6 MG/DL (ref 0–1.2)
BILIRUB UR STRIP.AUTO-MCNC: NEGATIVE MG/DL
BLOOD EXPIRATION DATE: NORMAL
BUN SERPL-MCNC: 20 MG/DL (ref 6–23)
BUN SERPL-MCNC: 27 MG/DL (ref 6–23)
CALCIUM SERPL-MCNC: 7.1 MG/DL (ref 8.6–10.3)
CALCIUM SERPL-MCNC: 7.6 MG/DL (ref 8.6–10.3)
CHLORIDE SERPL-SCNC: 100 MMOL/L (ref 98–107)
CHLORIDE SERPL-SCNC: 105 MMOL/L (ref 98–107)
CO2 SERPL-SCNC: 23 MMOL/L (ref 21–32)
CO2 SERPL-SCNC: 27 MMOL/L (ref 21–32)
COLOR UR: ABNORMAL
CREAT SERPL-MCNC: 0.55 MG/DL (ref 0.5–1.05)
CREAT SERPL-MCNC: 0.59 MG/DL (ref 0.5–1.05)
DISPENSE STATUS: NORMAL
EGFRCR SERPLBLD CKD-EPI 2021: >90 ML/MIN/1.73M*2
EGFRCR SERPLBLD CKD-EPI 2021: >90 ML/MIN/1.73M*2
ERYTHROCYTE [DISTWIDTH] IN BLOOD BY AUTOMATED COUNT: 14.6 % (ref 11.5–14.5)
ERYTHROCYTE [DISTWIDTH] IN BLOOD BY AUTOMATED COUNT: 15.7 % (ref 11.5–14.5)
ERYTHROCYTE [DISTWIDTH] IN BLOOD BY AUTOMATED COUNT: 15.7 % (ref 11.5–14.5)
GLUCOSE SERPL-MCNC: 101 MG/DL (ref 74–99)
GLUCOSE SERPL-MCNC: 213 MG/DL (ref 74–99)
GLUCOSE UR STRIP.AUTO-MCNC: NORMAL MG/DL
HCT VFR BLD AUTO: 19.9 % (ref 36–46)
HCT VFR BLD AUTO: 25.3 % (ref 36–46)
HCT VFR BLD AUTO: 27.1 % (ref 36–46)
HEMOCCULT SP1 STL QL: POSITIVE
HGB BLD-MCNC: 7 G/DL (ref 12–16)
HGB BLD-MCNC: 8.7 G/DL (ref 12–16)
HGB BLD-MCNC: 9.4 G/DL (ref 12–16)
INR PPP: 1.7 (ref 0.9–1.1)
KETONES UR STRIP.AUTO-MCNC: ABNORMAL MG/DL
LEUKOCYTE ESTERASE UR QL STRIP.AUTO: NEGATIVE
MAGNESIUM SERPL-MCNC: 1.83 MG/DL (ref 1.6–2.4)
MCH RBC QN AUTO: 30.3 PG (ref 26–34)
MCH RBC QN AUTO: 30.7 PG (ref 26–34)
MCH RBC QN AUTO: 31.3 PG (ref 26–34)
MCHC RBC AUTO-ENTMCNC: 34.4 G/DL (ref 32–36)
MCHC RBC AUTO-ENTMCNC: 34.7 G/DL (ref 32–36)
MCHC RBC AUTO-ENTMCNC: 35.2 G/DL (ref 32–36)
MCV RBC AUTO: 88 FL (ref 80–100)
MCV RBC AUTO: 89 FL (ref 80–100)
MCV RBC AUTO: 89 FL (ref 80–100)
MUCOUS THREADS #/AREA URNS AUTO: ABNORMAL /LPF
NITRITE UR QL STRIP.AUTO: NEGATIVE
NRBC BLD-RTO: 0.1 /100 WBCS (ref 0–0)
NRBC BLD-RTO: 0.2 /100 WBCS (ref 0–0)
NRBC BLD-RTO: 0.3 /100 WBCS (ref 0–0)
PH UR STRIP.AUTO: 6 [PH]
PLATELET # BLD AUTO: 138 X10*3/UL (ref 150–450)
PLATELET # BLD AUTO: 161 X10*3/UL (ref 150–450)
PLATELET # BLD AUTO: 170 X10*3/UL (ref 150–450)
POTASSIUM SERPL-SCNC: 3.1 MMOL/L (ref 3.5–5.3)
POTASSIUM SERPL-SCNC: 3.5 MMOL/L (ref 3.5–5.3)
PRODUCT BLOOD TYPE: 5100
PRODUCT CODE: NORMAL
PROT SERPL-MCNC: 3.5 G/DL (ref 6.4–8.2)
PROT UR STRIP.AUTO-MCNC: ABNORMAL MG/DL
PROTHROMBIN TIME: 18.4 SECONDS (ref 9.8–12.4)
RBC # BLD AUTO: 2.24 X10*6/UL (ref 4–5.2)
RBC # BLD AUTO: 2.87 X10*6/UL (ref 4–5.2)
RBC # BLD AUTO: 3.06 X10*6/UL (ref 4–5.2)
RBC # UR STRIP.AUTO: ABNORMAL MG/DL
RBC #/AREA URNS AUTO: >20 /HPF
RH FACTOR (ANTIGEN D): NORMAL
RH FACTOR (ANTIGEN D): NORMAL
SODIUM SERPL-SCNC: 134 MMOL/L (ref 136–145)
SODIUM SERPL-SCNC: 134 MMOL/L (ref 136–145)
SP GR UR STRIP.AUTO: 1.03
UNIT ABO: NORMAL
UNIT NUMBER: NORMAL
UNIT RH: NORMAL
UNIT VOLUME: 279
UNIT VOLUME: 287
UNIT VOLUME: 291
UROBILINOGEN UR STRIP.AUTO-MCNC: NORMAL MG/DL
WBC # BLD AUTO: 20.7 X10*3/UL (ref 4.4–11.3)
WBC # BLD AUTO: 28.7 X10*3/UL (ref 4.4–11.3)
WBC # BLD AUTO: 47.9 X10*3/UL (ref 4.4–11.3)
WBC #/AREA URNS AUTO: ABNORMAL /HPF
XM INTEP: NORMAL

## 2025-07-07 PROCEDURE — 2500000002 HC RX 250 W HCPCS SELF ADMINISTERED DRUGS (ALT 637 FOR MEDICARE OP, ALT 636 FOR OP/ED): Mod: IPSPLIT | Performed by: NURSE PRACTITIONER

## 2025-07-07 PROCEDURE — 2500000004 HC RX 250 GENERAL PHARMACY W/ HCPCS (ALT 636 FOR OP/ED): Mod: IPSPLIT | Performed by: EMERGENCY MEDICINE

## 2025-07-07 PROCEDURE — 85027 COMPLETE CBC AUTOMATED: CPT | Mod: IPSPLIT | Performed by: NURSE PRACTITIONER

## 2025-07-07 PROCEDURE — 36430 TRANSFUSION BLD/BLD COMPNT: CPT | Mod: IPSPLIT

## 2025-07-07 PROCEDURE — P9016 RBC LEUKOCYTES REDUCED: HCPCS | Mod: IPSPLIT

## 2025-07-07 PROCEDURE — 87506 IADNA-DNA/RNA PROBE TQ 6-11: CPT | Mod: GENLAB | Performed by: EMERGENCY MEDICINE

## 2025-07-07 PROCEDURE — 2500000002 HC RX 250 W HCPCS SELF ADMINISTERED DRUGS (ALT 637 FOR MEDICARE OP, ALT 636 FOR OP/ED): Mod: IPSPLIT | Performed by: SURGERY

## 2025-07-07 PROCEDURE — 83735 ASSAY OF MAGNESIUM: CPT | Mod: IPSPLIT | Performed by: NURSE PRACTITIONER

## 2025-07-07 PROCEDURE — 94760 N-INVAS EAR/PLS OXIMETRY 1: CPT | Mod: IPSPLIT

## 2025-07-07 PROCEDURE — 37799 UNLISTED PX VASCULAR SURGERY: CPT | Mod: IPSPLIT | Performed by: NURSE PRACTITIONER

## 2025-07-07 PROCEDURE — 85610 PROTHROMBIN TIME: CPT | Mod: IPSPLIT | Performed by: NURSE PRACTITIONER

## 2025-07-07 PROCEDURE — 87493 C DIFF AMPLIFIED PROBE: CPT | Mod: GENLAB | Performed by: EMERGENCY MEDICINE

## 2025-07-07 PROCEDURE — 2500000004 HC RX 250 GENERAL PHARMACY W/ HCPCS (ALT 636 FOR OP/ED): Mod: IPSPLIT | Performed by: NURSE PRACTITIONER

## 2025-07-07 PROCEDURE — 2500000005 HC RX 250 GENERAL PHARMACY W/O HCPCS: Mod: IPSPLIT | Performed by: INTERNAL MEDICINE

## 2025-07-07 PROCEDURE — 82374 ASSAY BLOOD CARBON DIOXIDE: CPT | Mod: IPSPLIT | Performed by: NURSE PRACTITIONER

## 2025-07-07 PROCEDURE — P9040 RBC LEUKOREDUCED IRRADIATED: HCPCS | Mod: IPSPLIT

## 2025-07-07 PROCEDURE — 36415 COLL VENOUS BLD VENIPUNCTURE: CPT | Mod: IPSPLIT | Performed by: NURSE PRACTITIONER

## 2025-07-07 PROCEDURE — 99223 1ST HOSP IP/OBS HIGH 75: CPT | Performed by: NURSE PRACTITIONER

## 2025-07-07 PROCEDURE — 2020000001 HC ICU ROOM DAILY: Mod: IPSPLIT

## 2025-07-07 PROCEDURE — 99233 SBSQ HOSP IP/OBS HIGH 50: CPT | Performed by: SURGERY

## 2025-07-07 PROCEDURE — 87086 URINE CULTURE/COLONY COUNT: CPT | Mod: GENLAB | Performed by: INTERNAL MEDICINE

## 2025-07-07 PROCEDURE — 84075 ASSAY ALKALINE PHOSPHATASE: CPT | Mod: IPSPLIT | Performed by: NURSE PRACTITIONER

## 2025-07-07 PROCEDURE — 87329 GIARDIA AG IA: CPT | Performed by: EMERGENCY MEDICINE

## 2025-07-07 PROCEDURE — 81001 URINALYSIS AUTO W/SCOPE: CPT | Mod: IPSPLIT | Performed by: EMERGENCY MEDICINE

## 2025-07-07 PROCEDURE — 2500000004 HC RX 250 GENERAL PHARMACY W/ HCPCS (ALT 636 FOR OP/ED): Mod: IPSPLIT | Performed by: INTERNAL MEDICINE

## 2025-07-07 PROCEDURE — 82270 OCCULT BLOOD FECES: CPT | Mod: IPSPLIT | Performed by: NURSE PRACTITIONER

## 2025-07-07 PROCEDURE — 86923 COMPATIBILITY TEST ELECTRIC: CPT | Mod: IPSPLIT

## 2025-07-07 PROCEDURE — 97166 OT EVAL MOD COMPLEX 45 MIN: CPT | Mod: GO,IPSPLIT

## 2025-07-07 PROCEDURE — 87328 CRYPTOSPORIDIUM AG IA: CPT | Performed by: EMERGENCY MEDICINE

## 2025-07-07 PROCEDURE — 2500000004 HC RX 250 GENERAL PHARMACY W/ HCPCS (ALT 636 FOR OP/ED): Mod: IPSPLIT | Performed by: HOSPITALIST

## 2025-07-07 PROCEDURE — 97161 PT EVAL LOW COMPLEX 20 MIN: CPT | Mod: GP,IPSPLIT | Performed by: PHYSICAL THERAPIST

## 2025-07-07 PROCEDURE — 86901 BLOOD TYPING SEROLOGIC RH(D): CPT | Mod: IPSPLIT | Performed by: NURSE PRACTITIONER

## 2025-07-07 RX ORDER — SODIUM CHLORIDE 9 MG/ML
150 INJECTION, SOLUTION INTRAVENOUS CONTINUOUS
Status: DISCONTINUED | OUTPATIENT
Start: 2025-07-07 | End: 2025-07-07

## 2025-07-07 RX ORDER — VANCOMYCIN 1.5 G/300ML
1500 INJECTION, SOLUTION INTRAVENOUS EVERY 12 HOURS
Status: DISCONTINUED | OUTPATIENT
Start: 2025-07-07 | End: 2025-07-08

## 2025-07-07 RX ORDER — NOREPINEPHRINE BITARTRATE/D5W 8 MG/250ML
0-.5 PLASTIC BAG, INJECTION (ML) INTRAVENOUS CONTINUOUS
Status: DISCONTINUED | OUTPATIENT
Start: 2025-07-07 | End: 2025-07-08

## 2025-07-07 RX ORDER — CEFEPIME HYDROCHLORIDE 2 G/50ML
2 INJECTION, SOLUTION INTRAVENOUS ONCE
Status: COMPLETED | OUTPATIENT
Start: 2025-07-07 | End: 2025-07-07

## 2025-07-07 RX ORDER — VANCOMYCIN HYDROCHLORIDE 1 G/200ML
1000 INJECTION, SOLUTION INTRAVENOUS ONCE
Status: DISCONTINUED | OUTPATIENT
Start: 2025-07-07 | End: 2025-07-07

## 2025-07-07 RX ORDER — PANTOPRAZOLE SODIUM 40 MG/1
40 TABLET, DELAYED RELEASE ORAL
Status: DISCONTINUED | OUTPATIENT
Start: 2025-07-08 | End: 2025-07-08

## 2025-07-07 RX ORDER — CEFEPIME HYDROCHLORIDE 1 G/50ML
1 INJECTION, SOLUTION INTRAVENOUS EVERY 8 HOURS
Status: DISCONTINUED | OUTPATIENT
Start: 2025-07-07 | End: 2025-07-09

## 2025-07-07 RX ORDER — VANCOMYCIN HYDROCHLORIDE 1 G/20ML
INJECTION, POWDER, LYOPHILIZED, FOR SOLUTION INTRAVENOUS DAILY PRN
Status: DISCONTINUED | OUTPATIENT
Start: 2025-07-07 | End: 2025-07-09

## 2025-07-07 RX ORDER — METRONIDAZOLE 500 MG/100ML
500 INJECTION, SOLUTION INTRAVENOUS EVERY 8 HOURS
Status: DISCONTINUED | OUTPATIENT
Start: 2025-07-07 | End: 2025-07-08

## 2025-07-07 RX ORDER — POTASSIUM CHLORIDE 20 MEQ/1
40 TABLET, EXTENDED RELEASE ORAL ONCE
Status: COMPLETED | OUTPATIENT
Start: 2025-07-07 | End: 2025-07-07

## 2025-07-07 RX ORDER — PHYTONADIONE 5 MG/1
2.5 TABLET ORAL ONCE
Status: COMPLETED | OUTPATIENT
Start: 2025-07-07 | End: 2025-07-07

## 2025-07-07 RX ORDER — SODIUM CHLORIDE 9 MG/ML
10 INJECTION, SOLUTION INTRAVENOUS CONTINUOUS PRN
Status: DISCONTINUED | OUTPATIENT
Start: 2025-07-07 | End: 2025-07-14 | Stop reason: HOSPADM

## 2025-07-07 RX ORDER — METOCLOPRAMIDE HYDROCHLORIDE 5 MG/ML
10 INJECTION INTRAMUSCULAR; INTRAVENOUS EVERY 6 HOURS PRN
Status: DISCONTINUED | OUTPATIENT
Start: 2025-07-07 | End: 2025-07-08

## 2025-07-07 RX ADMIN — ENOXAPARIN SODIUM 40 MG: 40 INJECTION SUBCUTANEOUS at 08:59

## 2025-07-07 RX ADMIN — NOREPINEPHRINE BITARTRATE 0.01 MCG/KG/MIN: 8 INJECTION, SOLUTION INTRAVENOUS at 20:36

## 2025-07-07 RX ADMIN — VANCOMYCIN 1.5 G: 1.5 INJECTION, SOLUTION INTRAVENOUS at 21:26

## 2025-07-07 RX ADMIN — METRONIDAZOLE 500 MG: 5 INJECTION, SOLUTION INTRAVENOUS at 16:52

## 2025-07-07 RX ADMIN — PHYTONADIONE 2.5 MG: 5 TABLET ORAL at 18:27

## 2025-07-07 RX ADMIN — VANCOMYCIN 1.5 G: 1.5 INJECTION, SOLUTION INTRAVENOUS at 10:06

## 2025-07-07 RX ADMIN — METOCLOPRAMIDE HYDROCHLORIDE 10 MG: 5 INJECTION INTRAMUSCULAR; INTRAVENOUS at 21:17

## 2025-07-07 RX ADMIN — SODIUM CHLORIDE 500 ML: 0.9 INJECTION, SOLUTION INTRAVENOUS at 16:44

## 2025-07-07 RX ADMIN — CEFEPIME HYDROCHLORIDE 1 G: 1 INJECTION, SOLUTION INTRAVENOUS at 17:16

## 2025-07-07 RX ADMIN — POTASSIUM CHLORIDE 40 MEQ: 1500 TABLET, EXTENDED RELEASE ORAL at 08:59

## 2025-07-07 RX ADMIN — SODIUM CHLORIDE 150 ML/HR: 0.9 INJECTION, SOLUTION INTRAVENOUS at 19:23

## 2025-07-07 RX ADMIN — CEFEPIME HYDROCHLORIDE 2 G: 2 INJECTION, SOLUTION INTRAVENOUS at 08:59

## 2025-07-07 RX ADMIN — ONDANSETRON 4 MG: 2 INJECTION INTRAMUSCULAR; INTRAVENOUS at 20:36

## 2025-07-07 RX ADMIN — SODIUM CHLORIDE 150 ML/HR: 9 INJECTION, SOLUTION INTRAVENOUS at 10:06

## 2025-07-07 RX ADMIN — SODIUM CHLORIDE 1000 ML: 0.9 INJECTION, SOLUTION INTRAVENOUS at 22:16

## 2025-07-07 ASSESSMENT — COGNITIVE AND FUNCTIONAL STATUS - GENERAL
STANDING UP FROM CHAIR USING ARMS: A LITTLE
WALKING IN HOSPITAL ROOM: A LOT
MOBILITY SCORE: 16
DRESSING REGULAR UPPER BODY CLOTHING: A LITTLE
CLIMB 3 TO 5 STEPS WITH RAILING: A LOT
TURNING FROM BACK TO SIDE WHILE IN FLAT BAD: A LITTLE
EATING MEALS: A LITTLE
HELP NEEDED FOR BATHING: A LOT
DRESSING REGULAR LOWER BODY CLOTHING: A LITTLE
TOILETING: A LOT
MOVING FROM LYING ON BACK TO SITTING ON SIDE OF FLAT BED WITH BEDRAILS: A LITTLE
MOVING TO AND FROM BED TO CHAIR: A LITTLE
PERSONAL GROOMING: A LITTLE
DAILY ACTIVITIY SCORE: 16

## 2025-07-07 ASSESSMENT — ENCOUNTER SYMPTOMS
PALPITATIONS: 0
VOMITING: 0
CHILLS: 0
FATIGUE: 1
ACTIVITY CHANGE: 1
APNEA: 0
STRIDOR: 0
ABDOMINAL DISTENTION: 0
CHEST TIGHTNESS: 0
DYSURIA: 0
DIZZINESS: 1
APPETITE CHANGE: 1
VOICE CHANGE: 0
TREMORS: 0
SHORTNESS OF BREATH: 0
BACK PAIN: 0
COUGH: 0
DIFFICULTY URINATING: 0
FEVER: 0
AGITATION: 0
NAUSEA: 0
CONFUSION: 0
WHEEZING: 0
WEAKNESS: 1
NAUSEA: 1
TROUBLE SWALLOWING: 0
ARTHRALGIAS: 0
CONSTIPATION: 0
PHOTOPHOBIA: 0
ADENOPATHY: 0
ANAL BLEEDING: 1
CHOKING: 0
DIARRHEA: 1
LIGHT-HEADEDNESS: 1

## 2025-07-07 ASSESSMENT — PAIN SCALES - GENERAL
PAINLEVEL_OUTOF10: 0 - NO PAIN

## 2025-07-07 ASSESSMENT — PAIN - FUNCTIONAL ASSESSMENT
PAIN_FUNCTIONAL_ASSESSMENT: 0-10

## 2025-07-07 ASSESSMENT — ACTIVITIES OF DAILY LIVING (ADL)
BATHING_ASSISTANCE: MODERATE
LACK_OF_TRANSPORTATION: NO
ADL_ASSISTANCE: INDEPENDENT

## 2025-07-07 NOTE — H&P
History Of Present Illness  Elisha Flores is a 79 y.o. female presenting with diarrhea.      Elisha Flores is a 79-year-old female with past medical history of breast cancer, undergoing chemotherapy with last chemo treatment 6/27/2025, congestive heart failure, depression, and hypertension.  Presents to the emergency department with diarrhea, weakness, lightheadedness with walking and standing and fatigue.    ED course  VS:  /62   Pulse 61   Temp 36 °C (96.8 °F) (Temporal)   Resp 15   SpO2 100%     EKG: NSR with ventricular rate 60    UA: 500 Leukocyte Esterase, 21-50 WBC, Occasional casts,    Significant labs: WBC 26.1, K 2.7, Magnesium 1.17, Lactate 0.7, K 2.7    Diagnostics: CT abdomen: No CT evidence of acute chest process. .   ABDOMEN AND PELVIS: Rectal air-fluid level consistent with diarrhea and probable distal sigmoid wall thickening/enhancement consistent with nonspecific colitis. Clinical correlation suggested.  Multiple bladder calculi near the right UVJ again seen with increased  now moderate hydroureteronephrosis.Flattening of the upper abdominal IVC which can be seen with dehydration.    Received: 1 L NS, Potassium 20mEq IV and 40mEq PO, Magensium 2 grams IV, Zofran, Cefepime, Vancomycin, and Flagyl    Patient was admitted to Medicine for further treatment and evaluation and close monitoring of hemodynamic status.     ADDENDUM:   Patient started to have increased watery stool.  A rectal tube was placed.  She developed rectal bleeding with stool becoming more maroon and red. CBC, CMP,  PT/INR,  type and screen,  and occult blood obtained. Occult blood came back positive PT 18.4, INR 1.7 hemoglobin 7.0/hematocrit 19.9. Dr. Pérez Acute Surgery consulted.  Patient consented to blood products, vitamin K given, 3 units RBC ordered.  After each unit CBC to be drawn, additional unit to be given if hemoglobin less than 7.4 or patient still bleeding. After, CBC to be drawn q 6 hours.           Past  "Medical History  Medical History[1]    Surgical History  Surgical History[2]     Social History  She reports that she quit smoking about 38 years ago. Her smoking use included cigarettes. She has been exposed to tobacco smoke. She has never used smokeless tobacco. She reports that she does not drink alcohol and does not use drugs.    Family History  Family History[3]     Allergies  Ciprofloxacin, Codeine, Hydrochlorothiazide, Penicillins, and Tetanus vaccines and toxoid    Review of Systems   Constitutional:  Positive for activity change, appetite change and fatigue.   HENT:  Negative for congestion, trouble swallowing and voice change.    Eyes:  Negative for photophobia and visual disturbance.   Respiratory:  Negative for apnea, cough, choking, chest tightness, shortness of breath, wheezing and stridor.    Cardiovascular:  Negative for chest pain and palpitations.   Gastrointestinal:  Positive for diarrhea and nausea. Negative for abdominal distention.        \"Dry- Heaves\"   Genitourinary:  Negative for difficulty urinating, dysuria and urgency.   Musculoskeletal:  Negative for arthralgias and back pain.   Skin:  Negative for rash.   Neurological:  Positive for dizziness, weakness and light-headedness. Negative for tremors.   Hematological:  Negative for adenopathy.   Psychiatric/Behavioral:  Negative for agitation and confusion.         Physical Exam  Constitutional:       General: She is not in acute distress.     Appearance: She is ill-appearing.   HENT:      Head: Normocephalic and atraumatic.      Nose: No congestion or rhinorrhea.      Mouth/Throat:      Mouth: Mucous membranes are moist.   Eyes:      General: No scleral icterus.        Right eye: No discharge.         Left eye: No discharge.      Conjunctiva/sclera: Conjunctivae normal.   Cardiovascular:      Rate and Rhythm: Normal rate and regular rhythm.      Pulses: Normal pulses.      Heart sounds: Normal heart sounds.   Pulmonary:      Effort: " "Pulmonary effort is normal. No respiratory distress.      Breath sounds: Normal breath sounds. No stridor. No wheezing or rales.      Comments: Diminished bilateral bases   Abdominal:      General: Abdomen is flat. Bowel sounds are normal. There is no distension.      Palpations: Abdomen is soft.      Tenderness: There is no abdominal tenderness.   Musculoskeletal:         General: Swelling present.      Cervical back: No rigidity or tenderness.      Comments: Bilateral lower leg swelling   Skin:     General: Skin is warm.      Capillary Refill: Capillary refill takes less than 2 seconds.      Coloration: Skin is pale.      Findings: Erythema present.      Comments: Bilateral leg erythema, warmth   Neurological:      General: No focal deficit present.      Mental Status: She is alert and oriented to person, place, and time.      Motor: Weakness present.   Psychiatric:         Mood and Affect: Mood normal.          Last Recorded Vitals  Blood pressure (!) 112/48, pulse 66, temperature 36.5 °C (97.7 °F), temperature source Temporal, resp. rate 16, height 1.651 m (5' 5\"), weight 112 kg (246 lb 14.6 oz), SpO2 96%.    Relevant Results  Scheduled medications  Scheduled Medications[4]  Continuous medications  Continuous Medications[5]  PRN medications  PRN Medications[6]      Latest Reference Range & Units 07/06/25 12:23 07/06/25 13:36 07/07/25 05:29   GLUCOSE 74 - 99 mg/dL 130 (H)  101 (H)   SODIUM 136 - 145 mmol/L 133 (L)  134 (L)   POTASSIUM 3.5 - 5.3 mmol/L 2.7 (LL)  3.1 (L)   CHLORIDE 98 - 107 mmol/L 105  100   Bicarbonate 21 - 32 mmol/L 19 (L)  27   Anion Gap 10 - 20 mmol/L 12  10   Blood Urea Nitrogen 6 - 23 mg/dL 42 (H)  27 (H)   Creatinine 0.50 - 1.05 mg/dL 0.90  0.59   EGFR >60 mL/min/1.73m*2 66  >90   Calcium 8.6 - 10.3 mg/dL 6.1 (L)  7.6 (L)   Albumin 3.4 - 5.0 g/dL 2.2 (L)     Alkaline Phosphatase 33 - 136 U/L 64     ALT 7 - 45 U/L 24     AST 9 - 39 U/L 19     Bilirubin Total 0.0 - 1.2 mg/dL 0.7     Total " Protein 6.4 - 8.2 g/dL 3.7 (L)     MAGNESIUM 1.60 - 2.40 mg/dL 1.17 (L)  1.83   Lactate 0.4 - 2.0 mmol/L 0.7     LIPASE 9 - 82 U/L 70     Troponin I, High Sensitivity 0 - 13 ng/L 13 15 (H)    Thyroid Stimulating Hormone 0.44 - 3.98 mIU/L 2.19     WBC 4.4 - 11.3 x10*3/uL 26.1 (H)  20.7 (H)   nRBC 0.0 - 0.0 /100 WBCs 0.1 (H)  0.1 (H)   RBC 4.00 - 5.20 x10*6/uL 3.38 (L)  3.06 (L)   HEMOGLOBIN 12.0 - 16.0 g/dL 10.5 (L)  9.4 (L)   HEMATOCRIT 36.0 - 46.0 % 29.9 (L)  27.1 (L)   MCV 80 - 100 fL 89  89   MCH 26.0 - 34.0 pg 31.1  30.7   MCHC 32.0 - 36.0 g/dL 35.1  34.7   RED CELL DISTRIBUTION WIDTH 11.5 - 14.5 % 15.7 (H)  15.7 (H)   Platelets 150 - 450 x10*3/uL 147 (L)  138 (L)   Immature Granulocytes %, Automated 0.0 - 0.9 % 11.6 (H)     Immature Granulocytes Absolute, Automated 0.00 - 0.50 x10*3/uL 3.02 (H)     Neutrophils %, Manual 40.0 - 80.0 % 72.0     Bands %, Manual 0.0 - 5.0 % 9.0     Lymphocytes %, Manual 13.0 - 44.0 % 10.0     Monocytes %, Manual 2.0 - 10.0 % 7.0     Eosinophils %, Manual 0.0 - 6.0 % 0.0     Basophils %, Manual 0.0 - 2.0 % 0.0     Metamyelocytes % 0.0 - 0.0 % 1.0     Myelocytes %, Manual 0.0 - 0.0 % 1.0     Seg Neutrophils Absolute, Manual 1.60 - 5.00 x10*3/uL 18.79 (H)     Bands Absolute, Manual 0.00 - 0.50 x10*3/uL 2.35 (H)     Lymphocytes Absolute, Manual 0.80 - 3.00 x10*3/uL 2.61     Monocytes Absolute, Manual 0.05 - 0.80 x10*3/uL 1.83 (H)     Eosinophils Absolute, Manual 0.00 - 0.40 x10*3/uL 0.00     Basophils Absolute, Manual 0.00 - 0.10 x10*3/uL 0.00     Metamyelocytes Absolute 0.00 - 0.00 x10*3/uL 0.26     Myelocytes Absolute 0.00 - 0.00 x10*3/uL 0.26     Total Cells Counted  100     Neutrophils Absolute, Manual 1.60 - 5.50 x10*3/uL 21.14 (H)     RBC Morphology  See Below     Polychromasia  Mild     RBC Fragments  Few     (LL): Data is critically low  (H): Data is abnormally high  (L): Data is abnormally low     Latest Reference Range & Units 07/01/25 19:31   Color, Urine Light-Yellow,  Yellow, Dark-Yellow  Yellow   Appearance, Urine Clear  Clear   Specific Gravity, Urine 1.005 - 1.035  1.050 !   pH, Urine 5.0, 5.5, 6.0, 6.5, 7.0, 7.5, 8.0  6.5   Protein, Urine NEGATIVE, 10 (TRACE), 20 (TRACE) mg/dL 10 (TRACE)   Glucose, Urine Normal mg/dL Normal   Blood, Urine NEGATIVE mg/dL NEGATIVE   Ketones, Urine NEGATIVE mg/dL TRACE !   Bilirubin, Urine NEGATIVE mg/dL NEGATIVE   Urobilinogen, Urine Normal mg/dL 2 (1+) !   Nitrite, Urine NEGATIVE  NEGATIVE   Leukocyte Esterase, Urine NEGATIVE  500 Devon/uL !   WBC, Urine 1-5, NONE /HPF 21-50 !   WBC Clumps, Urine Reference range not established. /HPF RARE   RBC, Urine NONE, 1-2, 3-5 /HPF 6-10 !   Squamous Epithelial Cells, Urine Reference range not established. /HPF 1-9 (SPARSE)   Transitional Epithelial Cells, Urine Reference range not established. /HPF 1-2 (FEW)   Mucus, Urine Reference range not established. /LPF FEW   Hyaline Casts, Urine NONE /LPF OCCASIONAL !   !: Data is abnormal    CT chest abdomen pelvis w IV contrast  Result Date: 7/6/2025  CHEST:   No CT evidence of acute chest process. Interval right anterior chest wall port placement since 06/03/2025. additional findings as above similar to the previous exam.   ABDOMEN AND PELVIS:   Rectal air-fluid level consistent with diarrhea and probable distal sigmoid wall thickening/enhancement consistent with nonspecific colitis. Clinical correlation suggested.   Multiple bladder calculi near the right UVJ again seen with increased now moderate hydroureteronephrosis.   Flattening of the upper abdominal IVC which can be seen with dehydration.   Other findings as described above.   MACRO: None.   Signed by: Rosario Herr 7/6/2025 1:21 PM Dictation workstation:   [id number]    CT abdomen pelvis w IV contrast  Result Date: 7/1/2025  Partially duplicated right renal collecting system with moderate right-sided hydronephrosis and hydroureter secondary to obstructing 1.1 cm and 1.9 cm calculi at the right  ureterovesicular junction. Minimal distal colonic diverticulosis. Additional chronic changes as detailed in the body of the report. Signed by Tay Arroyo      TRANSTHORACIC ECHOCARDIOGRAM REPORT    Study date 6/3/2025  PHYSICIAN INTERPRETATION:  Left Ventricle: Left ventricular ejection fraction is normal by visual estimate at 60-65%. There are no regional left ventricular wall motion abnormalities. The left ventricular cavity size is normal. There is normal septal and normal posterior left ventricular wall thickness. Spectral Doppler shows a Grade I (impaired relaxation pattern) of left ventricular diastolic filling with normal left atrial filling pressure.  Left Atrium: The left atrium is mildly dilated.  Right Ventricle: The right ventricle is normal in size. There is normal right ventricular global systolic function.  Right Atrium: The right atrium is normal in size.  Aortic Valve: There is no evidence of aortic valve regurgitation.  Mitral Valve: The mitral valve is normal in structure. There is mild mitral valve regurgitation. The E Vmax is 0.80 m/s.  Tricuspid Valve: The tricuspid valve is structurally normal. There is trace tricuspid regurgitation. The doppler estimated RVSP is within normal limits with a right ventricular systolic pressure of 30 mmHg.  Pulmonic Valve: The pulmonic valve is not well visualized. There is physiologic pulmonic valve regurgitation.  Pericardium: There is no pericardial effusion noted.  Aorta: The aortic root is normal.  Systemic Veins: The inferior vena cava appears normal in size, with IVC inspiratory collapse greater than 50%.        CONCLUSIONS:   1. Left ventricular ejection fraction is normal by visual estimate at 60-65%.   2. Spectral Doppler shows a Grade I (impaired relaxation pattern) of left ventricular diastolic filling with normal left atrial filling pressure.   3. There is normal right ventricular global systolic function.   4. The left atrium is mildly dilated.    5. The doppler estimated RVSP is within normal limits with a right ventricular systolic pressure of 30 mmHg.   6. The inferior vena cava appears normal in size, with IVC inspiratory collapse greater than 50%.             Assessment & Plan  Diarrhea, unspecified type    Benign essential hypertension    Cellulitis    GERD (gastroesophageal reflux disease)    Hyperlipidemia    Hyperglycemia    Hypokalemia    Urinary tract infection    Weakness    Kidney stones    Dehydration    Hypomagnesemia    Rectal bleeding      Diarrhea  Weakness  Kidney stone  Dehydration  Urinary tract infection  Cellulitis  -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 Clinical correlation suggested.Multiple bladder calculi near the right UVJ again seen with increased  now moderate hydroureteronephrosis. Flattening of the upper abdominal IVC which can be seen with dehydration.  -Right UVJ stones- can follow up with DR. Zapata- urology  OP   -C-diff pending  -Stool pathogens pending  -1 L NS given in the ED  -Cefepime, Flagyl, vancomycin given in the ED  -Start cefepime (Maxipime) 1 g in dextrose 5% IV 50 mL   -Start metroNIDAZOLE (Flagyl) 500 mg in sodium chloride  -Start vancomycin (Xellia) 1.5 g in diluent combination  mL    -WBC 26.1 > 20.7  -ID consulted, appreciate recommendations  -Intake and Output  -Daily weights  -Rectal tube  -sodium chloride 0.9% infusion 150 ml/hr  -Urine culture pending   -Monitor fever and WBC  -Daily CBC  -sodium chloride 0.9% infusion  150 ml/hr    Rectal Bleeding   -Began to have profuse bleeding  -Dr. Pérez consulted  -Hemoglobin 9.4 > 7.0  -500ml NS bolus   -Lovenox, ASA held  -PT/ INR 18.4/ 1.7   -Vitamin K given  -3 units RBC ordered   -CBC between each unit   -CBC  q 6  hours  -Monitor      Hypertension  Hyperlipidemia  -Amlodipine , lasix, and Cozaar on hold  -Hold aspirin EC tablet 81 mg daily  -Continue rosuvastatin (Crestor) tablet 10 mg daily         Hyperglycemia  -Glucose 101  -Monitor   -Daily BMP    Hypomagnesemia  -Magnesium 1.7 > 1.83  -2 Grams Mag given in the ED  -Monitor  -Daily BMP      Hypokalemia  - K 2.7 > 3.1   - 20 mEq potassium IV given in ED along with 40 mEq p.o.  -40 mEq potassium p.o. given 7/7  - Monitor  -Daily BMP  -Cardiac monitoring          GI ppx: PPI  DVT ppx: Enoxaparin on hold, SCD's  Fluids: As needed   Electrolytes: replace as needed  Nutrition: Regular   Adjuncts: PIV  Code Status: Full    Pt requires inpatient stay at this time.    MINDA Mayes-CNP    Attending Attestation:    I was present with the MINDA-CNP who participated in the documentation of this note. I have personally seen and re-examined the patient and performed the medical decision-making components (assessment and plan of care). I have reviewed the documentation and verified the findings in the note as written with additions or exceptions as stated in the body of this note.    79-year-old female who initially presented to the hospital for diarrhea lightheadedness and generalized weakness.  She does have medical history of breast cancer who is undergoing chemotherapy with last chemotherapy treatment dated 6/27/2025, congestive heart failure, depression and hypertension.  In the emergency department her vitals were stable.  Significant labs were positive for UTI, WBC 26.1, K 2.7, magnesium 1.17 and lactate 0.7.  CT scan of abdomen and pelvis is suggestive of probable distal sigmoid wall thickening/enhancement consistent with nonspecific colitis.  Patient also has multiple bladder calculi near the right UVJ with moderate hydroureteronephrosis.  In the emergency room patient was given 1 L of normal saline, potassium 20 mill equivalent IV and 40 mEq p.o., magnesium 2 g IV, IV  Zofran, cefepime, vancomycin and Flagyl.    Patient was admitted to medical floor for further treatment and close monitoring.    Admitting diagnoses are:    Chemotherapy and colitis induced diarrhea-started on metronidazole, vancomycin and cefepime.  Dehydration-continue IV hydration with sodium chloride 0.9% infusion 150 cc/h.  Hypokalemia and hypomagnesemia-replace electrolyte intravenously at this time.  UTI with hydronephrosis-continue IV antibiotics  Rectal bleeding-Dr. Pérez has been consulted.    DVT prophylaxis-hold anticoagulation at this time and continue SCD,    Sarthak Davis MD  Internal Medicine.       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    Bursitis of right shoulder 11/25/2019    Subdeltoid bursitis of right shoulder joint    Cancer (Multi)     Encounter for general adult medical examination without abnormal findings 04/26/2018    Encounter for Medicare annual wellness exam    Halitosis 05/22/2019    Halitosis    Localized swelling, mass and lump, head 12/28/2016    Left facial swelling    Localized swelling, mass and lump, head 12/29/2022    Facial mass    Other specified anxiety disorders 12/19/2014    Depression with anxiety    Other specified disorders of nose and nasal sinuses 04/18/2017    Jacque bullosa    Personal history of diseases of the skin and subcutaneous tissue     History of psoriasis    Personal history of other diseases of the circulatory system     History of congestive heart failure    Personal history of other diseases of the circulatory system 11/15/2019    History of uncontrolled hypertension    Personal history of other diseases of the circulatory system     History of hypertension    Personal history of other diseases  of the nervous system and sense organs     History of sciatica    Personal history of other diseases of the respiratory system 04/18/2017    History of deviated nasal septum    Personal history of other diseases of the respiratory system 02/27/2017    History of sinusitis    Personal history of other diseases of the respiratory system 05/28/2019    History of chronic sinusitis    Personal history of other diseases of the respiratory system 03/19/2014    History of acute sinusitis    Personal history of other specified conditions 03/26/2013    History of insomnia    Personal history of other specified conditions 07/09/2019    History of nasal congestion    Personal history of other specified conditions 11/17/2016    History of diarrhea    Unspecified blepharitis right eye, unspecified eyelid 01/22/2019    Blepharitis of eyelid of right eye    Unspecified mycosis 06/18/2019    Fungal sinusitis   [2]   Past Surgical History:  Procedure Laterality Date    BREAST BIOPSY Left 04/23/2025    HYSTERECTOMY  04/04/2013    Hysterectomy    OTHER SURGICAL HISTORY  11/22/2022    Eye surgery    OTHER SURGICAL HISTORY  11/22/2022    Nose surgery    OTHER SURGICAL HISTORY  11/22/2022    Back surgery   [3]   Family History  Problem Relation Name Age of Onset    Cancer Sister branden rain 65    Breast cancer Sister branden rain 50 - 59    Breast cancer Mother's Sister  70 - 79   [4] [Held by provider] amLODIPine, 10 mg, oral, Daily  aspirin, 81 mg, oral, Daily  enoxaparin, 40 mg, subcutaneous, q12h MARIOLA  folic acid, 1 mg, oral, Daily  [Held by provider] furosemide, 20 mg, oral, Daily  [Held by provider] losartan, 100 mg, oral, Daily  polyethylene glycol, 17 g, oral, Daily  [Held by provider] propranolol LA, 120 mg, oral, Nightly  rosuvastatin, 10 mg, oral, Nightly  vancomycin, 1,500 mg, intravenous, q12h  [5] sodium chloride 0.9%, 150 mL/hr, Last Rate: 150 mL/hr (07/07/25 1006)  sodium chloride 0.9%, 10 mL/hr  [6] PRN medications:  acetaminophen **OR** acetaminophen **OR** acetaminophen, alum-mag hydroxide-simeth, benzocaine-menthol, melatonin, ondansetron, sodium chloride 0.9%, vancomycin

## 2025-07-07 NOTE — CONSULTS
Vancomycin Dosing by Pharmacy- INITIAL    Elisha Flores is a 79 y.o. year old female who Pharmacy has been consulted for vancomycin dosing for other Sepsis. Based on the patient's indication and renal status this patient will be dosed based on a goal AUC of 400-600.     Renal function is currently stable.    Visit Vitals  /58 (BP Location: Left arm, Patient Position: Lying)   Pulse 71   Temp 36.5 °C (97.7 °F) (Temporal)   Resp 16        Lab Results   Component Value Date    CREATININE 0.59 2025    CREATININE 0.90 2025    CREATININE 0.63 2025    CREATININE 0.48 (L) 2025        Patient weight is as follows:   Vitals:    25 1131   Weight: 112 kg (246 lb 14.6 oz)       Cultures:  No results found for the encounter in last 14 days.        I/O last 3 completed shifts:  In: - (0 mL/kg)   Out: 1350 (12.1 mL/kg) [Urine:1350 (0.3 mL/kg/hr)]  Weight: 112 kg   I/O during current shift:  No intake/output data recorded.    Temp (24hrs), Av.3 °C (97.4 °F), Min:35.9 °C (96.6 °F), Max:36.6 °C (97.9 °F)         Assessment/Plan     Patient has already been given a loading dose of 2000 mg.  Will initiate vancomycin maintenance, 1500 mg every 12 hours.    This dosing regimen is predicted by [employer] to result in the following pharmacokinetic parameters:  Loading dose: N/A  Regimen: 1500 mg IV every 12 hours.  Start time: 08:19 on 2025  Exposure target: AUC24 (range) 400-600 mg/L.hr   [id number]: 569 mg/L.hr  AUC24,ss: 579 mg/L.hr  Probability of AUC24 > 400: 76 %  Ctrough,ss: 15.2 mg/L  Probability of Ctrough,ss > 20: 38 %    Follow-up level will be ordered on 25 at 05:00 unless clinically indicated sooner.  Will continue to monitor renal function daily while on vancomycin and order serum creatinine at least every 48 hours if not already ordered.  Follow for continued vancomycin needs, clinical response, and signs/symptoms of toxicity.       Saige Peter, PharmD

## 2025-07-07 NOTE — CONSULTS
Weakness, Gen  Associated symptoms include fatigue.     This is a consult requested for patient with diarrhea and lower gastrointestinal bleed.  Patient is 79-year-old female who is receiving neoadjuvant chemotherapy for a her 3 positive breast cancer who presented to the emergency room profuse diarrhea and weakness.  She been feeling unwell and fatigued.  A CT scan performed in the emergency room which confirmed distal wall thickening of the sigmoid colon.  She was admitted to the intensive care unit then began to develop active maroon bleeding.  She had profuse bleeding with a hemoglobin dropping to 7.0.  The patient had a previous colonoscopy in 2019 which was noted to have multiple diverticula.  She denies any abdominal pain.  Medical History[1]     Current Medications[2]     RX Allergies[3]     Review of Systems  Review of Systems   Constitutional:  Positive for fatigue.   Gastrointestinal:  Positive for anal bleeding.       Objective     Vital signs for last 24 hours:  Temp:  [36.4 °C (97.5 °F)-37.5 °C (99.5 °F)] 36.6 °C (97.9 °F)  Heart Rate:  [] 126  Resp:  [12-25] 25  BP: ()/(38-81) 85/60    Intake/Output this shift:  I/O this shift:  In: 2950 [I.V.:2900; IV Piggyback:50]  Out: 615 [Urine:615]    Physical Exam  Physical Exam  Vitals reviewed. Exam conducted with a chaperone present.   Constitutional:       Appearance: She is ill-appearing.      Comments: Pale   HENT:      Head: Normocephalic.   Eyes:      Comments: Pale conjunctiva.   Cardiovascular:      Rate and Rhythm: Normal rate and regular rhythm.      Heart sounds: Normal heart sounds.   Pulmonary:      Effort: Pulmonary effort is normal.      Breath sounds: Normal breath sounds.   Abdominal:      General: Abdomen is flat.      Palpations: Abdomen is soft. There is no mass.      Tenderness: There is no abdominal tenderness. There is no guarding.      Comments: Fecal bag in place with maroon stool present.   Musculoskeletal:          General: Normal range of motion.   Skin:     General: Skin is cool.      Comments: Cool with poor capillary refill.   Neurological:      General: No focal deficit present.   Psychiatric:         Mood and Affect: Mood normal.         Labs & Radiology      Labs Reviewed   OCCULT BLOOD X1, STOOL - Abnormal       Result Value    Occult Blood, Stool X1 Positive (*)    CBC WITH AUTO DIFFERENTIAL - Abnormal    WBC 26.1 (*)     nRBC 0.1 (*)     RBC 3.38 (*)     Hemoglobin 10.5 (*)     Hematocrit 29.9 (*)     MCV 89      MCH 31.1      MCHC 35.1      RDW 15.7 (*)     Platelets 147 (*)     Immature Granulocytes %, Automated 11.6 (*)     Immature Granulocytes Absolute, Automated 3.02 (*)     Narrative:     The previously reported component Neutrophils % is no longer being reported.  The previously reported component Lymphocytes % is no longer being reported.  The previously reported component Monocytes % is no longer being reported.  The previously                   reported component Eosinophils % is no longer being reported.  The previously reported component Basophils % is no longer being reported.  The previously reported component Absolute Neutrophils is no longer being reported.  The previously reported                   component Absolute Lymphocytes is no longer being reported.  The previously reported component Absolute Monocytes is no longer being reported.  The previously reported component Absolute Eosinophils is no longer being reported.  The previously reported                   component Absolute Basophils is no longer being reported.   COMPREHENSIVE METABOLIC PANEL - Abnormal    Glucose 130 (*)     Sodium 133 (*)     Potassium 2.7 (*)     Chloride 105      Bicarbonate 19 (*)     Anion Gap 12      Urea Nitrogen 42 (*)     Creatinine 0.90      eGFR 66      Calcium 6.1 (*)     Albumin 2.2 (*)     Alkaline Phosphatase 64      Total Protein 3.7 (*)     AST 19      Bilirubin, Total 0.7      ALT 24     URINALYSIS WITH  REFLEX CULTURE AND MICROSCOPIC - Abnormal    Color, Urine Light-Yellow      Appearance, Urine Clear      Specific Gravity, Urine 1.028      pH, Urine 6.0      Protein, Urine 30 (1+) (*)     Glucose, Urine Normal      Blood, Urine 0.1 (1+) (*)     Ketones, Urine TRACE (*)     Bilirubin, Urine NEGATIVE      Urobilinogen, Urine Normal      Nitrite, Urine NEGATIVE      Leukocyte Esterase, Urine NEGATIVE     MAGNESIUM - Abnormal    Magnesium 1.17 (*)    SERIAL TROPONIN, 1 HOUR - Abnormal    Troponin I, High Sensitivity 15 (*)     Narrative:     Less than 99th percentile of normal range cutoff-                  Female and children under 18 years old <14 ng/L; Male <21 ng/L: Negative                  Repeat testing should be performed if clinically indicated.                                     Female and children under 18 years old 14-50 ng/L; Male 21-50 ng/L:                  Consistent with possible cardiac damage and possible increased clinical                   risk. Serial measurements may help to assess extent of myocardial damage.                                     >50 ng/L: Consistent with cardiac damage, increased clinical risk and                  myocardial infarction. Serial measurements may help assess extent of                   myocardial damage.                                      NOTE: Children less than 1 year old may have higher baseline troponin                   levels and results should be interpreted in conjunction with the overall                   clinical context.                                     NOTE: Troponin I testing is performed using a different                   testing methodology at [facility] than at other                   [facility]. Direct result comparisons should only                   be made within the same method.   CBC - Abnormal    WBC 20.7 (*)     nRBC 0.1 (*)     RBC 3.06 (*)     Hemoglobin 9.4 (*)     Hematocrit 27.1 (*)     MCV 89      MCH 30.7       MCHC 34.7      RDW 15.7 (*)     Platelets 138 (*)    BASIC METABOLIC PANEL - Abnormal    Glucose 101 (*)     Sodium 134 (*)     Potassium 3.1 (*)     Chloride 100      Bicarbonate 27      Anion Gap 10      Urea Nitrogen 27 (*)     Creatinine 0.59      eGFR >90      Calcium 7.6 (*)    CBC - Abnormal    WBC 28.7 (*)     nRBC 0.2 (*)     RBC 2.24 (*)     Hemoglobin 7.0 (*)     Hematocrit 19.9 (*)     MCV 89      MCH 31.3      MCHC 35.2      RDW 15.7 (*)     Platelets 170     PROTIME-INR - Abnormal    Protime 18.4 (*)     INR 1.7 (*)    MANUAL DIFFERENTIAL - Abnormal    Neutrophils %, Manual 72.0      Bands %, Manual 9.0      Lymphocytes %, Manual 10.0      Monocytes %, Manual 7.0      Eosinophils %, Manual 0.0      Basophils %, Manual 0.0      Metamyelocytes %, Manual 1.0      Myelocytes %, Manual 1.0      Seg Neutrophils Absolute, Manual 18.79 (*)     Bands Absolute, Manual 2.35 (*)     Lymphocytes Absolute, Manual 2.61      Monocytes Absolute, Manual 1.83 (*)     Eosinophils Absolute, Manual 0.00      Basophils Absolute, Manual 0.00      Metamyelocytes Absolute, Manual 0.26      Myelocytes Absolute, Manual 0.26      Total Cells Counted 100      Neutrophils Absolute, Manual 21.14 (*)     RBC Morphology See Below      Polychromasia Mild      RBC Fragments Few     URINALYSIS MICROSCOPIC WITH REFLEX CULTURE - Abnormal    WBC, Urine 6-10 (*)     RBC, Urine >20 (*)     Bacteria, Urine 1+ (*)     Mucus, Urine FEW     BLOOD CULTURE - Normal    Blood Culture Loaded on Instrument - Culture in progress     BLOOD CULTURE - Normal    Blood Culture Loaded on Instrument - Culture in progress     LACTATE - Normal    Lactate 0.7      Narrative:     Venipuncture immediately after or during the administration of Metamizole may lead to falsely low results. Testing should be performed immediately prior to Metamizole dosing.   LIPASE - Normal    Lipase 70      Narrative:     Venipuncture immediately after or during the administration  of Metamizole may lead to falsely low results. Testing should be performed immediately prior to Metamizole dosing.   TSH WITH REFLEX TO FREE T4 IF ABNORMAL - Normal    Thyroid Stimulating Hormone 2.19      Narrative:     TSH testing is performed using different testing methodology at [facility] than at other [facility]. Direct result comparisons should only be made within the same method.                     SERIAL TROPONIN-INITIAL - Normal    Troponin I, High Sensitivity 13      Narrative:     Less than 99th percentile of normal range cutoff-                  Female and children under 18 years old <14 ng/L; Male <21 ng/L: Negative                  Repeat testing should be performed if clinically indicated.                                     Female and children under 18 years old 14-50 ng/L; Male 21-50 ng/L:                  Consistent with possible cardiac damage and possible increased clinical                   risk. Serial measurements may help to assess extent of myocardial damage.                                     >50 ng/L: Consistent with cardiac damage, increased clinical risk and                  myocardial infarction. Serial measurements may help assess extent of                   myocardial damage.                                      NOTE: Children less than 1 year old may have higher baseline troponin                   levels and results should be interpreted in conjunction with the overall                   clinical context.                                     NOTE: Troponin I testing is performed using a different                   testing methodology at [facility] than at other                   [facility]. Direct result comparisons should only                   be made within the same method.   MAGNESIUM - Normal    Magnesium 1.83     C. DIFFICILE, PCR   STOOL PATHOGEN PANEL, PCR   URINE CULTURE   TROPONIN SERIES- (INITIAL, 1 HR)    Narrative:     The  following orders were created for panel order Troponin I Series, High Sensitivity (0, 1 HR).                  Procedure                               Abnormality         Status                                     ---------                               -----------         ------                                     Troponin I, High Sensiti...[[id number]]  Normal              Final result                               Troponin, High Sensitivi...[[id number]]  Abnormal            Final result                                                 Please view results for these tests on the individual orders.   OVA/PARA + GIARDIA/CRYPTOSPORIDIUM ANTIGEN    Narrative:     The following orders were created for panel order Ova/Para + Giardia/Cryptosporidium Antigen.                  Procedure                               Abnormality         Status                                     ---------                               -----------         ------                                     Ova and Parasite Examina...[[id number]]                      In process                                 Giardia Antigen[[id number]]                                  In process                                 Cryptosporidium Antigen,...[[id number]]                      In process                                                   Please view results for these tests on the individual orders.   URINALYSIS WITH REFLEX CULTURE AND MICROSCOPIC    Narrative:     The following orders were created for panel order Urinalysis with Reflex Culture and Microscopic.                  Procedure                               Abnormality         Status                                     ---------                               -----------         ------                                     Urinalysis with Reflex C...[[id number]]  Abnormal            Final result                               Extra Urine Gray Tube[[id number]]                            In process                                                    Please view results for these tests on the individual orders.   OVA AND PARASITE EXAMINATION   GIARDIA ANTIGEN   CRYPTOSPORIDIUM ANTIGEN, STOOL   EXTRA URINE GRAY TUBE   TYPE AND SCREEN   PREPARE RBC     CT abdomen pelvis w IV contrast 7/6/2025  IMPRESSION:  CHEST:      No CT evidence of acute chest process. Interval right anterior chest  wall port placement since 06/03/2025. additional findings as above  similar to the previous exam.      ABDOMEN AND PELVIS:      Rectal air-fluid level consistent with diarrhea and probable distal  sigmoid wall thickening/enhancement consistent with nonspecific  colitis. Clinical correlation suggested.      Multiple bladder calculi near the right UVJ again seen with increased  now moderate hydroureteronephrosis.      Flattening of the upper abdominal IVC which can be seen with  dehydration.        Impression  Diarrhea, rectal bleeding, dehydration, likely diverticulosis as cause of bleeding.  Elevated INR  Plan   Aggressive resuscitation, intravenous fluids, recommend type and cross and transfusion of at least 2 to 3 units of blood cells.  80% of diverticular bleeds will eventually stop.  Recommend oral vitamin K for the elevated INR at 1.7.  Noninvasive hemoglobin every 6 hours.  Follow closely.  Discussed with the medical team who will sign patient out to the virtual critical care service.  Will follow.         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    Bursitis of right shoulder 11/25/2019    Subdeltoid bursitis of right shoulder joint    Cancer (Multi)     Cellulitis 09/03/2023    Diarrhea, unspecified  type 07/06/2025    Diarrhea, unspecified type 07/06/2025    Encounter for general adult medical examination without abnormal findings 04/26/2018    Encounter for Medicare annual wellness exam    GERD (gastroesophageal reflux disease) 05/18/2023    Halitosis 05/22/2019    Halitosis    Hyperglycemia 05/18/2023    Hyperlipidemia 05/18/2023    Hypokalemia 05/18/2023    Localized swelling, mass and lump, head 12/28/2016    Left facial swelling    Localized swelling, mass and lump, head 12/29/2022    Facial mass    Other specified anxiety disorders 12/19/2014    Depression with anxiety    Other specified disorders of nose and nasal sinuses 04/18/2017    Jacque bullosa    Personal history of diseases of the skin and subcutaneous tissue     History of psoriasis    Personal history of other diseases of the circulatory system     History of congestive heart failure    Personal history of other diseases of the circulatory system 11/15/2019    History of uncontrolled hypertension    Personal history of other diseases of the circulatory system     History of hypertension    Personal history of other diseases of the nervous system and sense organs     History of sciatica    Personal history of other diseases of the respiratory system 04/18/2017    History of deviated nasal septum    Personal history of other diseases of the respiratory system 02/27/2017    History of sinusitis    Personal history of other diseases of the respiratory system 05/28/2019    History of chronic sinusitis    Personal history of other diseases of the respiratory system 03/19/2014    History of acute sinusitis    Personal history of other specified conditions 03/26/2013    History of insomnia    Personal history of other specified conditions 07/09/2019    History of nasal congestion    Personal history of other specified conditions 11/17/2016    History of diarrhea    Unspecified blepharitis right eye, unspecified eyelid 01/22/2019    Blepharitis of  eyelid of right eye    Unspecified mycosis 06/18/2019    Fungal sinusitis    Vitamin D deficiency 05/18/2023   [2]   Current Facility-Administered Medications:     acetaminophen (Tylenol) tablet 650 mg, 650 mg, oral, q4h PRN **OR** acetaminophen (Tylenol) oral liquid 650 mg, 650 mg, oral, q4h PRN **OR** acetaminophen (Tylenol) suppository 650 mg, 650 mg, rectal, q4h PRN, SAMUEL Pascal    alum-mag hydroxide-simeth (Mylanta) 200-200-20 mg/5 mL oral suspension 20 mL, 20 mL, oral, q4h PRN, Clemente Lai DO    [Held by provider] amLODIPine (Norvasc) tablet 10 mg, 10 mg, oral, Daily, Edyta Us PA-C    [Held by provider] aspirin EC tablet 81 mg, 81 mg, oral, Daily, Edyta Us PA-C    benzocaine-menthol (Cepastat Sore Throat) lozenge 1 lozenge, 1 lozenge, Mouth/Throat, q2h PRN, Clemente Lai DO    cefepime (Maxipime) 1 g in dextrose 5% IV 50 mL, 1 g, intravenous, q8h, SAMUEL Mayes    [Held by provider] enoxaparin (Lovenox) syringe 40 mg, 40 mg, subcutaneous, q12h MARIOLA, SAMUEL Pascal, 40 mg at 07/07/25 0859    folic acid (Folvite) tablet 1 mg, 1 mg, oral, Daily, Edyta Us PA-C, 1 mg at 07/06/25 2048    [Held by provider] furosemide (Lasix) tablet 20 mg, 20 mg, oral, Daily, Edyta Us PA-C    [Held by provider] losartan (Cozaar) tablet 100 mg, 100 mg, oral, Daily, Edyta Us PA-C    melatonin tablet 10 mg, 10 mg, oral, Daily PRN, Clemente Lai DO    metroNIDAZOLE (Flagyl) 500 mg in sodium chloride (iso)  mL, 500 mg, intravenous, q8h, SAMUEL Mayes, 500 mg at 07/07/25 1652    ondansetron (Zofran) injection 4 mg, 4 mg, intravenous, q4h PRN, Clemente Lai DO    [START ON 7/8/2025] pantoprazole (ProtoNix) EC tablet 40 mg, 40 mg, oral, Daily before breakfast, SAMUEL Mayes    phytonadione (Vitamin K) tablet 2.5 mg, 2.5 mg, oral, Once, Jigar Pérez MD    polyethylene glycol (Glycolax, Miralax) packet 17  g, 17 g, oral, Daily, SAMUEL Pascal    [Held by provider] propranolol LA (Inderal LA) 24 hr capsule 120 mg, 120 mg, oral, Nightly, Edyta Us PA-C    rosuvastatin (Crestor) tablet 10 mg, 10 mg, oral, Nightly, Edyta Us PA-C, 10 mg at 07/06/25 2048    sodium chloride 0.9 % bolus 500 mL, 500 mL, intravenous, Once, SAMUEL Oconnell, Last Rate: 250 mL/hr at 07/07/25 1644, 500 mL at 07/07/25 1644    sodium chloride 0.9% infusion, 10 mL/hr, intravenous, Continuous PRN, Carmine Tee MD    vancomycin (Vancocin) pharmacy to dose - pharmacy monitoring, , miscellaneous, Daily PRN, SAMUEL Mayes    vancomycin (Xellia) 1.5 g in diluent combination  mL, 1,500 mg, intravenous, q12h, SAMUEL Mayes, Stopped at 07/07/25 1152  [3]   Allergies  Allergen Reactions    Ciprofloxacin Unknown    Codeine Unknown and Swelling    Hydrochlorothiazide Unknown    Penicillins Unknown and Swelling    Tetanus Vaccines And Toxoid Swelling

## 2025-07-07 NOTE — CONSULTS
"Nutrition Initial Assessment:   Nutrition Assessment       Malnutrition Screening Tool (MST)  Have you recently lost weight without trying?: No  Weight Loss Score: 0  Have you been eating poorly because of a decreased appetite?: Yes  Malnutrition Score: 1    Patient is a 79 y.o. female presenting with Diarrhea, unspecified type.    PMH: Diverticulosis with hemorrhage, colitis, dehydration, hyperglycemia, hypokalemia, hypomagnesemia, GERD, and HTN.    Nutrition History:  Energy Intake: Poor < 50 %  Pain affecting nutrition status: N/A  Food and Nutrient History: Visited patient at bedside. Her close friend of 73 years was visiting during the assessment. When asked questions, the patient curled up and became quiet, and she shared that she hasn’t had much of an appetite. Her friend confirmed that this has been an ongoing issue for a long time, even before she started chemotherapy.  The patient reported that she enjoys the Italian ice. Reviewed the option of adding Gelatein Plus to help increase protein intake while she is on a clear liquid diet. Provided encouragement and explained how this can support her nutrition needs despite reduced appetite.  Food Allergy:  (NKFA)       Anthropometrics:  Height: 165.1 cm (5' 5\")   Weight: 121 kg (267 lb 3.2 oz)   BMI (Calculated): 44.46  IBW/kg (Dietitian Calculated): 62.73 kg (IBW based on BMI of 23)  Percent of IBW: 195 %       Weight History:   Wt Readings from Last 10 Encounters:   07/09/25 121 kg (267 lb 3.2 oz)   07/01/25 112 kg (248 lb)   06/27/25 113 kg (248 lb 2 oz)   06/23/25 115 kg (253 lb 4.9 oz)   06/23/25 115 kg (253 lb 4.9 oz)   06/19/25 115 kg (253 lb 8.5 oz)   06/13/25 115 kg (252 lb 10.4 oz)   06/06/25 116 kg (255 lb 4.7 oz)   06/04/25 117 kg (257 lb)   05/13/25 117 kg (257 lb)     Weight Change %:  Weight History / % Weight Change: 07/08/25 (122 kg) to 07/01/25 (112 kg), +8.9% gain in ~1 week. 07/08/25 (122 kg) to 06/06/25 (116 kg), +5.2% gain in ~1 " month.  Significant Weight Loss: No    Nutrition Focused Physical Exam Findings:    Subcutaneous Fat Loss:   Defer Subcutaneous Fat Loss Assessment: Defer all  Defer All Reason: Pt resting and did not want exam at this time  Muscle Wasting:  Defer Muscle Wasting Assessment: Defer all  Edema:  Edema: +3 moderate, +2 mild  Edema Location: Generalized Edema: Non-pitting.  RLE Edema: Deep pitting, indentation remains for a short time.  LLE Edema: Deep pitting, indentation remains for a short time.  Physical Findings:  Eyes: Negative  Digestive System Findings: Loose stool    Nutrition Significant Labs:  CBC Trend:   Results from last 7 days   Lab Units 07/09/25  0534 07/08/25  2351 07/08/25  1723 07/08/25  1249   WBC AUTO x10*3/uL 26.4* 33.7* 42.1* 44.1*   RBC AUTO x10*6/uL 2.46* 2.74* 3.12* 3.47*   HEMOGLOBIN g/dL 7.5* 8.3* 9.5* 10.6*   HEMATOCRIT % 21.0* 23.1* 26.1* 29.0*   MCV fL 85 84 84 84   PLATELETS AUTO x10*3/uL 71* 74* 94* 77*    , BMP Trend:   Results from last 7 days   Lab Units 07/09/25  0534 07/08/25  0408 07/07/25  1627 07/07/25  0529   GLUCOSE mg/dL 137* 168* 213* 101*   CALCIUM mg/dL 6.9* 6.9* 7.1* 7.6*   SODIUM mmol/L 134* 135* 134* 134*   POTASSIUM mmol/L 2.9* 3.3* 3.5 3.1*   CO2 mmol/L 19* 20* 23 27   CHLORIDE mmol/L 110* 109* 105 100   BUN mg/dL 20 27* 20 27*   CREATININE mg/dL 0.67 0.75 0.55 0.59   BG POCT trend:   Results from last 7 days   Lab Units 07/08/25  0438   POCT GLUCOSE mg/dL 139*     Nutrition Specific Medications:  Scheduled medications  Scheduled Medications[1]  Continuous medications  Continuous Medications[2]    I/O:   Last BM Date: 07/09/25; Stool Appearance: Bloody (07/09/25 0900)    Dietary Orders (From admission, onward)       Start     Ordered    07/08/25 0659  Adult diet Clear Liquid  Diet effective now        Question:  Diet type  Answer:  Clear Liquid    07/08/25 0658    07/06/25 1825  May Participate in Room Service With Assistance  ( ROOM SERVICE MAY PARTICIPATE WITH  ASSISTANCE)  Once        Question:  .  Answer:  Yes    07/06/25 8305           Estimated Needs:   Total Energy Estimated Needs in 24 hours (kCal): 1881 kCal  Method for Estimating Needs: ~30 kcal/kg of IBW  Total Protein Estimated Needs in 24 Hours (g):  (75-88)  Method for Estimating 24 Hour Protein Needs: 1.2-1.4 g/kg of IBW  Total Fluid Estimated Needs in 24 Hours (mL): 1880 mL  Method for Estimating 24 Hour Fluid Needs: ~1 mL/kcal or per medical team.  Patient on Order Fluid Restriction: No        Nutrition Diagnosis        Nutrition Diagnosis  Patient has Nutrition Diagnosis: Yes  Diagnosis Status (1): New  Nutrition Diagnosis 1: Inadequate oral intake  Related to (1): decreased appetitie and recent nausea  As Evidenced by (1): Clear liquid diet, less than 50% of needs being consumed.       Nutrition Interventions/Recommendations   Nutrition prescription for oral nutrition    Nutrition Recommendations:  Individualized Nutrition Prescription Provided for : Clear liquid diet per provider order and Gelatein Plus    Nutrition Interventions/Goals:   Meals and Snacks: Other (Comment) (Clear liquid diet, increase or evaluate other nutiriton support within 5 days.)  Medical Food Supplement: Commercial food medical food supplement therapy  Goal: Gelatein Plus (160kcal and 20g protein per 4oz)  Coordination of Care with Providers: Provider (IDT rounds; MINDA Oconnell-CNP)    Education Documentation  Nutrition Related Education, taught by Makenna Aguilar, RUFUSN, LD at 7/9/2025 10:36 AM.  Learner: Patient  Readiness: Acceptance  Method: Explanation  Response: Needs Reinforcement, Verbalizes Understanding              Nutrition Monitoring and Evaluation   Intake / Amount of food: Other criteria, Consumes > or equal to 70% of supplement  Criteria: Diet increased within 5 days    Body Weight: Body weight - Maintain stable weight, Body weight - Weight reduction from fluids, as needed         Digestive System  Finding: Loose stool    Goal Status: New goal(s) identified    Time Spent (min): 75 minutes              [1] [Held by provider] amLODIPine, 10 mg, oral, Daily  [Held by provider] aspirin, 81 mg, oral, Daily  cefepime, 1 g, intravenous, q8h  [Held by provider] enoxaparin, 40 mg, subcutaneous, q12h MARIOLA  folic acid, 1 mg, oral, Daily  [Held by provider] furosemide, 20 mg, oral, Daily  [Held by provider] losartan, 100 mg, oral, Daily  metroNIDAZOLE, 500 mg, intravenous, q12h  pantoprazole, 40 mg, intravenous, Daily  phytonadione, 2 mg, intravenous, Once  phytonadione, 5 mg, oral, Once  potassium chloride, 20 mEq, intravenous, q2h  [Held by provider] propranolol LA, 120 mg, oral, Nightly  rosuvastatin, 10 mg, oral, Nightly  sodium chloride 0.9%, 10 mL, intravenous, q12h MARIOLA  vancomycin, 1,250 mg, intravenous, q12h     [2] sodium chloride 0.9%, 10 mL/hr

## 2025-07-07 NOTE — PROGRESS NOTES
Occupational Therapy    Evaluation    Patient Name: Elisha Flores  MRN: [id number]  Department: [facility]  Room: 01/01-A  Today's Date: 7/7/2025  Time Calculation  Start Time: 1227  Stop Time: 1252  Time Calculation (min): 25 min    Assessment  IP OT Assessment  OT Assessment: RN cleared. OT/PT co-eval due to pt complexity and maximize pt safety. OT orders received and occupational profile established via interview method, data gathering, and review of medical records to determine mod complexity and establish OT POC. Pt's prior function included complete ADL/IADL and ambulation independence. Currently, pt presents with deficits including diminished- ADL independence, functional mobility, and task tolerance. Pt would benefit from OT services to address deficit areas to restore QOL and improve safety prior to d/c. Pt positioned back into bed, side lying per request, with alarm on. Needs met, gingerale and ice cold water provided. Emesis bag nearby, cold cloth applied to forehead. RN notified.  Prognosis: Good  Barriers to Discharge Home: Caregiver assistance, Physical needs  Caregiver Assistance: Caregiver assistance needed per identified barriers - however, level of patient's required assistance exceeds assistance available at home  Physical Needs: Stair navigation into home limited by function/safety, Ambulating household distances limited by function/safety, Intermittent mobility assistance needed, Intermittent ADL assistance needed, High falls risk due to function or environment  Evaluation/Treatment Tolerance: Patient limited by fatigue (nausea)  Medical Staff Made Aware: Yes  End of Session Communication: Bedside nurse  End of Session Patient Position: Bed, 3 rail up, Alarm on    Plan:  Treatment Interventions: ADL retraining, UE strengthening/ROM, Functional transfer training, Endurance training, Patient/family training, Neuromuscular reeducation  OT Frequency: 3 times per week (During this acute inpatient  hospitalization)  OT Discharge Recommendations: Moderate intensity level of continued care (Based on current functional status and rehab potential, patient is anticipated to tolerate and benefit from 5 or more days per week of skilled rehabilitative therapy after discharge from this acute inpatient hospitalization.)  Equipment Recommended upon Discharge: Bedside commode  OT Recommended Transfer Status: Stand by assist, Assist of 1  OT - OK to Discharge: Yes    Subjective   Current Problem:  1. Diarrhea, unspecified type        2. Generalized weakness        3. On antineoplastic chemotherapy        4. Invasive ductal carcinoma of breast, female, left        5. Hypokalemia        6. Colitis        7. Kidney stone on right side        8. Sepsis without acute organ dysfunction, due to unspecified organism (Multi)        9. Obesity, morbid (Multi)  Inpatient Consult to Wound and Ostomy Nurse      10. Lower leg edema  Inpatient Consult to Wound and Ostomy Nurse        OT Visit Info:  OT Received On: 07/07/25  General Visit Info:  General  Reason for Referral: Impaired ADLs  Referred By: MINDA Mayes-CNP  Past Medical History Relevant to Rehab: PMH includes-  history of L breast cancer, s/p chemotherapy, last dose on 6/27/2025. Recent admission with c/c of diarrhea.  Co-Treatment: PT  Co-Treatment Reason: pt complexity  Prior to Session Communication: Bedside nurse  Patient Position Received: Bed, 2 rail up, Alarm on  Preferred Learning Style: auditory, verbal  General Comment: Pt side lying in bed, agreeable to work with therapy. Nauseous throughout however cooperative and pleasant.    Precautions:  Medical Precautions: Fall precautions, Infection precautions  Precautions Comment: chemo; r/o C-Diff. Pt cannot lay flat d/t vetigo.     Date/Time Vitals Session Patient Position Pulse Resp SpO2 BP MAP (mmHg)    07/07/25 1600 --  --  119  19  100 %  91/70  78     07/07/25 1630 --  --  113  19  100 %  65/57  62      "07/07/25 1645 --  --  115  21  99 %  81/70  76     07/07/25 1700 --  --  126  25  100 %  85/60  69     07/07/25 1715 --  --  127  27  100 %  84/75  80           Pain:  Pain Assessment  Pain Assessment: 0-10  0-10 (Numeric) Pain Score: 0 - No pain    Objective   Cognition:  Overall Cognitive Status: Impaired  Orientation Level: Disoriented to time (\"2024, no\")  Attention: Within Functional Limits  Memory: Within Funtional Limits  Problem Solving: Within Functional Limits  Abstract Reasoning: Within Functional Limits  Safety/Judgement: Within Functional Limits  Insight: Within function limits    Home Living:  Type of Home: House  Lives With: Adult children (Son)  Home Adaptive Equipment: Walker rolling or standard, Cane (rollator)  Home Layout: One level  Home Access: Stairs to enter with rails  Entrance Stairs-Rails:  (one)  Entrance Stairs-Number of Steps: 5  Bathroom Shower/Tub: Walk-in shower  Bathroom Toilet: Handicapped height  Bathroom Equipment: Grab bars in shower, Shower chair with back  Bathroom Accessibility: No concerns.  Home Living Comments: Laundry first level. Sleeps in reg bed.     Prior Function:  Level of [place]: Independent with ADLs and functional transfers, Independent with homemaking with ambulation  Receives Help From: Family (Son lives with her)  ADL Assistance: Independent  Homemaking Assistance: Independent  Ambulatory Assistance: Independent (Recent use of AD, typically does not use unless absolutely needed.)  Hand Dominance: Left  Prior Function Comments: Over the last few weeks, pt explains increased weakness and fatigue including diarrhea causing daily routine difficulty. Prior to recent admission, pt reports complete ADL/IADL independence and ambulation as well. Intermittent AD use. No falls. Has vertigo, has dealt with since a child. Pt drives, grocery shops, runs errands as needed. Completes outdoor care at home.    IADL History:  Homemaking Responsibilities: Yes  Meal Prep " Responsibility: Primary  Laundry Responsibility: Primary  Cleaning Responsibility: Primary  Bill Paying/Finance Responsibility: Primary  Shopping Responsibility: Primary  Current License: Yes  Mode of Transportation: Car, Family    ADL:  Eating Assistance: Stand by (anticipated)  Eating Deficit: Setup  Grooming Assistance: Stand by (anticipated)  Grooming Deficit: Setup  Bathing Assistance: Moderate (anticipated)  Bathing Deficit: Setup, Supervision/safety, Increased time to complete  (LB>UB)  UE Dressing Assistance: Minimal  UE Dressing Deficit: Fasteners, Pull around back  LE Dressing Assistance: Minimal  LE Dressing Deficit: Don/doff R shoe, Don/doff L shoe  Toileting Assistance with Device: Maximal (anticipated)  Toileting Deficit: Setup, Supervison/safety, Perineal hygiene, Clothing management up, Clothing management down, Increased time to complete    Activity Tolerance:  Endurance: Decreased tolerance for upright activites (limited 2/2 nausea + vertigo s/s)    Bed Mobility/Transfers:   Bed Mobility  Bed Mobility: Yes  Bed Mobility 1  Bed Mobility 1: Supine to sitting, Sitting to supine  Level of Assistance 1: Minimum assistance  Bed Mobility Comments 1: Min A provided to support trunk (OOB), support BLE elevate into bed and use of chucks to pull into more comfortable side-lying position per pt request/comfort (return to bed)  Bed Mobility 2  Bed Mobility  2: Scooting  Level of Assistance 2: Contact guard    Transfers  Transfer: Yes  Transfer 1  Transfer From 1: Bed to  Transfer to 1: Stand  Technique 1: Sit to stand, Stand to sit  Transfer Device 1: Walker, Gait belt  Transfer Level of Assistance 1: Contact guard  Trials/Comments 1: cueing for hand placement/safety with AD    Sitting Balance:  Static Sitting Balance  Static Sitting-Balance Support: Feet supported  Static Sitting-Level of Assistance: Close supervision  Dynamic Sitting Balance  Dynamic Sitting-Balance Support: Feet supported  Dynamic  Sitting-Level of Assistance: Contact guard    Standing Balance:  Static Standing Balance  Static Standing-Balance Support: Bilateral upper extremity supported  Static Standing-Level of Assistance: Contact guard    IADL's:   Homemaking Responsibilities: Yes  Meal Prep Responsibility: Primary  Laundry Responsibility: Primary  Cleaning Responsibility: Primary  Bill Paying/Finance Responsibility: Primary  Shopping Responsibility: Primary  Current License: Yes  Mode of Transportation: Car, Family    Sensation:  Light Touch: No apparent deficits    Strength:  Strength Comments: Needs assessed-- plan prior at EOB but d/t nausea pt return to bed side lying    Coordination:  Movements are Fluid and Coordinated: Yes     Hand Function:  Hand Function  Gross Grasp: Functional  Coordination: Functional    Extremities:   RUE   RUE : Within Functional Limits and LUE   LUE: Within Functional Limits    Outcome Measures:   [facility] Daily Activity  Putting on and taking off regular lower body clothing: A little  Bathing (including washing, rinsing, drying): A lot  Putting on and taking off regular upper body clothing: A little  Toileting, which includes using toilet, bedpan or urinal: A lot  Taking care of personal grooming such as brushing teeth: A little  Eating Meals: A little  Daily Activity - Total Score: 16    Education Documentation  No documentation found.  Education Comments  No comments found.      Goals:   Encounter Problems       Encounter Problems (Active)       ADLs       Patient will perform UB and LB bathing seated/or sponge bath with stand by assist level of assistance.       Start:  07/07/25    Expected End:  07/21/25            Patient with complete upper body dressing with stand by assist level of assistance donning and doffing all UE clothes with PRN adaptive equipment while supported sitting       Start:  07/07/25    Expected End:  07/21/25            Patient with complete lower body dressing with stand by assist  level of assistance donning and doffing all LE clothes  with PRN adaptive equipment while supported sitting       Start:  07/07/25    Expected End:  07/21/25            Patient will complete daily grooming tasks with set-up level of assistance and PRN adaptive equipment while supported sitting.       Start:  07/07/25    Expected End:  07/21/25            Patient will complete toileting including hygiene clothing management/hygiene with stand by assist level of assistance and raised toilet seat, grab bars, and bedside commode.       Start:  07/07/25    Expected End:  07/21/25               BALANCE       Pt will maintain dynamic standing balance during ADL task with supervision level of assistance in order to demonstrate decreased risk of falling and improved postural control.       Start:  07/07/25    Expected End:  07/21/25               TRANSFERS       Patient will perform bed mobility modified independent level of assistance and bed rails in order to improve safety and independence with mobility       Start:  07/07/25    Expected End:  07/21/25            Patient will complete sit to stand transfer with supervision level of assistance and least restrictive device in order to improve safety and prepare for out of bed mobility.       Start:  07/07/25    Expected End:  07/21/25

## 2025-07-07 NOTE — CONSULTS
Inpatient consult to Infectious Diseases  Consult performed by: Ismael Rao MD  Consult ordered by: Danyelle Guo, MINDA-CNP            Primary MD: Shun Hall MD    Reason For Consult  Urinary tract infection    History Of Present Illness  Elisha Flores is a 79 y.o. female presenting with diarrhea.  She has history of breast cancer, s/p chemotherapy, last dose on 6/27/2025.  Onset was a couple of days prior to presentation, gradual, intermittent, with interval worsening.  Chart associated fatigue.  She came to the hospital for further evaluation and management.  She had CT abdomen pelvis which was remarkable for thickening of the colon.  She is on vancomycin, cefepime and Flagyl.  She is still having diarrhea.  She denies any fever or chills.       Past Medical History  She has a past medical history of Abnormal finding of blood chemistry, unspecified (09/19/2014), Allergic dermatitis of unspecified eye, unspecified eyelid (03/12/2018), Benign neoplasm, unspecified site (03/29/2017), Bursitis of right shoulder (11/25/2019), Cancer (Multi), Encounter for general adult medical examination without abnormal findings (04/26/2018), Halitosis (05/22/2019), Localized swelling, mass and lump, head (12/28/2016), Localized swelling, mass and lump, head (12/29/2022), Other specified anxiety disorders (12/19/2014), Other specified disorders of nose and nasal sinuses (04/18/2017), Personal history of diseases of the skin and subcutaneous tissue, Personal history of other diseases of the circulatory system, Personal history of other diseases of the circulatory system (11/15/2019), Personal history of other diseases of the circulatory system, Personal history of other diseases of the nervous system and sense organs, Personal history of other diseases of the respiratory system (04/18/2017), Personal history of other diseases of the respiratory system (02/27/2017), Personal history of other diseases of the  respiratory system (2019), Personal history of other diseases of the respiratory system (2014), Personal history of other specified conditions (2013), Personal history of other specified conditions (2019), Personal history of other specified conditions (2016), Unspecified blepharitis right eye, unspecified eyelid (2019), and Unspecified mycosis (2019).    She has no past medical history of Personal history of irradiation.    Surgical History  She has a past surgical history that includes Hysterectomy (2013); Other surgical history (2022); Other surgical history (2022); Other surgical history (2022); and Breast biopsy (Left, 2025).     Social History     Occupational History    Not on file   Tobacco Use    Smoking status: Former     Current packs/day: 0.00     Types: Cigarettes     Quit date:      Years since quittin.5     Passive exposure: Past    Smokeless tobacco: Never   Vaping Use    Vaping status: Never Used   Substance and Sexual Activity    Alcohol use: Never    Drug use: Never    Sexual activity: Defer     Travel History   Travel since 27    No documented travel since 27           Family History  Family History[1]  Allergies  Ciprofloxacin, Codeine, Hydrochlorothiazide, Penicillins, and Tetanus vaccines and toxoid     Immunization History   Administered Date(s) Administered    COVID-19, mRNA, LNP-S, PF, 30 mcg/0.3 mL dose 2021, 2021, 2021    Flu vaccine (IIV4), preservative free *Check age/dose* 10/23/2015    Flu vaccine, quadrivalent, high-dose, preservative free, age 65y+ (FLUZONE) 10/26/2023    Flu vaccine, trivalent, preservative free, HIGH-DOSE, age 65y+ (Fluzone) 2013, 2019, 10/08/2020, 10/12/2021, 10/25/2022, 10/28/2024    Flu vaccine, trivalent, preservative free, age 6 months and greater (Fluarix/Fluzone/Flulaval) 10/07/2014    Influenza, Unspecified 2009, 2011,  "10/07/2014    Influenza, injectable, quadrivalent 10/19/2018    Pfizer COVID-19 vaccine, 12 years and older, (30mcg/0.3mL) (Comirnaty) 11/27/2023    Pfizer COVID-19 vaccine, bivalent, age 12 years and older (30 mcg/0.3 mL) 11/08/2022    Pfizer Gray Cap SARS-CoV-2 07/11/2022    Pfizer Purple Cap SARS-CoV-2 11/08/2022    Pneumococcal conjugate vaccine, 13-valent (PREVNAR 13) 11/15/2018    Pneumococcal polysaccharide vaccine, 23-valent, age 2 years and older (PNEUMOVAX 23) 08/23/2012    SARS-CoV-2, Unspecified 07/11/2022    Zoster vaccine, recombinant, adult (SHINGRIX) 05/03/2018, 07/21/2018, 08/19/2018    Zoster, Unspecified 05/03/2018, 07/21/2018     Medications  Home medications:  Prescriptions Prior to Admission[2]  Current medications:  Scheduled medications  Scheduled Medications[3]  Continuous medications  Continuous Medications[4]  PRN medications  PRN Medications[5]    Review of Systems   Constitutional:  Negative for chills and fever.   Gastrointestinal:  Positive for diarrhea. Negative for constipation, nausea and vomiting.   All other systems reviewed and are negative.       Objective  Range of Vitals (last 24 hours)  Heart Rate:  [56-74]   Temp:  [35.9 °C (96.6 °F)-36.6 °C (97.9 °F)]   Resp:  [12-34]   BP: ()/()   Height:  [165.1 cm (5' 5\")]   Weight:  [112 kg (246 lb 14.6 oz)]   SpO2:  [94 %-100 %]   Daily Weight  07/06/25 : 112 kg (246 lb 14.6 oz)    Body mass index is 41.09 kg/m².     Physical Exam  Constitutional:       Appearance: Normal appearance.   HENT:      Head: Normocephalic and atraumatic.      Right Ear: External ear normal.      Left Ear: External ear normal.      Nose: Nose normal.   Eyes:      General: No scleral icterus.     Extraocular Movements: Extraocular movements intact.      Conjunctiva/sclera: Conjunctivae normal.   Cardiovascular:      Rate and Rhythm: Normal rate and regular rhythm.      Heart sounds: Normal heart sounds.   Pulmonary:      Effort: Pulmonary effort is " "normal.      Breath sounds: Normal breath sounds.   Abdominal:      General: Bowel sounds are normal.      Palpations: Abdomen is soft.   Musculoskeletal:      Cervical back: Normal range of motion and neck supple.      Right lower leg: No edema.      Left lower leg: No edema.   Skin:     General: Skin is warm and dry.   Neurological:      Mental Status: She is alert and oriented to person, place, and time.   Psychiatric:         Behavior: Behavior normal.          Relevant Results  Outside Hospital Results    Labs  Results from last 72 hours   Lab Units 07/07/25  0529 07/06/25  1223   WBC AUTO x10*3/uL 20.7* 26.1*   HEMOGLOBIN g/dL 9.4* 10.5*   HEMATOCRIT % 27.1* 29.9*   PLATELETS AUTO x10*3/uL 138* 147*   LYMPHO PCT MAN %  --  10.0   MONO PCT MAN %  --  7.0   EOSINO PCT MAN %  --  0.0     Results from last 72 hours   Lab Units 07/07/25  0529 07/06/25  1223   SODIUM mmol/L 134* 133*   POTASSIUM mmol/L 3.1* 2.7*   CHLORIDE mmol/L 100 105   CO2 mmol/L 27 19*   BUN mg/dL 27* 42*   CREATININE mg/dL 0.59 0.90   GLUCOSE mg/dL 101* 130*   CALCIUM mg/dL 7.6* 6.1*   ANION GAP mmol/L 10 12   EGFR mL/min/1.73m*2 >90 66     Results from last 72 hours   Lab Units 07/06/25  1223   ALK PHOS U/L 64   BILIRUBIN TOTAL mg/dL 0.7   PROTEIN TOTAL g/dL 3.7*   ALT U/L 24   AST U/L 19   ALBUMIN g/dL 2.2*     Estimated Creatinine Clearance: 99.6 mL/min (by C-G formula based on SCr of 0.59 mg/dL).  No results found for: \"CRP\", \"SEDRATE\"  No results found for: \"HIV1X2\", \"HIVCONF\", \"DVAZIO4DL\"  Hepatitis C AB   Date Value Ref Range Status   05/13/2025 Nonreactive Nonreactive Final     Comment:     Results from patients taking biotin supplements or receiving high-dose biotin therapy should be interpreted with caution due to possible interference with this test. Providers may contact their local laboratory for further information.     Microbiology  Reviewed-blood cultures pending  Imaging  CT chest abdomen pelvis w IV contrast  Result Date: " 7/6/2025  Interpreted By:  Rosario Herr, STUDY: CT CHEST ABDOMEN PELVIS W IV CONTRAST;  7/6/2025 12:48 pm   INDICATION: Signs/Symptoms:weakness, chemo patient, profuse diarhea, sepsis.     COMPARISON: CT chest 06/03/2025, CT abdomen pelvis 07/01/2025   ACCESSION NUMBER(S): [id number]   ORDERING CLINICIAN: MIKE VILLARREAL   TECHNIQUE: CT of the chest, abdomen, and pelvis was performed. Sagittal and coronal reconstructions were generated.  75 ML Omnipaque 350 intravenous contrast given for the examination.   FINDINGS: CHEST:       CHEST WALL AND LOWER NECK: New right anterior chest wall port with catheter extending to the distal SVC. Slight asymmetric increased density in the lateral right breast similar to the prior exam. No significant axillary lymphadenopathy. Enlarged heterogenous left thyroid lobe similar to the previous exam.   MEDIASTINUM AND GOVIND:  No significant mediastinal or hilar lymphadenopathy. Probable small hiatal hernia.   HEART AND VESSELS:  The heart is normal in size. No significant pericardial effusion. Atherosclerotic calcifications including the coronary arteries.   LUNGS, PLEURA, LARGE AIRWAYS:  Azygous lobe in the medial right upper chest, anatomic variant, again seen. The central airways are patent. Linear opacities in the anterior left mid chest and medial right lung base similar to the prior exam. No new focal airspace consolidation or pleural effusion.   BONES: Smooth likely remote fracture deformity of a lateral right lower ribs similar to the previous exam. Kyphosis and degenerative changes of the thoracic spine.     ABDOMEN/PELVIS:       ABDOMINAL ORGANS:   LIVER: No focal lesion   GALL BLADDER AND BILIARY TREE: Tiny gas containing gallstones. Mild gallbladder wall thickening.   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 Several stones in  aggregate measuring 2.1 cm transversely near the right UVJ again seen however increased now moderate lower pole hydroureteronephrosis. Subcentimeter fat attenuation lesion in the posterior right kidney is similar to the prior exam. No new left renal mass or hydronephrosis within limits of nephrogram phase images.   BOWEL: Mild fluid distention of left mid abdominal small bowel loops. Distal colon diverticulosis. Probable mild distal sigmoid wall thickening and enhancement and air-fluid level in the rectum.   PERITONEUM, RETROPERITONEUM, NODES: No significant free fluid. No free air. No significant retroperitoneal lymphadenopathy.   VESSELS:  Scattered atherosclerotic calcifications. No abdominal aortic aneurysm. Flattening of the upper abdominal IVC which can be seen with dehydration.   PELVIS: Urinary bladder is moderately distended without focal wall thickening. Again conglomeration of stones near the right UVJ similar to the previous exam. 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   ABDOMINAL WALL: No sizable abdominal wall hernia.   BONES: Degenerative changes of the lumbar spine with mild spondylolisthesis and multilevel vacuum discs similar to the previous exam.       CHEST:   No CT evidence of acute chest process. Interval right anterior chest wall port placement since 06/03/2025. additional findings as above similar to the previous exam.   ABDOMEN AND PELVIS:   Rectal air-fluid level consistent with diarrhea and probable distal sigmoid wall thickening/enhancement consistent with nonspecific colitis. Clinical correlation suggested.   Multiple bladder calculi near the right UVJ again seen with increased now moderate hydroureteronephrosis.   Flattening of the upper abdominal IVC which can be seen with dehydration.   Other findings as described above.   MACRO: None.   Signed by: Rosario Herr 7/6/2025 1:21 PM Dictation workstation:   [id number]    CT abdomen pelvis w IV contrast  Result Date: 7/1/2025  STUDY: CT  Abdomen and Pelvis with IV Contrast; 7/1/2025 16:19 INDICATION: Sacral pressure ulcer, diarrhea, abdominal cramping beginning today. COMPARISON: 6/19/2025 CT Abdomen and Pelvis, 6/3/2025 CT Chest Abdomen and Pelvis. ACCESSION NUMBER(S): [id number] ORDERING CLINICIAN: FELI PARK TECHNIQUE: CT of the abdomen and pelvis was performed.  Contiguous axial images were obtained at 3 mm slice thickness through the abdomen and pelvis. Coronal and sagittal reconstructions at 3 mm slice thickness were performed.  Omnipaque 350 75 mL was administered intravenously.  FINDINGS: LOWER CHEST: The cardiac size is normal.  Catheter tip is seen at the cavoatrial junction.  Mild to moderate calcified coronary plaque is noted.  No pericardial effusion.  Lung bases are clear.  ABDOMEN:  LIVER: No hepatomegaly.  Smooth surface contour.  Normal attenuation.  BILE DUCTS: No intrahepatic or extrahepatic biliary ductal dilatation.  GALLBLADDER: Gallbladder is distended and contains noncalcified stones and sludge but is otherwise unremarkable.  STOMACH: No abnormalities identified.  PANCREAS: No masses or ductal dilatation.  SPLEEN: No splenomegaly or focal splenic lesion.  ADRENAL GLANDS: No thickening or nodules.  KIDNEYS AND URETERS: Kidneys are normal in size and location.  There is mild renal cortical thinning bilaterally.  Fat density 1 cm lesion is seen in the upper pole of the right kidney, consistent with an angiomyolipoma.  There is a partially duplicated right renal collecting system with mild right-sided hydronephrosis and hydroureter of the two ureters.  Large calculi are seen at the right ureterovesicular junction measuring 1.1 cm and 1.9 cm in diameter.  PELVIS:  BLADDER: Urinary bladder is moderately distended.  REPRODUCTIVE ORGANS: No abnormalities identified.  BOWEL: Appendix is not identified.  Terminal ileum is unremarkable. Diverticulosis is seen in the sigmoid colon.  VESSELS: Mild calcified plaque is seen in the  abdominal aorta and iliac arteries.  Abdominal aorta is normal in caliber.  PERITONEUM/RETROPERITONEUM/LYMPH NODES: No pneumoperitoneum. No lymphadenopathy.  ABDOMINAL WALL: No abnormalities identified. SOFT TISSUES: No significant abnormal soft tissue lesion or fluid collection is identified in the pelvis adjacent to the sacrum to suggest abscess or large decubitus ulcer.  BONES: Generalized osseous demineralization is noted with moderate to severe disc disease in the thoracic spine and mild to moderate disc disease in the lumbar spine.  Grade 1 retrolisthesis is seen of L1 on L2 with grade 1 anterolisthesis of L2 on L3 and L3 on L4.  Moderate lumbar spine facet arthrosis is seen bilaterally.  No acute fracture or aggressive osseous lesion.    Partially duplicated right renal collecting system with moderate right-sided hydronephrosis and hydroureter secondary to obstructing 1.1 cm and 1.9 cm calculi at the right ureterovesicular junction. Minimal distal colonic diverticulosis. Additional chronic changes as detailed in the body of the report. Signed by Tay Arroyo    ECG 12 lead  Result Date: 6/19/2025  Normal sinus rhythm Left axis deviation Nonspecific T wave abnormality Abnormal ECG No previous ECGs available Confirmed by Clemente Orlando ([id number]) on 6/19/2025 9:33:26 PM    CT abdomen pelvis w IV contrast  Result Date: 6/19/2025  STUDY: CT Abdomen and Pelvis with IV Contrast; 6/19/25 at 2:57 PM INDICATION: Diarrhea. Elevated lipase. Pancreatitis. Weakness. Left breast CA. COMPARISON: Chest XR same date. CT CAP 6/3/25. ACCESSION NUMBER(S): [id number] ORDERING CLINICIAN: RUBI QUIGLEY TECHNIQUE: CT of the abdomen and pelvis was performed.  Contiguous axial images were obtained at 3 mm slice thickness through the abdomen and pelvis. Coronal and sagittal reconstructions at 3 mm slice thickness were performed.  Omnipaque 350 75 mL was administered intravenously.   FINDINGS: LOWER CHEST: No cardiomegaly.  No  pericardial effusion.  Lung bases are clear.  ABDOMEN:  LIVER: No hepatomegaly.  Smooth surface contour.  Normal attenuation.  BILE DUCTS: Common bile duct diameter is 0.8 cm and previously measures 0.3 cm  GALLBLADDER: The gallbladder is present and contains multiple low-density stones. STOMACH: No abnormalities identified.  PANCREAS: No masses or ductal dilatation.  SPLEEN: No splenomegaly or focal splenic lesion.  ADRENAL GLANDS: No thickening or nodules.  KIDNEYS AND URETERS: Kidneys are normal in size and location.  No renal or ureteral calculi. Duplicated right renal collecting system. Mild hydronephrosis lower pole right kidney. Mild right hydroureter.  PELVIS:  BLADDER: Multiple bladder stones. A 0.9 cm stone near the right ureteral orifice. Largest bladder stone measures 1.8 cm  REPRODUCTIVE ORGANS: The uterus is absent  BOWEL: Multiple air and fluid-filled loops of small bowel. Colonic diverticulosis. Liquid stool content within the large bowel  VESSELS: No abnormalities identified.  Abdominal aorta is normal in caliber.  PERITONEUM/RETROPERITONEUM/LYMPH NODES: No free fluid.  No pneumoperitoneum. No lymphadenopathy.  ABDOMINAL WALL: No abnormalities identified. SOFT TISSUES: No abnormalities identified.  BONES: No acute fracture or aggressive osseous lesion.    Prominence of the common bile duct currently measures 0.8 cm and previously measured 0.3 cm on CT scan 6/3/2025. Cholelithiasis. No definitive CT findings of acute pancreatitis though the CT findings can lag behind clinical findings. Mild right hydroureteronephrosis is new. Multiple bladder stones. A 0.9 cm stone is located near the right ureteral orifice. Liquid stool content within the ascending colon. Multiple air and fluid-filled loops of small bowel are present. Findings may represent enterocolitis/diarrhea. Signed by Bill Merrill MD    XR chest 1 view  Result Date: 6/19/2025  STUDY: Chest Radiograph;  [6-; 2:09 pm] INDICATION:  Weakness. COMPARISON: None Available ACCESSION NUMBER(S): [id number] ORDERING CLINICIAN: CLAUDY ANAND TECHNIQUE:  Frontal chest was obtained at 14:08 hours. FINDINGS: There is a port entering from the right with the tip in the superior vena cava. CARDIOMEDIASTINAL SILHOUETTE: Cardiomediastinal silhouette is normal in size and configuration.  LUNGS: There is haziness to the lung bases most likely related overlying soft tissue.  Lungs are clear.  ABDOMEN: No remarkable upper abdominal findings.  BONES: No acute osseous changes.    No evidence of acute infiltrate or effusion. Signed by Apollo Centeno MD     Assessment/Plan   Diarrhea, etiology unclear  Resolving leukocytosis  Colitis, per CT  Multiple bowel calculi with right-sided moderate hydroureteronephrosis  Penicillin allergy    Stool workup-C. difficile, pathogen PCR  Follow-up blood cultures  Urine culture  Urology evaluation  Supportive care  Monitor temperature and WBC  Discontinue isolation if stool workup is negative for contagious pathogen    This is a complex infectious disease issue and the following was performed today (for more details please see the above note): Management decisions reflecting the added complexity (e.g., changes in antimicrobial therapy, infection control strategies).   Ismael Rao MD         [1]   Family History  Problem Relation Name Age of Onset    Cancer Sister branden rain 65    Breast cancer Sister branden rain 50 - 59    Breast cancer Mother's Sister  70 - 79   [2]   Medications Prior to Admission   Medication Sig Dispense Refill Last Dose/Taking    amLODIPine (Norvasc) 10 mg tablet Take 1 tablet (10 mg) by mouth once daily. 90 tablet 0 7/5/2025    doxycycline (Adoxa) 100 mg tablet Take 1 tablet (100 mg) by mouth 2 times a day for 7 days. Take with a full glass of water and do not lie down for at least 30 minutes after 14 tablet 0 7/5/2025    folic acid (Folvite) 1 mg tablet Take 1 tablet (1 mg) by mouth once daily.    7/5/2025    furosemide (Lasix) 20 mg tablet Take 1 tablet (20 mg) by mouth once daily. 90 tablet 1 7/5/2025    losartan (Cozaar) 100 mg tablet Take 1 tablet (100 mg) by mouth once daily. 90 tablet 1 7/5/2025    ondansetron (Zofran) 8 mg tablet Take 1 tablet (8 mg) by mouth every 8 hours if needed for nausea or vomiting. 30 tablet 5 7/5/2025    propranolol LA (Inderal LA) 120 mg 24 hr capsule Take 1 capsule (120 mg) by mouth once daily at bedtime. 90 capsule 0 7/5/2025    rosuvastatin (Crestor) 10 mg tablet Take 1 tablet (10 mg) by mouth once daily. 90 tablet 1 7/5/2025    aspirin 81 mg EC tablet Take 1 tablet (81 mg) by mouth once daily. (Patient not taking: Reported on 6/13/2025)       cetirizine (ZyrTEC) 10 mg tablet Take 1 tablet (10 mg) by mouth once daily. (Patient not taking: Reported on 6/13/2025) 30 tablet 2     cholecalciferol (Vitamin D-3) 5,000 Units tablet Take 1 tablet (125 mcg) by mouth once daily. (Patient not taking: Reported on 6/13/2025)       cyanocobalamin (Vitamin B-12) 1,000 mcg tablet Take 1 tablet (1,000 mcg) by mouth once daily. (Patient not taking: Reported on 6/13/2025)       diphenoxylate-atropine (Lomotil) 2.5-0.025 mg tablet Take 1 tablet by mouth 4 times a day as needed for diarrhea. 60 tablet 3 Unknown    fluticasone (Flonase) 50 mcg/actuation nasal spray Administer 1 spray into each nostril once daily. Shake gently. Before first use, prime pump. After use, clean tip and replace cap. (Patient not taking: Reported on 6/23/2025) 16 g 11     MULTIVITAMIN ORAL Take 1 tablet by mouth once daily. With food (Patient not taking: Reported on 6/13/2025)       OLANZapine (ZyPREXA) 5 mg tablet Take 1 tablet (5 mg) by mouth once daily at bedtime. For 4 days starting the evening of treatment 4 tablet 5     prochlorperazine (Compazine) 10 mg tablet Take 1 tablet (10 mg) by mouth every 6 hours if needed for nausea or vomiting. 30 tablet 5     pyridoxine (Vitamin B-6) 100 mg tablet Take 1 tablet (100  mg) by mouth once daily. (Patient not taking: Reported on 6/13/2025)      [3] [Held by provider] amLODIPine, 10 mg, oral, Daily  aspirin, 81 mg, oral, Daily  enoxaparin, 40 mg, subcutaneous, q12h MARIOLA  folic acid, 1 mg, oral, Daily  [Held by provider] furosemide, 20 mg, oral, Daily  [Held by provider] losartan, 100 mg, oral, Daily  polyethylene glycol, 17 g, oral, Daily  [Held by provider] propranolol LA, 120 mg, oral, Nightly  rosuvastatin, 10 mg, oral, Nightly  vancomycin, 1,500 mg, intravenous, q12h    [4] sodium chloride 0.9%, 150 mL/hr, Last Rate: 150 mL/hr (07/07/25 1006)  sodium chloride 0.9%, 10 mL/hr    [5] PRN medications: acetaminophen **OR** acetaminophen **OR** acetaminophen, alum-mag hydroxide-simeth, benzocaine-menthol, melatonin, ondansetron, sodium chloride 0.9%, vancomycin

## 2025-07-07 NOTE — PROGRESS NOTES
"Confirmed address and pharmacy with patient.  PCP is Dr. Shun Hall.  [facility] following for any home going needs.        07/07/25 1210   Discharge Planning   Living Arrangements Children  (son)   Support Systems Children   Assistance Needed Patient lives in a 1 story house with her son and cats.  She says she is independent with ADLS, IADLS, ambulates with a cane sometimes and doesn't drive.  She says she is usually \"healthy as a horse\".    Patient doesn't use home oxygen or CPAP/BiPAP. Her son helps with transportation and should be able to pick her up on discharge.   Type of Residence Private residence   Number of Stairs to Enter Residence 3   Number of Stairs Within Residence 0   Do you have animals or pets at home? Yes   Type of Animals or Pets cats   Home or Post Acute Services None   Expected Discharge Disposition Home   Does the patient need discharge transport arranged? No   Financial Resource Strain   How hard is it for you to pay for the very basics like food, housing, medical care, and heating? Not very   Housing Stability   In the last 12 months, was there a time when you were not able to pay the mortgage or rent on time? N   In the past 12 months, how many times have you moved where you were living? 0   At any time in the past 12 months, were you homeless or living in a shelter (including now)? N   Transportation Needs   In the past 12 months, has lack of transportation kept you from medical appointments or from getting medications? no   In the past 12 months, has lack of transportation kept you from meetings, work, or from getting things needed for daily living? No   Stroke Family Assessment   Stroke Family Assessment Needed No        07/07/25 1553   Discharge Planning   Home or Post Acute Services Post acute facilities (Rehab/SNF/etc)   Type of Post Acute Facility Services Skilled nursing   Expected Discharge Disposition SNF  (Discussed MOD intensity w/AMPAC 16 from PT with patient.  She would like to " think about it and see how she feels tomorrow.  Patient admitted 7/6-Medicare-List of SNFs given to patient.)

## 2025-07-07 NOTE — NURSING NOTE
0700: assumed care of patient  0715: Dr. Davis at bedside for rounds   0845: Danyelle Guo CNP aware of potassium of 3.1, see new orders, and aware of platelets of 138 and states to continue lovenox at this time.   1230: Patient working with PT/OT at this time   1545: Noted patient to have bright matias blood from rectum with clots, called Danyelle Guo CNP to bedside. Patient afebrile, now tachycardic in the 120's, diastolic BP in the 40's skin color more pallor compared to this morning. See new orders.  1600: Stool occult obtained and walked to lab   1615: Dr. Pérez at bedside for consult  1616: Lab at bedside to obtain CBC, type and screen  1830: Son at bedside

## 2025-07-07 NOTE — PROGRESS NOTES
Physical Therapy    Physical Therapy Evaluation    Patient Name: Elisha Flores  MRN: [id number]  Department: [facility]  Room: 01/01-A  Today's Date: 7/8/2025   Time Calculation  Start Time: 1228  Stop Time: 1246  Time Calculation (min): 18 min    Assessment/Plan   PT Assessment  PT Assessment Results: Decreased strength, Impaired balance, Decreased mobility, Decreased endurance, Obesity, Decreased skin integrity, Decreased cognition  Rehab Prognosis: Good  Barriers to Discharge Home: Caregiver assistance  Caregiver Assistance: Caregiver assistance needed per identified barriers - however, level of patient's required assistance exceeds assistance available at home  Evaluation/Treatment Tolerance: Patient limited by fatigue (dizziness/nausea)  Medical Staff Made Aware: Yes  Strengths: Ability to acquire knowledge  Barriers to Participation: Comorbidities  End of Session Communication: Bedside nurse  Assessment Comment: Adriano 79 y.o presents with weakness and impaired mobility. Pt. lives  with son and is normally IND (has been using cane for past couple of weeks 2/2 weakness). Pt. currently requires modA for sit to supine and would benefit from additional PT to address above noted limitations and prevent further decline.  End of Session Patient Position: Alarm on, Bed, 2 rail up  IP OR SWING BED PT PLAN  Inpatient or Swing Bed: Inpatient  PT Plan  Treatment/Interventions: Transfer training, Bed mobility, Gait training, Stair training, Balance training, Strengthening, Endurance training, Therapeutic exercise, Therapeutic activity, Home exercise program, Neuromuscular re-education  PT Plan: Ongoing PT  PT Frequency: 3 times per week (during  this acute inpatient hospitalization.)  PT Discharge Recommendations: Moderate intensity level of continued care (Based on current functional status and rehab potential, patient is anticipated to tolerate and benefit from 5 or more days per week of skilled rehabilitative therapy  after discharge from this acute inpatient hospitalization.)  PT Recommended Transfer Status: Assist x1  PT - OK to Discharge: Yes Based on completed evaluation and care plan recommendations, no barriers to discharge to next site of care       Subjective     PT Visit Info:  PT Received On: 07/07/25  General Visit Information:  General  Reason for Referral: impaired mobility, diarrhea  Referred By: MINDA Mayes-CNP  Past Medical History Relevant to Rehab: left breast cancer on chemotherapy (last chemo treatment was 6/27/2025), pt. reports vertigo since she was a child (worse now since being in chemo)  Co-Treatment: OT  Co-Treatment Reason: pt. complexity  Prior to Session Communication: Bedside nurse  Patient Position Received: Bed, 2 rail up, Alarm on  General Comment: stanley BP cuff, shahida, pt. dry heaving throughout session  Home Living:  Home Living  Type of Home: House  Lives With:  (son)  Home Adaptive Equipment: Walker rolling or standard, Cane (Rollator)  Home Layout: One level  Home Access: Stairs to enter with rails  Entrance Stairs-Rails:  (one)  Entrance Stairs-Number of Steps: 5  Bathroom Shower/Tub: Walk-in shower  Bathroom Toilet: Handicapped height  Bathroom Equipment: Grab bars around toilet (shower chair)  Prior Level of Function:  Prior Function Per Pt/Caregiver Report  Level of [place]: Independent with ADLs and functional transfers, Independent with homemaking with ambulation  Ambulatory Assistance: Independent (has been using cane for past 2 wks)  Prior Function Comments: + drives; has been sleeping in recliner as she states her bed is too high for her  Precautions:  Precautions  Medical Precautions: Fall precautions, Infection precautions (states she cannot lay flat)  Precautions Comment: chemo; R/O Cdiff precautions      Date/Time Vitals Session Patient Position Pulse Resp SpO2 BP MAP (mmHg)    07/08/25 0700 --  --  122  22  --  123/94  103     07/08/25 0800 --  --  110  22  100  %  100/83  89           Objective   Pain:  Pain Assessment  Pain Assessment: 0-10  0-10 (Numeric) Pain Score: 0 - No pain  Cognition:  Cognition  Overall Cognitive Status: Impaired  Orientation Level: Disoriented to time (could not recall year)    General Assessments:     Activity Tolerance  Endurance: Decreased tolerance for upright activites (limited 2/2 dizziness and nausea)    Sensation  Sensation Comment: denies deficits    Strength  Strength Comments: BLE: grossly 4-/5 (+ generalized weakness)  Coordination  Movements are Fluid and Coordinated: Yes    Static Sitting Balance  Static Sitting-Comment/Number of Minutes: good  Dynamic Sitting Balance  Dynamic Sitting-Comments: good-; with 1 UE support    Static Standing Balance  Static Standing-Comment/Number of Minutes: F+; with BUE support  Dynamic Standing Balance  Dynamic Standing-Comments: F+; with BUE support (recommend WW at this time)  Functional Assessments:  Bed Mobility  Bed Mobility: Yes  Bed Mobility 1  Bed Mobility 1: Supine to sitting  Level of Assistance 1: Minimum assistance  Bed Mobility 2  Bed Mobility  2: Sitting to supine  Level of Assistance 2: Moderate assistance    Transfers  Transfer: Yes  Transfer 1  Technique 1: Sit to stand, Stand to sit  Transfer Device 1: Walker  Transfer Level of Assistance 1: Contact guard  Trials/Comments 1: VC's for hand placement    Ambulation/Gait Training  Ambulation/Gait Training Performed: Yes  Ambulation/Gait Training 1  Surface 1: Level tile  Device 1: Rolling walker  Assistance 1: Contact guard  Quality of Gait 1:  (side steps alongside bed)  Comments/Distance (ft) 1: 3 side steps- not able to tolerate any further 2/2 dizziness and nausea  Extremity/Trunk Assessments:  RLE   RLE : Within Functional Limits  LLE   LLE : Within Functional Limits  Outcome Measures:  [facility] Basic Mobility  Turning from your back to your side while in a flat bed without using bedrails: A little  Moving from lying on your back to  sitting on the side of a flat bed without using bedrails: A little  Moving to and from bed to chair (including a wheelchair): A little  Standing up from a chair using your arms (e.g. wheelchair or bedside chair): A little  To walk in hospital room: A lot  Climbing 3-5 steps with railing: A lot  Basic Mobility - Total Score: 16    FSS-ICU  Ambulation: Walks <50 feet with any assistance x1 or walks any distance with assistance x2 people  Rolling: Minimal assistance (performs 75% or more of task)  Sitting: Supervision or set-up only  Transfer Sit-to-Stand: Minimal assistance (performs 75% or more of task)  Transfer Supine-to-Sit: Supervision or set-up only  Total Score: 19    Encounter Problems       Encounter Problems (Active)       Balance       STG - Maintains dynamic standing balance without upper extremity support with good balance        Start:  07/07/25    Expected End:  07/21/25               Mobility       LTG - Patient will ambulate community distance LORY with LRD        Start:  07/07/25    Expected End:  07/21/25            STG - Patient will ascend and descend four to six stairs IND with 1 rail        Start:  07/07/25    Expected End:  07/21/25               PT Transfers       STG - Patient will perform bed mobility IND        Start:  07/07/25    Expected End:  07/21/25            STG - Patient will transfer sit to and from stand LORY with LRD       Start:  07/07/25    Expected End:  07/21/25               Safety       Pt. will complete 5 STS without UE support <30 seconds        Start:  07/07/25    Expected End:  07/21/25                   Education Documentation  No documentation found.  Education Comments  No comments found.

## 2025-07-07 NOTE — CARE PLAN
The patient's goals for the shift include  to rest     The clinical goals for the shift include patient will tolerate IV antibiotics this shift    Over the shift, the patient had matias red blood per rectum. Dr. Pérez consulted. Occult stool positive. Type and Cross obtained, CBC and CMP obtained. Blood infusing per MAR. Son at bedside and updated on POC.

## 2025-07-07 NOTE — PROGRESS NOTES
Pharmacy Medication History Review    Elisha Flores is a 79 y.o. female admitted for Diarrhea, unspecified type. Pharmacy reviewed the patient's [id number] medications and allergies for accuracy.    The list below reflectives the updated PTA list. Please review each medication in order reconciliation for additional clarification and justification.  Medications Prior to Admission   Medication Sig Dispense Refill Last Dose/Taking    amLODIPine (Norvasc) 10 mg tablet Take 1 tablet (10 mg) by mouth once daily. 90 tablet 0 7/5/2025    doxycycline (Adoxa) 100 mg tablet Take 1 tablet (100 mg) by mouth 2 times a day for 7 days. Take with a full glass of water and do not lie down for at least 30 minutes after 14 tablet 0 7/5/2025    folic acid (Folvite) 1 mg tablet Take 1 tablet (1 mg) by mouth once daily.   7/5/2025    furosemide (Lasix) 20 mg tablet Take 1 tablet (20 mg) by mouth once daily. 90 tablet 1 7/5/2025    losartan (Cozaar) 100 mg tablet Take 1 tablet (100 mg) by mouth once daily. 90 tablet 1 7/5/2025    ondansetron (Zofran) 8 mg tablet Take 1 tablet (8 mg) by mouth every 8 hours if needed for nausea or vomiting. 30 tablet 5 7/5/2025    propranolol LA (Inderal LA) 120 mg 24 hr capsule Take 1 capsule (120 mg) by mouth once daily at bedtime. 90 capsule 0 7/5/2025    rosuvastatin (Crestor) 10 mg tablet Take 1 tablet (10 mg) by mouth once daily. 90 tablet 1 7/5/2025    cetirizine (ZyrTEC) 10 mg tablet Take 1 tablet (10 mg) by mouth once daily. (Patient not taking: Reported on 6/13/2025) 30 tablet 2     diphenoxylate-atropine (Lomotil) 2.5-0.025 mg tablet Take 1 tablet by mouth 4 times a day as needed for diarrhea. 60 tablet 3 Unknown    fluticasone (Flonase) 50 mcg/actuation nasal spray Administer 1 spray into each nostril once daily. Shake gently. Before first use, prime pump. After use, clean tip and replace cap. (Patient not taking: Reported on 6/23/2025) 16 g 11     OLANZapine (ZyPREXA) 5 mg tablet Take 1  tablet (5 mg) by mouth once daily at bedtime. For 4 days starting the evening of treatment (Patient not taking: Reported on 7/7/2025) 4 tablet 5 Not Taking    prochlorperazine (Compazine) 10 mg tablet Take 1 tablet (10 mg) by mouth every 6 hours if needed for nausea or vomiting. (Patient not taking: Reported on 7/7/2025) 30 tablet 5 Not Taking        The list below reflectives the updated allergy list. Please review each documented allergy for additional clarification and justification.  Allergies  Reviewed by Loan Solorio RN on 7/6/2025        Severity Reactions Comments    Ciprofloxacin Not Specified Unknown     Codeine Not Specified Unknown, Swelling     Hydrochlorothiazide Not Specified Unknown     Penicillins Not Specified Unknown, Swelling     Tetanus Vaccines And Toxoid Not Specified Swelling             Below are additional concerns with the patient's PTA list.  Pharmacy virtually reviewed medications using Medication Dispense History (MDH). Pharmacy virtual review is accurate with nursing medication history, therefore patient not interviewed. Unable to verify OTCs.    Rox Barron CPhT  Medication History Pharmacy Technician  JACINTO 8-4:30  Available via [employer] Secure Chat  OR  [phone]

## 2025-07-08 PROBLEM — K57.31 DIVERTICULOSIS OF LARGE INTESTINE WITH HEMORRHAGE: Status: ACTIVE | Noted: 2025-07-08

## 2025-07-08 PROBLEM — K52.9 COLITIS: Status: ACTIVE | Noted: 2025-07-08

## 2025-07-08 LAB
ANION GAP SERPL CALC-SCNC: 9 MMOL/L (ref 10–20)
ATRIAL RATE: 60 BPM
BACTERIA UR CULT: NO GROWTH
BUN SERPL-MCNC: 27 MG/DL (ref 6–23)
C COLI+JEJ+UPSA DNA STL QL NAA+PROBE: NOT DETECTED
C DIF TOX TCDA+TCDB STL QL NAA+PROBE: NOT DETECTED
CALCIUM SERPL-MCNC: 6.9 MG/DL (ref 8.6–10.3)
CHLORIDE SERPL-SCNC: 109 MMOL/L (ref 98–107)
CO2 SERPL-SCNC: 20 MMOL/L (ref 21–32)
CREAT SERPL-MCNC: 0.75 MG/DL (ref 0.5–1.05)
EC STX1 GENE STL QL NAA+PROBE: NOT DETECTED
EC STX2 GENE STL QL NAA+PROBE: NOT DETECTED
EGFRCR SERPLBLD CKD-EPI 2021: 82 ML/MIN/1.73M*2
ERYTHROCYTE [DISTWIDTH] IN BLOOD BY AUTOMATED COUNT: 13.9 % (ref 11.5–14.5)
ERYTHROCYTE [DISTWIDTH] IN BLOOD BY AUTOMATED COUNT: 14 % (ref 11.5–14.5)
ERYTHROCYTE [DISTWIDTH] IN BLOOD BY AUTOMATED COUNT: 14.9 % (ref 11.5–14.5)
ERYTHROCYTE [DISTWIDTH] IN BLOOD BY AUTOMATED COUNT: 15.2 % (ref 11.5–14.5)
ERYTHROCYTE [DISTWIDTH] IN BLOOD BY AUTOMATED COUNT: 15.3 % (ref 11.5–14.5)
GLUCOSE BLD MANUAL STRIP-MCNC: 139 MG/DL (ref 74–99)
GLUCOSE SERPL-MCNC: 168 MG/DL (ref 74–99)
HCT VFR BLD AUTO: 23.1 % (ref 36–46)
HCT VFR BLD AUTO: 26.1 % (ref 36–46)
HCT VFR BLD AUTO: 29 % (ref 36–46)
HCT VFR BLD AUTO: 29.2 % (ref 36–46)
HCT VFR BLD AUTO: 33.3 % (ref 36–46)
HGB BLD-MCNC: 10 G/DL (ref 12–16)
HGB BLD-MCNC: 10.6 G/DL (ref 12–16)
HGB BLD-MCNC: 12 G/DL (ref 12–16)
HGB BLD-MCNC: 8.3 G/DL (ref 12–16)
HGB BLD-MCNC: 9.5 G/DL (ref 12–16)
HOLD SPECIMEN: NORMAL
INR PPP: 1.8 (ref 0.9–1.1)
INR PPP: 1.8 (ref 0.9–1.1)
MCH RBC QN AUTO: 29.8 PG (ref 26–34)
MCH RBC QN AUTO: 30.2 PG (ref 26–34)
MCH RBC QN AUTO: 30.3 PG (ref 26–34)
MCH RBC QN AUTO: 30.4 PG (ref 26–34)
MCH RBC QN AUTO: 30.5 PG (ref 26–34)
MCHC RBC AUTO-ENTMCNC: 34.2 G/DL (ref 32–36)
MCHC RBC AUTO-ENTMCNC: 35.9 G/DL (ref 32–36)
MCHC RBC AUTO-ENTMCNC: 36 G/DL (ref 32–36)
MCHC RBC AUTO-ENTMCNC: 36.4 G/DL (ref 32–36)
MCHC RBC AUTO-ENTMCNC: 36.6 G/DL (ref 32–36)
MCV RBC AUTO: 84 FL (ref 80–100)
MCV RBC AUTO: 87 FL (ref 80–100)
NOROVIRUS GI + GII RNA STL NAA+PROBE: NOT DETECTED
NRBC BLD-RTO: 0.2 /100 WBCS (ref 0–0)
NRBC BLD-RTO: 0.3 /100 WBCS (ref 0–0)
NRBC BLD-RTO: 0.3 /100 WBCS (ref 0–0)
NRBC BLD-RTO: 0.4 /100 WBCS (ref 0–0)
NRBC BLD-RTO: 0.4 /100 WBCS (ref 0–0)
P AXIS: 39 DEGREES
P OFFSET: 186 MS
P ONSET: 133 MS
PLATELET # BLD AUTO: 100 X10*3/UL (ref 150–450)
PLATELET # BLD AUTO: 74 X10*3/UL (ref 150–450)
PLATELET # BLD AUTO: 77 X10*3/UL (ref 150–450)
PLATELET # BLD AUTO: 86 X10*3/UL (ref 150–450)
PLATELET # BLD AUTO: 94 X10*3/UL (ref 150–450)
POTASSIUM SERPL-SCNC: 3.3 MMOL/L (ref 3.5–5.3)
PR INTERVAL: 176 MS
PROTHROMBIN TIME: 20.3 SECONDS (ref 9.8–12.4)
PROTHROMBIN TIME: 20.4 SECONDS (ref 9.8–12.4)
Q ONSET: 221 MS
QRS COUNT: 10 BEATS
QRS DURATION: 98 MS
QT INTERVAL: 450 MS
QTC CALCULATION(BAZETT): 450 MS
QTC FREDERICIA: 450 MS
R AXIS: -12 DEGREES
RBC # BLD AUTO: 2.74 X10*6/UL (ref 4–5.2)
RBC # BLD AUTO: 3.12 X10*6/UL (ref 4–5.2)
RBC # BLD AUTO: 3.36 X10*6/UL (ref 4–5.2)
RBC # BLD AUTO: 3.47 X10*6/UL (ref 4–5.2)
RBC # BLD AUTO: 3.98 X10*6/UL (ref 4–5.2)
RV RNA STL NAA+PROBE: NOT DETECTED
SALMONELLA DNA STL QL NAA+PROBE: NOT DETECTED
SHIGELLA DNA SPEC QL NAA+PROBE: NOT DETECTED
SODIUM SERPL-SCNC: 135 MMOL/L (ref 136–145)
T AXIS: 38 DEGREES
T OFFSET: 446 MS
V CHOLERAE DNA STL QL NAA+PROBE: NOT DETECTED
VANCOMYCIN SERPL-MCNC: 22.5 UG/ML (ref 5–20)
VENTRICULAR RATE: 60 BPM
WBC # BLD AUTO: 31.3 X10*3/UL (ref 4.4–11.3)
WBC # BLD AUTO: 33.7 X10*3/UL (ref 4.4–11.3)
WBC # BLD AUTO: 42.1 X10*3/UL (ref 4.4–11.3)
WBC # BLD AUTO: 42.3 X10*3/UL (ref 4.4–11.3)
WBC # BLD AUTO: 44.1 X10*3/UL (ref 4.4–11.3)
Y ENTEROCOL DNA STL QL NAA+PROBE: NOT DETECTED

## 2025-07-08 PROCEDURE — 87081 CULTURE SCREEN ONLY: CPT | Mod: GENLAB | Performed by: NURSE PRACTITIONER

## 2025-07-08 PROCEDURE — 2500000004 HC RX 250 GENERAL PHARMACY W/ HCPCS (ALT 636 FOR OP/ED): Mod: IPSPLIT | Performed by: NURSE PRACTITIONER

## 2025-07-08 PROCEDURE — 85027 COMPLETE CBC AUTOMATED: CPT | Mod: IPSPLIT | Performed by: NURSE PRACTITIONER

## 2025-07-08 PROCEDURE — 2500000004 HC RX 250 GENERAL PHARMACY W/ HCPCS (ALT 636 FOR OP/ED): Mod: JW,IPSPLIT | Performed by: INTERNAL MEDICINE

## 2025-07-08 PROCEDURE — 2020000001 HC ICU ROOM DAILY: Mod: IPSPLIT

## 2025-07-08 PROCEDURE — 99233 SBSQ HOSP IP/OBS HIGH 50: CPT | Performed by: NURSE PRACTITIONER

## 2025-07-08 PROCEDURE — 82947 ASSAY GLUCOSE BLOOD QUANT: CPT | Mod: IPSPLIT

## 2025-07-08 PROCEDURE — 80202 ASSAY OF VANCOMYCIN: CPT | Mod: IPSPLIT | Performed by: NURSE PRACTITIONER

## 2025-07-08 PROCEDURE — 85610 PROTHROMBIN TIME: CPT | Mod: IPSPLIT | Performed by: NURSE PRACTITIONER

## 2025-07-08 PROCEDURE — 2500000004 HC RX 250 GENERAL PHARMACY W/ HCPCS (ALT 636 FOR OP/ED): Mod: IPSPLIT | Performed by: HOSPITALIST

## 2025-07-08 PROCEDURE — 80048 BASIC METABOLIC PNL TOTAL CA: CPT | Mod: IPSPLIT | Performed by: NURSE PRACTITIONER

## 2025-07-08 PROCEDURE — 2500000004 HC RX 250 GENERAL PHARMACY W/ HCPCS (ALT 636 FOR OP/ED): Mod: IPSPLIT | Performed by: SURGERY

## 2025-07-08 PROCEDURE — 94760 N-INVAS EAR/PLS OXIMETRY 1: CPT | Mod: IPSPLIT

## 2025-07-08 PROCEDURE — 2500000002 HC RX 250 W HCPCS SELF ADMINISTERED DRUGS (ALT 637 FOR MEDICARE OP, ALT 636 FOR OP/ED): Mod: IPSPLIT | Performed by: NURSE PRACTITIONER

## 2025-07-08 PROCEDURE — 37799 UNLISTED PX VASCULAR SURGERY: CPT | Mod: IPSPLIT | Performed by: NURSE PRACTITIONER

## 2025-07-08 PROCEDURE — 2500000004 HC RX 250 GENERAL PHARMACY W/ HCPCS (ALT 636 FOR OP/ED): Mod: IPSPLIT | Performed by: INTERNAL MEDICINE

## 2025-07-08 PROCEDURE — 99233 SBSQ HOSP IP/OBS HIGH 50: CPT | Performed by: SURGERY

## 2025-07-08 RX ORDER — PHYTONADIONE 5 MG/1
5 TABLET ORAL ONCE
Status: DISCONTINUED | OUTPATIENT
Start: 2025-07-08 | End: 2025-07-10

## 2025-07-08 RX ORDER — MIDAZOLAM HYDROCHLORIDE 2 MG/2ML
1 INJECTION, SOLUTION INTRAMUSCULAR; INTRAVENOUS EVERY 6 HOURS
Status: DISCONTINUED | OUTPATIENT
Start: 2025-07-08 | End: 2025-07-08

## 2025-07-08 RX ORDER — METRONIDAZOLE 500 MG/100ML
500 INJECTION, SOLUTION INTRAVENOUS EVERY 12 HOURS
Status: DISCONTINUED | OUTPATIENT
Start: 2025-07-08 | End: 2025-07-09

## 2025-07-08 RX ORDER — DIAZEPAM 5 MG/1
5 TABLET ORAL EVERY 8 HOURS PRN
Status: DISCONTINUED | OUTPATIENT
Start: 2025-07-08 | End: 2025-07-14 | Stop reason: HOSPADM

## 2025-07-08 RX ORDER — METOCLOPRAMIDE HYDROCHLORIDE 5 MG/ML
10 INJECTION INTRAMUSCULAR; INTRAVENOUS EVERY 6 HOURS PRN
Status: DISCONTINUED | OUTPATIENT
Start: 2025-07-08 | End: 2025-07-14 | Stop reason: HOSPADM

## 2025-07-08 RX ORDER — MIDAZOLAM HYDROCHLORIDE 2 MG/2ML
1 INJECTION, SOLUTION INTRAMUSCULAR; INTRAVENOUS ONCE
Status: COMPLETED | OUTPATIENT
Start: 2025-07-08 | End: 2025-07-08

## 2025-07-08 RX ORDER — POTASSIUM CHLORIDE 20 MEQ/1
40 TABLET, EXTENDED RELEASE ORAL ONCE
Status: COMPLETED | OUTPATIENT
Start: 2025-07-08 | End: 2025-07-08

## 2025-07-08 RX ORDER — VANCOMYCIN 1.75 G/350ML
1250 INJECTION, SOLUTION INTRAVENOUS EVERY 12 HOURS
Status: DISCONTINUED | OUTPATIENT
Start: 2025-07-08 | End: 2025-07-09

## 2025-07-08 RX ORDER — SODIUM CHLORIDE 9 MG/ML
100 INJECTION, SOLUTION INTRAVENOUS CONTINUOUS
Status: ACTIVE | OUTPATIENT
Start: 2025-07-08 | End: 2025-07-09

## 2025-07-08 RX ORDER — PANTOPRAZOLE SODIUM 40 MG/10ML
40 INJECTION, POWDER, LYOPHILIZED, FOR SOLUTION INTRAVENOUS DAILY
Status: DISCONTINUED | OUTPATIENT
Start: 2025-07-08 | End: 2025-07-10

## 2025-07-08 RX ORDER — SODIUM CHLORIDE 0.9 % (FLUSH) 0.9 %
10 SYRINGE (ML) INJECTION EVERY 12 HOURS SCHEDULED
Status: DISCONTINUED | OUTPATIENT
Start: 2025-07-08 | End: 2025-07-14 | Stop reason: HOSPADM

## 2025-07-08 RX ORDER — SODIUM CHLORIDE 0.9 % (FLUSH) 0.9 %
10 SYRINGE (ML) INJECTION AS NEEDED
Status: DISCONTINUED | OUTPATIENT
Start: 2025-07-08 | End: 2025-07-14 | Stop reason: HOSPADM

## 2025-07-08 RX ADMIN — VANCOMYCIN 1250 MG: 1.75 INJECTION, SOLUTION INTRAVENOUS at 20:42

## 2025-07-08 RX ADMIN — METOCLOPRAMIDE HYDROCHLORIDE 10 MG: 5 INJECTION INTRAMUSCULAR; INTRAVENOUS at 20:42

## 2025-07-08 RX ADMIN — CEFEPIME HYDROCHLORIDE 1 G: 1 INJECTION, SOLUTION INTRAVENOUS at 01:16

## 2025-07-08 RX ADMIN — METRONIDAZOLE 500 MG: 5 INJECTION, SOLUTION INTRAVENOUS at 01:16

## 2025-07-08 RX ADMIN — ONDANSETRON 4 MG: 2 INJECTION INTRAMUSCULAR; INTRAVENOUS at 17:24

## 2025-07-08 RX ADMIN — SODIUM CHLORIDE 75 ML/HR: 0.9 INJECTION, SOLUTION INTRAVENOUS at 01:10

## 2025-07-08 RX ADMIN — Medication 10 ML: at 12:59

## 2025-07-08 RX ADMIN — ONDANSETRON 4 MG: 2 INJECTION INTRAMUSCULAR; INTRAVENOUS at 11:29

## 2025-07-08 RX ADMIN — POTASSIUM CHLORIDE 40 MEQ: 1500 TABLET, EXTENDED RELEASE ORAL at 10:32

## 2025-07-08 RX ADMIN — METRONIDAZOLE 500 MG: 5 INJECTION, SOLUTION INTRAVENOUS at 08:22

## 2025-07-08 RX ADMIN — PANTOPRAZOLE SODIUM 40 MG: 40 INJECTION, POWDER, LYOPHILIZED, FOR SOLUTION INTRAVENOUS at 09:10

## 2025-07-08 RX ADMIN — VANCOMYCIN 1.5 G: 1.5 INJECTION, SOLUTION INTRAVENOUS at 08:22

## 2025-07-08 RX ADMIN — CEFEPIME HYDROCHLORIDE 1 G: 1 INJECTION, SOLUTION INTRAVENOUS at 17:11

## 2025-07-08 RX ADMIN — Medication 10 ML: at 22:27

## 2025-07-08 RX ADMIN — CEFEPIME HYDROCHLORIDE 1 G: 1 INJECTION, SOLUTION INTRAVENOUS at 09:07

## 2025-07-08 RX ADMIN — METRONIDAZOLE 500 MG: 5 INJECTION, SOLUTION INTRAVENOUS at 22:27

## 2025-07-08 RX ADMIN — MIDAZOLAM HYDROCHLORIDE 1 MG: 1 INJECTION, SOLUTION INTRAMUSCULAR; INTRAVENOUS at 03:22

## 2025-07-08 RX ADMIN — SODIUM CHLORIDE 100 ML/HR: 0.9 INJECTION, SOLUTION INTRAVENOUS at 12:23

## 2025-07-08 RX ADMIN — PHYTONADIONE 5 MG: 10 INJECTION, EMULSION INTRAMUSCULAR; INTRAVENOUS; SUBCUTANEOUS at 21:44

## 2025-07-08 ASSESSMENT — PAIN - FUNCTIONAL ASSESSMENT
PAIN_FUNCTIONAL_ASSESSMENT: 0-10

## 2025-07-08 ASSESSMENT — PAIN SCALES - GENERAL
PAINLEVEL_OUTOF10: 0 - NO PAIN

## 2025-07-08 NOTE — NURSING NOTE
Assumed care of patient. Patient has no complaints. Call light is within reach. Bed alarm is on and active. Nurse will continue to medicate and monitor per MD order.   750 Patient bathed. Small amount of blood coming from patient backside. Nurse will continue to monitor.

## 2025-07-08 NOTE — PROGRESS NOTES
Physical Therapy                 Therapy Communication Note    Patient Name: Elisha Flores  MRN: [id number]  Department: [facility]  Room: 01/01-A  Today's Date: 7/8/2025     Discipline: Physical Therapy    Missed Visit: PT Missed Visit: Yes     Missed Visit Reason: Missed Visit Reason: Patient sleeping (RN notified therapist pt up all night and got 3 bag of blood, just fell asleep and might need to rest a while, requested PTA return later in the afternoon.)    Missed Time: Attempt    Comment:

## 2025-07-08 NOTE — PROGRESS NOTES
Vancomycin Dosing by Pharmacy- FOLLOW UP    Elisha Flores is a 79 y.o. year old female who Pharmacy has been consulted for vancomycin dosing for other Sepsis. Based on the patient's indication and renal status this patient is being dosed based on a goal AUC of 400-600.     Renal function is currently stable.    Current vancomycin dose: 1250 mg given every 12 hours    Estimated vancomycin AUC on current dose: 590 mg/L.hr     Visit Vitals  /83   Pulse 110   Temp 36.6 °C (97.9 °F) (Temporal)   Resp 22        Lab Results   Component Value Date    CREATININE 0.75 2025    CREATININE 0.55 2025    CREATININE 0.59 2025    CREATININE 0.90 2025        Patient weight is as follows:   Vitals:    25 0530   Weight: 122 kg (269 lb 13.5 oz)       Cultures:  No results found for the encounter in last 14 days.       I/O last 3 completed shifts:  In: 6828 (55.8 mL/kg) [I.V.:3470.7 (28.4 mL/kg); Blood:1057.3; IV Piggyback:2300]  Out: 2660 (21.7 mL/kg) [Urine:2260 (0.5 mL/kg/hr); Stool:400]  Weight: 122.4 kg   I/O during current shift:  No intake/output data recorded.    Temp (24hrs), Av.1 °C (96.9 °F), Min:35.3 °C (95.5 °F), Max:37.5 °C (99.5 °F)      Assessment/Plan    Above goal AUC. Orders placed for new vancomcyin regimen of 1250 mg every 12 hours to begin at 20:30.    This dosing regimen is predicted by [employer] to result in the following pharmacokinetic parameters:  Loading dose: N/A  Regimen: 1250 mg IV every 12 hours.  Start time: 20:22 on 2025  Exposure target: AUC24 (range) 400-600 mg/L.hr   CFD06-60: 595 mg/L.hr  AUC24,ss: 590 mg/L.hr  Probability of AUC24 > 400: 100 %  Ctrough,ss: 16.5 mg/L  Probability of Ctrough,ss > 20: 32 %    The next level will be obtained on 25 at 05:00. May be obtained sooner if clinically indicated.   Will continue to monitor renal function daily while on vancomycin and order serum creatinine at least every 48 hours if not already ordered.  Follow  for continued vancomycin needs, clinical response, and signs/symptoms of toxicity.       Saige Peter, PharmD

## 2025-07-08 NOTE — CARE PLAN
"The patient's goals for the shift include \"go home and [place] to [place]    The clinical goals for the shift include Pt will have improved Hgb level by end of shift. Pt will have decreased amount of bloody stools by end of shift.        "

## 2025-07-08 NOTE — PROGRESS NOTES
"Elisha Flores is a 79 y.o. female on day 2 of admission presenting with Diarrhea, unspecified type.    Subjective   Feels better.  Received 3 units of packed cells overnight.  Warmer.  Still having maroon stool       Objective     Physical Exam  Constitutional:       Appearance: Normal appearance.   Abdominal:      Palpations: Abdomen is soft. There is no mass.      Tenderness: There is no abdominal tenderness. There is no guarding.     Maroon stool present with old blood.    Last Recorded Vitals  Blood pressure (!) 62/50, pulse (!) 116, temperature 36.1 °C (97 °F), temperature source Temporal, resp. rate 15, height 1.651 m (5' 5\"), weight 122 kg (269 lb 13.5 oz), SpO2 98%.  Intake/Output last 3 Shifts:  I/O last 3 completed shifts:  In: 3777.8 (31.1 mL/kg) [I.V.:2900 (23.8 mL/kg); Blood:177.8; IV Piggyback:700]  Out: 1990 (16.4 mL/kg) [Urine:1990 (0.5 mL/kg/hr)]  Weight: 121.6 kg     Relevant Results    H&H 12.0 and 33.3  White blood cell count 42.3.          Assessment & Plan  Diarrhea, unspecified type    Benign essential hypertension    Anxiety and depression    GERD (gastroesophageal reflux disease)    Hyperglycemia    Hyperlipidemia    Hypokalemia    Vitamin D deficiency    Arthritis    Cellulitis    Personal history of other specified conditions    Urinary tract infection    Weakness    Kidney stones    Dehydration    Hypomagnesemia    Rectal bleeding    Diverticulosis of large intestine with hemorrhage    Plan-follow conservatively.  Follow H&H.  Continue antibiotics white blood cell count.  Would discuss patient's case with oncology regarding elevated white cell count and recent chemotherapy.  No indication for endoscopy at this time.      Jigar Pérez MD    "

## 2025-07-08 NOTE — PROGRESS NOTES
Occupational Therapy                 Therapy Communication Note    Patient Name: Elisha Flores  MRN: [id number]  Department: [facility]  Room: 01/01-A  Today's Date: 7/8/2025     Discipline: Occupational Therapy    Missed Visit:  Yes    Missed Visit Reason:  Pt placed on medical hold (Attempt at 828 - Per RN, hold this morning. Pt received 3 bags of blood overnightand just fell asleep. Attempt at 1302 - Per RN, pt is having bloody stools at this time. Continue to hold. Will follow up per pt status and as schedule allows.)    Missed Time: [id number], 1302

## 2025-07-08 NOTE — PROGRESS NOTES
07/08/25 1449   Discharge Planning   Living Arrangements Children  (Son)   Home or Post Acute Services Post acute facilities (Rehab/SNF/etc)   Type of Post Acute Facility Services Skilled nursing   Expected Discharge Disposition SNF  (Discussed MOD intensity w/AMPAC 16 from PT with patient and her son Kashif. He wants to review it. Patient is leaning to going to SNF. Patient admitted 7/6-Medicare.)   Patient Choice   Provider Choice list and CMS website (https://medicare.gov/care-compare#search) for post-acute Quality and Resource Measure Data were provided and reviewed with: Patient;Family   Intensity of Service   Intensity of Service 0-30 min

## 2025-07-08 NOTE — PROGRESS NOTES
Elisha Flores is a 79 y.o. female on day 2 of admission presenting with Diarrhea, unspecified type.    Subjective   She is resting in bed this morning, alert to herself, pleasant and able to answer questions with short answers.  She states that she is having a little bit of abdominal pain but is nontender on assessment.  Nursing states she had more maroon stool this morning, will continue to monitor her labs.  Surgery following.     Objective     Physical Exam  Constitutional:       General: She is not in acute distress.     Appearance: Normal appearance. She is obese. She is not toxic-appearing.   HENT:      Head: Normocephalic and atraumatic.      Mouth/Throat:      Mouth: Mucous membranes are moist.   Eyes:      Extraocular Movements: Extraocular movements intact.      Pupils: Pupils are equal, round, and reactive to light.   Cardiovascular:      Rate and Rhythm: Regular rhythm. Tachycardia present.      Pulses: Normal pulses.      Heart sounds: No murmur heard.     No gallop.   Pulmonary:      Effort: Pulmonary effort is normal. No respiratory distress.      Breath sounds: Normal breath sounds. No wheezing, rhonchi or rales.   Abdominal:      General: Bowel sounds are normal. There is no distension.      Palpations: Abdomen is soft.      Tenderness: There is no abdominal tenderness. There is no guarding or rebound.   Musculoskeletal:         General: No swelling, tenderness, deformity or signs of injury. Normal range of motion.      Cervical back: Normal range of motion and neck supple.   Skin:     General: Skin is warm and dry.      Capillary Refill: Capillary refill takes less than 2 seconds.      Coloration: Skin is pale. Skin is not jaundiced.      Findings: No bruising or rash.   Neurological:      General: No focal deficit present.      Mental Status: She is alert. She is disoriented.      Cranial Nerves: No cranial nerve deficit.      Sensory: No sensory deficit.      Motor: Weakness present.      Gait:  "Gait normal.   Psychiatric:         Mood and Affect: Mood normal.         Behavior: Behavior normal.         Thought Content: Thought content normal.         Judgment: Judgment normal.         Last Recorded Vitals  Blood pressure (!) 139/115, pulse (!) 112, temperature 36.6 °C (97.9 °F), temperature source Temporal, resp. rate 16, height 1.651 m (5' 5\"), weight 122 kg (269 lb 13.5 oz), SpO2 95%.  Intake/Output last 3 Shifts:  I/O last 3 completed shifts:  In: 6828 (55.8 mL/kg) [I.V.:3470.7 (28.4 mL/kg); Blood:1057.3; IV Piggyback:2300]  Out: 2660 (21.7 mL/kg) [Urine:2260 (0.5 mL/kg/hr); Stool:400]  Weight: 122.4 kg     Scheduled medications  Scheduled Medications[1]  Continuous medications  Continuous Medications[2]  PRN medications  PRN Medications[3]    Relevant Results  CT chest abdomen pelvis w IV contrast  Result Date: 7/6/2025  CHEST:   No CT evidence of acute chest process. Interval right anterior chest wall port placement since 06/03/2025. additional findings as above similar to the previous exam.   ABDOMEN AND PELVIS:   Rectal air-fluid level consistent with diarrhea and probable distal sigmoid wall thickening/enhancement consistent with nonspecific colitis. Clinical correlation suggested.   Multiple bladder calculi near the right UVJ again seen with increased now moderate hydroureteronephrosis.   Flattening of the upper abdominal IVC which can be seen with dehydration.   Other findings as described above.   MACRO: None.   Signed by: Rosario Herr 7/6/2025 1:21 PM Dictation workstation:   [id number]    CT abdomen pelvis w IV contrast  Result Date: 7/1/2025  Partially duplicated right renal collecting system with moderate right-sided hydronephrosis and hydroureter secondary to obstructing 1.1 cm and 1.9 cm calculi at the right ureterovesicular junction. Minimal distal colonic diverticulosis. Additional chronic changes as detailed in the body of the report. Signed by Tay Arroyo     Latest Reference Range & " Units 07/06/25 12:23 07/06/25 13:36 07/07/25 05:29 07/07/25 16:27 07/08/25 04:08   GLUCOSE 74 - 99 mg/dL 130 (H)  101 (H) 213 (H) 168 (H)   SODIUM 136 - 145 mmol/L 133 (L)  134 (L) 134 (L) 135 (L)   POTASSIUM 3.5 - 5.3 mmol/L 2.7 (LL)  3.1 (L) 3.5 3.3 (L)   CHLORIDE 98 - 107 mmol/L 105  100 105 109 (H)   Bicarbonate 21 - 32 mmol/L 19 (L)  27 23 20 (L)   Anion Gap 10 - 20 mmol/L 12  10 10 9 (L)   Blood Urea Nitrogen 6 - 23 mg/dL 42 (H)  27 (H) 20 27 (H)   Creatinine 0.50 - 1.05 mg/dL 0.90  0.59 0.55 0.75   EGFR >60 mL/min/1.73m*2 66  >90 >90 82   Calcium 8.6 - 10.3 mg/dL 6.1 (L)  7.6 (L) 7.1 (L) 6.9 (L)   Albumin 3.4 - 5.0 g/dL 2.2 (L)   2.2 (L)    Alkaline Phosphatase 33 - 136 U/L 64   63    ALT 7 - 45 U/L 24   23    AST 9 - 39 U/L 19   21    Bilirubin Total 0.0 - 1.2 mg/dL 0.7   0.6    Total Protein 6.4 - 8.2 g/dL 3.7 (L)   3.5 (L)    MAGNESIUM 1.60 - 2.40 mg/dL 1.17 (L)  1.83     Lactate 0.4 - 2.0 mmol/L 0.7       LIPASE 9 - 82 U/L 70       Troponin I, High Sensitivity 0 - 13 ng/L 13 15 (H)         [facility] Reference Range & Units 07/07/25 16:27 07/07/25 21:35 07/08/25 00:00 07/08/25 04:08   WBC 4.4 - 11.3 x10*3/uL 28.7 (H) 47.9 (H) 31.3 (H) 42.3 (H)   nRBC 0.0 - 0.0 /100 WBCs 0.2 (H) 0.3 (H) 0.4 (H) 0.4 (H)   RBC 4.00 - 5.20 x10*6/uL 2.24 (L) 2.87 (L) 3.36 (L) 3.98 (L)   HEMOGLOBIN 12.0 - 16.0 g/dL 7.0 (L) 8.7 (L) 10.0 (L) 12.0   HEMATOCRIT 36.0 - 46.0 % 19.9 (L) 25.3 (L) 29.2 (L) 33.3 (L)   MCV 80 - 100 fL 89 88 87 84   MCH 26.0 - 34.0 pg 31.3 30.3 29.8 30.2   MCHC 32.0 - 36.0 g/dL 35.2 34.4 34.2 36.0   RED CELL DISTRIBUTION WIDTH 11.5 - 14.5 % 15.7 (H) 14.6 (H) 13.9 14.0   Platelets 150 - 450 x10*3/uL 170 161 86 (L) 100 (L)     Assessment & Plan  Diarrhea, unspecified type    Benign essential hypertension    GERD (gastroesophageal reflux disease)    Hyperlipidemia    Hyperglycemia    Hypokalemia    Weakness    Kidney stones    Dehydration    Hypomagnesemia    Rectal bleeding    Diverticulosis of large intestine  with hemorrhage    Colitis    Diarrhea  Weakness  Kidney stone  Dehydration  Colitis  Leukocytosis  -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 Clinical correlation suggested. Multiple bladder calculi near the right UVJ again seen with increased now moderate hydroureteronephrosis. Flattening of the upper abdominal IVC which can be seen with dehydration.  -Right UVJ stones- can follow up with DR. Zapata- urology  OP   -C-diff negative  -Stool pathogens pending  -1 L NS given in the ED  -Cefepime, Flagyl, vancomycin given in the ED  -Continue cefepime (Maxipime) 1 g in dextrose 5% IV 50 mL   -Continue metroNIDAZOLE (Flagyl) 500 mg in sodium chloride  -Continue vancomycin (Xellia) 1.5 g in diluent combination  mL    -WBC 26.1 > 20.7 > 42.3  -ID consulted, appreciate recommendations  -Intake and Output  -Daily weights  -Rectal tube, dc'd  -Urine culture pending   -blood cx NGTD  -Monitor fever and WBC  -Daily CBC  -sodium chloride 0.9% infusion  150 ml/hr  -message sent to Dr Bustillos regarding WBC elevations     Rectal Bleeding   -Began to have profuse bleeding  -Dr. Pérez consulted  -Hemoglobin 9.4 > 7.0  -500ml NS bolus   -Lovenox, ASA held  -PT/ INR 18.4/ 1.7   -Vitamin K given  -3 units RBC ordered, given  -CBC between each unit   -CBC q 6 hours  -Monitor      Hypertension  Hyperlipidemia  -Amlodipine , lasix, and Cozaar on hold  -Hold aspirin EC tablet 81 mg daily  -Continue rosuvastatin (Crestor) tablet 10 mg daily       Hyperglycemia  -Monitor   -Daily BMP  -start SSRI when orals tolerated or BG>200 consistently     Hypomagnesemia  Hypokalemia  -Magnesium 1.7 > 1.83  -K 2.7 > 3.1 >3.3  -Replete as needed  -Monitor  -Daily BMP      GI ppx: PPI  DVT ppx: Enoxaparin on hold, SCD's  Fluids: As needed   Electrolytes:  replace as needed  Nutrition: Regular   Adjuncts: PIV  Code Status: Full   Pt requires inpatient stay at this time.    Fang Abernathy, APRN-CNP         [1] [Held by provider] amLODIPine, 10 mg, oral, Daily  [Held by provider] aspirin, 81 mg, oral, Daily  cefepime, 1 g, intravenous, q8h  [Held by provider] enoxaparin, 40 mg, subcutaneous, q12h MARIOLA  folic acid, 1 mg, oral, Daily  [Held by provider] furosemide, 20 mg, oral, Daily  [Held by provider] losartan, 100 mg, oral, Daily  metroNIDAZOLE, 500 mg, intravenous, q12h  pantoprazole, 40 mg, intravenous, Daily  [Held by provider] propranolol LA, 120 mg, oral, Nightly  rosuvastatin, 10 mg, oral, Nightly  vancomycin, 1,250 mg, intravenous, q12h  [2] sodium chloride 0.9%, 10 mL/hr  sodium chloride 0.9%, 100 mL/hr, Last Rate: 100 mL/hr (07/08/25 0512)  [3] PRN medications: acetaminophen **OR** acetaminophen **OR** acetaminophen, alum-mag hydroxide-simeth, benzocaine-menthol, melatonin, metoclopramide, ondansetron, sodium chloride 0.9%, vancomycin

## 2025-07-08 NOTE — PROGRESS NOTES
Elisha Flores is a 79 y.o. female on day 2 of admission presenting with Diarrhea, unspecified type.    Subjective   Interval History:   Afebrile, no chills  No abdominal pain  No nausea, vomiting or diarrhea  No chest pain or shortness of breath        Review of Systems   All other systems reviewed and are negative.      Objective   Range of Vitals (last 24 hours)  Heart Rate:  []   Temp:  [35.3 °C (95.5 °F)-37.5 °C (99.5 °F)]   Resp:  [10-30]   BP: ()/()   Weight:  [122 kg (268 lb 1.3 oz)-122 kg (269 lb 13.5 oz)]   SpO2:  [93 %-100 %]   Daily Weight  07/08/25 : 122 kg (269 lb 13.5 oz)    Body mass index is 44.9 kg/m².    Physical Exam  Constitutional:       Appearance: Normal appearance.   HENT:      Head: Normocephalic and atraumatic.      Right Ear: External ear normal.      Left Ear: External ear normal.      Nose: Nose normal.   Eyes:      General: No scleral icterus.     Extraocular Movements: Extraocular movements intact.      Conjunctiva/sclera: Conjunctivae normal.   Cardiovascular:      Rate and Rhythm: Normal rate and regular rhythm.      Heart sounds: Normal heart sounds.   Pulmonary:      Effort: Pulmonary effort is normal.      Breath sounds: Normal breath sounds.   Abdominal:      General: Bowel sounds are normal.      Palpations: Abdomen is soft.   Musculoskeletal:      Cervical back: Normal range of motion and neck supple.      Right lower leg: No edema.      Left lower leg: No edema.   Skin:     General: Skin is warm and dry.   Neurological:      Mental Status: She is alert and oriented to person, place, and time.   Psychiatric:         Behavior: Behavior normal.     Antibiotics  cefepime - 1 gram/50 mL  doxycycline - 100 mg  metroNIDAZOLE - 500 mg/100 mL  vancomycin - 1.25 gram/250 mL    Relevant Results  Labs  Results from last 72 hours   Lab Units 07/08/25  0408 07/08/25  0000 07/07/25  2135 07/07/25  0529 07/06/25  1223   WBC AUTO x10*3/uL 42.3* 31.3* 47.9*   < > 26.1*  "  HEMOGLOBIN g/dL 12.0 10.0* 8.7*   < > 10.5*   HEMATOCRIT % 33.3* 29.2* 25.3*   < > 29.9*   PLATELETS AUTO x10*3/uL 100* 86* 161   < > 147*   LYMPHO PCT MAN %  --   --   --   --  10.0   MONO PCT MAN %  --   --   --   --  7.0   EOSINO PCT MAN %  --   --   --   --  0.0    < > = values in this interval not displayed.     Results from last 72 hours   Lab Units 07/08/25  0408 07/07/25  1627 07/07/25  0529   SODIUM mmol/L 135* 134* 134*   POTASSIUM mmol/L 3.3* 3.5 3.1*   CHLORIDE mmol/L 109* 105 100   CO2 mmol/L 20* 23 27   BUN mg/dL 27* 20 27*   CREATININE mg/dL 0.75 0.55 0.59   GLUCOSE mg/dL 168* 213* 101*   CALCIUM mg/dL 6.9* 7.1* 7.6*   ANION GAP mmol/L 9* 10 10   EGFR mL/min/1.73m*2 82 >90 >90     Results from last 72 hours   Lab Units 07/07/25  1627 07/06/25  1223   ALK PHOS U/L 63 64   BILIRUBIN TOTAL mg/dL 0.6 0.7   PROTEIN TOTAL g/dL 3.5* 3.7*   ALT U/L 23 24   AST U/L 21 19   ALBUMIN g/dL 2.2* 2.2*     Estimated Creatinine Clearance: 82.3 mL/min (by C-G formula based on SCr of 0.75 mg/dL).  No results found for: \"CRP\"  Microbiology  Reviewed-blood cultures pending  Imaging  ECG 12 lead  Result Date: 7/8/2025  Normal sinus rhythm Normal ECG When compared with ECG of 19-JUN-2025 13:12, Nonspecific T wave abnormality has replaced inverted T waves in Inferior leads Nonspecific T wave abnormality no longer evident in Anterior leads    CT chest abdomen pelvis w IV contrast  Result Date: 7/6/2025  Interpreted By:  Rosario Herr, STUDY: CT CHEST ABDOMEN PELVIS W IV CONTRAST;  7/6/2025 12:48 pm   INDICATION: Signs/Symptoms:weakness, chemo patient, profuse diarhea, sepsis.     COMPARISON: CT chest 06/03/2025, CT abdomen pelvis 07/01/2025   ACCESSION NUMBER(S): [id number]   ORDERING CLINICIAN: MIKE VILLARREAL   TECHNIQUE: CT of the chest, abdomen, and pelvis was performed. Sagittal and coronal reconstructions were generated.  75 ML Omnipaque 350 intravenous contrast given for the examination.   FINDINGS: CHEST:       " CHEST WALL AND LOWER NECK: New right anterior chest wall port with catheter extending to the distal SVC. Slight asymmetric increased density in the lateral right breast similar to the prior exam. No significant axillary lymphadenopathy. Enlarged heterogenous left thyroid lobe similar to the previous exam.   MEDIASTINUM AND GOVIND:  No significant mediastinal or hilar lymphadenopathy. Probable small hiatal hernia.   HEART AND VESSELS:  The heart is normal in size. No significant pericardial effusion. Atherosclerotic calcifications including the coronary arteries.   LUNGS, PLEURA, LARGE AIRWAYS:  Azygous lobe in the medial right upper chest, anatomic variant, again seen. The central airways are patent. Linear opacities in the anterior left mid chest and medial right lung base similar to the prior exam. No new focal airspace consolidation or pleural effusion.   BONES: Smooth likely remote fracture deformity of a lateral right lower ribs similar to the previous exam. Kyphosis and degenerative changes of the thoracic spine.     ABDOMEN/PELVIS:       ABDOMINAL ORGANS:   LIVER: No focal lesion   GALL BLADDER AND BILIARY TREE: Tiny gas containing gallstones. Mild gallbladder wall thickening.   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 Several stones in aggregate measuring 2.1 cm transversely near the right UVJ again seen however increased now moderate lower pole hydroureteronephrosis. Subcentimeter fat attenuation lesion in the posterior right kidney is similar to the prior exam. No new left renal mass or hydronephrosis within limits of nephrogram phase images.   BOWEL: Mild fluid distention of left mid abdominal small bowel loops. Distal colon diverticulosis. Probable mild distal sigmoid wall thickening and enhancement and air-fluid level in the rectum.   PERITONEUM, RETROPERITONEUM,  NODES: No significant free fluid. No free air. No significant retroperitoneal lymphadenopathy.   VESSELS:  Scattered atherosclerotic calcifications. No abdominal aortic aneurysm. Flattening of the upper abdominal IVC which can be seen with dehydration.   PELVIS: Urinary bladder is moderately distended without focal wall thickening. Again conglomeration of stones near the right UVJ similar to the previous exam. 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   ABDOMINAL WALL: No sizable abdominal wall hernia.   BONES: Degenerative changes of the lumbar spine with mild spondylolisthesis and multilevel vacuum discs similar to the previous exam.       CHEST:   No CT evidence of acute chest process. Interval right anterior chest wall port placement since 06/03/2025. additional findings as above similar to the previous exam.   ABDOMEN AND PELVIS:   Rectal air-fluid level consistent with diarrhea and probable distal sigmoid wall thickening/enhancement consistent with nonspecific colitis. Clinical correlation suggested.   Multiple bladder calculi near the right UVJ again seen with increased now moderate hydroureteronephrosis.   Flattening of the upper abdominal IVC which can be seen with dehydration.   Other findings as described above.   MACRO: None.   Signed by: Rosario Herr 7/6/2025 1:21 PM Dictation workstation:   [id number]    CT abdomen pelvis w IV contrast  Result Date: 7/1/2025  STUDY: CT Abdomen and Pelvis with IV Contrast; 7/1/2025 16:19 INDICATION: Sacral pressure ulcer, diarrhea, abdominal cramping beginning today. COMPARISON: 6/19/2025 CT Abdomen and Pelvis, 6/3/2025 CT Chest Abdomen and Pelvis. ACCESSION NUMBER(S): [id number] ORDERING CLINICIAN: FELI PARK TECHNIQUE: CT of the abdomen and pelvis was performed.  Contiguous axial images were obtained at 3 mm slice thickness through the abdomen and pelvis. Coronal and sagittal reconstructions at 3 mm slice thickness were performed.  Omnipaque 350 75 mL was  administered intravenously.  FINDINGS: LOWER CHEST: The cardiac size is normal.  Catheter tip is seen at the cavoatrial junction.  Mild to moderate calcified coronary plaque is noted.  No pericardial effusion.  Lung bases are clear.  ABDOMEN:  LIVER: No hepatomegaly.  Smooth surface contour.  Normal attenuation.  BILE DUCTS: No intrahepatic or extrahepatic biliary ductal dilatation.  GALLBLADDER: Gallbladder is distended and contains noncalcified stones and sludge but is otherwise unremarkable.  STOMACH: No abnormalities identified.  PANCREAS: No masses or ductal dilatation.  SPLEEN: No splenomegaly or focal splenic lesion.  ADRENAL GLANDS: No thickening or nodules.  KIDNEYS AND URETERS: Kidneys are normal in size and location.  There is mild renal cortical thinning bilaterally.  Fat density 1 cm lesion is seen in the upper pole of the right kidney, consistent with an angiomyolipoma.  There is a partially duplicated right renal collecting system with mild right-sided hydronephrosis and hydroureter of the two ureters.  Large calculi are seen at the right ureterovesicular junction measuring 1.1 cm and 1.9 cm in diameter.  PELVIS:  BLADDER: Urinary bladder is moderately distended.  REPRODUCTIVE ORGANS: No abnormalities identified.  BOWEL: Appendix is not identified.  Terminal ileum is unremarkable. Diverticulosis is seen in the sigmoid colon.  VESSELS: Mild calcified plaque is seen in the abdominal aorta and iliac arteries.  Abdominal aorta is normal in caliber.  PERITONEUM/RETROPERITONEUM/LYMPH NODES: No pneumoperitoneum. No lymphadenopathy.  ABDOMINAL WALL: No abnormalities identified. SOFT TISSUES: No significant abnormal soft tissue lesion or fluid collection is identified in the pelvis adjacent to the sacrum to suggest abscess or large decubitus ulcer.  BONES: Generalized osseous demineralization is noted with moderate to severe disc disease in the thoracic spine and mild to moderate disc disease in the lumbar  spine.  Grade 1 retrolisthesis is seen of L1 on L2 with grade 1 anterolisthesis of L2 on L3 and L3 on L4.  Moderate lumbar spine facet arthrosis is seen bilaterally.  No acute fracture or aggressive osseous lesion.    Partially duplicated right renal collecting system with moderate right-sided hydronephrosis and hydroureter secondary to obstructing 1.1 cm and 1.9 cm calculi at the right ureterovesicular junction. Minimal distal colonic diverticulosis. Additional chronic changes as detailed in the body of the report. Signed by Tay Arroyo    ECG 12 lead  Result Date: 6/19/2025  Normal sinus rhythm Left axis deviation Nonspecific T wave abnormality Abnormal ECG No previous ECGs available Confirmed by Clemente Orlando ([id number]) on 6/19/2025 9:33:26 PM    CT abdomen pelvis w IV contrast  Result Date: 6/19/2025  STUDY: CT Abdomen and Pelvis with IV Contrast; 6/19/25 at 2:57 PM INDICATION: Diarrhea. Elevated lipase. Pancreatitis. Weakness. Left breast CA. COMPARISON: Chest XR same date. CT CAP 6/3/25. ACCESSION NUMBER(S): [id number] ORDERING CLINICIAN: RUBI QUIGLEY TECHNIQUE: CT of the abdomen and pelvis was performed.  Contiguous axial images were obtained at 3 mm slice thickness through the abdomen and pelvis. Coronal and sagittal reconstructions at 3 mm slice thickness were performed.  Omnipaque 350 75 mL was administered intravenously.   FINDINGS: LOWER CHEST: No cardiomegaly.  No pericardial effusion.  Lung bases are clear.  ABDOMEN:  LIVER: No hepatomegaly.  Smooth surface contour.  Normal attenuation.  BILE DUCTS: Common bile duct diameter is 0.8 cm and previously measures 0.3 cm  GALLBLADDER: The gallbladder is present and contains multiple low-density stones. STOMACH: No abnormalities identified.  PANCREAS: No masses or ductal dilatation.  SPLEEN: No splenomegaly or focal splenic lesion.  ADRENAL GLANDS: No thickening or nodules.  KIDNEYS AND URETERS: Kidneys are normal in size and location.  No renal or  ureteral calculi. Duplicated right renal collecting system. Mild hydronephrosis lower pole right kidney. Mild right hydroureter.  PELVIS:  BLADDER: Multiple bladder stones. A 0.9 cm stone near the right ureteral orifice. Largest bladder stone measures 1.8 cm  REPRODUCTIVE ORGANS: The uterus is absent  BOWEL: Multiple air and fluid-filled loops of small bowel. Colonic diverticulosis. Liquid stool content within the large bowel  VESSELS: No abnormalities identified.  Abdominal aorta is normal in caliber.  PERITONEUM/RETROPERITONEUM/LYMPH NODES: No free fluid.  No pneumoperitoneum. No lymphadenopathy.  ABDOMINAL WALL: No abnormalities identified. SOFT TISSUES: No abnormalities identified.  BONES: No acute fracture or aggressive osseous lesion.    Prominence of the common bile duct currently measures 0.8 cm and previously measured 0.3 cm on CT scan 6/3/2025. Cholelithiasis. No definitive CT findings of acute pancreatitis though the CT findings can lag behind clinical findings. Mild right hydroureteronephrosis is new. Multiple bladder stones. A 0.9 cm stone is located near the right ureteral orifice. Liquid stool content within the ascending colon. Multiple air and fluid-filled loops of small bowel are present. Findings may represent enterocolitis/diarrhea. Signed by Bill Merrill MD    XR chest 1 view  Result Date: 6/19/2025  STUDY: Chest Radiograph;  [6-; 2:09 pm] INDICATION: Weakness. COMPARISON: None Available ACCESSION NUMBER(S): [id number] ORDERING CLINICIAN: CLAUDY ANAND TECHNIQUE:  Frontal chest was obtained at 14:08 hours. FINDINGS: There is a port entering from the right with the tip in the superior vena cava. CARDIOMEDIASTINAL SILHOUETTE: Cardiomediastinal silhouette is normal in size and configuration.  LUNGS: There is haziness to the lung bases most likely related overlying soft tissue.  Lungs are clear.  ABDOMEN: No remarkable upper abdominal findings.  BONES: No acute osseous changes.    No  evidence of acute infiltrate or effusion. Signed by Apollo Centeno MD      Assessment/Plan   Diarrhea, etiology unclear, resolved, negative stool for C. difficile, negative stool pathogen PCR  Resolving leukocytosis  Colitis, per CT  Multiple bowel calculi with right-sided moderate hydroureteronephrosis  Penicillin allergy  Leukemoid reaction    Symptomatic treatment as needed  Follow-up blood cultures  Urology evaluation  Supportive care  Monitor temperature and WBC       This is a complex infectious disease issue and the following was performed today (for more details please see the above note): Management decisions reflecting the added complexity (e.g., changes in antimicrobial therapy, infection control strategies).     Ismael Rao MD

## 2025-07-08 NOTE — PROGRESS NOTES
Physical Therapy                 Therapy Communication Note    Patient Name: Elisha Flores  MRN: [id number]  Department: [facility]  Room: 01/01-A  Today's Date: 7/8/2025     Discipline: Physical Therapy    Missed Visit: PT Missed Visit: Yes     Missed Visit Reason: Missed Visit Reason: Patient placed on medical hold (RN refused session d/t bloody stools.)    Missed Time: Attempt    Comment:

## 2025-07-09 PROBLEM — E83.39 HYPOPHOSPHATEMIA: Status: ACTIVE | Noted: 2025-07-09

## 2025-07-09 LAB
ANION GAP SERPL CALC-SCNC: 8 MMOL/L (ref 10–20)
BLOOD EXPIRATION DATE: NORMAL
BUN SERPL-MCNC: 20 MG/DL (ref 6–23)
CALCIUM SERPL-MCNC: 6.9 MG/DL (ref 8.6–10.3)
CHLORIDE SERPL-SCNC: 110 MMOL/L (ref 98–107)
CO2 SERPL-SCNC: 19 MMOL/L (ref 21–32)
CREAT SERPL-MCNC: 0.67 MG/DL (ref 0.5–1.05)
DISPENSE STATUS: NORMAL
EGFRCR SERPLBLD CKD-EPI 2021: 90 ML/MIN/1.73M*2
ERYTHROCYTE [DISTWIDTH] IN BLOOD BY AUTOMATED COUNT: 15.7 % (ref 11.5–14.5)
ERYTHROCYTE [DISTWIDTH] IN BLOOD BY AUTOMATED COUNT: 15.8 % (ref 11.5–14.5)
ERYTHROCYTE [DISTWIDTH] IN BLOOD BY AUTOMATED COUNT: 16.1 % (ref 11.5–14.5)
GLUCOSE SERPL-MCNC: 137 MG/DL (ref 74–99)
HCT VFR BLD AUTO: 21 % (ref 36–46)
HCT VFR BLD AUTO: 21.8 % (ref 36–46)
HCT VFR BLD AUTO: 24.4 % (ref 36–46)
HGB BLD-MCNC: 7.5 G/DL (ref 12–16)
HGB BLD-MCNC: 7.7 G/DL (ref 12–16)
HGB BLD-MCNC: 8.5 G/DL (ref 12–16)
INR PPP: 1.5 (ref 0.9–1.1)
INR PPP: 1.5 (ref 0.9–1.1)
MCH RBC QN AUTO: 30.3 PG (ref 26–34)
MCH RBC QN AUTO: 30.4 PG (ref 26–34)
MCH RBC QN AUTO: 30.5 PG (ref 26–34)
MCHC RBC AUTO-ENTMCNC: 34.8 G/DL (ref 32–36)
MCHC RBC AUTO-ENTMCNC: 35.3 G/DL (ref 32–36)
MCHC RBC AUTO-ENTMCNC: 35.7 G/DL (ref 32–36)
MCV RBC AUTO: 85 FL (ref 80–100)
MCV RBC AUTO: 86 FL (ref 80–100)
MCV RBC AUTO: 87 FL (ref 80–100)
NRBC BLD-RTO: 0.2 /100 WBCS (ref 0–0)
NRBC BLD-RTO: 0.3 /100 WBCS (ref 0–0)
NRBC BLD-RTO: 0.6 /100 WBCS (ref 0–0)
PHOSPHATE SERPL-MCNC: 1.5 MG/DL (ref 2.5–4.9)
PLATELET # BLD AUTO: 53 X10*3/UL (ref 150–450)
PLATELET # BLD AUTO: 71 X10*3/UL (ref 150–450)
PLATELET # BLD AUTO: 77 X10*3/UL (ref 150–450)
POTASSIUM SERPL-SCNC: 2.9 MMOL/L (ref 3.5–5.3)
PRODUCT BLOOD TYPE: 5100
PRODUCT BLOOD TYPE: 5100
PRODUCT BLOOD TYPE: 9500
PRODUCT BLOOD TYPE: NORMAL
PRODUCT CODE: NORMAL
PROTHROMBIN TIME: 16.6 SECONDS (ref 9.8–12.4)
PROTHROMBIN TIME: 17 SECONDS (ref 9.8–12.4)
RBC # BLD AUTO: 2.46 X10*6/UL (ref 4–5.2)
RBC # BLD AUTO: 2.54 X10*6/UL (ref 4–5.2)
RBC # BLD AUTO: 2.8 X10*6/UL (ref 4–5.2)
SODIUM SERPL-SCNC: 134 MMOL/L (ref 136–145)
UNIT ABO: NORMAL
UNIT NUMBER: NORMAL
UNIT RH: NORMAL
UNIT VOLUME: 272
UNIT VOLUME: 350
VANCOMYCIN SERPL-MCNC: 18.2 UG/ML (ref 5–20)
WBC # BLD AUTO: 20.4 X10*3/UL (ref 4.4–11.3)
WBC # BLD AUTO: 26.4 X10*3/UL (ref 4.4–11.3)
WBC # BLD AUTO: 31.9 X10*3/UL (ref 4.4–11.3)
XM INTEP: NORMAL

## 2025-07-09 PROCEDURE — 2500000004 HC RX 250 GENERAL PHARMACY W/ HCPCS (ALT 636 FOR OP/ED): Mod: IPSPLIT | Performed by: NURSE PRACTITIONER

## 2025-07-09 PROCEDURE — 85027 COMPLETE CBC AUTOMATED: CPT | Mod: IPSPLIT | Performed by: NURSE PRACTITIONER

## 2025-07-09 PROCEDURE — 85610 PROTHROMBIN TIME: CPT | Mod: IPSPLIT | Performed by: NURSE PRACTITIONER

## 2025-07-09 PROCEDURE — 2500000001 HC RX 250 WO HCPCS SELF ADMINISTERED DRUGS (ALT 637 FOR MEDICARE OP): Mod: IPSPLIT | Performed by: PHYSICIAN ASSISTANT

## 2025-07-09 PROCEDURE — 2500000005 HC RX 250 GENERAL PHARMACY W/O HCPCS: Mod: IPSPLIT | Performed by: NURSE PRACTITIONER

## 2025-07-09 PROCEDURE — 2500000001 HC RX 250 WO HCPCS SELF ADMINISTERED DRUGS (ALT 637 FOR MEDICARE OP): Mod: IPSPLIT | Performed by: INTERNAL MEDICINE

## 2025-07-09 PROCEDURE — 2020000001 HC ICU ROOM DAILY: Mod: IPSPLIT

## 2025-07-09 PROCEDURE — 94760 N-INVAS EAR/PLS OXIMETRY 1: CPT | Mod: IPSPLIT

## 2025-07-09 PROCEDURE — 84100 ASSAY OF PHOSPHORUS: CPT | Mod: IPSPLIT | Performed by: NURSE PRACTITIONER

## 2025-07-09 PROCEDURE — 2500000002 HC RX 250 W HCPCS SELF ADMINISTERED DRUGS (ALT 637 FOR MEDICARE OP, ALT 636 FOR OP/ED): Mod: IPSPLIT | Performed by: NURSE PRACTITIONER

## 2025-07-09 PROCEDURE — 80048 BASIC METABOLIC PNL TOTAL CA: CPT | Mod: IPSPLIT | Performed by: NURSE PRACTITIONER

## 2025-07-09 PROCEDURE — 80202 ASSAY OF VANCOMYCIN: CPT | Mod: IPSPLIT | Performed by: NURSE PRACTITIONER

## 2025-07-09 PROCEDURE — 37799 UNLISTED PX VASCULAR SURGERY: CPT | Mod: IPSPLIT | Performed by: NURSE PRACTITIONER

## 2025-07-09 PROCEDURE — 99233 SBSQ HOSP IP/OBS HIGH 50: CPT | Performed by: SURGERY

## 2025-07-09 PROCEDURE — 2500000002 HC RX 250 W HCPCS SELF ADMINISTERED DRUGS (ALT 637 FOR MEDICARE OP, ALT 636 FOR OP/ED): Mod: IPSPLIT | Performed by: PHYSICIAN ASSISTANT

## 2025-07-09 PROCEDURE — 85610 PROTHROMBIN TIME: CPT | Mod: IPSPLIT | Performed by: SURGERY

## 2025-07-09 PROCEDURE — P9016 RBC LEUKOCYTES REDUCED: HCPCS | Mod: IPSPLIT

## 2025-07-09 PROCEDURE — 99233 SBSQ HOSP IP/OBS HIGH 50: CPT | Performed by: NURSE PRACTITIONER

## 2025-07-09 PROCEDURE — 36430 TRANSFUSION BLD/BLD COMPNT: CPT | Mod: IPSPLIT

## 2025-07-09 PROCEDURE — 2500000004 HC RX 250 GENERAL PHARMACY W/ HCPCS (ALT 636 FOR OP/ED): Mod: IPSPLIT | Performed by: SURGERY

## 2025-07-09 RX ORDER — POTASSIUM CHLORIDE 14.9 MG/ML
20 INJECTION INTRAVENOUS
Status: DISCONTINUED | OUTPATIENT
Start: 2025-07-09 | End: 2025-07-09

## 2025-07-09 RX ORDER — POTASSIUM CHLORIDE 20 MEQ/1
20 TABLET, EXTENDED RELEASE ORAL ONCE
Status: COMPLETED | OUTPATIENT
Start: 2025-07-09 | End: 2025-07-09

## 2025-07-09 RX ORDER — CEFUROXIME AXETIL 250 MG/1
500 TABLET ORAL 2 TIMES DAILY
Status: COMPLETED | OUTPATIENT
Start: 2025-07-09 | End: 2025-07-13

## 2025-07-09 RX ORDER — METRONIDAZOLE 500 MG/1
500 TABLET ORAL EVERY 12 HOURS SCHEDULED
Status: COMPLETED | OUTPATIENT
Start: 2025-07-09 | End: 2025-07-13

## 2025-07-09 RX ADMIN — CEFUROXIME AXETIL 500 MG: 250 TABLET, FILM COATED ORAL at 20:13

## 2025-07-09 RX ADMIN — Medication 10 ML: at 20:13

## 2025-07-09 RX ADMIN — Medication 10 ML: at 09:00

## 2025-07-09 RX ADMIN — VANCOMYCIN 1250 MG: 1.75 INJECTION, SOLUTION INTRAVENOUS at 12:20

## 2025-07-09 RX ADMIN — ROSUVASTATIN CALCIUM 10 MG: 10 TABLET, FILM COATED ORAL at 20:13

## 2025-07-09 RX ADMIN — CEFEPIME HYDROCHLORIDE 1 G: 1 INJECTION, SOLUTION INTRAVENOUS at 09:41

## 2025-07-09 RX ADMIN — PHYTONADIONE 2 MG: 10 INJECTION, EMULSION INTRAMUSCULAR; INTRAVENOUS; SUBCUTANEOUS at 12:20

## 2025-07-09 RX ADMIN — CEFEPIME HYDROCHLORIDE 1 G: 1 INJECTION, SOLUTION INTRAVENOUS at 00:48

## 2025-07-09 RX ADMIN — FOLIC ACID 1 MG: 1 TABLET ORAL at 08:23

## 2025-07-09 RX ADMIN — POTASSIUM CHLORIDE 20 MEQ: 1500 TABLET, EXTENDED RELEASE ORAL at 08:23

## 2025-07-09 RX ADMIN — PANTOPRAZOLE SODIUM 40 MG: 40 INJECTION, POWDER, LYOPHILIZED, FOR SOLUTION INTRAVENOUS at 08:23

## 2025-07-09 RX ADMIN — POTASSIUM CHLORIDE 20 MEQ: 14.9 INJECTION, SOLUTION INTRAVENOUS at 08:24

## 2025-07-09 RX ADMIN — METRONIDAZOLE 500 MG: 500 TABLET ORAL at 20:13

## 2025-07-09 RX ADMIN — METRONIDAZOLE 500 MG: 5 INJECTION, SOLUTION INTRAVENOUS at 08:22

## 2025-07-09 RX ADMIN — POTASSIUM PHOSPHATE, MONOBASIC POTASSIUM PHOSPHATE, DIBASIC 30 MMOL: 224; 236 INJECTION, SOLUTION, CONCENTRATE INTRAVENOUS at 13:52

## 2025-07-09 ASSESSMENT — PAIN SCALES - GENERAL
PAINLEVEL_OUTOF10: 0 - NO PAIN

## 2025-07-09 ASSESSMENT — PAIN - FUNCTIONAL ASSESSMENT
PAIN_FUNCTIONAL_ASSESSMENT: 0-10

## 2025-07-09 NOTE — PROGRESS NOTES
"Elisha Flores is a 79 y.o. female on day 3 of admission presenting with Diarrhea, unspecified type.    Subjective   Feels much better.  Has black stool now.  H&H is dropped back to 7.5 and 21.0.  INR is still 1.8.       Objective     Physical Exam  Constitutional:       Appearance: Normal appearance.   Abdominal:      Palpations: Abdomen is soft. There is no mass.      Tenderness: There is no abdominal tenderness. There is no guarding.         Last Recorded Vitals  Blood pressure 112/76, pulse 92, temperature 36.5 °C (97.7 °F), temperature source Temporal, resp. rate 19, height 1.651 m (5' 5\"), weight 121 kg (267 lb 3.2 oz), SpO2 100%.  Intake/Output last 3 Shifts:  I/O last 3 completed shifts:  In: 7530 (61.5 mL/kg) [I.V.:4022.7 (32.9 mL/kg); Blood:1057.3; IV Piggyback:2450]  Out: 1530 (12.5 mL/kg) [Urine:1080 (0.2 mL/kg/hr); Stool:400; Blood:50]  Weight: 122.4 kg     Relevant Results    Protime     20.3   20.4    Protime      INR     1.8   1.8    INR     CBC AND DIFFERENTIAL    WBC  31.3 42.3   44.1 42.1  33.7 26.4  WBC     nRBC  0.4 0.4   0.2 0.3  0.3 0.3  nRBC     RBC  3.36 3.98   3.47 3.12  2.74 2.46  RBC     HEMOGLOBIN  10.0 12.0   10.6 9.5  8.3 7.5  HEMOGLOBIN     HEMATOCRIT  29.2 33.3   29.0 26.1  23.1 21.0  HEMATOCRIT     MCV  87 84   84 84  84 85  MCV     MCH  29.8 30.2   30.5 30.4  30.3 30.5  MCH     MCHC  34.2 36.0   36.6 36.4  35.9 35.7  MCHC     RED CELL DISTRIBUTION WIDTH  13.9 14.0   14.9 15.2  15.3 15.8  RED CELL DISTRIBUTION WIDTH     Platelets  86 100   77 94  74 71                     Assessment & Plan  Diarrhea, unspecified type    Benign essential hypertension    Anxiety and depression    GERD (gastroesophageal reflux disease)    Hyperglycemia    Hyperlipidemia    Hypokalemia    Vitamin D deficiency    Arthritis    Personal history of other specified conditions    Weakness    Kidney stones    Dehydration    Hypomagnesemia    Rectal bleeding    Diverticulosis of large intestine with " hemorrhage    Colitis    Plan-recommend correcting INR.  Did receive vitamin K, INR pending.  Follow H&H.  Can give an additional transfusion if hemoglobin drops to less than 7.  Advance diet to full liquid diet and protein shakes.      Jigar Pérez MD

## 2025-07-09 NOTE — CARE PLAN
The clinical goals for the shift include Patient to maintain HGB >9.0 by end of shift    Over the shift, the patient did not make progress toward the following goals.   Problem: Discharge Planning  Goal: Discharge to home or other facility with appropriate resources  Outcome: Not Progressing     Problem: Chronic Conditions and Co-morbidities  Goal: Patient's chronic conditions and co-morbidity symptoms are monitored and maintained or improved  Outcome: Not Progressing     Problem: Nutrition  Goal: Nutrient intake appropriate for maintaining nutritional needs  Outcome: Not Progressing     Problem: Skin  Goal: Decreased wound size/increased tissue granulation at next dressing change  Outcome: Not Progressing  Goal: Participates in plan/prevention/treatment measures  Outcome: Not Progressing  Goal: Prevent/manage excess moisture  Outcome: Not Progressing  Goal: Prevent/minimize sheer/friction injuries  Outcome: Not Progressing  Goal: Promote/optimize nutrition  Outcome: Not Progressing  Goal: Promote skin healing  Outcome: Not Progressing

## 2025-07-09 NOTE — NURSING NOTE
0730 Assumed nursing for patient. Patient sleeping between care. On room air, pulse ox at 100%.     0900 Son at bedside.     1315 RN Ayesha at bedside for ultrasound guided IV placement.

## 2025-07-09 NOTE — PROGRESS NOTES
Physical Therapy                 Therapy Communication Note    Patient Name: Elisha Flores  MRN: [id number]  Department: [facility] ICU  Room: 01/01-A  Today's Date: 7/9/2025     Discipline: Physical Therapy    Missed Visit: PT Missed Visit: Yes     Missed Visit Reason: Missed Visit Reason: Patient placed on medical hold (RN refused therapy as her labs are still low and unstable.)    Missed Time: Attempt    Comment:

## 2025-07-09 NOTE — PROGRESS NOTES
"Patient informed of recommendation for MOD level of therapy/Skilled Nursing Facility upon discharge.  Printed insurance choice list for patient.  SNF choice (s) is/are:   Brenda Sara -sending referral.     2:26 pm  Patient accepted at [facility]- She has Medicare insurance and has met the 3 midnight inpatient requirement- no barriers when medically stable for discharge.        07/09/25 1315   Discharge Planning   Living Arrangements Children   Support Systems Children   Assistance Needed On admission TCC was informed that patient lives in a 1 story house with her son and cats. She says she is independent with ADLS, IADLS, ambulates with a cane sometimes and doesn't drive. She says she is usually \"healthy as a horse\". Patient doesn't use home oxygen or CPAP/BiPAP. Her son helps with transportation and should be able to pick her up on discharge.   Type of Residence Private residence   Number of Stairs to Enter Residence 3   Do you have animals or pets at home? Yes   Type of Animals or Pets cats   Home or Post Acute Services Post acute facilities (Rehab/SNF/etc)   Type of Post Acute Facility Services Skilled nursing   Expected Discharge Disposition SNF  (Family and patient requests [facility]-1st choice- will send referral and requesting 2 other SNFs for backup.   Patient will need precert authorization prior to admitting to facility.)   Does the patient need discharge transport arranged? No   Patient Choice   Provider Choice list and CMS website (https://medicare.gov/care-compare#search) for post-acute Quality and Resource Measure Data were provided and reviewed with: Patient   Stroke Family Assessment   Stroke Family Assessment Needed No   Intensity of Service   Intensity of Service 0-30 min       "

## 2025-07-09 NOTE — PROGRESS NOTES
Occupational Therapy   Therapy Communication Note    Patient Name: Elisha Flores  MRN: [id number]  Department: [facility] ICU  Room: 01/01-A  Today's Date: 7/9/2025     Discipline: Occupational Therapy     07/09/25 1230   OT Last Visit   OT Missed Visit Yes   Missed Visit Reason Patient placed on medical hold  (Pt. placed on medical hold d/t continued unstable labs, low Hemoglobin at 7.7 after recieving)         Missed Time: Attempt 1230

## 2025-07-09 NOTE — CARE PLAN
The patient's goals for the shift include      The clinical goals for the shift include Patient HGB will not drop from 7.5 this shift 7/9/25 1900.    Patient HGB did not drop below 7.5 this shift. Patient was given one unit of blood and has tolerated well at this time. Pulse ox maintained at 95% or greater on room air this shift. Patient continues to have black diarrhea, and remains incontinence.  Vitals remains stable and remains afebrile.

## 2025-07-09 NOTE — PROGRESS NOTES
Elisha Flores is a 79 y.o. female on day 3 of admission presenting with Diarrhea, unspecified type.    Subjective   Interval History:   Afebrile, no chills  No abdominal pain, nausea, vomiting or diarrhea  No chest pain or shortness of breath  Review of Systems   All other systems reviewed and are negative.      Objective   Range of Vitals (last 24 hours)  Heart Rate:  []   Temp:  [34.9 °C (94.8 °F)-37 °C (98.6 °F)]   Resp:  [13-41]   BP: ()/()   Weight:  [121 kg (267 lb 3.2 oz)]   SpO2:  [97 %-100 %]   Daily Weight  07/09/25 : 121 kg (267 lb 3.2 oz)    Body mass index is 44.46 kg/m².    Physical Exam  Constitutional:       Appearance: Normal appearance.   HENT:      Head: Normocephalic and atraumatic.      Right Ear: External ear normal.      Left Ear: External ear normal.      Nose: Nose normal.   Eyes:      General: No scleral icterus.     Extraocular Movements: Extraocular movements intact.      Conjunctiva/sclera: Conjunctivae normal.   Cardiovascular:      Rate and Rhythm: Normal rate and regular rhythm.      Heart sounds: Normal heart sounds.   Pulmonary:      Effort: Pulmonary effort is normal.      Breath sounds: Normal breath sounds.   Abdominal:      General: Bowel sounds are normal.      Palpations: Abdomen is soft.   Musculoskeletal:      Cervical back: Normal range of motion and neck supple.      Right lower leg: No edema.      Left lower leg: No edema.   Skin:     General: Skin is warm and dry.   Neurological:      Mental Status: She is alert and oriented to person, place, and time.   Psychiatric:         Behavior: Behavior normal.     Antibiotics  cefepime - 1 gram/50 mL  metroNIDAZOLE - 500 mg/100 mL  vancomycin - 1.25 gram/250 mL    Relevant Results  Labs  Results from last 72 hours   Lab Units 07/09/25  0534 07/08/25  2351 07/08/25  1723 07/07/25  0529 07/06/25  1223   WBC AUTO x10*3/uL 26.4* 33.7* 42.1*   < > 26.1*   HEMOGLOBIN g/dL 7.5* 8.3* 9.5*   < > 10.5*   HEMATOCRIT %  "21.0* 23.1* 26.1*   < > 29.9*   PLATELETS AUTO x10*3/uL 71* 74* 94*   < > 147*   LYMPHO PCT MAN %  --   --   --   --  10.0   MONO PCT MAN %  --   --   --   --  7.0   EOSINO PCT MAN %  --   --   --   --  0.0    < > = values in this interval not displayed.     Results from last 72 hours   Lab Units 07/09/25  0534 07/08/25  0408 07/07/25  1627   SODIUM mmol/L 134* 135* 134*   POTASSIUM mmol/L 2.9* 3.3* 3.5   CHLORIDE mmol/L 110* 109* 105   CO2 mmol/L 19* 20* 23   BUN mg/dL 20 27* 20   CREATININE mg/dL 0.67 0.75 0.55   GLUCOSE mg/dL 137* 168* 213*   CALCIUM mg/dL 6.9* 6.9* 7.1*   ANION GAP mmol/L 8* 9* 10   EGFR mL/min/1.73m*2 90 82 >90   PHOSPHORUS mg/dL 1.5*  --   --      Results from last 72 hours   Lab Units 07/07/25  1627 07/06/25  1223   ALK PHOS U/L 63 64   BILIRUBIN TOTAL mg/dL 0.6 0.7   PROTEIN TOTAL g/dL 3.5* 3.7*   ALT U/L 23 24   AST U/L 21 19   ALBUMIN g/dL 2.2* 2.2*     Estimated Creatinine Clearance: 91.7 mL/min (by C-G formula based on SCr of 0.67 mg/dL).  No results found for: \"CRP\"  Microbiology    Imaging  ECG 12 lead  Result Date: 7/8/2025  Normal sinus rhythm Normal ECG When compared with ECG of 19-JUN-2025 13:12, Nonspecific T wave abnormality has replaced inverted T waves in Inferior leads Nonspecific T wave abnormality no longer evident in Anterior leads    CT chest abdomen pelvis w IV contrast  Result Date: 7/6/2025  Interpreted By:  Rosario Herr, STUDY: CT CHEST ABDOMEN PELVIS W IV CONTRAST;  7/6/2025 12:48 pm   INDICATION: Signs/Symptoms:weakness, chemo patient, profuse diarhea, sepsis.     COMPARISON: CT chest 06/03/2025, CT abdomen pelvis 07/01/2025   ACCESSION NUMBER(S): [id number]   ORDERING CLINICIAN: MIKE VILLARREAL   TECHNIQUE: CT of the chest, abdomen, and pelvis was performed. Sagittal and coronal reconstructions were generated.  75 ML Omnipaque 350 intravenous contrast given for the examination.   FINDINGS: CHEST:       CHEST WALL AND LOWER NECK: New right anterior chest wall " port with catheter extending to the distal SVC. Slight asymmetric increased density in the lateral right breast similar to the prior exam. No significant axillary lymphadenopathy. Enlarged heterogenous left thyroid lobe similar to the previous exam.   MEDIASTINUM AND GOVIND:  No significant mediastinal or hilar lymphadenopathy. Probable small hiatal hernia.   HEART AND VESSELS:  The heart is normal in size. No significant pericardial effusion. Atherosclerotic calcifications including the coronary arteries.   LUNGS, PLEURA, LARGE AIRWAYS:  Azygous lobe in the medial right upper chest, anatomic variant, again seen. The central airways are patent. Linear opacities in the anterior left mid chest and medial right lung base similar to the prior exam. No new focal airspace consolidation or pleural effusion.   BONES: Smooth likely remote fracture deformity of a lateral right lower ribs similar to the previous exam. Kyphosis and degenerative changes of the thoracic spine.     ABDOMEN/PELVIS:       ABDOMINAL ORGANS:   LIVER: No focal lesion   GALL BLADDER AND BILIARY TREE: Tiny gas containing gallstones. Mild gallbladder wall thickening.   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 Several stones in aggregate measuring 2.1 cm transversely near the right UVJ again seen however increased now moderate lower pole hydroureteronephrosis. Subcentimeter fat attenuation lesion in the posterior right kidney is similar to the prior exam. No new left renal mass or hydronephrosis within limits of nephrogram phase images.   BOWEL: Mild fluid distention of left mid abdominal small bowel loops. Distal colon diverticulosis. Probable mild distal sigmoid wall thickening and enhancement and air-fluid level in the rectum.   PERITONEUM, RETROPERITONEUM, NODES: No significant free fluid. No free air. No  significant retroperitoneal lymphadenopathy.   VESSELS:  Scattered atherosclerotic calcifications. No abdominal aortic aneurysm. Flattening of the upper abdominal IVC which can be seen with dehydration.   PELVIS: Urinary bladder is moderately distended without focal wall thickening. Again conglomeration of stones near the right UVJ similar to the previous exam. 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   ABDOMINAL WALL: No sizable abdominal wall hernia.   BONES: Degenerative changes of the lumbar spine with mild spondylolisthesis and multilevel vacuum discs similar to the previous exam.       CHEST:   No CT evidence of acute chest process. Interval right anterior chest wall port placement since 06/03/2025. additional findings as above similar to the previous exam.   ABDOMEN AND PELVIS:   Rectal air-fluid level consistent with diarrhea and probable distal sigmoid wall thickening/enhancement consistent with nonspecific colitis. Clinical correlation suggested.   Multiple bladder calculi near the right UVJ again seen with increased now moderate hydroureteronephrosis.   Flattening of the upper abdominal IVC which can be seen with dehydration.   Other findings as described above.   MACRO: None.   Signed by: Rosario Herr 7/6/2025 1:21 PM Dictation workstation:   [id number]    CT abdomen pelvis w IV contrast  Result Date: 7/1/2025  STUDY: CT Abdomen and Pelvis with IV Contrast; 7/1/2025 16:19 INDICATION: Sacral pressure ulcer, diarrhea, abdominal cramping beginning today. COMPARISON: 6/19/2025 CT Abdomen and Pelvis, 6/3/2025 CT Chest Abdomen and Pelvis. ACCESSION NUMBER(S): [id number] ORDERING CLINICIAN: FELI PARK TECHNIQUE: CT of the abdomen and pelvis was performed.  Contiguous axial images were obtained at 3 mm slice thickness through the abdomen and pelvis. Coronal and sagittal reconstructions at 3 mm slice thickness were performed.  Omnipaque 350 75 mL was administered intravenously.  FINDINGS: LOWER CHEST:  The cardiac size is normal.  Catheter tip is seen at the cavoatrial junction.  Mild to moderate calcified coronary plaque is noted.  No pericardial effusion.  Lung bases are clear.  ABDOMEN:  LIVER: No hepatomegaly.  Smooth surface contour.  Normal attenuation.  BILE DUCTS: No intrahepatic or extrahepatic biliary ductal dilatation.  GALLBLADDER: Gallbladder is distended and contains noncalcified stones and sludge but is otherwise unremarkable.  STOMACH: No abnormalities identified.  PANCREAS: No masses or ductal dilatation.  SPLEEN: No splenomegaly or focal splenic lesion.  ADRENAL GLANDS: No thickening or nodules.  KIDNEYS AND URETERS: Kidneys are normal in size and location.  There is mild renal cortical thinning bilaterally.  Fat density 1 cm lesion is seen in the upper pole of the right kidney, consistent with an angiomyolipoma.  There is a partially duplicated right renal collecting system with mild right-sided hydronephrosis and hydroureter of the two ureters.  Large calculi are seen at the right ureterovesicular junction measuring 1.1 cm and 1.9 cm in diameter.  PELVIS:  BLADDER: Urinary bladder is moderately distended.  REPRODUCTIVE ORGANS: No abnormalities identified.  BOWEL: Appendix is not identified.  Terminal ileum is unremarkable. Diverticulosis is seen in the sigmoid colon.  VESSELS: Mild calcified plaque is seen in the abdominal aorta and iliac arteries.  Abdominal aorta is normal in caliber.  PERITONEUM/RETROPERITONEUM/LYMPH NODES: No pneumoperitoneum. No lymphadenopathy.  ABDOMINAL WALL: No abnormalities identified. SOFT TISSUES: No significant abnormal soft tissue lesion or fluid collection is identified in the pelvis adjacent to the sacrum to suggest abscess or large decubitus ulcer.  BONES: Generalized osseous demineralization is noted with moderate to severe disc disease in the thoracic spine and mild to moderate disc disease in the lumbar spine.  Grade 1 retrolisthesis is seen of L1 on L2 with  grade 1 anterolisthesis of L2 on L3 and L3 on L4.  Moderate lumbar spine facet arthrosis is seen bilaterally.  No acute fracture or aggressive osseous lesion.    Partially duplicated right renal collecting system with moderate right-sided hydronephrosis and hydroureter secondary to obstructing 1.1 cm and 1.9 cm calculi at the right ureterovesicular junction. Minimal distal colonic diverticulosis. Additional chronic changes as detailed in the body of the report. Signed by Tay Arroyo    ECG 12 lead  Result Date: 6/19/2025  Normal sinus rhythm Left axis deviation Nonspecific T wave abnormality Abnormal ECG No previous ECGs available Confirmed by Clemente Orlando ([id number]) on 6/19/2025 9:33:26 PM    CT abdomen pelvis w IV contrast  Result Date: 6/19/2025  STUDY: CT Abdomen and Pelvis with IV Contrast; 6/19/25 at 2:57 PM INDICATION: Diarrhea. Elevated lipase. Pancreatitis. Weakness. Left breast CA. COMPARISON: Chest XR same date. CT CAP 6/3/25. ACCESSION NUMBER(S): [id number] ORDERING CLINICIAN: RUBI QUIGLEY TECHNIQUE: CT of the abdomen and pelvis was performed.  Contiguous axial images were obtained at 3 mm slice thickness through the abdomen and pelvis. Coronal and sagittal reconstructions at 3 mm slice thickness were performed.  Omnipaque 350 75 mL was administered intravenously.   FINDINGS: LOWER CHEST: No cardiomegaly.  No pericardial effusion.  Lung bases are clear.  ABDOMEN:  LIVER: No hepatomegaly.  Smooth surface contour.  Normal attenuation.  BILE DUCTS: Common bile duct diameter is 0.8 cm and previously measures 0.3 cm  GALLBLADDER: The gallbladder is present and contains multiple low-density stones. STOMACH: No abnormalities identified.  PANCREAS: No masses or ductal dilatation.  SPLEEN: No splenomegaly or focal splenic lesion.  ADRENAL GLANDS: No thickening or nodules.  KIDNEYS AND URETERS: Kidneys are normal in size and location.  No renal or ureteral calculi. Duplicated right renal collecting system.  Mild hydronephrosis lower pole right kidney. Mild right hydroureter.  PELVIS:  BLADDER: Multiple bladder stones. A 0.9 cm stone near the right ureteral orifice. Largest bladder stone measures 1.8 cm  REPRODUCTIVE ORGANS: The uterus is absent  BOWEL: Multiple air and fluid-filled loops of small bowel. Colonic diverticulosis. Liquid stool content within the large bowel  VESSELS: No abnormalities identified.  Abdominal aorta is normal in caliber.  PERITONEUM/RETROPERITONEUM/LYMPH NODES: No free fluid.  No pneumoperitoneum. No lymphadenopathy.  ABDOMINAL WALL: No abnormalities identified. SOFT TISSUES: No abnormalities identified.  BONES: No acute fracture or aggressive osseous lesion.    Prominence of the common bile duct currently measures 0.8 cm and previously measured 0.3 cm on CT scan 6/3/2025. Cholelithiasis. No definitive CT findings of acute pancreatitis though the CT findings can lag behind clinical findings. Mild right hydroureteronephrosis is new. Multiple bladder stones. A 0.9 cm stone is located near the right ureteral orifice. Liquid stool content within the ascending colon. Multiple air and fluid-filled loops of small bowel are present. Findings may represent enterocolitis/diarrhea. Signed by Bill Merrill MD    XR chest 1 view  Result Date: 6/19/2025  STUDY: Chest Radiograph;  [6-; 2:09 pm] INDICATION: Weakness. COMPARISON: None Available ACCESSION NUMBER(S): [id number] ORDERING CLINICIAN: CLAUDY ANAND TECHNIQUE:  Frontal chest was obtained at 14:08 hours. FINDINGS: There is a port entering from the right with the tip in the superior vena cava. CARDIOMEDIASTINAL SILHOUETTE: Cardiomediastinal silhouette is normal in size and configuration.  LUNGS: There is haziness to the lung bases most likely related overlying soft tissue.  Lungs are clear.  ABDOMEN: No remarkable upper abdominal findings.  BONES: No acute osseous changes.    No evidence of acute infiltrate or effusion. Signed by Apollo Centeno  MD     Assessment/Plan   Diarrhea, etiology unclear, resolved, negative stool for C. difficile, negative stool pathogen PCR  Resolving leukocytosis  Colitis, per CT  Multiple bowel calculi with right-sided moderate hydroureteronephrosis  Penicillin allergy  Leukemoid reaction     Discontinue vancomycin  Discontinue cefepime  Change Flagyl to oral  Oral cefuroxime  Symptomatic treatment as needed  Follow-up blood cultures  Urology evaluation  Supportive care  Monitor temperature and WBC  Long-term plan is total of 7 days of antibiotic therapy-discussed with pharmacy/primary team        This is a complex infectious disease issue and the following was performed today (for more details please see the above note): Management decisions reflecting the added complexity (e.g., changes in antimicrobial therapy, infection control strategies).     Ismael Rao MD

## 2025-07-09 NOTE — NURSING NOTE
Wound RN rounding for photo updates and evaluation. Patient having procedure, unavailable for exam, nursing made aware wound photo's and measurements needed in flow sheet.    Marixa Mcclendon, BSN RN [facility]

## 2025-07-10 ENCOUNTER — ANESTHESIA EVENT (OUTPATIENT)
Dept: GASTROENTEROLOGY | Facility: HOSPITAL | Age: 80
End: 2025-07-10
Payer: MEDICARE

## 2025-07-10 ENCOUNTER — ANESTHESIA (OUTPATIENT)
Dept: GASTROENTEROLOGY | Facility: HOSPITAL | Age: 80
End: 2025-07-10
Payer: MEDICARE

## 2025-07-10 ENCOUNTER — APPOINTMENT (OUTPATIENT)
Dept: GASTROENTEROLOGY | Facility: HOSPITAL | Age: 80
DRG: 871 | End: 2025-07-10
Payer: MEDICARE

## 2025-07-10 ENCOUNTER — APPOINTMENT (OUTPATIENT)
Facility: CLINIC | Age: 80
End: 2025-07-10
Payer: MEDICARE

## 2025-07-10 PROBLEM — K92.1 MELENA: Status: ACTIVE | Noted: 2025-07-10

## 2025-07-10 LAB
ANION GAP SERPL CALC-SCNC: 7 MMOL/L (ref 10–20)
ANION GAP SERPL CALC-SCNC: 8 MMOL/L (ref 10–20)
BUN SERPL-MCNC: 10 MG/DL (ref 6–23)
BUN SERPL-MCNC: 11 MG/DL (ref 6–23)
CALCIUM SERPL-MCNC: 6.8 MG/DL (ref 8.6–10.3)
CALCIUM SERPL-MCNC: 6.8 MG/DL (ref 8.6–10.3)
CHLORIDE SERPL-SCNC: 108 MMOL/L (ref 98–107)
CHLORIDE SERPL-SCNC: 109 MMOL/L (ref 98–107)
CO2 SERPL-SCNC: 19 MMOL/L (ref 21–32)
CO2 SERPL-SCNC: 20 MMOL/L (ref 21–32)
CREAT SERPL-MCNC: 0.44 MG/DL (ref 0.5–1.05)
CREAT SERPL-MCNC: 0.48 MG/DL (ref 0.5–1.05)
CRYPTOSP AG STL QL IA: NEGATIVE
EGFRCR SERPLBLD CKD-EPI 2021: >90 ML/MIN/1.73M*2
EGFRCR SERPLBLD CKD-EPI 2021: >90 ML/MIN/1.73M*2
ERYTHROCYTE [DISTWIDTH] IN BLOOD BY AUTOMATED COUNT: 15.9 % (ref 11.5–14.5)
G LAMBLIA AG STL QL IA: NEGATIVE
GLUCOSE SERPL-MCNC: 103 MG/DL (ref 74–99)
GLUCOSE SERPL-MCNC: 110 MG/DL (ref 74–99)
HCT VFR BLD AUTO: 22.3 % (ref 36–46)
HCT VFR BLD AUTO: 22.4 % (ref 36–46)
HCT VFR BLD AUTO: 22.9 % (ref 36–46)
HCT VFR BLD AUTO: 25.4 % (ref 36–46)
HGB BLD-MCNC: 7.9 G/DL (ref 12–16)
HGB BLD-MCNC: 7.9 G/DL (ref 12–16)
HGB BLD-MCNC: 8.2 G/DL (ref 12–16)
HGB BLD-MCNC: 9.1 G/DL (ref 12–16)
INR PPP: 1.3 (ref 0.9–1.1)
MAGNESIUM SERPL-MCNC: 1.42 MG/DL (ref 1.6–2.4)
MCH RBC QN AUTO: 30.7 PG (ref 26–34)
MCH RBC QN AUTO: 31.1 PG (ref 26–34)
MCH RBC QN AUTO: 31.5 PG (ref 26–34)
MCH RBC QN AUTO: 31.7 PG (ref 26–34)
MCHC RBC AUTO-ENTMCNC: 35.3 G/DL (ref 32–36)
MCHC RBC AUTO-ENTMCNC: 35.4 G/DL (ref 32–36)
MCHC RBC AUTO-ENTMCNC: 35.8 G/DL (ref 32–36)
MCHC RBC AUTO-ENTMCNC: 35.8 G/DL (ref 32–36)
MCV RBC AUTO: 87 FL (ref 80–100)
MCV RBC AUTO: 88 FL (ref 80–100)
NRBC BLD-RTO: 0.4 /100 WBCS (ref 0–0)
NRBC BLD-RTO: 0.5 /100 WBCS (ref 0–0)
NRBC BLD-RTO: 0.5 /100 WBCS (ref 0–0)
NRBC BLD-RTO: 0.6 /100 WBCS (ref 0–0)
PHOSPHATE SERPL-MCNC: 2 MG/DL (ref 2.5–4.9)
PHOSPHATE SERPL-MCNC: 2.2 MG/DL (ref 2.5–4.9)
PHOSPHATE SERPL-MCNC: 2.3 MG/DL (ref 2.5–4.9)
PLATELET # BLD AUTO: 47 X10*3/UL (ref 150–450)
PLATELET # BLD AUTO: 62 X10*3/UL (ref 150–450)
PLATELET # BLD AUTO: 66 X10*3/UL (ref 150–450)
PLATELET # BLD AUTO: 85 X10*3/UL (ref 150–450)
POTASSIUM SERPL-SCNC: 3.1 MMOL/L (ref 3.5–5.3)
POTASSIUM SERPL-SCNC: 3.2 MMOL/L (ref 3.5–5.3)
POTASSIUM SERPL-SCNC: 3.4 MMOL/L (ref 3.5–5.3)
PROTHROMBIN TIME: 14.1 SECONDS (ref 9.8–12.4)
RBC # BLD AUTO: 2.54 X10*6/UL (ref 4–5.2)
RBC # BLD AUTO: 2.57 X10*6/UL (ref 4–5.2)
RBC # BLD AUTO: 2.59 X10*6/UL (ref 4–5.2)
RBC # BLD AUTO: 2.89 X10*6/UL (ref 4–5.2)
SODIUM SERPL-SCNC: 132 MMOL/L (ref 136–145)
SODIUM SERPL-SCNC: 133 MMOL/L (ref 136–145)
STAPHYLOCOCCUS SPEC CULT: ABNORMAL
WBC # BLD AUTO: 12.7 X10*3/UL (ref 4.4–11.3)
WBC # BLD AUTO: 13 X10*3/UL (ref 4.4–11.3)
WBC # BLD AUTO: 14.9 X10*3/UL (ref 4.4–11.3)
WBC # BLD AUTO: 17.4 X10*3/UL (ref 4.4–11.3)

## 2025-07-10 PROCEDURE — 2500000004 HC RX 250 GENERAL PHARMACY W/ HCPCS (ALT 636 FOR OP/ED): Mod: IPSPLIT | Performed by: SURGERY

## 2025-07-10 PROCEDURE — 85610 PROTHROMBIN TIME: CPT | Mod: IPSPLIT | Performed by: NURSE PRACTITIONER

## 2025-07-10 PROCEDURE — 99233 SBSQ HOSP IP/OBS HIGH 50: CPT | Performed by: NURSE PRACTITIONER

## 2025-07-10 PROCEDURE — 37799 UNLISTED PX VASCULAR SURGERY: CPT | Mod: IPSPLIT | Performed by: NURSE PRACTITIONER

## 2025-07-10 PROCEDURE — 0753T DGTZ GLS MCRSCP SLD LEVEL IV: CPT | Mod: TC,GENLAB,IPSPLIT | Performed by: SURGERY

## 2025-07-10 PROCEDURE — 85027 COMPLETE CBC AUTOMATED: CPT | Mod: IPSPLIT | Performed by: NURSE PRACTITIONER

## 2025-07-10 PROCEDURE — 94760 N-INVAS EAR/PLS OXIMETRY 1: CPT | Mod: IPSPLIT

## 2025-07-10 PROCEDURE — 3700000001 HC GENERAL ANESTHESIA TIME - INITIAL BASE CHARGE: Mod: IPSPLIT

## 2025-07-10 PROCEDURE — 2500000002 HC RX 250 W HCPCS SELF ADMINISTERED DRUGS (ALT 637 FOR MEDICARE OP, ALT 636 FOR OP/ED): Mod: IPSPLIT | Performed by: SURGERY

## 2025-07-10 PROCEDURE — 2500000005 HC RX 250 GENERAL PHARMACY W/O HCPCS: Mod: IPSPLIT | Performed by: INTERNAL MEDICINE

## 2025-07-10 PROCEDURE — 3700000002 HC GENERAL ANESTHESIA TIME - EACH INCREMENTAL 1 MINUTE: Mod: IPSPLIT

## 2025-07-10 PROCEDURE — 99232 SBSQ HOSP IP/OBS MODERATE 35: CPT | Performed by: SURGERY

## 2025-07-10 PROCEDURE — 2500000001 HC RX 250 WO HCPCS SELF ADMINISTERED DRUGS (ALT 637 FOR MEDICARE OP): Mod: IPSPLIT | Performed by: INTERNAL MEDICINE

## 2025-07-10 PROCEDURE — 2500000004 HC RX 250 GENERAL PHARMACY W/ HCPCS (ALT 636 FOR OP/ED): Mod: IPSPLIT | Performed by: LICENSED PRACTICAL NURSE

## 2025-07-10 PROCEDURE — 84100 ASSAY OF PHOSPHORUS: CPT | Mod: IPSPLIT | Performed by: NURSE PRACTITIONER

## 2025-07-10 PROCEDURE — 7100000001 HC RECOVERY ROOM TIME - INITIAL BASE CHARGE: Mod: IPSPLIT

## 2025-07-10 PROCEDURE — 84132 ASSAY OF SERUM POTASSIUM: CPT | Mod: IPSPLIT | Performed by: NURSE PRACTITIONER

## 2025-07-10 PROCEDURE — 7100000002 HC RECOVERY ROOM TIME - EACH INCREMENTAL 1 MINUTE: Mod: IPSPLIT

## 2025-07-10 PROCEDURE — 0DB98ZX EXCISION OF DUODENUM, VIA NATURAL OR ARTIFICIAL OPENING ENDOSCOPIC, DIAGNOSTIC: ICD-10-PCS | Performed by: SURGERY

## 2025-07-10 PROCEDURE — 2500000004 HC RX 250 GENERAL PHARMACY W/ HCPCS (ALT 636 FOR OP/ED): Mod: IPSPLIT | Performed by: NURSE PRACTITIONER

## 2025-07-10 PROCEDURE — 2020000001 HC ICU ROOM DAILY: Mod: IPSPLIT

## 2025-07-10 PROCEDURE — 80048 BASIC METABOLIC PNL TOTAL CA: CPT | Mod: IPSPLIT | Performed by: NURSE PRACTITIONER

## 2025-07-10 PROCEDURE — 2500000002 HC RX 250 W HCPCS SELF ADMINISTERED DRUGS (ALT 637 FOR MEDICARE OP, ALT 636 FOR OP/ED): Mod: IPSPLIT | Performed by: PHYSICIAN ASSISTANT

## 2025-07-10 PROCEDURE — 43239 EGD BIOPSY SINGLE/MULTIPLE: CPT | Mod: IPSPLIT | Performed by: SURGERY

## 2025-07-10 PROCEDURE — 2500000002 HC RX 250 W HCPCS SELF ADMINISTERED DRUGS (ALT 637 FOR MEDICARE OP, ALT 636 FOR OP/ED): Mod: IPSPLIT | Performed by: INTERNAL MEDICINE

## 2025-07-10 PROCEDURE — 83735 ASSAY OF MAGNESIUM: CPT | Mod: IPSPLIT | Performed by: NURSE PRACTITIONER

## 2025-07-10 PROCEDURE — 36430 TRANSFUSION BLD/BLD COMPNT: CPT | Mod: IPSPLIT

## 2025-07-10 PROCEDURE — 0DB78ZX EXCISION OF STOMACH, PYLORUS, VIA NATURAL OR ARTIFICIAL OPENING ENDOSCOPIC, DIAGNOSTIC: ICD-10-PCS | Performed by: SURGERY

## 2025-07-10 PROCEDURE — 2500000005 HC RX 250 GENERAL PHARMACY W/O HCPCS: Mod: IPSPLIT | Performed by: NURSE PRACTITIONER

## 2025-07-10 PROCEDURE — 2500000001 HC RX 250 WO HCPCS SELF ADMINISTERED DRUGS (ALT 637 FOR MEDICARE OP): Mod: IPSPLIT | Performed by: PHYSICIAN ASSISTANT

## 2025-07-10 PROCEDURE — P9037 PLATE PHERES LEUKOREDU IRRAD: HCPCS | Mod: IPSPLIT

## 2025-07-10 RX ORDER — PROPOFOL 10 MG/ML
INJECTION, EMULSION INTRAVENOUS AS NEEDED
Status: DISCONTINUED | OUTPATIENT
Start: 2025-07-10 | End: 2025-07-10

## 2025-07-10 RX ORDER — PANTOPRAZOLE SODIUM 40 MG/10ML
40 INJECTION, POWDER, LYOPHILIZED, FOR SOLUTION INTRAVENOUS 2 TIMES DAILY
Status: DISCONTINUED | OUTPATIENT
Start: 2025-07-10 | End: 2025-07-14 | Stop reason: HOSPADM

## 2025-07-10 RX ORDER — FENTANYL CITRATE 50 UG/ML
INJECTION, SOLUTION INTRAMUSCULAR; INTRAVENOUS AS NEEDED
Status: DISCONTINUED | OUTPATIENT
Start: 2025-07-10 | End: 2025-07-10

## 2025-07-10 RX ORDER — POTASSIUM CHLORIDE 20 MEQ/1
40 TABLET, EXTENDED RELEASE ORAL ONCE
Status: COMPLETED | OUTPATIENT
Start: 2025-07-10 | End: 2025-07-10

## 2025-07-10 RX ORDER — SODIUM,POTASSIUM PHOSPHATES 280-250MG
1 POWDER IN PACKET (EA) ORAL 4 TIMES DAILY
Status: COMPLETED | OUTPATIENT
Start: 2025-07-10 | End: 2025-07-11

## 2025-07-10 RX ORDER — MAGNESIUM SULFATE HEPTAHYDRATE 40 MG/ML
2 INJECTION, SOLUTION INTRAVENOUS ONCE
Status: COMPLETED | OUTPATIENT
Start: 2025-07-10 | End: 2025-07-10

## 2025-07-10 RX ORDER — MUPIROCIN 20 MG/G
OINTMENT TOPICAL 3 TIMES DAILY
Status: DISCONTINUED | OUTPATIENT
Start: 2025-07-10 | End: 2025-07-10

## 2025-07-10 RX ORDER — MUPIROCIN 20 MG/G
OINTMENT TOPICAL 2 TIMES DAILY
Status: DISCONTINUED | OUTPATIENT
Start: 2025-07-10 | End: 2025-07-14 | Stop reason: HOSPADM

## 2025-07-10 RX ORDER — SODIUM CHLORIDE 9 MG/ML
INJECTION, SOLUTION INTRAVENOUS CONTINUOUS PRN
Status: DISCONTINUED | OUTPATIENT
Start: 2025-07-10 | End: 2025-07-10

## 2025-07-10 RX ORDER — SUCRALFATE 1 G/10ML
1 SUSPENSION ORAL EVERY 6 HOURS SCHEDULED
Status: DISCONTINUED | OUTPATIENT
Start: 2025-07-10 | End: 2025-07-14 | Stop reason: HOSPADM

## 2025-07-10 RX ADMIN — Medication 10 ML: at 20:30

## 2025-07-10 RX ADMIN — CEFUROXIME AXETIL 500 MG: 250 TABLET, FILM COATED ORAL at 09:48

## 2025-07-10 RX ADMIN — CEFUROXIME AXETIL 500 MG: 250 TABLET, FILM COATED ORAL at 20:29

## 2025-07-10 RX ADMIN — PROPOFOL 20 MG: 10 INJECTION, EMULSION INTRAVENOUS at 08:58

## 2025-07-10 RX ADMIN — PANTOPRAZOLE SODIUM 40 MG: 40 INJECTION, POWDER, LYOPHILIZED, FOR SOLUTION INTRAVENOUS at 20:29

## 2025-07-10 RX ADMIN — SUCRALFATE 1 G: 1 SUSPENSION ORAL at 11:56

## 2025-07-10 RX ADMIN — FENTANYL CITRATE 25 MCG: 50 INJECTION, SOLUTION INTRAMUSCULAR; INTRAVENOUS at 08:56

## 2025-07-10 RX ADMIN — POTASSIUM PHOSPHATE, MONOBASIC POTASSIUM PHOSPHATE, DIBASIC 21 MMOL: 224; 236 INJECTION, SOLUTION, CONCENTRATE INTRAVENOUS at 11:56

## 2025-07-10 RX ADMIN — Medication 10 ML: at 09:48

## 2025-07-10 RX ADMIN — POTASSIUM CHLORIDE 40 MEQ: 1500 TABLET, EXTENDED RELEASE ORAL at 20:29

## 2025-07-10 RX ADMIN — POTASSIUM & SODIUM PHOSPHATES POWDER PACK 280-160-250 MG 1 PACKET: 280-160-250 PACK at 23:04

## 2025-07-10 RX ADMIN — FOLIC ACID 1 MG: 1 TABLET ORAL at 09:47

## 2025-07-10 RX ADMIN — SUCRALFATE 1 G: 1 SUSPENSION ORAL at 23:04

## 2025-07-10 RX ADMIN — MAGNESIUM SULFATE HEPTAHYDRATE 2 G: 40 INJECTION, SOLUTION INTRAVENOUS at 09:48

## 2025-07-10 RX ADMIN — ROSUVASTATIN CALCIUM 10 MG: 10 TABLET, FILM COATED ORAL at 20:29

## 2025-07-10 RX ADMIN — SUCRALFATE 1 G: 1 SUSPENSION ORAL at 17:00

## 2025-07-10 RX ADMIN — PROPOFOL 30 MG: 10 INJECTION, EMULSION INTRAVENOUS at 08:56

## 2025-07-10 RX ADMIN — MUPIROCIN 1 APPLICATION: 20 OINTMENT TOPICAL at 20:28

## 2025-07-10 RX ADMIN — METRONIDAZOLE 500 MG: 500 TABLET ORAL at 09:47

## 2025-07-10 RX ADMIN — PANTOPRAZOLE SODIUM 40 MG: 40 INJECTION, POWDER, LYOPHILIZED, FOR SOLUTION INTRAVENOUS at 09:47

## 2025-07-10 RX ADMIN — MUPIROCIN: 20 OINTMENT TOPICAL at 15:38

## 2025-07-10 RX ADMIN — SODIUM CHLORIDE: 9 INJECTION, SOLUTION INTRAVENOUS at 08:51

## 2025-07-10 RX ADMIN — METRONIDAZOLE 500 MG: 500 TABLET ORAL at 20:29

## 2025-07-10 SDOH — HEALTH STABILITY: MENTAL HEALTH: CURRENT SMOKER: 0

## 2025-07-10 ASSESSMENT — COGNITIVE AND FUNCTIONAL STATUS - GENERAL
DAILY ACTIVITIY SCORE: 16
CLIMB 3 TO 5 STEPS WITH RAILING: A LOT
HELP NEEDED FOR BATHING: A LITTLE
MOVING TO AND FROM BED TO CHAIR: A LOT
TOILETING: A LOT
TURNING FROM BACK TO SIDE WHILE IN FLAT BAD: A LITTLE
DRESSING REGULAR UPPER BODY CLOTHING: A LOT
STANDING UP FROM CHAIR USING ARMS: A LITTLE
DRESSING REGULAR LOWER BODY CLOTHING: A LOT
MOBILITY SCORE: 16
PERSONAL GROOMING: A LITTLE
WALKING IN HOSPITAL ROOM: A LOT

## 2025-07-10 ASSESSMENT — PAIN SCALES - GENERAL
PAINLEVEL_OUTOF10: 0 - NO PAIN

## 2025-07-10 ASSESSMENT — PAIN - FUNCTIONAL ASSESSMENT
PAIN_FUNCTIONAL_ASSESSMENT: 0-10

## 2025-07-10 NOTE — NURSING NOTE
Unit of platelets infusing, per order. Patient aware of S/S of possible reaction. Call light within reach.

## 2025-07-10 NOTE — ANESTHESIA POSTPROCEDURE EVALUATION
Patient: Elisha Flores    Procedure Summary       Date: 07/10/25 Room / Location: [facility]    Anesthesia Start: 0851 Anesthesia Stop: 0911    Procedure: EGD Diagnosis: Gastrointestinal hemorrhage with melena    Scheduled Providers: Jigar Pérez MD Responsible Provider: EMELINA Barber    Anesthesia Type: MAC ASA Status: 3            Anesthesia Type: MAC    Vitals Value Taken Time   /59 07/10/25 09:27   Temp 36.3 °C (97.3 °F) 07/10/25 09:07   Pulse 77 07/10/25 09:27   Resp 16 07/10/25 09:27   SpO2 100 % 07/10/25 09:27       Anesthesia Post Evaluation    Patient location during evaluation: PACU  Patient participation: complete - patient participated  Level of consciousness: awake and alert  Pain management: adequate  Airway patency: patent  Cardiovascular status: acceptable  Respiratory status: acceptable  Hydration status: acceptable  Postoperative Nausea and Vomiting: none        There were no known notable events for this encounter.

## 2025-07-10 NOTE — PROGRESS NOTES
Elisha Flores is a 79 y.o. female on day 4 of admission presenting with Diarrhea, unspecified type.    Subjective   Patient returned from EGD, tolerated well sitting up in bed.  She is alert and conversant in no distress. Denies any pain at this time.  Denies headache, dizziness, chest pain, shortness of breath, abdominal pain, nausea or vomiting.  She reported that she has 3 large ulcers.  Discussed plan for the day and all questions were answered.       Objective     Physical Exam  Constitutional:       General: She is not in acute distress.     Appearance: She is ill-appearing.   HENT:      Head: Normocephalic and atraumatic.      Nose: No congestion or rhinorrhea.      Mouth/Throat:      Mouth: Mucous membranes are moist.   Eyes:      General: No scleral icterus.        Right eye: No discharge.         Left eye: No discharge.      Conjunctiva/sclera: Conjunctivae normal.      Pupils: Pupils are equal, round, and reactive to light.   Cardiovascular:      Rate and Rhythm: Normal rate and regular rhythm.      Pulses: Normal pulses.      Heart sounds: No murmur heard.     No friction rub. No gallop.   Pulmonary:      Effort: Pulmonary effort is normal.      Breath sounds: Normal breath sounds.   Abdominal:      General: Abdomen is flat. Bowel sounds are normal. There is no distension.      Palpations: Abdomen is soft.      Tenderness: There is no abdominal tenderness.      Comments: Dark tarry stools reported    Musculoskeletal:         General: No swelling.      Cervical back: No rigidity or tenderness.      Right lower leg: No edema.      Left lower leg: No edema.   Skin:     General: Skin is warm and dry.      Coloration: Skin is pale.   Neurological:      General: No focal deficit present.      Mental Status: She is alert and oriented to person, place, and time.      Motor: Weakness present.   Psychiatric:         Mood and Affect: Mood normal.         Behavior: Behavior normal.         Last Recorded  "Vitals  Blood pressure (!) 123/94, pulse 81, temperature 36.3 °C (97.3 °F), temperature source Temporal, resp. rate 15, height 1.651 m (5' 5\"), weight 129 kg (283 lb 4.7 oz), SpO2 100%.  Intake/Output last 3 Shifts:  I/O last 3 completed shifts:  In: 4713 (36.7 mL/kg) [P.O.:1250; I.V.:2091.3 (16.3 mL/kg); Blood:519.9; IV Piggyback:851.9]  Out: 1730 (13.5 mL/kg) [Urine:1730 (0.4 mL/kg/hr)]  Weight: 128.5 kg     Relevant Results  Scheduled medications  [Held by provider] amLODIPine, 10 mg, oral, Daily  [Held by provider] aspirin, 81 mg, oral, Daily  cefuroxime, 500 mg, oral, BID  [Held by provider] enoxaparin, 40 mg, subcutaneous, q12h MARIOLA  folic acid, 1 mg, oral, Daily  [Held by provider] furosemide, 20 mg, oral, Daily  [Held by provider] losartan, 100 mg, oral, Daily  metroNIDAZOLE, 500 mg, oral, q12h MARIOLA  pantoprazole, 40 mg, intravenous, BID  potassium phosphate, 21 mmol, intravenous, Once  [Held by provider] propranolol LA, 120 mg, oral, Nightly  rosuvastatin, 10 mg, oral, Nightly  sodium chloride 0.9%, 10 mL, intravenous, q12h MARIOLA  sucralfate, 1 g, oral, q6h MARIOLA      Continuous medications  sodium chloride 0.9%, 10 mL/hr      PRN medications  PRN medications: acetaminophen **OR** acetaminophen **OR** acetaminophen, alum-mag hydroxide-simeth, benzocaine-menthol, diazePAM, melatonin, metoclopramide, ondansetron, sodium chloride 0.9%, sodium chloride 0.9%                      Latest Reference Range & Units 07/09/25 17:14 07/09/25 23:46 07/10/25 05:32   GLUCOSE 74 - 99 mg/dL  110 (H) 103 (H)   SODIUM 136 - 145 mmol/L  132 (L) 133 (L)   POTASSIUM 3.5 - 5.3 mmol/L  3.4 (L) 3.1 (L)   CHLORIDE 98 - 107 mmol/L  108 (H) 109 (H)   Bicarbonate 21 - 32 mmol/L  19 (L) 20 (L)   Anion Gap 10 - 20 mmol/L  8 (L) 7 (L)   Blood Urea Nitrogen 6 - 23 mg/dL  11 10   Creatinine 0.50 - 1.05 mg/dL  0.48 (L) 0.44 (L)   EGFR >60 mL/min/1.73m*2  >90 >90   Calcium 8.6 - 10.3 mg/dL  6.8 (L) 6.8 (L)   PHOSPHORUS 2.5 - 4.9 mg/dL  2.0 (L) 2.2 " (L)   MAGNESIUM 1.60 - 2.40 mg/dL   1.42 (L)   Protime 9.8 - 12.4 seconds 16.6 (H)     INR 0.9 - 1.1  1.5 (H)     WBC 4.4 - 11.3 x10*3/uL  20.4 (H) 14.9 (H)   nRBC 0.0 - 0.0 /100 WBCs  0.6 (H) 0.6 (H)   RBC 4.00 - 5.20 x10*6/uL  2.80 (L) 2.57 (L)   HEMOGLOBIN 12.0 - 16.0 g/dL  8.5 (L) 7.9 (L)   HEMATOCRIT 36.0 - 46.0 %  24.4 (L) 22.3 (L)   MCV 80 - 100 fL  87 87   MCH 26.0 - 34.0 pg  30.4 30.7   MCHC 32.0 - 36.0 g/dL  34.8 35.4   RED CELL DISTRIBUTION WIDTH 11.5 - 14.5 %  16.1 (H) 15.9 (H)   Platelets 150 - 450 x10*3/uL  53 (L) 47 (L)   (H): Data is abnormally high  (L): Data is abnormally low    Esophagogastroduodenoscopy (EGD)  Addendum Date: 7/10/2025  Impression 3 ulcers in the duodenal bulb and 1st part of the duodenum with clean base (Dave III); performed cold forceps biopsy Performed forceps biopsies in the antrum Findings 3 deep, round, benign-appearing ulcers in the duodenal bulb and 1st part of the duodenum with clean base (Dave III); performed cold forceps biopsy Performed forceps biopsies in the antrum Regular Z-line 38 cm from the incisors Recommendation Await pathology results Repeat EGD in 3 months, due: 10/8/2025 Protonix 40 iv bid for 5 days Carafate 1 gm qid for 1 month  Indication Gastrointestinal hemorrhage with melena Post-Op Diagnosis None Staff Staff Role Jigar Pérez MD Proceduralist Medications See Anesthesia Record. Preprocedure A history and physical has been performed, and patient medication allergies have been reviewed. The patient's tolerance of previous anesthesia has been reviewed. The risks and benefits of the procedure and the sedation options and risks were discussed with the patient. All questions were answered and informed consent obtained. Details of the Procedure The patient underwent monitored anesthesia care, which was administered by an anesthesia professional. The patient's blood pressure, ECG, ETCO2, heart rate, level of consciousness, oxygen and respirations were  monitored throughout the procedure. The scope was introduced through the mouth and advanced to the second part of the duodenum. Retroflexion was performed in the cardia. Prior to the procedure, the patient's H. Pylori status was unknown. The patient experienced no blood loss. The procedure was not difficult. The patient tolerated the procedure well. There were no apparent adverse events. Events Procedure Events Event Event Time ENDO SCOPE IN TIME 7/10/2025  8:57 AM ENDO SCOPE OUT TIME 7/10/2025  9:02 AM Specimens ID Type Source Tests Collected by Time 1 : via cold bx Tissue DUODENAL BULB  BIOPSY SURGICAL PATHOLOGY EXAM Jigar Pérez MD 7/10/2025 0859 2 : via cold bx Tissue STOMACH ANTRUM BIOPSY SURGICAL PATHOLOGY EXAM Jigar Pérez MD 7/10/2025 0900 Procedure Location [facility] [address] [facility] [address] [phone] Referring Provider Jigar Pérez MD Procedure Provider Jigar Pérez MD    CT chest abdomen pelvis w IV contrast  Result Date: 7/6/2025  CHEST:   No CT evidence of acute chest process. Interval right anterior chest wall port placement since 06/03/2025. additional findings as above similar to the previous exam.   ABDOMEN AND PELVIS:   Rectal air-fluid level consistent with diarrhea and probable distal sigmoid wall thickening/enhancement consistent with nonspecific colitis. Clinical correlation suggested.   Multiple bladder calculi near the right UVJ again seen with increased now moderate hydroureteronephrosis.   Flattening of the upper abdominal IVC which can be seen with dehydration.   Other findings as described above.   MACRO: None.   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 Clinical correlation suggested. Multiple bladder calculi near the right UVJ again seen with increased now moderate hydroureteronephrosis. Flattening of the upper abdominal IVC which can be seen with dehydration.  -Right UVJ stones- can follow up with DR. Zapata- urology  OP   -C-diff negative  -Stool pathogens pending  -1 L NS given in the ED  -Cefepime, Flagyl, vancomycin given in the ED  -Cefepime (Maxipime) and Vancomycin d/c'd   -Continue metroNIDAZOLE (Flagyl) 500 mg PO  -Continue cefuroxime 500 mg  PO  -WBC 26.1 > 20.7 > 42.3 > 26.4 > 14.9  -ID consulted, appreciate recommendations  -Intake and Output  -Daily weights  -Rectal tube, dc'd  -Urine culture No growth  -Nieto  -Blood culture  no growth at 3  days   -Monitor fever and WBC  -Daily CBC  -sodium chloride 0.9% infusion  150 ml/hr completed  -message sent to Dr Bustillos regarding WBC elevations  -PT/OT evaluations       Rectal Bleeding   -Began to have profuse bleeding  -Dr. Pérez consulted  -Hemoglobin 9.4 > 7.0  -500ml NS bolus   -Lovenox, ASA held  -PT/ INR 18.4/ 1.7 > 17.0/ 1.5  -Vitamin K given  -3 units RBC ordered, given  -CBC between each unit   -CBC q 6 hours  -Continue folic acid (Folvite) tablet 1 mg   -Hemoglobin 7.5   -1 Unit RBC given  -Monitor   -EGD with Dr. Pérez has 3 large duodenal bleeding ulcers, Protonix twice daily and Carafate added.  -PLT 47  -1 Unit platelets     Hypertension  Hyperlipidemia  -Hold Amlodipine , lasix, propranolol, and Cozaar  -Hold aspirin EC tablet 81  mg daily  -Continue rosuvastatin (Crestor) tablet 10 mg daily       Hyperglycemia  -Monitor   -Daily BMP  -start SSRI when orals tolerated or BG>200 consistently     Hypomagnesemia  Hypokalemia  Hypophosphatemia  -Magnesium 1.17 > 1.83 > 1.42  -2 G  Magnesium  -K 2.7 > 3.1 > 3.3 > 2.9 > 3.1  -Phosphorous 1.5 > 2.2   -Replete as needed  -Given potassium phosphates 30 mmol in dextrose 5% 250 mL IV   -Given potassium phosphates 20 mmol in dextrose 5% 250 mL IV   -Monitor  -Daily BMP          GI ppx: PPI  DVT ppx: Enoxaparin on hold, SCD's  Fluids: As needed   Electrolytes: replace as needed  Nutrition: Soft diet with Oral nutritional supplements   Adjuncts: PIV/ Implantable port  Code Status: Full           Danyelle Guo, APRN-CNP

## 2025-07-10 NOTE — CARE PLAN
The patient's goals for the shift include stopping my bleeding.     The clinical goals for the shift include completing EGD without any complications, supplementing electrolytes, provide wound care, provide incontinence care every two hours and as needed, promote PO intake, bleeding precautions, and monitor for S/S of bleeding.

## 2025-07-10 NOTE — PROGRESS NOTES
Physical Therapy                 Therapy Communication Note    Patient Name: Elisha Flores  MRN: [id number]  Department: [facility]  Room: 01/01-A  Today's Date: 7/10/2025     Discipline: Physical Therapy    Missed Visit: PT Missed Visit: Yes     Missed Visit Reason: Missed Visit Reason: Patient placed on medical hold (RN requested PT to hold today due to labs and currently having scope.)    Missed Time: Attempt    Comment:

## 2025-07-10 NOTE — CARE PLAN
The patient's goals for the shift include      The clinical goals for the shift include hgb will remain above 7    Over the shift, the patient did make progress toward the following goals. Pt received 1 unit prbc yesterday and hgb remained stable over night. Patient did have 2 dark tarry stools and serial labs are ordered

## 2025-07-10 NOTE — PROGRESS NOTES
"Elisha Flores is a 79 y.o. female on day 4 of admission presenting with Diarrhea, unspecified type.    Subjective   Having black tarry stools.  Received an additional unit of packed cells overnight.  H&H 7.9 22.3.  White blood cell count 14.9.       Objective     Physical Exam  Constitutional:       Appearance: Normal appearance.   Cardiovascular:      Heart sounds: Normal heart sounds.   Pulmonary:      Breath sounds: Normal breath sounds and air entry.   Abdominal:      General: Abdomen is flat.      Palpations: Abdomen is soft.      Tenderness: There is no abdominal tenderness.   Neurological:      Mental Status: She is alert.         Last Recorded Vitals  Blood pressure 143/66, pulse 77, temperature 36.6 °C (97.8 °F), temperature source Temporal, resp. rate 13, height 1.651 m (5' 5\"), weight 129 kg (283 lb 4.7 oz), SpO2 100%.  Intake/Output last 3 Shifts:  I/O last 3 completed shifts:  In: 4035.1 (33.3 mL/kg) [P.O.:960; I.V.:2425.1 (20 mL/kg); IV Piggyback:650]  Out: 1420 (11.7 mL/kg) [Urine:1370 (0.3 mL/kg/hr); Blood:50]  Weight: 121.2 kg     Relevant Results  Hemoglobin 7.9.  Hematocrit 22.3.  White blood cell count 14.9 potassium 3.1      Assessment & Plan  Diarrhea, unspecified type    Benign essential hypertension    Anxiety and depression    GERD (gastroesophageal reflux disease)    Hyperglycemia    Hyperlipidemia    Hypokalemia    Vitamin D deficiency    Arthritis    Personal history of other specified conditions    Weakness    Kidney stones    Dehydration    Hypomagnesemia    Rectal bleeding    Diverticulosis of large intestine with hemorrhage    Colitis    Hypophosphatemia    Melena  Plan-EGD to rule out peptic ulcer.  Continue proton pump inhibitor.  Patient agrees to the procedure and plan as discussed  ESOPHAGOGASTRODODENOSCOPY/ BIOPSIES   Risks include, but not limited to pain, infection, bleeding, perforation,  missed lesions, aspiration, risk of cardiac, pulmonary, neurologic, locomotor, " anesthetic events,  and other unforeseen complications including death.      Jigar Pérez MD

## 2025-07-10 NOTE — DISCHARGE INSTRUCTIONS

## 2025-07-10 NOTE — PROGRESS NOTES
S/P EGD today-new medications.  Patient not medically ready for discharge.         07/10/25 1115   Discharge Planning   Living Arrangements Children   Home or Post Acute Services Post acute facilities (Rehab/SNF/etc)   Type of Post Acute Facility Services Skilled nursing   Expected Discharge Disposition SNF  (Patient accepted at [facility]- She has Medicare insurance and has met the 3 midnight inpatient requirement- no barriers when medically stable for discharge.  Updates sent thru [facility].)   Does the patient need discharge transport arranged? No

## 2025-07-10 NOTE — ASSESSMENT & PLAN NOTE
Plan-EGD to rule out peptic ulcer.  Continue proton pump inhibitor.  Patient agrees to the procedure and plan as discussed

## 2025-07-10 NOTE — PROGRESS NOTES
Occupational Therapy                 Therapy Communication Note    Patient Name: Elisha Flores  MRN: [id number]  Department: [facility]  Room: 01/01-A  Today's Date: 7/10/2025     Discipline: Occupational Therapy    Missed Visit: OT Missed Visit: Yes     Missed Visit Reason: Missed Visit Reason: Patient placed on medical hold (Per discussion w/ nursing, hold at this time 2/2 labs and pt going down for scope at 830)    Missed Time: Attempt 802

## 2025-07-10 NOTE — ANESTHESIA PREPROCEDURE EVALUATION
Patient: Elisha Flores    Procedure Information       Date/Time: 07/10/25 0830    Scheduled providers: Jigar Pérez MD    Procedure: EGD    Location: [facility]            Relevant Problems   Anesthesia (within normal limits)      Cardiac   (+) Benign essential hypertension   (+) Chronic diastolic congestive heart failure   (+) Hyperlipidemia   (+) Mastodynia   (+) PVD (peripheral vascular disease)      Neuro   (+) Anxiety and depression   (+) Radiculopathy, cervical region      GI   (+) Diverticulosis of large intestine with hemorrhage   (+) GERD (gastroesophageal reflux disease)   (+) Rectal bleeding      /Renal   (+) Kidney stones   (+) Urinary tract infection      Endocrine   (+) Class 2 obesity with body mass index (BMI) of 39.0 to 39.9 in adult   (+) Obesity, morbid (Multi)      ID   (+) Candidal intertrigo   (+) Mycotic toenails   (+) Urinary tract infection      Skin   (+) Eczema      GYN   (+) Invasive ductal carcinoma of breast, female, left       Clinical information reviewed:    Allergies  Meds  Problems              NPO Detail:  No data recorded     Physical Exam    Airway  Mallampati: III  TM distance: >3 FB  Neck ROM: full  Mouth opening: 3 or more finger widths     Cardiovascular    Dental - normal exam     Pulmonary    Abdominal            Anesthesia Plan    History of general anesthesia?: yes  History of complications of general anesthesia?: no    ASA 3     MAC     The patient is not a current smoker.    intravenous induction   Anesthetic plan and risks discussed with patient.

## 2025-07-10 NOTE — PROGRESS NOTES
"Elisha Flores is a 79 y.o. female on day 3 of admission presenting with Diarrhea, unspecified type.    Subjective   Patient sitting up in bed eating lemon ice. She is interactive, conversant, no apparent distress noted, and  alert and oriented x 4.  Son is at bedside.  Patient states that she feels much better.  Care of plan discussed.  All questions answered.       Objective     Physical Exam  Constitutional:       General: She is not in acute distress.     Appearance: She is ill-appearing.   HENT:      Head: Normocephalic and atraumatic.      Nose: Nose normal.      Mouth/Throat:      Mouth: Mucous membranes are moist.   Eyes:      General: No scleral icterus.        Right eye: No discharge.         Left eye: No discharge.      Conjunctiva/sclera: Conjunctivae normal.   Cardiovascular:      Rate and Rhythm: Normal rate and regular rhythm.      Pulses: Normal pulses.      Heart sounds: No murmur heard.     No gallop.   Pulmonary:      Effort: Pulmonary effort is normal. No respiratory distress.      Breath sounds: Normal breath sounds. No stridor. No wheezing, rhonchi or rales.   Chest:      Chest wall: No tenderness.   Abdominal:      General: Bowel sounds are normal. There is no distension.      Palpations: Abdomen is soft. There is no mass.      Tenderness: There is no abdominal tenderness.      Comments: Black stool     Skin:     General: Skin is warm and dry.      Capillary Refill: Capillary refill takes less than 2 seconds.      Coloration: Skin is pale.   Neurological:      General: No focal deficit present.      Mental Status: She is alert and oriented to person, place, and time.      Motor: Weakness present.   Psychiatric:         Mood and Affect: Mood normal.         Behavior: Behavior normal.         Thought Content: Thought content normal.         Last Recorded Vitals  Blood pressure 137/67, pulse 79, temperature 36.7 °C (98.1 °F), temperature source Temporal, resp. rate 20, height 1.651 m (5' 5\"), " weight 121 kg (267 lb 3.2 oz), SpO2 99%.  Intake/Output last 3 Shifts:  I/O last 3 completed shifts:  In: 4035.1 (33.3 mL/kg) [P.O.:960; I.V.:2425.1 (20 mL/kg); IV Piggyback:650]  Out: 1420 (11.7 mL/kg) [Urine:1370 (0.3 mL/kg/hr); Blood:50]  Weight: 121.2 kg     Relevant Results  Scheduled medications  [Held by provider] amLODIPine, 10 mg, oral, Daily  [Held by provider] aspirin, 81 mg, oral, Daily  cefuroxime, 500 mg, oral, BID  [Held by provider] enoxaparin, 40 mg, subcutaneous, q12h MARIOLA  folic acid, 1 mg, oral, Daily  [Held by provider] furosemide, 20 mg, oral, Daily  [Held by provider] losartan, 100 mg, oral, Daily  metroNIDAZOLE, 500 mg, oral, q12h MARIOLA  pantoprazole, 40 mg, intravenous, Daily  phytonadione, 5 mg, oral, Once  potassium phosphate, 30 mmol, intravenous, Once  [Held by provider] propranolol LA, 120 mg, oral, Nightly  rosuvastatin, 10 mg, oral, Nightly  sodium chloride 0.9%, 10 mL, intravenous, q12h MARIOLA      Continuous medications  sodium chloride 0.9%, 10 mL/hr      PRN medications  PRN medications: acetaminophen **OR** acetaminophen **OR** acetaminophen, alum-mag hydroxide-simeth, benzocaine-menthol, diazePAM, melatonin, metoclopramide, ondansetron, sodium chloride 0.9%, sodium chloride 0.9%     Latest Reference Range & Units 07/08/25 04:08 07/08/25 06:48 07/08/25 12:49 07/08/25 17:23 07/08/25 18:32 07/08/25 23:51 07/09/25 05:34   GLUCOSE 74 - 99 mg/dL 168 (H)      137 (H)   SODIUM 136 - 145 mmol/L 135 (L)      134 (L)   POTASSIUM 3.5 - 5.3 mmol/L 3.3 (L)      2.9 (LL)   CHLORIDE 98 - 107 mmol/L 109 (H)      110 (H)   Bicarbonate 21 - 32 mmol/L 20 (L)      19 (L)   Anion Gap 10 - 20 mmol/L 9 (L)      8 (L)   Blood Urea Nitrogen 6 - 23 mg/dL 27 (H)      20   Creatinine 0.50 - 1.05 mg/dL 0.75      0.67   EGFR >60 mL/min/1.73m*2 82      90   Calcium 8.6 - 10.3 mg/dL 6.9 (L)      6.9 (L)   PHOSPHORUS 2.5 - 4.9 mg/dL       1.5 (L)   Protime 9.8 - 12.4 seconds  20.3 (H)   20.4 (H)  17.0 (H)   INR 0.9 -  1.1   1.8 (H)   1.8 (H)  1.5 (H)   WBC 4.4 - 11.3 x10*3/uL 42.3 (H)  44.1 (H) 42.1 (H)  33.7 (H) 26.4 (H)   nRBC 0.0 - 0.0 /100 WBCs 0.4 (H)  0.2 (H) 0.3 (H)  0.3 (H) 0.3 (H)   RBC 4.00 - 5.20 x10*6/uL 3.98 (L)  3.47 (L) 3.12 (L)  2.74 (L) 2.46 (L)   HEMOGLOBIN 12.0 - 16.0 g/dL 12.0  10.6 (L) 9.5 (L)  8.3 (L) 7.5 (L)   HEMATOCRIT 36.0 - 46.0 % 33.3 (L)  29.0 (L) 26.1 (L)  23.1 (L) 21.0 (L)   MCV 80 - 100 fL 84  84 84  84 85   MCH 26.0 - 34.0 pg 30.2  30.5 30.4  30.3 30.5   MCHC 32.0 - 36.0 g/dL 36.0  36.6 (H) 36.4 (H)  35.9 35.7   RED CELL DISTRIBUTION WIDTH 11.5 - 14.5 % 14.0  14.9 (H) 15.2 (H)  15.3 (H) 15.8 (H)   Platelets 150 - 450 x10*3/uL 100 (L)  77 (L) 94 (L)  74 (L) 71 (L)   (LL): Data is critically low  (H): Data is abnormally high  (L): Data is abnormally low    CT chest abdomen pelvis w IV contrast  Result Date: 7/6/2025  CHEST:   No CT evidence of acute chest process. Interval right anterior chest wall port placement since 06/03/2025. additional findings as above similar to the previous exam.   ABDOMEN AND PELVIS:   Rectal air-fluid level consistent with diarrhea and probable distal sigmoid wall thickening/enhancement consistent with nonspecific colitis. Clinical correlation suggested.   Multiple bladder calculi near the right UVJ again seen with increased now moderate hydroureteronephrosis.   Flattening of the upper abdominal IVC which can be seen with dehydration.   Other findings as described above.   MACRO: None.   Signed by: Rosario Herr 7/6/2025 1:21 PM Dictation workstation:   [id number]               This patient currently has cardiac telemetry ordered; if you would like to modify or discontinue the telemetry order, click here to go to the orders activity to modify/discontinue the order.  This patient has a central line   Reason for the central line remaining today? Parenteral medication    This patient has a urinary catheter   Reason for the urinary catheter remaining today? 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 Clinical correlation suggested. Multiple bladder calculi near the right UVJ again seen with increased now moderate hydroureteronephrosis. Flattening of the upper abdominal IVC which can be seen with dehydration.  -Right UVJ stones- can follow up with DR. Zapata- urology  OP   -C-diff negative  -Stool pathogens pending  -1 L NS given in the ED  -Cefepime, Flagyl, vancomycin given in the ED  -Cefepime (Maxipime) and Vancomycin d/c'd   -Continue metroNIDAZOLE (Flagyl) 500 mg PO  -Continue cefuroxime 500 mg  PO  -WBC 26.1 > 20.7 > 42.3 > 26.4  -ID consulted, appreciate recommendations  -Intake and Output  -Daily weights  -Rectal tube, dc'd  -Urine culture No growth  -Nieto  -Blood culture  no growth at 2 days   -Monitor fever and WBC  -Daily CBC  -sodium chloride 0.9% infusion  150 ml/hr completed  -message sent to Dr Bustillos regarding WBC elevations  -PT/OT evaluations     Rectal Bleeding   -Began to have profuse bleeding  -Dr. Pérez consulted  -Hemoglobin 9.4 > 7.0  -500ml NS bolus   -Lovenox, ASA held  -PT/ INR 18.4/ 1.7 > 17.0/ 1.5  -Vitamin K given  -3 units RBC ordered, given  -CBC between each unit   -CBC q 6 hours  -Continue folic acid (Folvite) tablet 1 mg   -Hemoglobin 7.5   -1 Unit RBC given  -Monitor       Hypertension  Hyperlipidemia  -Hold Amlodipine , lasix, propranolol, and Cozaar  -Hold aspirin EC tablet 81 mg daily  -Continue rosuvastatin (Crestor) tablet 10 mg daily       Hyperglycemia  -Monitor   -Daily BMP  -start SSRI when orals tolerated or BG>200 consistently     Hypomagnesemia  Hypokalemia  Hypophosphatemia  -Magnesium 1.17 > 1.83  -K 2.7 > 3.1 > 3.3 > 2.9  -Phosphorous 1.5  -Replete as needed  -Given potassium phosphates 30 mmol in dextrose 5% 250 mL IV   -Monitor  -Daily BMP      GI ppx: PPI  DVT ppx: Enoxaparin on hold, SCD's  Fluids: As needed   Electrolytes: replace as needed  Nutrition: Adult diet Full Liquid with Oral nutritional supplements   Adjuncts: PIV/ Implantable port  Code Status: Full         MINDA Mayes-CNP

## 2025-07-11 PROBLEM — K26.9 DUODENAL ULCER: Status: ACTIVE | Noted: 2025-07-11

## 2025-07-11 PROBLEM — C50.812 MALIGNANT NEOPLASM OF OVERLAPPING SITES OF LEFT BREAST IN FEMALE, ESTROGEN RECEPTOR POSITIVE: Status: ACTIVE | Noted: 2025-07-11

## 2025-07-11 PROBLEM — Z17.0 MALIGNANT NEOPLASM OF OVERLAPPING SITES OF LEFT BREAST IN FEMALE, ESTROGEN RECEPTOR POSITIVE: Status: ACTIVE | Noted: 2025-07-11

## 2025-07-11 LAB
ANION GAP SERPL CALC-SCNC: 8 MMOL/L (ref 10–20)
BACTERIA BLD CULT: NORMAL
BACTERIA BLD CULT: NORMAL
BLOOD EXPIRATION DATE: NORMAL
BUN SERPL-MCNC: 5 MG/DL (ref 6–23)
CALCIUM SERPL-MCNC: 7 MG/DL (ref 8.6–10.3)
CHLORIDE SERPL-SCNC: 108 MMOL/L (ref 98–107)
CO2 SERPL-SCNC: 23 MMOL/L (ref 21–32)
CREAT SERPL-MCNC: 0.35 MG/DL (ref 0.5–1.05)
DISPENSE STATUS: NORMAL
EGFRCR SERPLBLD CKD-EPI 2021: >90 ML/MIN/1.73M*2
ERYTHROCYTE [DISTWIDTH] IN BLOOD BY AUTOMATED COUNT: 16.1 % (ref 11.5–14.5)
ERYTHROCYTE [DISTWIDTH] IN BLOOD BY AUTOMATED COUNT: 16.2 % (ref 11.5–14.5)
ERYTHROCYTE [DISTWIDTH] IN BLOOD BY AUTOMATED COUNT: 16.2 % (ref 11.5–14.5)
GLUCOSE SERPL-MCNC: 99 MG/DL (ref 74–99)
HCT VFR BLD AUTO: 23 % (ref 36–46)
HCT VFR BLD AUTO: 25.8 % (ref 36–46)
HCT VFR BLD AUTO: 26.8 % (ref 36–46)
HGB BLD-MCNC: 8.1 G/DL (ref 12–16)
HGB BLD-MCNC: 9.1 G/DL (ref 12–16)
HGB BLD-MCNC: 9.3 G/DL (ref 12–16)
INR PPP: 1.3 (ref 0.9–1.1)
MAGNESIUM SERPL-MCNC: 1.7 MG/DL (ref 1.6–2.4)
MCH RBC QN AUTO: 30.9 PG (ref 26–34)
MCH RBC QN AUTO: 31.4 PG (ref 26–34)
MCH RBC QN AUTO: 31.4 PG (ref 26–34)
MCHC RBC AUTO-ENTMCNC: 34.7 G/DL (ref 32–36)
MCHC RBC AUTO-ENTMCNC: 35.2 G/DL (ref 32–36)
MCHC RBC AUTO-ENTMCNC: 35.3 G/DL (ref 32–36)
MCV RBC AUTO: 89 FL (ref 80–100)
NRBC BLD-RTO: 0.2 /100 WBCS (ref 0–0)
NRBC BLD-RTO: 0.2 /100 WBCS (ref 0–0)
NRBC BLD-RTO: 0.3 /100 WBCS (ref 0–0)
PLATELET # BLD AUTO: 69 X10*3/UL (ref 150–450)
PLATELET # BLD AUTO: 78 X10*3/UL (ref 150–450)
PLATELET # BLD AUTO: 93 X10*3/UL (ref 150–450)
POTASSIUM SERPL-SCNC: 3.5 MMOL/L (ref 3.5–5.3)
PRODUCT BLOOD TYPE: 5100
PRODUCT CODE: NORMAL
PROTHROMBIN TIME: 14.9 SECONDS (ref 9.8–12.4)
RBC # BLD AUTO: 2.58 X10*6/UL (ref 4–5.2)
RBC # BLD AUTO: 2.9 X10*6/UL (ref 4–5.2)
RBC # BLD AUTO: 3.01 X10*6/UL (ref 4–5.2)
SODIUM SERPL-SCNC: 135 MMOL/L (ref 136–145)
UNIT ABO: NORMAL
UNIT NUMBER: NORMAL
UNIT RH: NORMAL
UNIT VOLUME: 288
WBC # BLD AUTO: 12.7 X10*3/UL (ref 4.4–11.3)
WBC # BLD AUTO: 15.2 X10*3/UL (ref 4.4–11.3)
WBC # BLD AUTO: 16.6 X10*3/UL (ref 4.4–11.3)

## 2025-07-11 PROCEDURE — 2500000002 HC RX 250 W HCPCS SELF ADMINISTERED DRUGS (ALT 637 FOR MEDICARE OP, ALT 636 FOR OP/ED): Mod: IPSPLIT | Performed by: NURSE PRACTITIONER

## 2025-07-11 PROCEDURE — 2500000004 HC RX 250 GENERAL PHARMACY W/ HCPCS (ALT 636 FOR OP/ED): Mod: IPSPLIT | Performed by: NURSE PRACTITIONER

## 2025-07-11 PROCEDURE — 94760 N-INVAS EAR/PLS OXIMETRY 1: CPT | Mod: IPSPLIT

## 2025-07-11 PROCEDURE — 2500000001 HC RX 250 WO HCPCS SELF ADMINISTERED DRUGS (ALT 637 FOR MEDICARE OP): Mod: IPSPLIT | Performed by: INTERNAL MEDICINE

## 2025-07-11 PROCEDURE — 83735 ASSAY OF MAGNESIUM: CPT | Mod: IPSPLIT | Performed by: NURSE PRACTITIONER

## 2025-07-11 PROCEDURE — 2500000002 HC RX 250 W HCPCS SELF ADMINISTERED DRUGS (ALT 637 FOR MEDICARE OP, ALT 636 FOR OP/ED): Mod: IPSPLIT | Performed by: SURGERY

## 2025-07-11 PROCEDURE — 37799 UNLISTED PX VASCULAR SURGERY: CPT | Mod: IPSPLIT | Performed by: NURSE PRACTITIONER

## 2025-07-11 PROCEDURE — 80048 BASIC METABOLIC PNL TOTAL CA: CPT | Mod: IPSPLIT | Performed by: NURSE PRACTITIONER

## 2025-07-11 PROCEDURE — 99233 SBSQ HOSP IP/OBS HIGH 50: CPT | Performed by: NURSE PRACTITIONER

## 2025-07-11 PROCEDURE — 85027 COMPLETE CBC AUTOMATED: CPT | Mod: IPSPLIT | Performed by: NURSE PRACTITIONER

## 2025-07-11 PROCEDURE — 99232 SBSQ HOSP IP/OBS MODERATE 35: CPT | Performed by: SURGERY

## 2025-07-11 PROCEDURE — 2500000004 HC RX 250 GENERAL PHARMACY W/ HCPCS (ALT 636 FOR OP/ED): Mod: IPSPLIT | Performed by: SURGERY

## 2025-07-11 PROCEDURE — 1100000001 HC PRIVATE ROOM DAILY: Mod: IPSPLIT

## 2025-07-11 PROCEDURE — 2500000001 HC RX 250 WO HCPCS SELF ADMINISTERED DRUGS (ALT 637 FOR MEDICARE OP): Mod: IPSPLIT | Performed by: PHYSICIAN ASSISTANT

## 2025-07-11 PROCEDURE — 85610 PROTHROMBIN TIME: CPT | Mod: IPSPLIT | Performed by: NURSE PRACTITIONER

## 2025-07-11 PROCEDURE — 97530 THERAPEUTIC ACTIVITIES: CPT | Mod: GP,CQ,IPSPLIT

## 2025-07-11 PROCEDURE — 2500000001 HC RX 250 WO HCPCS SELF ADMINISTERED DRUGS (ALT 637 FOR MEDICARE OP): Mod: IPSPLIT | Performed by: NURSE PRACTITIONER

## 2025-07-11 RX ADMIN — PANTOPRAZOLE SODIUM 40 MG: 40 INJECTION, POWDER, LYOPHILIZED, FOR SOLUTION INTRAVENOUS at 08:12

## 2025-07-11 RX ADMIN — PANTOPRAZOLE SODIUM 40 MG: 40 INJECTION, POWDER, LYOPHILIZED, FOR SOLUTION INTRAVENOUS at 20:31

## 2025-07-11 RX ADMIN — MUPIROCIN: 20 OINTMENT TOPICAL at 20:39

## 2025-07-11 RX ADMIN — CEFUROXIME AXETIL 500 MG: 250 TABLET, FILM COATED ORAL at 20:28

## 2025-07-11 RX ADMIN — FOLIC ACID 1 MG: 1 TABLET ORAL at 08:12

## 2025-07-11 RX ADMIN — SUCRALFATE 1 G: 1 SUSPENSION ORAL at 17:04

## 2025-07-11 RX ADMIN — SUCRALFATE 1 G: 1 SUSPENSION ORAL at 06:37

## 2025-07-11 RX ADMIN — POTASSIUM & SODIUM PHOSPHATES POWDER PACK 280-160-250 MG 1 PACKET: 280-160-250 PACK at 06:37

## 2025-07-11 RX ADMIN — METRONIDAZOLE 500 MG: 500 TABLET ORAL at 20:28

## 2025-07-11 RX ADMIN — MUPIROCIN: 20 OINTMENT TOPICAL at 08:12

## 2025-07-11 RX ADMIN — DIAZEPAM 5 MG: 5 TABLET ORAL at 14:09

## 2025-07-11 RX ADMIN — ROSUVASTATIN CALCIUM 10 MG: 10 TABLET, FILM COATED ORAL at 20:28

## 2025-07-11 RX ADMIN — Medication 10 ML: at 20:53

## 2025-07-11 RX ADMIN — METRONIDAZOLE 500 MG: 500 TABLET ORAL at 08:12

## 2025-07-11 RX ADMIN — SUCRALFATE 1 G: 1 SUSPENSION ORAL at 11:39

## 2025-07-11 RX ADMIN — CEFUROXIME AXETIL 500 MG: 250 TABLET, FILM COATED ORAL at 08:12

## 2025-07-11 ASSESSMENT — COGNITIVE AND FUNCTIONAL STATUS - GENERAL
DAILY ACTIVITIY SCORE: 15
CLIMB 3 TO 5 STEPS WITH RAILING: TOTAL
STANDING UP FROM CHAIR USING ARMS: A LOT
TURNING FROM BACK TO SIDE WHILE IN FLAT BAD: A LITTLE
MOVING FROM LYING ON BACK TO SITTING ON SIDE OF FLAT BED WITH BEDRAILS: A LITTLE
HELP NEEDED FOR BATHING: A LOT
DRESSING REGULAR UPPER BODY CLOTHING: A LOT
MOVING FROM LYING ON BACK TO SITTING ON SIDE OF FLAT BED WITH BEDRAILS: A LITTLE
TOILETING: A LOT
MOVING TO AND FROM BED TO CHAIR: A LOT
DRESSING REGULAR LOWER BODY CLOTHING: A LOT
WALKING IN HOSPITAL ROOM: A LOT
STANDING UP FROM CHAIR USING ARMS: A LOT
PERSONAL GROOMING: A LITTLE
WALKING IN HOSPITAL ROOM: A LOT
MOVING TO AND FROM BED TO CHAIR: A LOT
CLIMB 3 TO 5 STEPS WITH RAILING: A LOT
MOBILITY SCORE: 13
MOBILITY SCORE: 13
TURNING FROM BACK TO SIDE WHILE IN FLAT BAD: A LOT

## 2025-07-11 ASSESSMENT — PAIN SCALES - GENERAL
PAINLEVEL_OUTOF10: 0 - NO PAIN

## 2025-07-11 ASSESSMENT — PAIN - FUNCTIONAL ASSESSMENT
PAIN_FUNCTIONAL_ASSESSMENT: 0-10

## 2025-07-11 NOTE — PROGRESS NOTES
"   07/11/25 1040   Discharge Planning   Living Arrangements Children   Support Systems Children   Assistance Needed On admission TCC was informed that patient lives in a 1 story house with her son and cats. She says she is independent with ADLS, IADLS, ambulates with a cane sometimes and doesn't drive. She says she is usually \"healthy as a horse\". Patient doesn't use home oxygen or CPAP/BiPAP. Her son helps with transportation and should be able to pick her up on discharge.   Type of Residence Private residence   Do you have animals or pets at home? Yes   Type of Animals or Pets cats   Home or Post Acute Services Post acute facilities (Rehab/SNF/etc)   Type of Post Acute Facility Services Skilled nursing   Expected Discharge Disposition SNF  (Patient accepted at [facility]- She has Medicare insurance and has met the 3 midnight inpatient requirement- no barriers when medically stable for discharge. Updates sent thru [facility].)   Does the patient need discharge transport arranged? Yes   [facility] coordination needed? Yes   Patient Choice   Provider Choice list and CMS website (https://medicare.gov/care-compare#search) for post-acute Quality and Resource Measure Data were provided and reviewed with: Patient;Family   Intensity of Service   Intensity of Service 0-30 min     2:30 pm  Per discharge huddle, patient will not be discharged today.  ADOD 7/14   Updates sent to [facility].  There are no insurance barriers.      DC PLAN:  Discharging to [facility]  Monday- facility aware (if medically cleared)  "

## 2025-07-11 NOTE — PROGRESS NOTES
"Nutrition Follow Up  Nutrition Assessment    Reason for Assessment:  (follow-up)    Recent events since last RDN visit:  Clear liquid diet to Easy to chew  Pt did not like the Gelatein Plus, willing to try magic cup.  Peptic ulcers found    Nutrition Intake Since Last RDN Visit:  Energy Intake: Poor < 50 %, Fair 50-75 %  Pain affecting nutrition status: N/A  Food and Nutrient History: Intake improving. Ate some peaches for breakfast and mash potatoes for lunch. Willing to try magic cup. Reviewed protein rich foods and she is willing to try come cottage cheese with her meals.  Food Allergy:  (NKFA)     appetite   Anthropometrics:  Height: 165.1 cm (5' 5\")   Weight: 129 kg (283 lb 15.2 oz)   BMI (Calculated): 47.25  IBW/kg (Dietitian Calculated): 62.73 kg (IBW based on BMI of 23)  Percent of IBW: 195 %       Weight Change % This Admission:  Weight History / % Weight Change: 7/8/25 122 kg (269 lb 13.5 oz) to 7/11/25 (129kg) --> 5% gain. (fluid shifts?)  Significant Weight Gain: Fluid related (likely)    Nutrition Focused Physical Exam Findings:    Subcutaneous Fat Loss:   Orbital Fat Pads: Mild-Moderate (slight dark circles and slight hollowing) (mild)  Buccal Fat Pads: Well nourished (full, rounded cheeks)  Triceps: Mild-Moderate (less than ample fat tissue) (mild)  Muscle Wasting:  Temporalis: Mild-Moderate (slight depression)  Pectoralis (Clavicular Region): Well nourished (clavicle not visible) (some edema)  Deltoid/Trapezius: Well nourished (rounded appearance at arm, shoulder, neck)  Interosseous: Well nourished (muscle bulges)  Edema:  Edema: +2 mild  Edema Location: Generalized edema  Physical Findings:  Eyes: Negative  Nails: Positive (vertical lines)  Skin: Positive  Digestive System Findings: Nausea    Nutrition Significant Labs:  CBC Trend:   Results from last 7 days   Lab Units 07/11/25  1206 07/11/25  0513 07/10/25  2340 07/10/25  1902   WBC AUTO x10*3/uL 16.6* 12.7* 12.7* 13.0*   RBC AUTO x10*6/uL 3.01* " 2.58* 2.54* 2.59*   HEMOGLOBIN g/dL 9.3* 8.1* 7.9* 8.2*   HEMATOCRIT % 26.8* 23.0* 22.4* 22.9*   MCV fL 89 89 88 88   PLATELETS AUTO x10*3/uL 93* 69* 62* 85*    , BMP Trend:   Results from last 7 days   Lab Units 07/11/25  0512 07/10/25  1902 07/10/25  0532 07/09/25  2346 07/09/25  0534   GLUCOSE mg/dL 99  --  103* 110* 137*   CALCIUM mg/dL 7.0*  --  6.8* 6.8* 6.9*   SODIUM mmol/L 135*  --  133* 132* 134*   POTASSIUM mmol/L 3.5   < > 3.1* 3.4* 2.9*   CO2 mmol/L 23  --  20* 19* 19*   CHLORIDE mmol/L 108*  --  109* 108* 110*   BUN mg/dL 5*  --  10 11 20   CREATININE mg/dL 0.35*  --  0.44* 0.48* 0.67    < > = values in this interval not displayed.     Nutrition Specific Medications:  Scheduled medications  Scheduled Medications[1]  Continuous medications  Continuous Medications[2]    I/O:   Last BM Date: 07/11/25; Stool Appearance: Soft (07/11/25 1445)    Dietary Orders (From admission, onward)       Start     Ordered    07/10/25 0909  Adult diet Regular; Easy to chew  Diet effective now        Question Answer Comment   Diet type Regular    Texture Easy to chew        07/10/25 0908    07/08/25 1306  Oral nutritional supplements  Until discontinued        Question Answer Comment   Deliver with All meals    Select supplement: Gelatein Plus        07/08/25 1305    07/06/25 1825  May Participate in Room Service With Assistance  ( ROOM SERVICE MAY PARTICIPATE WITH ASSISTANCE)  Once        Question:  .  Answer:  Yes    07/06/25 1824                Estimated Needs:   Total Energy Estimated Needs in 24 hours (kCal): 1881 kCal  Method for Estimating Needs: ~30 kcal/kg of IBW  Total Protein Estimated Needs in 24 Hours (g):  (75-88)  Method for Estimating 24 Hour Protein Needs: 1.2-1.4 g/kg of IBW  Total Fluid Estimated Needs in 24 Hours (mL): 1880 mL  Method for Estimating 24 Hour Fluid Needs: ~1 mL/kcal or per medical team.  Patient on Order Fluid Restriction: No        Nutrition Diagnosis   Malnutrition Diagnosis  Patient  has Malnutrition Diagnosis: Yes  Diagnosis Status: New  Malnutrition Diagnosis: Moderate malnutrition related to chronic disease or condition  Related to: poor appetite, cancer on chemo  As Evidenced by: intake < 50% of est needs for > 5 days, moderate subcutaeous fat loss and muscle wasting.  Additional Assessment Information: Generalized edema    Nutrition Diagnosis  Patient has Nutrition Diagnosis: Yes  Diagnosis Status (1): Active  Nutrition Diagnosis 1: Inadequate oral intake  Related to (1): decreased appetitie and recent nausea  As Evidenced by (1): Clear liquid diet, less than 50% of needs being consumed. - Upgraded to easy to chew diet, intake > 75%, not taking ONS       Nutrition Interventions/Recommendations   Nutrition prescription for oral nutrition    Nutrition Recommendations:  Individualized Nutrition Prescription Provided for : Regular, Easy to chew, Magic Cup BID, discontiune Gelatein Plus. Consider low sodium once pt intake is >75% of needs.    Nutrition Interventions/Goals:   Medical Food Supplement: Commercial food medical food supplement therapy  Goal: Magic Cup (290kcal and 9g protein per 4 oz cup)  Coordination of Care with Providers: Nursing (IDT rounds; Talia Kaur RN & Sandra Ramirez RN)      Education Documentation  Nutrition Related Education, taught by Makenna Aguilar RDN, LD at 7/11/2025  3:42 PM.  Learner: Patient  Readiness: Acceptance  Method: Explanation  Response: Verbalizes Understanding, Needs Reinforcement  Comment: increased protein rich foods    Nutrition Related Education, taught by Makenna Aguilar RDN, CEDRIC at 7/9/2025 10:36 AM.  Learner: Patient  Readiness: Acceptance  Method: Explanation  Response: Needs Reinforcement, Verbalizes Understanding          Nutrition Monitoring and Evaluation   Intake / Amount of food: Other criteria, Consumes > or equal to 70% of supplement  Criteria: Diet increased within 5 days - met, clear liquid to easy to chew.    Body  Weight: Body weight - Maintain stable weight, Body weight - Weight reduction from fluids, as needed (not met, weight gain.)       Digestive System Finding: Loose stool, Diarrhea (progressing)    Goal Status: Some progress toward goal(s)    Time Spent (min): 45 minutes             [1] [Held by provider] amLODIPine, 10 mg, oral, Daily  [Held by provider] aspirin, 81 mg, oral, Daily  cefuroxime, 500 mg, oral, BID  [Held by provider] enoxaparin, 40 mg, subcutaneous, q12h MARIOLA  folic acid, 1 mg, oral, Daily  furosemide, 20 mg, oral, Daily  losartan, 100 mg, oral, Daily  metroNIDAZOLE, 500 mg, oral, q12h MARIOLA  mupirocin, , Each Nostril, BID  pantoprazole, 40 mg, intravenous, BID  [Held by provider] propranolol LA, 120 mg, oral, Nightly  rosuvastatin, 10 mg, oral, Nightly  sodium chloride 0.9%, 10 mL, intravenous, q12h MARIOLA  sucralfate, 1 g, oral, q6h MARIOLA     [2] sodium chloride 0.9%, 10 mL/hr

## 2025-07-11 NOTE — NURSING NOTE
Received report from Talia TINAJERO and Stephy RN; Will transfer pt from ICU once dinner is done. Introduced self and Jaz TINAJERO.         [id number] Pt transferred from ICU to  208. Oriented to  and call bell in reach.

## 2025-07-11 NOTE — PROGRESS NOTES
Elisha Flores is a 79 y.o. female on day 5 of admission presenting with Diarrhea, unspecified type.      Subjective   Patient assessed at bedside; sitting up in bed. She is feeling better. She still feels weak. Diarrhea has slowed down. She denies pain, fever, chills, SOB, chest pain, N/V/D/C.       Objective     Last Recorded Vitals  /80   Pulse 94   Temp 36.3 °C (97.3 °F) (Temporal)   Resp 21   Wt 129 kg (283 lb 15.2 oz)   SpO2 100%   Intake/Output last 3 Shifts:    Intake/Output Summary (Last 24 hours) at 7/11/2025 1118  Last data filed at 7/11/2025 0930  Gross per 24 hour   Intake 916.42 ml   Output 1675 ml   Net -758.58 ml       Admission Weight  Weight: 112 kg (246 lb 14.6 oz) (07/06/25 1131)    Daily Weight  07/11/25 : 129 kg (283 lb 15.2 oz)    Image Results  Esophagogastroduodenoscopy (EGD)  Addendum: Impression   3 ulcers in the duodenal bulb and 1st part of the duodenum with clean base  (Dave III); performed cold forceps biopsy   Performed forceps biopsies in the antrum     Findings   3 deep, round, benign-appearing ulcers in the duodenal bulb and 1st part  of the duodenum with clean base (Dave III); performed cold forceps  biopsy   Performed forceps biopsies in the antrum   Regular Z-line 38 cm from the incisors     Recommendation   Await pathology results    Repeat EGD in 3 months, due: 10/8/2025    Protonix 40 iv bid for 5 days    Carafate 1 gm qid for 1 month          Indication   Gastrointestinal hemorrhage with melena     Post-Op Diagnosis   None     Staff   Staff Role    Jigar Pérez MD Proceduralist      Medications   See Anesthesia Record.      Preprocedure   A history and physical has been performed, and patient medication  allergies have been reviewed. The patient's tolerance of previous  anesthesia has been reviewed. The risks and benefits of the procedure and  the sedation options and risks were discussed with the patient. All  questions were answered and informed consent  obtained.     Details of the Procedure   The patient underwent monitored anesthesia care, which was administered by  an anesthesia professional. The patient's blood pressure, ECG, ETCO2,  heart rate, level of consciousness, oxygen and respirations were monitored  throughout the procedure. The scope was introduced through the mouth and  advanced to the second part of the duodenum. Retroflexion was performed in  the cardia. Prior to the procedure, the patient's H. Pylori status was  unknown. The patient experienced no blood loss. The procedure was not  difficult. The patient tolerated the procedure well. There were no  apparent adverse events.      Events   Procedure Events    Event Event Time    ENDO SCOPE IN TIME 7/10/2025  8:57 AM    ENDO SCOPE OUT TIME 7/10/2025  9:02 AM      Specimens   ID Type Source Tests Collected by Time    1 : via cold bx Tissue DUODENAL BULB  BIOPSY SURGICAL PATHOLOGY EXAM  Jigar Pérez MD 7/10/2025 0859    2 : via cold bx Tissue STOMACH ANTRUM BIOPSY SURGICAL PATHOLOGY EXAM  Jigar Pérez MD 7/10/2025 0900      Procedure Location   [facility]   [address] [facility] [address] [phone]     Referring Provider   Jigar Pérez MD     Procedure Provider   Jigar Pérez MD  Narrative: Table formatting from the original result was not included.  Impression  3 ulcers in the duodenal bulb and 1st part of the duodenum with clean base   (Dave III); performed cold forceps biopsy  Performed forceps biopsies in the antrum    Findings  3 deep, round, benign-appearing ulcers in the duodenal bulb and 1st part   of the duodenum with clean base (Dave III); performed cold forceps   biopsy  Performed forceps biopsies in the antrum  Regular Z-line 38 cm from the incisors    Recommendation  Await pathology results   Repeat EGD in 3 months, due: 10/8/2025   Protonix 40 iv bid for 5 days   Carafate 1 gm qid for 1 month        Indication  Gastrointestinal  hemorrhage with melena    Post-Op Diagnosis  None    Staff  Staff Role   Jigar Pérez MD Proceduralist     Medications  See Anesthesia Record.     Preprocedure  A history and physical has been performed, and patient medication   allergies have been reviewed. The patient's tolerance of previous   anesthesia has been reviewed. The risks and benefits of the procedure and   the sedation options and risks were discussed with the patient. All   questions were answered and informed consent obtained.    Details of the Procedure  The patient underwent monitored anesthesia care, which was administered by   an anesthesia professional. The patient's blood pressure, ECG, ETCO2,   heart rate, level of consciousness, oxygen and respirations were monitored   throughout the procedure. The scope was introduced through the mouth and   advanced to the second part of the duodenum. Retroflexion was performed in   the cardia. Prior to the procedure, the patient's H. Pylori status was   unknown. The patient experienced no blood loss. The procedure was not   difficult. The patient tolerated the procedure well. There were no   apparent adverse events.     Events  Procedure Events   Event Event Time   ENDO SCOPE IN TIME 7/10/2025  8:57 AM   ENDO SCOPE OUT TIME 7/10/2025  9:02 AM     Specimens  ID Type Source Tests Collected by Time   1 : via cold bx Tissue DUODENAL BULB  BIOPSY SURGICAL PATHOLOGY EXAM   Jigar Pérez MD 7/10/2025 0859   2 : via cold bx Tissue STOMACH ANTRUM BIOPSY SURGICAL PATHOLOGY EXAM   Jigar Pérez MD 7/10/2025 0900     Procedure Location  [facility]  [address] [facility] [id number] [phone]    Referring Provider  Jigar Pérez MD    Procedure Provider  Jigar Pérez MD      Physical Exam  Vitals reviewed.   Constitutional:       Appearance: Normal appearance. She is obese.   HENT:      Head: Normocephalic and atraumatic.      Right Ear: External ear normal.      Left  Ear: External ear normal.      Nose: Nose normal.      Mouth/Throat:      Mouth: Mucous membranes are moist.      Pharynx: Oropharynx is clear.   Eyes:      Conjunctiva/sclera: Conjunctivae normal.      Pupils: Pupils are equal, round, and reactive to light.   Cardiovascular:      Rate and Rhythm: Normal rate and regular rhythm.      Pulses: Normal pulses.      Heart sounds: Normal heart sounds.   Pulmonary:      Effort: Pulmonary effort is normal.      Breath sounds: Normal breath sounds.   Abdominal:      General: Bowel sounds are normal.      Palpations: Abdomen is soft.   Musculoskeletal:         General: Normal range of motion.      Cervical back: Normal range of motion and neck supple.   Skin:     General: Skin is warm and dry.      Coloration: Skin is pale.   Neurological:      General: No focal deficit present.      Mental Status: She is alert and oriented to person, place, and time.   Psychiatric:         Mood and Affect: Mood normal.         Behavior: Behavior normal.         Relevant Results                    This patient has a central line   Reason for the central line remaining today? Parenteral medication            Assessment & Plan  Diarrhea, unspecified type    Benign essential hypertension    GERD (gastroesophageal reflux disease)    Hyperlipidemia    Hyperglycemia    Hypokalemia    Weakness    Kidney stones    Dehydration    Hypomagnesemia    Rectal bleeding    Diverticulosis of large intestine with hemorrhage    Colitis    Hypophosphatemia    Melena    Malignant neoplasm of overlapping sites of left breast in female, estrogen receptor positive    Duodenal ulcer    Diarrhea  Weakness  Kidney stone  Dehydration  Colitis  Breast cancer  - Currently on chemotherapy for invasive ductal carcinoma of the left breast  - CT abdomen pelvis: Rectal air-fluid level consistent with diarrhea and probable distal sigmoid wall thickening/enhancement consistent with nonspecific colitis. Clinical correlation  suggested. Multiple bladder calculi near the right UVJ again seen with increased now moderate hydroureteronephrosis. Flattening of the upper abdominal IVC which can be seen with dehydration.  - Right UVJ stones- can follow up with DR. Zapata- urology  OP   - C-diff negative  - Stool pathogens negative  - 1 L NS given in the ED  - Cefepime, Flagyl, vancomycin given in the ED  - discontinued Cefepime and Vancomycin   - Continue metroNIDAZOLE (Flagyl) 500 mg PO  - Continue cefuroxime 500 mg  PO  - WBC 26.1 > 20.7 > 42.3 > 26.4 > 14.9  - ID consulted, appreciate recommendations  - Intake and Output  - Daily weights  - discontinued Rectal tube  - Urine culture No growth  - discontinued Nieto 7/11/25  - Blood culture; negative to date  - Monitor fever and WBC  - Daily CBC  - discontinued sodium chloride 0.9% infusion  150 ml/hr   - message sent to Dr Bustillos regarding WBC elevations  - PT/OT evaluations        GI Bleed  Duodenal ulcers  Normocytic Anemia  - Began to have profuse bleeding  - Dr. Pérez consulted  - Hemoglobin 9.4 > 7.0  - given 500ml NS bolus   - discontinued enoxaparin and aspirin  - PT/ INR 18.4/ 1.7 > 17.0/ 1.5  - Vitamin K given  - received a total of 5 units of  PRBC  - Continue folic acid 1 mg daily  - Hemoglobin 7.5   - Monitor   - EGD with Dr. Pérez has 3 large duodenal bleeding ulcers, Protonix twice daily and Carafate QID for 1 month  - continue pantoprazole 40 mg BID and Carafate 1 g QID   - PLT 47  -1 Unit platelets given     Essential Hypertension  Hyperlipidemia  - Hold Amlodipine, propranolol  - resumed furosemide 20 mg daily, losartan 100 mg daily  - Hold aspirin EC tablet 81 mg daily  - Continue rosuvastatin 10 mg daily   - monitor BP and HR      Hyperglycemia  -Monitor   -Daily BMP  -start SSRI when orals tolerated or BG>200 consistently     Hypomagnesemia, resolved  Hypokalemia, resolved  Hypophosphatemia  -Magnesium 1.17 > 1.83 > 1.42 > 1.70  -2 G  Magnesium  -K 2.7 > 3.1 > 3.3 > 2.9  > 3.1 > 3.5  -Phosphorous 1.5 > 2.2 > 2.3  -Replete as needed  -Given potassium phosphates 30 mmol in dextrose 5% 250 mL IV   -Given potassium phosphates 20 mmol in dextrose 5% 250 mL IV   -Monitor  -Daily BMP      DVT ppx:   - hold Enoxaparin   - continue SCD's    Code Status: Full     Disposition: Patient requires inpatient care.           Guerda Fischer, APRN-CNP

## 2025-07-11 NOTE — CARE PLAN
The patient's goals for the shift include      The clinical goals for the shift include Monitor for s/s of bleeding    Pt rested well since taking over her care at 0100. Pt's H/H improved this am. Refused turning and repositioning every 2 hours when offered. Stated she is comfortable. Otherwise uneventful night

## 2025-07-11 NOTE — NURSING NOTE
0730 Assumed nursing care for patient. Patient resting in bed on room air, pulse ox at 100%. No complaints of pain at this time.     1000 Son at bedside    1045 Patient up to chair with PT.    1255 Patient back to bed.

## 2025-07-11 NOTE — PROGRESS NOTES
"Elisha Flores is a 79 y.o. female on day 5 of admission presenting with Diarrhea, unspecified type.    Subjective   Status post EGD with biopsy 7/10/2025.  Noted to have  3 Dave  duodenal ulcers.  No further bleeding overnight.  Also has a history of diverticulosis       Objective     Physical Exam  Constitutional:       Appearance: Normal appearance.   Abdominal:      Palpations: Abdomen is soft. There is no mass.      Tenderness: There is no abdominal tenderness. There is no guarding.         Last Recorded Vitals  Blood pressure 136/66, pulse 70, temperature 37.1 °C (98.8 °F), temperature source Temporal, resp. rate 16, height 1.651 m (5' 5\"), weight 129 kg (283 lb 4.7 oz), SpO2 100%.  Intake/Output last 3 Shifts:  I/O last 3 completed shifts:  In: 2746.3 (21.4 mL/kg) [P.O.:350; I.V.:986.3 (7.7 mL/kg); Blood:796.2; IV Piggyback:613.8]  Out: 1780 (13.9 mL/kg) [Urine:1780 (0.4 mL/kg/hr)]  Weight: 128.5 kg     Relevant Results        WBC  20.4 14.9   17.4 13.0 12.7  12.7  WBC     nRBC  0.6 0.6   0.5 0.5 0.4  0.3  nRBC     RBC  2.80 2.57   2.89 2.59 2.54  2.58  RBC     HEMOGLOBIN  8.5 7.9   9.1 8.2 7.9  8.1  HEMOGLOBIN     HEMATOCRIT  24.4 22.3   25.4 22.9 22.4  23.0  HEMATOCRIT     MCV  87 87   88 88 88  89  MCV     MCH  30.4 30.7   31.5 31.7 31.1  31.4  MCH     MCHC  34.8 35.4   35.8 35.8 35.3  35.2  MCHC     RED CELL DISTRIBUTION WIDTH  16.1 15.9   15.9 15.9 15.9  16.2  RED CELL DISTRIBUTION WIDTH     Platelets  53 47   66 85 62  69  Platelet                 Assessment & Plan  Diarrhea, unspecified type    Benign essential hypertension    Anxiety and depression    GERD (gastroesophageal reflux disease)    Hyperglycemia    Hyperlipidemia    Hypokalemia    Vitamin D deficiency    Arthritis    Personal history of other specified conditions    Weakness    Kidney stones    Dehydration    Hypomagnesemia    Rectal bleeding    Diverticulosis of large intestine with " hemorrhage    Colitis    Hypophosphatemia    Melena  Plan-EGD to rule out peptic ulcer.  Continue proton pump inhibitor.  Patient agrees to the procedure and plan as discussed  Malignant neoplasm of overlapping sites of left breast in female, estrogen receptor positive    Duodenal ulcer    Plan-continue Protonix 40 mg p.o. twice daily for 1 month then switch to daily.  Continue Carafate 4 times a day for 1 month then discontinue.  Will need follow-up endoscopy in approximately 8 weeks and will obtain this with her surgeon who will be seeing her for her breast cancer.      Jigar Pérez MD

## 2025-07-11 NOTE — NURSING NOTE
Assumed care of patient. Patient has no complaints. Call light is within reach. Nurse will continue to medicate and monitor per MD order. Bed alarm is on and active. I agree with previous nurse assessment.

## 2025-07-11 NOTE — CARE PLAN
Problem: Nutrition  Goal: Consume prescribed supplement  Outcome: Not Progressing       Problem: Nutrition  Goal: Oral intake greater than 50%  Outcome: Progressing  Goal: Oral intake greater 75%  Outcome: Progressing  Goal: Promote healing  Outcome: Progressing

## 2025-07-11 NOTE — NURSING NOTE
Call to lindsey Rowland per pt request [phone], updated on upcoming transfer from ICU to [facility] Room 208

## 2025-07-11 NOTE — PROGRESS NOTES
"Occupational Therapy                 Therapy Communication Note    Patient Name: Elisha Flores  MRN: [id number]  Department: [facility] ICU  Room: 01/01-A  Today's Date: 7/11/2025     Discipline: Occupational Therapy    Missed Visit: OT Missed Visit: Yes     Missed Visit Reason: Missed Visit Reason: Patient refused, Other (Comment)    Missed Time: Attempt    Comment: Pt supine in bed, recently returned from sitting in chair ~1hr (progression with functional mobility) as well as both her sons in room to visit. Pt wanting to work with OT but not feeling up to it at this time, \"I will do it, I know I need it to get better.\". Willing to try at a later time, will follow-up as schedule permits and pt appropriate. RN notified.      "

## 2025-07-11 NOTE — PROGRESS NOTES
CRITICAL CARE PROGRESS NOTE      Hospital Day # 4    Elisha Flores  Primary Care Physician: Shun Hall MD  Primary Pulmonologist:      Subjective/Last 24 Hour Update   Es Flores is a 79-year-old female with a history of breast cancer (currently undergoing chemotherapy), heart failure, hypertension, depression, and anxiety. She was admitted to the ICU three days ago with complaints of diarrhea and weakness. On admission, her hemoglobin was low, and she received a total of five units of blood (three units on night shift and two units on day shift). She was treated with fluids, potassium replacement, Zofran for nausea, and broad-spectrum antibiotics (Vancomycin and Flagyl). A general surgery consult was obtained, and an EGD was performed, which revealed three large ulcers with biopsies taken for further analysis. Since admission, she has shown significant clinical improvement.        Past history: reviewed as below    Medical History[1]  Surgical History[2]  Social History[3]  Family History[4]        Objective         Vitals for last 24 hours Temperature Ins/Outs Weight   Temp:  [36.2 °C (97.2 °F)-37.3 °C (99.1 °F)] 36.8 °C (98.2 °F)  Heart Rate:  [70-96] 83  Resp:  [13-24] 16  BP: (100-155)/() 147/60 Temp (24hrs), Av.8 °C (98.2 °F), Min:36.2 °C (97.2 °F), Max:37.3 °C (99.1 °F)     Intake/Output Summary (Last 24 hours) at 7/10/2025 2156  Last data filed at 7/10/2025 1822  Gross per 24 hour   Intake 770.42 ml   Output 945 ml   Net -174.58 ml    Wt Readings from Last 7 Encounters:   07/10/25 129 kg (283 lb 4.7 oz)   25 112 kg (248 lb)   25 113 kg (248 lb 2 oz)   25 115 kg (253 lb 4.9 oz)   25 115 kg (253 lb 4.9 oz)   25 115 kg (253 lb 8.5 oz)   25 115 kg (252 lb 10.4 oz)    Body mass index is 47.14 kg/m².   Sats Hemodynamics Cumulative I/O Vent Settings   SpO2 Readings from Last 3 Encounters:   07/10/25 100%   25 100%   25 98%           "[unfilled]   @[facility]@     Exam:    Gen: NAD.   HEENT: NCAT  CV: RRR  Resp: Bilateral chest excursion  Abd: S, NT, ND  Ext: WWP, no significant peripheral edema noted.  Neuro: No focal deficits identified.      Drains Urethral Catheter Non-latex 16 Fr. (Active)   Present on Admission to Healthcare Facility N 07/10/25 1010   Site Assessment Clean;Red 07/10/25 1824   Collection Container Urometer 07/10/25 1010   Securement Method Securing device (Describe) 07/10/25 1010   Reason for Continuing Urinary Catheterization acute urinary retention 07/10/25 1010   Output (mL) 300 mL 07/10/25 1800   Number of days: 4          Medications     Antibiotics:  Name Indication Start Date Stop Date                                             Completed Antibiotics:                                                    Scheduled Meds:Scheduled Medications[5]  Continuous Infusions:Continuous Medications[6]      Laboratory Data     ID/Cultures:    Date Result Date  Result   Blood       Respiratory       Urine       C. Diff       Flu/RSV/COVID       Other    No results found for: \"HIV1X2\"  No results found for: \"FT5BMHPG\"       Hematology  Results from last 7 days   Lab Units 07/10/25  1902 07/10/25  1254 07/10/25  0532   WBC AUTO x10*3/uL 13.0* 17.4* 14.9*   RBC AUTO x10*6/uL 2.59* 2.89* 2.57*   HEMOGLOBIN g/dL 8.2* 9.1* 7.9*   HEMATOCRIT % 22.9* 25.4* 22.3*   PLATELETS AUTO x10*3/uL 85* 66* 47*     Results from last 7 days   Lab Units 07/10/25  1254 07/09/25  1714 07/09/25  0534   INR  1.3* 1.5* 1.5*     Chemistry         Results from last 7 days   Lab Units 07/10/25  1902 07/10/25  0532 07/09/25  2346 07/09/25  0534 07/08/25  0408 07/07/25  1627 07/07/25  0529 07/06/25  1223   SODIUM mmol/L  --  133* 132* 134*   < > 134*   < > 133*   POTASSIUM mmol/L 3.2* 3.1* 3.4* 2.9*   < > 3.5   < > 2.7*   CHLORIDE mmol/L  --  109* 108* 110*   < > 105   < > 105   CO2 mmol/L  --  20* 19* 19*   < > 23   < > 19*   BUN mg/dL  --  10 11 20   < " "> 20   < > 42*   CREATININE mg/dL  --  0.44* 0.48* 0.67   < > 0.55   < > 0.90   CALCIUM mg/dL  --  6.8* 6.8* 6.9*   < > 7.1*   < > 6.1*   ALBUMIN g/dL  --   --   --   --   --  2.2*  --  2.2*   PROTEIN TOTAL g/dL  --   --   --   --   --  3.5*  --  3.7*   BILIRUBIN TOTAL mg/dL  --   --   --   --   --  0.6  --  0.7   PHOSPHORUS mg/dL 2.3* 2.2* 2.0* 1.5*   < >  --   --   --    ALT U/L  --   --   --   --   --  23  --  24   AST U/L  --   --   --   --   --  21  --  19    < > = values in this interval not displayed.     Results from last 7 days   Lab Units 07/06/25  1223   LACTATE mmol/L 0.7     No lab exists for component: \"SCVO2\"      Blood Gases        No lab exists for component: \"[id number]\", \"OK2HUGQH\", \"BEDEFICIT\"       Data/Imaging     The following data have been personally reviewed by me on 7/10/2025 and are summarized below:     Date Result   EKG/Tele     Echo     CXR     CT     Other     PFT's         Assessment/Plan       Anemia (due to blood loss and/or chemotherapy):    Plan: Continue monitoring hemoglobin and platelets closely; transfuse blood as needed.  Follow-up CBC in 12-24 hours to assess trend.    Hypokaelmia:    Plan: Continue potassium replacement therapy as per current protocol.  Monitor serum potassium levels and adjust supplementation as necessary.    Gastrointestinal Ulcers (found on EGD):    Plan: Continue Protonix 40 mg BID for ulcer protection.  Await biopsy results and follow-up with general surgery for further management.    Sepsis  (due to chemotherapy and diarrhea):    Plan: Continue Vancomycin and Flagyl for broad-spectrum antibiotic coverage.  Monitor for signs of infection and adjust antibiotics based on culture results.    Nutritional Support (due to poor oral intake from chemotherapy):    Plan: Continue liquids and soft foods as tolerated.    Encourage oral intake and assess for tolerance to more solid foods. Consider nutrition consult if needed.    Weakness and Generalized Fatigue " (likely secondary to chemotherapy):    Plan: Continue supportive care with fluids and electrolytes as needed.  Monitor functional status and reassess energy levels regularly.    Plan: Continue monitoring her stability and response to treatment.    Plan for step-down to a lower level of care once clinically stable without bleeding risks.    The entirety of this visit history, physical exam, and diagnostic interpretation was done via audiovisual telemedicine.     Patient is located in [place] and I am located in [place].   Upon my own and separate evaluation, this patient had a high  probability of imminent of life-threatening deterioration due to anemia,  which required my direct attention, intervention and personal  management.    I have personally provided 54 minutes of critical care time exclusive  of time spent on separately billable procedures. Time includes review  of laboratory data, radiology results, discussion with consultants,  and monitoring of potential decompensation. Interventions were  performed as documented above.    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    Bursitis of right shoulder 11/25/2019    Subdeltoid bursitis of right shoulder joint    Cancer (Multi)     Cellulitis 09/03/2023    Diarrhea, unspecified type 07/06/2025    Diarrhea, unspecified type 07/06/2025    Encounter for general adult medical examination without abnormal findings 04/26/2018    Encounter for Medicare annual wellness exam    GERD (gastroesophageal reflux disease) 05/18/2023    Halitosis  05/22/2019    Halitosis    Hyperglycemia 05/18/2023    Hyperlipidemia 05/18/2023    Hypokalemia 05/18/2023    Localized swelling, mass and lump, head 12/28/2016    Left facial swelling    Localized swelling, mass and lump, head 12/29/2022    Facial mass    Other specified anxiety disorders 12/19/2014    Depression with anxiety    Other specified disorders of nose and nasal sinuses 04/18/2017    Jacque bullosa    Personal history of diseases of the skin and subcutaneous tissue     History of psoriasis    Personal history of other diseases of the circulatory system     History of congestive heart failure    Personal history of other diseases of the circulatory system 11/15/2019    History of uncontrolled hypertension    Personal history of other diseases of the circulatory system     History of hypertension    Personal history of other diseases of the nervous system and sense organs     History of sciatica    Personal history of other diseases of the respiratory system 04/18/2017    History of deviated nasal septum    Personal history of other diseases of the respiratory system 02/27/2017    History of sinusitis    Personal history of other diseases of the respiratory system 05/28/2019    History of chronic sinusitis    Personal history of other diseases of the respiratory system 03/19/2014    History of acute sinusitis    Personal history of other specified conditions 03/26/2013    History of insomnia    Personal history of other specified conditions 07/09/2019    History of nasal congestion    Personal history of other specified conditions 11/17/2016    History of diarrhea    Unspecified blepharitis right eye, unspecified eyelid 01/22/2019    Blepharitis of eyelid of right eye    Unspecified mycosis 06/18/2019    Fungal sinusitis    Vitamin D deficiency 05/18/2023   [2]   Past Surgical History:  Procedure Laterality Date    BREAST BIOPSY Left 04/23/2025    HYSTERECTOMY  04/04/2013    Hysterectomy    OTHER SURGICAL  HISTORY  2022    Eye surgery    OTHER SURGICAL HISTORY  2022    Nose surgery    OTHER SURGICAL HISTORY  2022    Back surgery   [3]   Social History  Socioeconomic History    Marital status:     Number of children: 2   Tobacco Use    Smoking status: Former     Current packs/day: 0.00     Types: Cigarettes     Quit date:      Years since quittin.5     Passive exposure: Past    Smokeless tobacco: Never   Vaping Use    Vaping status: Never Used   Substance and Sexual Activity    Alcohol use: Never    Drug use: Never    Sexual activity: Defer     Social Drivers of Health     Financial Resource Strain: Low Risk  (2025)    Overall Financial Resource Strain (CARDIA)     Difficulty of Paying Living Expenses: Not very hard   Food Insecurity: No Food Insecurity (2025)    Hunger Vital Sign     Worried About Running Out of Food in the Last Year: Never true     Ran Out of Food in the Last Year: Never true   Transportation Needs: No Transportation Needs (2025)    PRAPARE - Transportation     Lack of Transportation (Medical): No     Lack of Transportation (Non-Medical): No   Physical Activity: Sufficiently Active (2025)    Exercise Vital Sign     Days of Exercise per Week: 7 days     Minutes of Exercise per Session: 30 min   Stress: No Stress Concern Present (2025)    [place] [employer] - Occupational Stress Questionnaire     Feeling of Stress : Not at all   Social Connections: Moderately Isolated (2025)    Social Connection and Isolation Panel [NHANES]     Frequency of Communication with Friends and Family: More than three times a week     Frequency of Social Gatherings with Friends and Family: More than three times a week     Attends Jew Services: Never     Active Member of Clubs or Organizations: Yes     Attends Club or Organization Meetings: More than 4 times per year     Marital Status:    Intimate Partner Violence: Not At Risk  (7/6/2025)    Humiliation, Afraid, Rape, and Kick questionnaire     Fear of Current or Ex-Partner: No     Emotionally Abused: No     Physically Abused: No     Sexually Abused: No   Housing Stability: Low Risk  (7/7/2025)    Housing Stability Vital Sign     Unable to Pay for Housing in the Last Year: No     Number of Times Moved in the Last Year: 0     Homeless in the Last Year: No   [4]   Family History  Problem Relation Name Age of Onset    Cancer Sister branden rain 65    Breast cancer Sister branden rain 50 - 59    Breast cancer Mother's Sister  70 - 79   [5] [Held by provider] amLODIPine, 10 mg, oral, Daily  [Held by provider] aspirin, 81 mg, oral, Daily  cefuroxime, 500 mg, oral, BID  [Held by provider] enoxaparin, 40 mg, subcutaneous, q12h MARIOLA  folic acid, 1 mg, oral, Daily  [Held by provider] furosemide, 20 mg, oral, Daily  [Held by provider] losartan, 100 mg, oral, Daily  metroNIDAZOLE, 500 mg, oral, q12h MARIOLA  mupirocin, , Each Nostril, BID  pantoprazole, 40 mg, intravenous, BID  potassium, sodium phosphates, 1 packet, oral, 4x daily  [Held by provider] propranolol LA, 120 mg, oral, Nightly  rosuvastatin, 10 mg, oral, Nightly  sodium chloride 0.9%, 10 mL, intravenous, q12h MARIOLA  sucralfate, 1 g, oral, q6h MARIOLA  [6] sodium chloride 0.9%, 10 mL/hr

## 2025-07-11 NOTE — PROGRESS NOTES
Physical Therapy    Physical Therapy Treatment    Patient Name: Elisha Flores  MRN: [id number]  Department: [facility] ICU  Room: 01/01-A  Today's Date: 7/11/2025  Time Calculation  Start Time: 1031  Stop Time: 1058  Time Calculation (min): 27 min         Assessment/Plan   PT Assessment  PT Assessment Results: Decreased strength, Impaired balance, Decreased mobility, Decreased endurance, Obesity, Decreased skin integrity, Decreased cognition  Rehab Prognosis: Good  Barriers to Discharge Home: Caregiver assistance  Caregiver Assistance: Caregiver assistance needed per identified barriers - however, level of patient's required assistance exceeds assistance available at home  Evaluation/Treatment Tolerance: Patient limited by fatigue  Medical Staff Made Aware: Yes  Strengths: Ability to acquire knowledge, Attitude of self, Insight into problems, Support of Caregivers  Barriers to Participation: Comorbidities, Housing layout, Rehab experience  End of Session Communication: Bedside nurse  Assessment Comment: Pt demonstrated improved bed mobility this date as well as improved unsupport EOB sitting with cont dizziness but able to sit eob without midline cues and no lob.  Sit to stand and static stand at fww mod a x 1 with cues for erect posture and increased use of b le and ue to aid in posture. SPT improved to min a x 1 with slow pace and cues for weight shifting but able to sit out of bed in chair this date.  Pt cont to require skilled PT to improve functional mobiltiy and transfers while improving skin integrity and increased time out of bed to aid in posture and moblity goals while preventing further decline while here in hospital. Pt left up in chair, alarm on, call bell within reach and all needs addressed.  Son present in room when PTA left.  End of Session Patient Position: Up in chair, Alarm on     PT Plan  Treatment/Interventions: Transfer training, Bed mobility, Gait training, Stair training, Balance training,  Strengthening, Endurance training, Therapeutic exercise, Therapeutic activity, Home exercise program, Neuromuscular re-education  PT Plan: Ongoing PT  PT Frequency: 3 times per week (during  this acute inpatient hospitalization.)  PT Discharge Recommendations: Moderate intensity level of continued care (Based on current functional status and rehab potential, patient is anticipated to tolerate and benefit from 5 or more days per week of skilled rehabilitative therapy after discharge from this acute inpatient hospitalization.)  PT Recommended Transfer Status: Assist x1  PT - OK to Discharge: Yes    PT Visit Info:  PT Received On: 07/11/25  Response to Previous Treatment: Patient with no complaints from previous session.     General Visit Information:   General  Reason for Referral: impaired mobility, diarrhea  Referred By: MINDA Mayes-CNP  Past Medical History Relevant to Rehab: left breast cancer on chemotherapy (last chemo treatment was 6/27/2025), pt. reports vertigo since she was a child (worse now since being in chemo)  Prior to Session Communication: Bedside nurse  Patient Position Received: Bed, 2 rail up, Alarm on  Preferred Learning Style: auditory, verbal  General Comment: Pt awake and reports feeling much better today. Pt agreeable to therapy and would really like to get up and out of the bed. Reports no pain and was cleared by nursing prior to session.    Subjective I am feeling much better today.   Precautions:  Precautions  Precautions Comment: stanley BP cuff, shahida     Date/Time Vitals Session Patient Position Pulse Resp SpO2 BP MAP (mmHg)    07/11/25 1100 --  --  94  21  100 %  156/80  95     07/11/25 1200 --  --  90  15  100 %  158/130  141             Objective   Pain:  Pain Assessment  Pain Assessment: 0-10  0-10 (Numeric) Pain Score: 0 - No pain  Cognition:  Cognition  Overall Cognitive Status: Within Functional Limits  Orientation Level: Oriented X4  Attention: Within Functional  Limits  Coordination:  Movements are Fluid and Coordinated: Yes  Postural Control:  Static Sitting Balance  Static Sitting-Balance Support: No upper extremity supported  Static Sitting-Level of Assistance: Contact guard  Static Sitting-Comment/Number of Minutes: EOB sit unsupport CGA due to dizziness.  Static Standing Balance  Static Standing-Balance Support: Bilateral upper extremity supported  Static Standing-Level of Assistance: Moderate assistance  Static Standing-Comment/Number of Minutes: Static stand at FWW 1-2 min with good posture x 2 trials with mod a with cues for increased use of b ue and erect posture.    Activity Tolerance:  Activity Tolerance  Endurance: Tolerates less than 10 min exercise, no significant change in vital signs  Treatments:     Bed Mobility  Bed Mobility: Yes  Bed Mobility 1  Bed Mobility 1: Supine to sitting  Level of Assistance 1: Minimum assistance  Bed Mobility Comments 1: Use of bed rail and min a to sit EOB    Transfers  Transfer: Yes  Transfer 1  Transfer From 1: Bed to  Transfer to 1: Stand  Technique 1: Sit to stand, Stand to sit  Transfer Device 1: Walker, Gait belt  Transfer Level of Assistance 1: Moderate assistance  Trials/Comments 1: Sit to stand EOB to FWW x 2 trials with mod a with increased time for trunk flexed noes over toes posture and increased time to get fully standing.  Transfers 2  Transfer From 2: Bed to  Transfer to 2: Stand, Chair with arms  Technique 2: Sit to stand, Stand to sit  Transfer Device 2: Walker, Gait belt  Transfer Level of Assistance 2: Moderate assistance, Minimum assistance  Trials/Comments 2: Sit to stand eob to FWW mod a x 1, SPT with fww to chair min a with increasd time and short shuffling steps and min a for weight shifting to allow movement opp le.    Outcome Measures:  [facility] Basic Mobility  Turning from your back to your side while in a flat bed without using bedrails: A little  Moving from lying on your back to sitting on the side of  a flat bed without using bedrails: A little  Moving to and from bed to chair (including a wheelchair): A lot  Standing up from a chair using your arms (e.g. wheelchair or bedside chair): A lot  To walk in hospital room: A lot  Climbing 3-5 steps with railing: Total  Basic Mobility - Total Score: 13    FSS-ICU  Ambulation: Walks <50 feet with any assistance x1 or walks any distance with assistance x2 people  Rolling: Minimal assistance (performs 75% or more of task)  Sitting: Supervision or set-up only  Transfer Sit-to-Stand: Moderate assistance (performs 50 - 74% of task)  Transfer Supine-to-Sit: Minimal assistance (performs 75% or more of task)  Total Score: 17    Education Documentation  Body Mechanics, taught by Latasha Randolph PTA at 7/11/2025 12:13 PM.  Learner: Patient  Readiness: Acceptance  Method: Explanation  Response: Needs Reinforcement  Comment: education for midline poswture and ue sequencing for all transfers    Mobility Training, taught by Latasha Randolph PTA at 7/11/2025 12:13 PM.  Learner: Patient  Readiness: Acceptance  Method: Explanation  Response: Needs Reinforcement  Comment: education for midline poswture and ue sequencing for all transfers    Education Comments  No comments found.      Encounter Problems       Encounter Problems (Active)       Balance       STG - Maintains dynamic standing balance without upper extremity support with good balance  (Progressing)       Start:  07/07/25    Expected End:  07/21/25               Mobility       LTG - Patient will ambulate community distance LORY with LRD        Start:  07/07/25    Expected End:  07/21/25            STG - Patient will ascend and descend four to six stairs IND with 1 rail        Start:  07/07/25    Expected End:  07/21/25               PT Transfers       STG - Patient will perform bed mobility IND  (Progressing)       Start:  07/07/25    Expected End:  07/21/25            STG - Patient will transfer sit to and from  stand LORY with LRD (Progressing)       Start:  07/07/25    Expected End:  07/21/25               Safety       Pt. will complete 5 STS without UE support <30 seconds  (Progressing)       Start:  07/07/25    Expected End:  07/21/25

## 2025-07-12 LAB
ANION GAP SERPL CALC-SCNC: 8 MMOL/L (ref 10–20)
BUN SERPL-MCNC: 4 MG/DL (ref 6–23)
CALCIUM SERPL-MCNC: 7.1 MG/DL (ref 8.6–10.3)
CHLORIDE SERPL-SCNC: 107 MMOL/L (ref 98–107)
CO2 SERPL-SCNC: 24 MMOL/L (ref 21–32)
COMMENTS - MP RESULT TYPE: NORMAL
CREAT SERPL-MCNC: 0.4 MG/DL (ref 0.5–1.05)
EGFRCR SERPLBLD CKD-EPI 2021: >90 ML/MIN/1.73M*2
ERYTHROCYTE [DISTWIDTH] IN BLOOD BY AUTOMATED COUNT: 16.4 % (ref 11.5–14.5)
ERYTHROCYTE [DISTWIDTH] IN BLOOD BY AUTOMATED COUNT: 16.7 % (ref 11.5–14.5)
ERYTHROCYTE [DISTWIDTH] IN BLOOD BY AUTOMATED COUNT: 18.5 % (ref 11.5–14.5)
ERYTHROCYTE [DISTWIDTH] IN BLOOD BY AUTOMATED COUNT: 18.6 % (ref 11.5–14.5)
GLUCOSE SERPL-MCNC: 99 MG/DL (ref 74–99)
HCT VFR BLD AUTO: 23 % (ref 36–46)
HCT VFR BLD AUTO: 24.9 % (ref 36–46)
HCT VFR BLD AUTO: 29 % (ref 36–46)
HCT VFR BLD AUTO: 31.8 % (ref 36–46)
HGB BLD-MCNC: 10.4 G/DL (ref 12–16)
HGB BLD-MCNC: 7.9 G/DL (ref 12–16)
HGB BLD-MCNC: 8.6 G/DL (ref 12–16)
HGB BLD-MCNC: 9.9 G/DL (ref 12–16)
HOLD SPECIMEN: NORMAL
MAGNESIUM SERPL-MCNC: 1.63 MG/DL (ref 1.6–2.4)
MCH RBC QN AUTO: 30.7 PG (ref 26–34)
MCH RBC QN AUTO: 30.8 PG (ref 26–34)
MCH RBC QN AUTO: 31 PG (ref 26–34)
MCH RBC QN AUTO: 31.7 PG (ref 26–34)
MCHC RBC AUTO-ENTMCNC: 32.7 G/DL (ref 32–36)
MCHC RBC AUTO-ENTMCNC: 34.1 G/DL (ref 32–36)
MCHC RBC AUTO-ENTMCNC: 34.3 G/DL (ref 32–36)
MCHC RBC AUTO-ENTMCNC: 34.5 G/DL (ref 32–36)
MCV RBC AUTO: 89 FL (ref 80–100)
MCV RBC AUTO: 90 FL (ref 80–100)
MCV RBC AUTO: 90 FL (ref 80–100)
MCV RBC AUTO: 97 FL (ref 80–100)
NRBC BLD-RTO: 0.1 /100 WBCS (ref 0–0)
NRBC BLD-RTO: 0.2 /100 WBCS (ref 0–0)
O+P STL MICRO: NEGATIVE
PLATELET # BLD AUTO: 109 X10*3/UL (ref 150–450)
PLATELET # BLD AUTO: 77 X10*3/UL (ref 150–450)
PLATELET # BLD AUTO: 81 X10*3/UL (ref 150–450)
PLATELET # BLD AUTO: 96 X10*3/UL (ref 150–450)
POTASSIUM SERPL-SCNC: 3.1 MMOL/L (ref 3.5–5.3)
RBC # BLD AUTO: 2.55 X10*6/UL (ref 4–5.2)
RBC # BLD AUTO: 2.79 X10*6/UL (ref 4–5.2)
RBC # BLD AUTO: 3.22 X10*6/UL (ref 4–5.2)
RBC # BLD AUTO: 3.28 X10*6/UL (ref 4–5.2)
SCAN RESULT: NORMAL
SODIUM SERPL-SCNC: 136 MMOL/L (ref 136–145)
WBC # BLD AUTO: 11.3 X10*3/UL (ref 4.4–11.3)
WBC # BLD AUTO: 13.1 X10*3/UL (ref 4.4–11.3)
WBC # BLD AUTO: 15.7 X10*3/UL (ref 4.4–11.3)
WBC # BLD AUTO: 16.5 X10*3/UL (ref 4.4–11.3)

## 2025-07-12 PROCEDURE — 94760 N-INVAS EAR/PLS OXIMETRY 1: CPT | Mod: IPSPLIT

## 2025-07-12 PROCEDURE — 97530 THERAPEUTIC ACTIVITIES: CPT | Mod: GP,CQ,IPSPLIT

## 2025-07-12 PROCEDURE — 2500000004 HC RX 250 GENERAL PHARMACY W/ HCPCS (ALT 636 FOR OP/ED): Mod: IPSPLIT | Performed by: NURSE PRACTITIONER

## 2025-07-12 PROCEDURE — 97112 NEUROMUSCULAR REEDUCATION: CPT | Mod: GP,CQ,IPSPLIT

## 2025-07-12 PROCEDURE — 2500000001 HC RX 250 WO HCPCS SELF ADMINISTERED DRUGS (ALT 637 FOR MEDICARE OP): Mod: IPSPLIT | Performed by: NURSE PRACTITIONER

## 2025-07-12 PROCEDURE — 2500000002 HC RX 250 W HCPCS SELF ADMINISTERED DRUGS (ALT 637 FOR MEDICARE OP, ALT 636 FOR OP/ED): Mod: IPSPLIT | Performed by: NURSE PRACTITIONER

## 2025-07-12 PROCEDURE — 36415 COLL VENOUS BLD VENIPUNCTURE: CPT | Mod: IPSPLIT | Performed by: NURSE PRACTITIONER

## 2025-07-12 PROCEDURE — 83735 ASSAY OF MAGNESIUM: CPT | Mod: IPSPLIT | Performed by: NURSE PRACTITIONER

## 2025-07-12 PROCEDURE — 85027 COMPLETE CBC AUTOMATED: CPT | Mod: IPSPLIT | Performed by: NURSE PRACTITIONER

## 2025-07-12 PROCEDURE — 80048 BASIC METABOLIC PNL TOTAL CA: CPT | Mod: IPSPLIT | Performed by: NURSE PRACTITIONER

## 2025-07-12 PROCEDURE — 99232 SBSQ HOSP IP/OBS MODERATE 35: CPT | Performed by: SURGERY

## 2025-07-12 PROCEDURE — 97535 SELF CARE MNGMENT TRAINING: CPT | Mod: GO,IPSPLIT

## 2025-07-12 PROCEDURE — 99232 SBSQ HOSP IP/OBS MODERATE 35: CPT | Performed by: NURSE PRACTITIONER

## 2025-07-12 PROCEDURE — 1100000001 HC PRIVATE ROOM DAILY: Mod: IPSPLIT

## 2025-07-12 PROCEDURE — 2500000005 HC RX 250 GENERAL PHARMACY W/O HCPCS: Mod: IPSPLIT | Performed by: NURSE PRACTITIONER

## 2025-07-12 RX ORDER — POTASSIUM CHLORIDE 20 MEQ/1
40 TABLET, EXTENDED RELEASE ORAL ONCE
Status: COMPLETED | OUTPATIENT
Start: 2025-07-12 | End: 2025-07-12

## 2025-07-12 RX ADMIN — Medication 10 ML: at 21:26

## 2025-07-12 RX ADMIN — SUCRALFATE 1 G: 1 SUSPENSION ORAL at 00:02

## 2025-07-12 RX ADMIN — SUCRALFATE 1 G: 1 SUSPENSION ORAL at 12:09

## 2025-07-12 RX ADMIN — ROSUVASTATIN CALCIUM 10 MG: 10 TABLET, FILM COATED ORAL at 21:12

## 2025-07-12 RX ADMIN — CEFUROXIME AXETIL 500 MG: 250 TABLET, FILM COATED ORAL at 21:12

## 2025-07-12 RX ADMIN — METRONIDAZOLE 500 MG: 500 TABLET ORAL at 09:34

## 2025-07-12 RX ADMIN — CEFUROXIME AXETIL 500 MG: 250 TABLET, FILM COATED ORAL at 09:34

## 2025-07-12 RX ADMIN — SUCRALFATE 1 G: 1 SUSPENSION ORAL at 06:05

## 2025-07-12 RX ADMIN — MUPIROCIN: 20 OINTMENT TOPICAL at 21:11

## 2025-07-12 RX ADMIN — SUCRALFATE 1 G: 1 SUSPENSION ORAL at 18:13

## 2025-07-12 RX ADMIN — SODIUM CHLORIDE 10 ML/HR: 9 INJECTION INTRAMUSCULAR; INTRAVENOUS; SUBCUTANEOUS at 09:34

## 2025-07-12 RX ADMIN — PANTOPRAZOLE SODIUM 40 MG: 40 INJECTION, POWDER, LYOPHILIZED, FOR SOLUTION INTRAVENOUS at 21:11

## 2025-07-12 RX ADMIN — FOLIC ACID 1 MG: 1 TABLET ORAL at 09:35

## 2025-07-12 RX ADMIN — LOSARTAN POTASSIUM 100 MG: 50 TABLET, FILM COATED ORAL at 09:35

## 2025-07-12 RX ADMIN — AMLODIPINE BESYLATE 10 MG: 10 TABLET ORAL at 09:42

## 2025-07-12 RX ADMIN — Medication 10 MG: at 21:12

## 2025-07-12 RX ADMIN — METRONIDAZOLE 500 MG: 500 TABLET ORAL at 21:12

## 2025-07-12 RX ADMIN — FUROSEMIDE 20 MG: 20 TABLET ORAL at 09:35

## 2025-07-12 RX ADMIN — MUPIROCIN: 20 OINTMENT TOPICAL at 09:53

## 2025-07-12 RX ADMIN — Medication 10 ML: at 09:36

## 2025-07-12 RX ADMIN — PANTOPRAZOLE SODIUM 40 MG: 40 INJECTION, POWDER, LYOPHILIZED, FOR SOLUTION INTRAVENOUS at 09:36

## 2025-07-12 RX ADMIN — SUCRALFATE 1 G: 1 SUSPENSION ORAL at 23:39

## 2025-07-12 RX ADMIN — PROPRANOLOL HYDROCHLORIDE 120 MG: 60 CAPSULE, EXTENDED RELEASE ORAL at 21:12

## 2025-07-12 RX ADMIN — POTASSIUM CHLORIDE 40 MEQ: 1500 TABLET, EXTENDED RELEASE ORAL at 09:42

## 2025-07-12 ASSESSMENT — ACTIVITIES OF DAILY LIVING (ADL)
HOME_MANAGEMENT_TIME_ENTRY: 62
BATHING_LEVEL_OF_ASSISTANCE: MAXIMUM VERBAL CUES

## 2025-07-12 ASSESSMENT — PAIN SCALES - GENERAL
PAINLEVEL_OUTOF10: 0 - NO PAIN

## 2025-07-12 ASSESSMENT — COGNITIVE AND FUNCTIONAL STATUS - GENERAL
DRESSING REGULAR LOWER BODY CLOTHING: A LOT
DRESSING REGULAR UPPER BODY CLOTHING: A LOT
TOILETING: A LITTLE
MOVING FROM LYING ON BACK TO SITTING ON SIDE OF FLAT BED WITH BEDRAILS: A LITTLE
TURNING FROM BACK TO SIDE WHILE IN FLAT BAD: A LITTLE
PERSONAL GROOMING: A LITTLE
HELP NEEDED FOR BATHING: A LOT
MOVING FROM LYING ON BACK TO SITTING ON SIDE OF FLAT BED WITH BEDRAILS: A LITTLE
MOBILITY SCORE: 17
STANDING UP FROM CHAIR USING ARMS: A LITTLE
WALKING IN HOSPITAL ROOM: A LITTLE
DRESSING REGULAR UPPER BODY CLOTHING: A LITTLE
MOVING TO AND FROM BED TO CHAIR: A LITTLE
MOBILITY SCORE: 15
WALKING IN HOSPITAL ROOM: A LOT
MOVING TO AND FROM BED TO CHAIR: A LITTLE
TOILETING: A LOT
DAILY ACTIVITIY SCORE: 17
CLIMB 3 TO 5 STEPS WITH RAILING: A LOT
PERSONAL GROOMING: A LITTLE
CLIMB 3 TO 5 STEPS WITH RAILING: TOTAL
HELP NEEDED FOR BATHING: A LOT
DRESSING REGULAR LOWER BODY CLOTHING: A LOT
STANDING UP FROM CHAIR USING ARMS: A LITTLE
TURNING FROM BACK TO SIDE WHILE IN FLAT BAD: A LITTLE
DAILY ACTIVITIY SCORE: 15

## 2025-07-12 ASSESSMENT — PAIN - FUNCTIONAL ASSESSMENT
PAIN_FUNCTIONAL_ASSESSMENT: 0-10

## 2025-07-12 NOTE — NURSING NOTE
Patient visiting with her friend.  Denies pain at this time.  Patient has been active with getting up out of bed to the chair and/or bedside commode with assistance and the use of a walker.

## 2025-07-12 NOTE — CARE PLAN
The patient's goals for the shift include      The clinical goals for the shift include patient will have decrease symptoms of diarrhea this shift.    Over the shift, the patient did not make progress toward the following goals. Barriers to progression include increase in diarrhea. Recommendations to address these barriers include monitor plan of care.

## 2025-07-12 NOTE — PROGRESS NOTES
Elisha Flores is a 79 y.o. female on day 6 of admission presenting with Diarrhea, unspecified type.      Subjective   Elisha is seen in her room       Objective     Last Recorded Vitals  /65 (BP Location: Left arm, Patient Position: Lying)   Pulse 76   Temp 36.3 °C (97.3 °F) (Temporal)   Resp 17   Wt 130 kg (286 lb 2.5 oz)   SpO2 97%   Intake/Output last 3 Shifts:    Intake/Output Summary (Last 24 hours) at 7/12/2025 0836  Last data filed at 7/12/2025 0300  Gross per 24 hour   Intake --   Output 675 ml   Net -675 ml       Admission Weight  Weight: 112 kg (246 lb 14.6 oz) (07/06/25 1131)    Daily Weight  07/12/25 : 130 kg (286 lb 2.5 oz)    Image Results  Esophagogastroduodenoscopy (EGD)  Addendum: Impression   3 ulcers in the duodenal bulb and 1st part of the duodenum with clean base  (Dave III); performed cold forceps biopsy   Performed forceps biopsies in the antrum     Findings   3 deep, round, benign-appearing ulcers in the duodenal bulb and 1st part  of the duodenum with clean base (Dave III); performed cold forceps  biopsy   Performed forceps biopsies in the antrum   Regular Z-line 38 cm from the incisors     Recommendation   Await pathology results    Repeat EGD in 3 months, due: 10/8/2025    Protonix 40 iv bid for 5 days    Carafate 1 gm qid for 1 month          Indication   Gastrointestinal hemorrhage with melena     Post-Op Diagnosis   None     Staff   Staff Role    Jigar Pérez MD Proceduralist      Medications   See Anesthesia Record.      Preprocedure   A history and physical has been performed, and patient medication  allergies have been reviewed. The patient's tolerance of previous  anesthesia has been reviewed. The risks and benefits of the procedure and  the sedation options and risks were discussed with the patient. All  questions were answered and informed consent obtained.     Details of the Procedure   The patient underwent monitored anesthesia care, which was administered  by  an anesthesia professional. The patient's blood pressure, ECG, ETCO2,  heart rate, level of consciousness, oxygen and respirations were monitored  throughout the procedure. The scope was introduced through the mouth and  advanced to the second part of the duodenum. Retroflexion was performed in  the cardia. Prior to the procedure, the patient's H. Pylori status was  unknown. The patient experienced no blood loss. The procedure was not  difficult. The patient tolerated the procedure well. There were no  apparent adverse events.      Events   Procedure Events    Event Event Time    ENDO SCOPE IN TIME 7/10/2025  8:57 AM    ENDO SCOPE OUT TIME 7/10/2025  9:02 AM      Specimens   ID Type Source Tests Collected by Time    1 : via cold bx Tissue DUODENAL BULB  BIOPSY SURGICAL PATHOLOGY EXAM  Jigar Pérez MD 7/10/2025 0859    2 : via cold bx Tissue STOMACH ANTRUM BIOPSY SURGICAL PATHOLOGY EXAM  Jigar Pérez MD 7/10/2025 0900      Procedure Location   [facility]   [address] [address] [phone]     Referring Provider   Jigar Pérez MD     Procedure Provider   Jigar Pérez MD  Narrative: Table formatting from the original result was not included.  Impression  3 ulcers in the duodenal bulb and 1st part of the duodenum with clean base   (Dave III); performed cold forceps biopsy  Performed forceps biopsies in the antrum    Findings  3 deep, round, benign-appearing ulcers in the duodenal bulb and 1st part   of the duodenum with clean base (Dave III); performed cold forceps   biopsy  Performed forceps biopsies in the antrum  Regular Z-line 38 cm from the incisors    Recommendation  Await pathology results   Repeat EGD in 3 months, due: 10/8/2025   Protonix 40 iv bid for 5 days   Carafate 1 gm qid for 1 month        Indication  Gastrointestinal hemorrhage with melena    Post-Op Diagnosis  None    Staff  Staff Role   Jigar Pérez MD Proceduralist      Medications  See Anesthesia Record.     Preprocedure  A history and physical has been performed, and patient medication   allergies have been reviewed. The patient's tolerance of previous   anesthesia has been reviewed. The risks and benefits of the procedure and   the sedation options and risks were discussed with the patient. All   questions were answered and informed consent obtained.    Details of the Procedure  The patient underwent monitored anesthesia care, which was administered by   an anesthesia professional. The patient's blood pressure, ECG, ETCO2,   heart rate, level of consciousness, oxygen and respirations were monitored   throughout the procedure. The scope was introduced through the mouth and   advanced to the second part of the duodenum. Retroflexion was performed in   the cardia. Prior to the procedure, the patient's H. Pylori status was   unknown. The patient experienced no blood loss. The procedure was not   difficult. The patient tolerated the procedure well. There were no   apparent adverse events.     Events  Procedure Events   Event Event Time   ENDO SCOPE IN TIME 7/10/2025  8:57 AM   ENDO SCOPE OUT TIME 7/10/2025  9:02 AM     Specimens  ID Type Source Tests Collected by Time   1 : via cold bx Tissue DUODENAL BULB  BIOPSY SURGICAL PATHOLOGY EXAM   Jigar Pérez MD 7/10/2025 0859   2 : via cold bx Tissue STOMACH ANTRUM BIOPSY SURGICAL PATHOLOGY EXAM   Jigar Pérez MD 7/10/2025 0900     Procedure Location  [facility]  [address] [address] [phone]    Referring Provider  Jigar Pérez MD    Procedure Provider  Jigar éPrez MD      Physical Exam    Relevant Results  {If you would like to pull in Medications, type .meds     If you would like to pull in Lab results for the last 24 hours, type .[id number]    If you would like to pull in Imaging results, type .imgrslt :99}      {Link to Stroke Scoring tools - Link :99}                       Assessment & Plan  Diarrhea, unspecified type    Benign essential hypertension    GERD (gastroesophageal reflux disease)    Hyperlipidemia    Hyperglycemia    Hypokalemia    Weakness    Kidney stones    Dehydration    Hypomagnesemia    Rectal bleeding    Diverticulosis of large intestine with hemorrhage    Colitis    Hypophosphatemia      Diarrhea  Weakness  Kidney stone  Dehydration  Colitis  Leukocytosis  Melena    Malignant neoplasm of overlapping sites of left breast in female, estrogen receptor positive    Duodenal ulcer    Diarrhea  Weakness  Kidney stone  Dehydration  Colitis  Breast cancer  - Currently on chemotherapy for invasive ductal carcinoma of the left breast  - CT abdomen pelvis: Rectal air-fluid level consistent with diarrhea and probable distal sigmoid wall thickening/enhancement consistent with nonspecific colitis. Clinical correlation suggested. Multiple bladder calculi near the right UVJ again seen with increased now moderate hydroureteronephrosis. Flattening of the upper abdominal IVC which can be seen with dehydration.  - Right UVJ stones- can follow up with DR. Zapata- urology  OP   - C-diff negative  - Stool pathogens negative  - 1 L NS given in the ED  - Cefepime, Flagyl, vancomycin given in the ED  - discontinued Cefepime and Vancomycin   - Continue metroNIDAZOLE (Flagyl) 500 mg PO  - Continue cefuroxime 500 mg  PO  - WBC 26.1 > 20.7 > 42.3 > 26.4 > 14.9  - ID consulted, appreciate recommendations  - Intake and Output  - Daily weights  - discontinued Rectal tube  - Urine culture No growth  - discontinued Nieto 7/11/25  - Blood culture; negative to date  - Monitor fever and WBC  - Daily CBC  - discontinued sodium chloride 0.9% infusion  150 ml/hr   - message sent to Dr Bustillos regarding WBC elevations  - PT/OT evaluations        GI Bleed  Duodenal ulcers  Normocytic Anemia  - Began to have profuse bleeding  - given 500ml NS bolus   - discontinued enoxaparin and aspirin  -  PT/ INR 18.4/ 1.7 > 17.0/ 1.5  - Vitamin K given  - received a total of 5 units of  PRBC  - Continue folic acid 1 mg daily  - Hemoglobin 7.5 >7.9  - EGD with Dr. Pérez has 3 large duodenal bleeding ulcers, Protonix twice daily and Carafate QID for 1 month  - continue pantoprazole 40 mg BID and Carafate 1 g QID   - PLT 47>77  -1 Unit platelets given  Per gen surg  Plan-continue protein supplementation.  Protonix 40 mg p.o. twice daily for 1 month until 8/11/2025.  Carafate 1 g p.o. 4 times daily for 1 month until 8/11/2025.  Then Protonix 40 mg p.o. daily.      Essential Hypertension  Hyperlipidemia  - Hold Amlodipine, propranolol-resumed 7/12  - resumed furosemide 20 mg daily, losartan 100 mg daily  - Hold aspirin EC tablet 81 mg daily  - Continue rosuvastatin 10 mg daily   - monitor BP and HR      Hyperglycemia  -Monitor   -Daily BMP  -start SSRI when orals tolerated or BG>200 consistently     Hypomagnesemia, resolved  Hypokalemia, resolved  Hypophosphatemia  -Magnesium 1.17 > 1.83 > 1.42 > 1.70  -2 G  Magnesium  -K 2.7 > 3.1 > 3.3 > 2.9 > 3.1 > 3.5  -Phosphorous 1.5 > 2.2 > 2.3  -Replete as needed  -Monitor  -Daily BMP      DVT ppx:   - hold Enoxaparin   - continue SCD's     Code Status: Full      Disposition: Patient requires inpatient care.                 Christianne Coles, APRN-CNP

## 2025-07-12 NOTE — PROGRESS NOTES
Occupational Therapy    Occupational Therapy Treatment    Name: Elisha Flores  MRN: [id number]  Department: [facility]  Room: [address] [place] [address]  Date: 07/12/25  Time Calculation  Start Time: 0816  Stop Time: 0918  Time Calculation (min): 62 min    Assessment:  OT Assessment: Good participation in graded self care performance with task modifications, education on safety & use of compensatory techniques with increased time needed for self care performance. The pt. demonstrated sit to stand from the bed with a FWW with modA & cues & stand pivot transfers to the [facility] with modA & cues.  Pt. required CGA to Esthela & cues for standing balance during self care with vc's for unilateral support to reduce her risk for falls.  OT introduced to the pt. use of adaptive equipment(reacher & sock aide) for increased ease & safety with LE dressing which the pt. appeared receptive to at this time.  The pt. would benefit from additional education on use of adaptive equipment to facilitate pete over with use & increased independence.  The pt. fatigues easily requiring increased time for ADL task completion & therapeutic rest breaks needed intermittently due to this & her elevated HR at times.  Prognosis: Good  Barriers to Discharge Home: Caregiver assistance, Physical needs  Caregiver Assistance: Caregiver assistance needed per identified barriers - however, level of patient's required assistance exceeds assistance available at home  Physical Needs: Stair navigation into home limited by function/safety, 24hr mobility assistance needed, 24hr ADL assistance needed, High falls risk due to function or environment  Evaluation/Treatment Tolerance: Patient limited by fatigue however participated well with increased time provided & use of therapeutic rest breaks as needed.  Medical Staff Made Aware: Yes  End of Session Communication: Bedside nurse  End of Session Patient Position: Up in chair, Alarm on  Plan:  Treatment Interventions: ADL retraining, Functional  transfer training, Endurance training, Patient/family training, Equipment evaluation/education, Neuromuscular reeducation, Compensatory technique education  OT Frequency: 3 times per week (During this acute inpatient hospitalization)  OT Discharge Recommendations: Moderate intensity level of continued care (Based on current functional status and rehab potential, patient is anticipated to tolerate and benefit from 5 or more days per week of skilled rehabilitative therapy after discharge from this acute inpatient hospitalization.)  Equipment Recommended upon Discharge: Bedside commode  OT Recommended Transfer Status: Moderate assist (& cues with a FWW)  OT - OK to Discharge: Yes Based on completed evaluation and care plan recommendations, no barriers to discharge to next site of care.      Subjective     OT Visit Info:  OT Received On: 07/12/25  General:  General  Reason for Referral: Impaired self care skills & functional transfers due to hospitalization for diarrhea, weakness, GI bleed, dehydration  Past Medical History Relevant to Rehab: L breast cancer, s/p chemotherapy, last dose on 6/27/2025, vertigo, CHF, psoriasis, HTN, CHF, blepharitis R eye, eye surgery, back surgery, breast biopsy, isomnia  Family/Caregiver Present: No  Prior to Session Communication: Bedside nurse  Patient Position Received: Bed, 2 rail up, Alarm on  Preferred Learning Style: verbal, visual  General Comment: telemetry.  Per imaging:likely remote  deformity lateral R lower ribs.  Precautions:  Medical Precautions: Fall precautions, Cardiac precautions  Precautions Comment: med port R upper chest    Pre session: O2 saturation level was 99% & HR was 81 BPM while supine in bed.    Vital Signs Comment: Pt. displayed intermittent elevated HR throughout the session (125 BPM -142 BPM).  Verbal cues for therapeutic rest with pt.'s HR returning to WNL.  Pt.'s nurse was notified of this.    Pain Assessment:  Pain Assessment  Pain Assessment:  0-10  0-10 (Numeric) Pain Score: 0 - No pain    Objective   Cognition:  Overall Cognitive Status: Within Functional Limits  Orientation Level: Oriented X4  Activities of Daily Living: Grooming  Grooming Level of Assistance: Close S set up from a supported sitting position  Grooming Where Assessed:[facility]    UE Bathing  UE Bathing Level of Assistance: Close SBA with set up  UE Bathing Where Assessed:  [facility]    LE Bathing  LE Bathing Level of Assistance: Maximum verbal cues.  Pt. bathed her B LE's with Esthela& cues to assist with bathing her distal LE's.  LE Bathing Where Assessed:  [facility]  LE Bathing Comments: Pt. performed front perineal hygiene care with CGA/Esthela & cues for standing balance, however required assistance with thoroughness as well as assistance to perform back perineal hygiene care.  Vc's for use of one UE for support for safety.    UE Dressing  UE Dressing Level of Assistance:  Pt. donned a front closure bra with Esthela & cues provided to don the bra around her back.  The pt. was able to perform bra closure in the front with increased time needed to perform.  UE Dressing Where Assessed:  [facility]  UE Dressing Comments: Pt. donned a pre tied hospital gown to simulate donning a pull over shirt with Esthela& cues to don the clothing over her back    LE Dressing  LE Dressing: Yes  LE Dressing Adaptive Equipment: Reacher, Sock aide  Sock Level of Assistance: After set up of the sock aide was provided, the pt. donned B slipper socks with modA & cues for correct use of the sock aide  Adult Briefs Level of Assistance: Guided instruction on use of a reacher to begin a pull up brief over her distal LE's.  Esthela& cues to initiate a pull up brief over her distal LE's using the reacher.  LE Dressing Where Assessed:  [facility]  LE Dressing Comments: The pt. donned the pull up brief over her hips with Esthela & cues for standing balance as well as modA & cues for task completion.    Toileting  Toileting Level of Assistance: Maximum  assistance  Where Assessed: Bedside commode  Toileting Comments: Due to the pt.'s urgency with the need to have a  bowel movement & experiencing diarrhea at this time, the pt. displayed bowel incontinence when transferring from the bed to a raised BSC.  Pt. was able to assist minimally with front perineal hygiene care, however required maxA& cues for thoroughness with hygiene care & clothing management due to bowel incontinence at this time.    Bed Mobility/Transfers: Bed Mobility 1  Bed Mobility 1: Supine to sitting  Level of Assistance 1: Minimum assistance & cues with use of a bed rail    Transfers  Transfer: Yes  Transfer 1  Technique 1: Sit to stand, Stand to sit  Transfer Device 1: Gait belt, Walker  Transfer Level of Assistance 1: ModA & cues(Repeated times throughout self care performance to perform perineal hygiene care multiple times.)  Trials/Comments 1: Min/mod cues for safety  Transfers 2  Transfer From 2: Bed to raised BSC  Technique 2: Stand pivot  Transfer Device 2: Gait belt, Walker  Transfer Level of Assistance 2: Moderate assistance & cues    Toilet Transfers  Toilet Transfer From:  BSC to bedside chair  Toilet Transfer Technique: Stand pivot  Toilet Transfers: MinAx2 & cues at this time secondary to pt.'s increased fatigue post toileting with multiple sit to stand transfers & static standing trials.  Sitting Balance:  Static Sitting Balance  Static Sitting-Level of Assistance: Static sitting balance on the side of the bed in preparation for transfers.  Static Standing Balance  Static Standing-Level of Assistance: CGA to Esthela & cues for standing balance during self care with vc's for use of one UE for support  Pt. demonstrtaed static standing balance multiple times standing for 40 seconds to 1 minute at maximum due to fatigue & or elevated HR requiring a seated rest break.    Outcome Measures:  [facility] Daily Activity  Putting on and taking off regular lower body clothing: A lot  Bathing (including  washing, rinsing, drying): A lot  Putting on and taking off regular upper body clothing: A lot  Toileting, which includes using toilet, bedpan or urinal: A lot  Taking care of personal grooming such as brushing teeth: A little  Eating Meals: None  Daily Activity - Total Score: 15      Education Documentation  Precautions, taught by Sandra Khoury OT at 7/12/2025 12:22 PM.  Learner: Patient  Readiness: Acceptance  Method: Explanation, Demonstration  Response: Needs Reinforcement, Demonstrated Understanding  Comment: Education on safety with transfers, safety with balance, compensatory techniques for self care, energy conservation & use of adaptive equipment for self care.    ADL Training, taught by Sandra Khoury OT at 7/12/2025 12:22 PM.  Learner: Patient  Readiness: Acceptance  Method: Explanation, Demonstration  Response: Needs Reinforcement, Demonstrated Understanding  Comment: Education on safety with transfers, safety with balance, compensatory techniques for self care, energy conservation & use of adaptive equipment for self care.    Goals:  Encounter Problems       Encounter Problems (Active)       ADLs       Patient will perform UB and LB bathing seated/or sponge bath with stand by assist level of assistance. (Progressing)       Start:  07/07/25    Expected End:  07/21/25            Patient with complete upper body dressing with stand by assist level of assistance donning and doffing all UE clothes with PRN adaptive equipment while supported sitting       Start:  07/07/25    Expected End:  07/21/25            Patient with complete lower body dressing with stand by assist level of assistance donning and doffing all LE clothes  with PRN adaptive equipment while supported sitting (Progressing)       Start:  07/07/25    Expected End:  07/21/25            Patient will complete daily grooming tasks with set-up level of assistance and PRN adaptive equipment while supported sitting. (Progressing)       Start:   07/07/25    Expected End:  07/21/25            Patient will complete toileting including hygiene clothing management/hygiene with stand by assist level of assistance and raised toilet seat, grab bars, and bedside commode. (Progressing)       Start:  07/07/25    Expected End:  07/21/25               BALANCE       Pt will maintain dynamic standing balance during ADL task with supervision level of assistance in order to demonstrate decreased risk of falling and improved postural control. (Progressing)       Start:  07/07/25    Expected End:  07/21/25               TRANSFERS       Patient will perform bed mobility modified independent level of assistance and bed rails in order to improve safety and independence with mobility (Progressing)       Start:  07/07/25    Expected End:  07/21/25            Patient will complete sit to stand transfer with supervision level of assistance and least restrictive device in order to improve safety and prepare for out of bed mobility.       Start:  07/07/25    Expected End:  07/21/25

## 2025-07-12 NOTE — PROGRESS NOTES
Physical Therapy    Physical Therapy Treatment    Patient Name: Elisha Flores  MRN: [id number]  Department: [facility]  Room: [address] [place], [place] [address]A  Today's Date: 7/12/2025  Time Calculation  Start Time: 1307  Stop Time: 1352  Time Calculation (min): 45 min       Assessment/Plan   PT Assessment  PT Assessment Results: Decreased strength, Impaired balance, Decreased mobility, Decreased endurance, Obesity, Decreased skin integrity, Decreased cognition  Rehab Prognosis: Good  Barriers to Discharge Home: Caregiver assistance  Caregiver Assistance: Caregiver assistance needed per identified barriers - however, level of patient's required assistance exceeds assistance available at home  Evaluation/Treatment Tolerance: Patient limited by fatigue  Medical Staff Made Aware: Yes  Strengths: Attitude of self  Barriers to Participation: Comorbidities  End of Session Communication: Bedside nurse  Assessment Comment: Pt is very slow through perfomance of activities especially in upright position 2/2 dizizness and feeling of weakness. Pt particiapted in transfer training and some bed mobility this session. Continued skilled interventions to address PT goals and deficts mentioned within this note. Phlebotomist present towards end of session attempting lab draws d/t Port failure.  End of Session Patient Position: Bed, 3 rail up, Alarm on  PT Plan  Inpatient/Swing Bed or Outpatient: Inpatient  PT Plan  Treatment/Interventions: Transfer training, Bed mobility, Gait training, Stair training, Balance training, Strengthening, Endurance training, Therapeutic exercise, Therapeutic activity, Home exercise program, Neuromuscular re-education  PT Plan: Ongoing PT  PT Frequency: 3 times per week (during  this acute inpatient hospitalization.)  PT Discharge Recommendations: Moderate intensity level of continued care (Based on current functional status and rehab potential, patient is anticipated to tolerate and benefit from 5 or more days per week of skilled  rehabilitative therapy after discharge from this acute inpatient hospitalization.)  PT Recommended Transfer Status: Assist x1  PT - OK to Discharge: Yes    PT Visit Info:  PT Received On: 07/12/25  Response to Previous Treatment: Patient reporting fatigue but able to participate.     General Visit Information:   General  Reason for Referral: Imparied mobility & general weakness  Referred By: SAMUEL Mayes  Past Medical History Relevant to Rehab: L breast cancer, s/p chemotherapy, last dose on 6/27/2025, vertigo, CHF, psoriasis, HTN, CHF, blepharitis R eye, eye surgery, back surgery, breast biopsy, isomnia  Prior to Session Communication: Bedside nurse  Patient Position Received: Up in chair, Alarm on  Preferred Learning Style: verbal, visual  General Comment: Pt pleasant and highly distracted throughout session requiring redirection. Patient did report feeling very weak and very tired and agreeable to light session.    Subjective   Precautions:  Precautions  Medical Precautions: Fall precautions, Cardiac precautions  Precautions Comment: med port R upper chest, tele, masimo      Objective   Pain:  Pain Assessment  Pain Assessment: 0-10  0-10 (Numeric) Pain Score: 0 - No pain  Cognition:  Cognition  Overall Cognitive Status: Within Functional Limits  Orientation Level: Oriented X4    Postural Control:  Static Sitting Balance  Static Sitting-Balance Support: No upper extremity supported, Feet supported  Static Sitting-Level of Assistance: Close supervision  Dynamic Sitting Balance  Dynamic Sitting-Balance Support: Feet supported  Dynamic Sitting-Level of Assistance: Close supervision  Dynamic Sitting-Balance: Forward lean  Dynamic Sitting-Comments: reaching towards feet on BSC to lake briefs, ROM limited d/t body hibatus but no LOB. Sitting EOB performing weight shifting , scooting fwd/bwd  Static Standing Balance  Static Standing-Balance Support: Bilateral upper extremity supported  Static Standing-Level  of Assistance: Contact guard  Static Standing-Comment/Number of Minutes: multiple minutes for nursing to dress wound  Dynamic Standing Balance  Dynamic Standing-Balance Support:  (single UE support)  Dynamic Standing-Level of Assistance: Contact guard  Dynamic Standing-Balance: Turning  Dynamic Standing-Comments: alternating single UE support assisting with brief positioning in standing with CGA x 2    Activity Tolerance:  Activity Tolerance  Endurance: Tolerates less than 10 min exercise, no significant change in vital signs  Treatments:   Bed Mobility 1  Bed Mobility 1: Sitting to supine  Level of Assistance 1: Maximum assistance  Bed Mobility Comments 1: for BLE elevation onto bed  Bed Mobility 2  Bed Mobility  2: Scooting  Level of Assistance 2: Maximum assistance  Bed Mobility Comments 2: Pt pulling up on headboard and lifting trunk off of bed as PTA pull chux to assist with repositioning. partial bridging x 4 with cues for techniques    Ambulation/Gait Training 1  Surface 1: Level tile  Device 1: Rolling walker  Gait Support Devices: Gait belt  Assistance 1: Contact guard  Quality of Gait 1: Wide base of support, Soft knee(s), Decreased step length  Comments/Distance (ft) 1: small steps from chair <> commode, chair>bed  Transfers  Transfer: Yes (reporting mild dizziness with all standing activities and feeling of weakness.)  Transfer 1  Transfer From 1: Chair with arms to, Commode-standard to  Transfer to 1: Chair with arms, Commode-standard  Technique 1: Sit to stand, Stand to sit  Transfer Device 1: Gait belt, Walker  Transfer Level of Assistance 1: Moderate assistance, Contact guard  Trials/Comments 1: assistance with initial elevation then CGA to complete. CGA for stand to sit  Transfers 2  Transfer From 2: Chair with arms to  Transfer to 2: Bed  Technique 2: Sit to stand, Stand to sit, Stand pivot (stand +many small steps for turning)  Transfer Device 2: Gait belt, Walker  Transfer Level of Assistance 2:  Contact guard    Outcome Measures:  [facility] Basic Mobility  Turning from your back to your side while in a flat bed without using bedrails: A little  Moving from lying on your back to sitting on the side of a flat bed without using bedrails: A little  Moving to and from bed to chair (including a wheelchair): A little  Standing up from a chair using your arms (e.g. wheelchair or bedside chair): A little  To walk in hospital room: A lot  Climbing 3-5 steps with railing: Total  Basic Mobility - Total Score: 15      Encounter Problems       Encounter Problems (Active)       Balance       STG - Maintains dynamic standing balance without upper extremity support with good balance  (Progressing)       Start:  07/07/25    Expected End:  07/21/25               Mobility       LTG - Patient will ambulate community distance LORY with LRD  (Progressing)       Start:  07/07/25    Expected End:  07/21/25            STG - Patient will ascend and descend four to six stairs IND with 1 rail  (Progressing)       Start:  07/07/25    Expected End:  07/21/25               PT Transfers       STG - Patient will perform bed mobility IND  (Progressing)       Start:  07/07/25    Expected End:  07/21/25            STG - Patient will transfer sit to and from stand LORY with LRD (Progressing)       Start:  07/07/25    Expected End:  07/21/25               Safety       Pt. will complete 5 STS without UE support <30 seconds  (Progressing)       Start:  07/07/25    Expected End:  07/21/25

## 2025-07-12 NOTE — PROGRESS NOTES
Elisha Flores is a 79 y.o. female on day 6 of admission presenting with Diarrhea, unspecified type.      Subjective   Continues intermittent loose stools. No blood per patient.   H/H stable.  Continue PPI and carafate.   Plan for rehab on Monday       Objective     Last Recorded Vitals  /65 (BP Location: Left arm, Patient Position: Lying)   Pulse 101   Temp 36.3 °C (97.3 °F) (Temporal)   Resp 17   Wt 130 kg (286 lb 2.5 oz)   SpO2 99%   Intake/Output last 3 Shifts:    Intake/Output Summary (Last 24 hours) at 7/12/2025 1221  Last data filed at 7/12/2025 0300  Gross per 24 hour   Intake --   Output 600 ml   Net -600 ml       Admission Weight  Weight: 112 kg (246 lb 14.6 oz) (07/06/25 1131)    Daily Weight  07/12/25 : 130 kg (286 lb 2.5 oz)    Image Results  Esophagogastroduodenoscopy (EGD)  Addendum: Impression   3 ulcers in the duodenal bulb and 1st part of the duodenum with clean base  (Dave III); performed cold forceps biopsy   Performed forceps biopsies in the antrum     Findings   3 deep, round, benign-appearing ulcers in the duodenal bulb and 1st part  of the duodenum with clean base (Dave III); performed cold forceps  biopsy   Performed forceps biopsies in the antrum   Regular Z-line 38 cm from the incisors     Recommendation   Await pathology results    Repeat EGD in 3 months, due: 10/8/2025    Protonix 40 iv bid for 5 days    Carafate 1 gm qid for 1 month          Indication   Gastrointestinal hemorrhage with melena     Post-Op Diagnosis   None     Staff   Staff Role    Jigar Pérez MD Proceduralist      Medications   See Anesthesia Record.      Preprocedure   A history and physical has been performed, and patient medication  allergies have been reviewed. The patient's tolerance of previous  anesthesia has been reviewed. The risks and benefits of the procedure and  the sedation options and risks were discussed with the patient. All  questions were answered and informed consent  obtained.     Details of the Procedure   The patient underwent monitored anesthesia care, which was administered by  an anesthesia professional. The patient's blood pressure, ECG, ETCO2,  heart rate, level of consciousness, oxygen and respirations were monitored  throughout the procedure. The scope was introduced through the mouth and  advanced to the second part of the duodenum. Retroflexion was performed in  the cardia. Prior to the procedure, the patient's H. Pylori status was  unknown. The patient experienced no blood loss. The procedure was not  difficult. The patient tolerated the procedure well. There were no  apparent adverse events.      Events   Procedure Events    Event Event Time    ENDO SCOPE IN TIME 7/10/2025  8:57 AM    ENDO SCOPE OUT TIME 7/10/2025  9:02 AM      Specimens   ID Type Source Tests Collected by Time    1 : via cold bx Tissue DUODENAL BULB  BIOPSY SURGICAL PATHOLOGY EXAM  Jigar Pérez MD 7/10/2025 0859    2 : via cold bx Tissue STOMACH ANTRUM BIOPSY SURGICAL PATHOLOGY EXAM  Jigar Pérez MD 7/10/2025 0900      Procedure Location   [facility]   [address] [facility] [address] [phone]     Referring Provider   Jigar Pérez MD     Procedure Provider   Jigar Pérez MD  Narrative: Table formatting from the original result was not included.  Impression  3 ulcers in the duodenal bulb and 1st part of the duodenum with clean base   (Dave III); performed cold forceps biopsy  Performed forceps biopsies in the antrum    Findings  3 deep, round, benign-appearing ulcers in the duodenal bulb and 1st part   of the duodenum with clean base (Dave III); performed cold forceps   biopsy  Performed forceps biopsies in the antrum  Regular Z-line 38 cm from the incisors    Recommendation  Await pathology results   Repeat EGD in 3 months, due: 10/8/2025   Protonix 40 iv bid for 5 days   Carafate 1 gm qid for 1 month        Indication  Gastrointestinal  hemorrhage with melena    Post-Op Diagnosis  None    Staff  Staff Role   Jigar Pérez MD Proceduralist     Medications  See Anesthesia Record.     Preprocedure  A history and physical has been performed, and patient medication   allergies have been reviewed. The patient's tolerance of previous   anesthesia has been reviewed. The risks and benefits of the procedure and   the sedation options and risks were discussed with the patient. All   questions were answered and informed consent obtained.    Details of the Procedure  The patient underwent monitored anesthesia care, which was administered by   an anesthesia professional. The patient's blood pressure, ECG, ETCO2,   heart rate, level of consciousness, oxygen and respirations were monitored   throughout the procedure. The scope was introduced through the mouth and   advanced to the second part of the duodenum. Retroflexion was performed in   the cardia. Prior to the procedure, the patient's H. Pylori status was   unknown. The patient experienced no blood loss. The procedure was not   difficult. The patient tolerated the procedure well. There were no   apparent adverse events.     Events  Procedure Events   Event Event Time   ENDO SCOPE IN TIME 7/10/2025  8:57 AM   ENDO SCOPE OUT TIME 7/10/2025  9:02 AM     Specimens  ID Type Source Tests Collected by Time   1 : via cold bx Tissue DUODENAL BULB  BIOPSY SURGICAL PATHOLOGY EXAM   Jigar Pérez MD 7/10/2025 0859   2 : via cold bx Tissue STOMACH ANTRUM BIOPSY SURGICAL PATHOLOGY EXAM   Jigar Pérez MD 7/10/2025 0900     Procedure Location  [facility]  [address] [facility] [address]  [phone]    Referring Provider  Jigar Pérez MD    Procedure Provider  Jigar Pérez MD    Results for orders placed or performed during the hospital encounter of 07/06/25 (from the past 24 hours)   CBC   Result Value Ref Range    WBC 15.2 (H) 4.4 - 11.3 x10*3/uL    nRBC 0.2 (H) 0.0 - 0.0  /100 WBCs    RBC 2.90 (L) 4.00 - 5.20 x10*6/uL    Hemoglobin 9.1 (L) 12.0 - 16.0 g/dL    Hematocrit 25.8 (L) 36.0 - 46.0 %    MCV 89 80 - 100 fL    MCH 31.4 26.0 - 34.0 pg    MCHC 35.3 32.0 - 36.0 g/dL    RDW 16.2 (H) 11.5 - 14.5 %    Platelets 78 (L) 150 - 450 x10*3/uL   CBC   Result Value Ref Range    WBC 13.1 (H) 4.4 - 11.3 x10*3/uL    nRBC 0.2 (H) 0.0 - 0.0 /100 WBCs    RBC 2.79 (L) 4.00 - 5.20 x10*6/uL    Hemoglobin 8.6 (L) 12.0 - 16.0 g/dL    Hematocrit 24.9 (L) 36.0 - 46.0 %    MCV 89 80 - 100 fL    MCH 30.8 26.0 - 34.0 pg    MCHC 34.5 32.0 - 36.0 g/dL    RDW 16.4 (H) 11.5 - 14.5 %    Platelets 81 (L) 150 - 450 x10*3/uL   Lavender Top   Result Value Ref Range    Extra Tube Hold for add-ons.    CBC   Result Value Ref Range    WBC 11.3 4.4 - 11.3 x10*3/uL    nRBC 0.2 (H) 0.0 - 0.0 /100 WBCs    RBC 2.55 (L) 4.00 - 5.20 x10*6/uL    Hemoglobin 7.9 (L) 12.0 - 16.0 g/dL    Hematocrit 23.0 (L) 36.0 - 46.0 %    MCV 90 80 - 100 fL    MCH 31.0 26.0 - 34.0 pg    MCHC 34.3 32.0 - 36.0 g/dL    RDW 16.7 (H) 11.5 - 14.5 %    Platelets 77 (L) 150 - 450 x10*3/uL   Magnesium   Result Value Ref Range    Magnesium 1.63 1.60 - 2.40 mg/dL   Basic Metabolic Panel   Result Value Ref Range    Glucose 99 74 - 99 mg/dL    Sodium 136 136 - 145 mmol/L    Potassium 3.1 (L) 3.5 - 5.3 mmol/L    Chloride 107 98 - 107 mmol/L    Bicarbonate 24 21 - 32 mmol/L    Anion Gap 8 (L) 10 - 20 mmol/L    Urea Nitrogen 4 (L) 6 - 23 mg/dL    Creatinine 0.40 (L) 0.50 - 1.05 mg/dL    eGFR >90 >60 mL/min/1.73m*2    Calcium 7.1 (L) 8.6 - 10.3 mg/dL     Physical Exam  Vitals reviewed.   Constitutional:       Appearance: Normal appearance. She is obese.   HENT:      Head: Normocephalic and atraumatic.      Right Ear: External ear normal.      Left Ear: External ear normal.      Nose: Nose normal.      Mouth/Throat:      Mouth: Mucous membranes are moist.      Pharynx: Oropharynx is clear.   Eyes:      Conjunctiva/sclera: Conjunctivae normal.      Pupils: Pupils  are equal, round, and reactive to light.   Cardiovascular:      Rate and Rhythm: Normal rate and regular rhythm.      Pulses: Normal pulses.      Heart sounds: Normal heart sounds.   Pulmonary:      Effort: Pulmonary effort is normal.      Breath sounds: Normal breath sounds.   Abdominal:      General: Bowel sounds are normal.      Palpations: Abdomen is soft.   Musculoskeletal:         General: Normal range of motion.      Cervical back: Normal range of motion and neck supple.   Skin:     General: Skin is warm and dry.      Coloration: Skin is pale.   Neurological:      General: No focal deficit present.      Mental Status: She is alert and oriented to person, place, and time.   Psychiatric:         Mood and Affect: Mood normal.         Behavior: Behavior normal.     Scheduled medications  Scheduled Medications[1]  Continuous medications  Continuous Medications[2]  PRN medications  PRN Medications[3]    Assessment & Plan  Diarrhea, unspecified type    Benign essential hypertension    GERD (gastroesophageal reflux disease)    Hyperlipidemia    Hyperglycemia    Hypokalemia    Weakness    Kidney stones    Dehydration    Hypomagnesemia    Rectal bleeding    Diverticulosis of large intestine with hemorrhage    Colitis    Hypophosphatemia      Melena    Malignant neoplasm of overlapping sites of left breast in female, estrogen receptor positive    Duodenal ulcer    Diarrhea  Weakness  Kidney stone  Dehydration  Colitis  Breast cancer  - Currently on chemotherapy for invasive ductal carcinoma of the left breast  - CT abdomen pelvis: Rectal air-fluid level consistent with diarrhea and probable distal sigmoid wall thickening/enhancement consistent with nonspecific colitis. Clinical correlation suggested. Multiple bladder calculi near the right UVJ again seen with increased now moderate hydroureteronephrosis. Flattening of the upper abdominal IVC which can be seen with dehydration.  - Right UVJ stones- can follow up with   Benny- urology  OP   - C-diff negative  - Stool pathogens negative  - 1 L NS given in the ED  - Cefepime, Flagyl, vancomycin given in the ED  - discontinued Cefepime and Vancomycin   - Continue metroNIDAZOLE (Flagyl) 500 mg PO  - Continue cefuroxime 500 mg  PO  - WBC 26.1 > 20.7 > 42.3 > 26.4 > 14.9  - ID consulted, appreciate recommendations  - Intake and Output  - Daily weights  - discontinued Rectal tube  - Urine culture No growth  - discontinued Nieto 7/11/25  - Blood culture; negative to date  - Monitor fever and WBC  - Daily CBC  - discontinued sodium chloride 0.9% infusion  150 ml/hr   - message sent to Dr Bustillos regarding WBC elevations  - PT/OT evaluations        GI Bleed  Duodenal ulcers  Normocytic Anemia  - Began to have profuse bleeding  - Dr. Pérez consulted  - Hemoglobin 9.4 > 7.0  - given 500ml NS bolus   - discontinued enoxaparin and aspirin  - PT/ INR 18.4/ 1.7 > 17.0/ 1.5  - Vitamin K given  - received a total of 5 units of  PRBC and 1 unit PLT  - Continue folic acid 1 mg daily  - Hemoglobin 7.5 >7.9  - Monitor   - EGD with Dr. Pérez has 3 large duodenal bleeding ulcers, Protonix twice daily and Carafate QID for 1 month  - PLT 47>77  -monitor     Essential Hypertension  Hyperlipidemia  - Hold Amlodipine, propranolol  - resumed furosemide 20 mg daily, losartan 100 mg daily  - Hold aspirin EC tablet 81 mg daily  - Continue rosuvastatin 10 mg daily   - monitor BP and HR      Hyperglycemia  -Monitor   -Daily BMP  -start  metformin when orals tolerated for BG>200 consistently     Hypomagnesemia, resolved  Hypokalemia, resolved  Hypophosphatemia  -Magnesium 1.17 > 1.83 > 1.42 > 1.70  -2 G  Magnesium  -K 2.7 > 3.1 > 3.3 > 2.9 > 3.1 > 3.5  -Phosphorous 1.5 > 2.2 > 2.3  -Replete as needed  -Given potassium phosphates 30 mmol in dextrose 5% 250 mL IV   -Given potassium phosphates 20 mmol in dextrose 5% 250 mL IV   -Monitor  -Daily BMP      DVT ppx:   - hold Enoxaparin   - continue SCD's     Code  Status: Full      Disposition: Medically stable for discharge. Awaiting rehab transfer.           Christianne Coles, APRN-CNP           [1] amLODIPine, 10 mg, oral, Daily  [Held by provider] aspirin, 81 mg, oral, Daily  cefuroxime, 500 mg, oral, BID  [Held by provider] enoxaparin, 40 mg, subcutaneous, q12h MARIOLA  folic acid, 1 mg, oral, Daily  furosemide, 20 mg, oral, Daily  losartan, 100 mg, oral, Daily  metroNIDAZOLE, 500 mg, oral, q12h MARIOLA  mupirocin, , Each Nostril, BID  pantoprazole, 40 mg, intravenous, BID  propranolol LA, 120 mg, oral, Nightly  rosuvastatin, 10 mg, oral, Nightly  sodium chloride 0.9%, 10 mL, intravenous, q12h MARIOLA  sucralfate, 1 g, oral, q6h MARIOLA  [2] sodium chloride 0.9%, 10 mL/hr, Last Rate: 10 mL/hr (07/12/25 0934)  [3] PRN medications: acetaminophen **OR** acetaminophen **OR** acetaminophen, alum-mag hydroxide-simeth, benzocaine-menthol, diazePAM, melatonin, metoclopramide, ondansetron, sodium chloride 0.9%, sodium chloride 0.9%

## 2025-07-12 NOTE — PROGRESS NOTES
"Elisha Flores is a 79 y.o. female on day 6 of admission presenting with Diarrhea, unspecified type.    Subjective   Feels better.  Tolerating oral diet.  Stool green no blood.       Objective     Physical Exam  Constitutional:       Appearance: Normal appearance.   Abdominal:      Palpations: Abdomen is soft. There is no mass.      Tenderness: There is no abdominal tenderness. There is no guarding.         Last Recorded Vitals  Blood pressure 162/83, pulse 86, temperature 36.3 °C (97.3 °F), temperature source Temporal, resp. rate 17, height 1.651 m (5' 5\"), weight 130 kg (286 lb 2.5 oz), SpO2 97%.  Intake/Output last 3 Shifts:  I/O last 3 completed shifts:  In: 916.4 (7 mL/kg) [P.O.:240; I.V.:138.2 (1.1 mL/kg); Blood:276.3; IV Piggyback:261.9]  Out: 1800 (13.8 mL/kg) [Urine:1800 (0.4 mL/kg/hr)]  Weight: 130 kg     Relevant Results    Hemoglobin 8.6.  Hematocrit 24.9.        Malnutrition Diagnosis Status: New  Malnutrition Diagnosis: Moderate malnutrition related to chronic disease or condition  Related to: poor appetite, cancer on chemo  As Evidenced by: intake < 50% of est needs for > 5 days, moderate subcutaeous fat loss and muscle wasting.  I agree with the dietitian's malnutrition diagnosis.      Assessment & Plan  Diarrhea, unspecified type    Benign essential hypertension    Anxiety and depression    GERD (gastroesophageal reflux disease)    Hyperglycemia    Hyperlipidemia    Hypokalemia    Vitamin D deficiency    Arthritis    Personal history of other specified conditions    Weakness    Kidney stones    Dehydration    Hypomagnesemia    Rectal bleeding    Diverticulosis of large intestine with hemorrhage    Colitis    Hypophosphatemia    Melena    Malignant neoplasm of overlapping sites of left breast in female, estrogen receptor positive    Duodenal ulcer    Plan-continue protein supplementation.  Protonix 40 mg p.o. twice daily for 1 month until 8/11/2025.  Carafate 1 g p.o. 4 times daily for 1 month until " 8/11/2025.  Then Protonix 40 mg p.o. daily.  Note sent to Dr Covington her surgeon refollow-up EGD per patient's request.  In 8 to 10 weeks.      Jigar Pérez MD

## 2025-07-12 NOTE — CARE PLAN
The patient's goals for the shift include  To urinate    The clinical goals for the shift include monitor urine output    2330 Assumed care of patient. Patient resting in bed. Purwick in place, has not urinated during shift. Patient drinking water. Bladder scanned at 2050 for a total of 389. Patient repositioned to assist with urinated. Patient denies pain or tenderness. Call light within reach.     0000 CBC drawn from right chest port as ordered, brisk blood return. Family at bedside.    0130 Bladder scan 426. Denies abdominal discomfort. States she feels like urination will be occurring soon. Will continue to monitor. Purwick in place. Call light within reach.    0250 Patient unable to void. Bladder scan 599. Message sent to provider.    0300 Straight cath completed, 600 ml of tea colored urine out. Patient tolerated well. Encouraged patient to drink more fluids. Patient denies complaints at this time. Call light within reach.    0605 Labs drawn from right chest port as ordered and sent to lab.     0615 Kpi Pereira at bedside.

## 2025-07-13 LAB
ANION GAP SERPL CALC-SCNC: 8 MMOL/L (ref 10–20)
BUN SERPL-MCNC: 4 MG/DL (ref 6–23)
CALCIUM SERPL-MCNC: 7.4 MG/DL (ref 8.6–10.3)
CHLORIDE SERPL-SCNC: 107 MMOL/L (ref 98–107)
CO2 SERPL-SCNC: 25 MMOL/L (ref 21–32)
CREAT SERPL-MCNC: 0.47 MG/DL (ref 0.5–1.05)
EGFRCR SERPLBLD CKD-EPI 2021: >90 ML/MIN/1.73M*2
ERYTHROCYTE [DISTWIDTH] IN BLOOD BY AUTOMATED COUNT: 18 % (ref 11.5–14.5)
ERYTHROCYTE [DISTWIDTH] IN BLOOD BY AUTOMATED COUNT: 18.6 % (ref 11.5–14.5)
GLUCOSE SERPL-MCNC: 96 MG/DL (ref 74–99)
HCT VFR BLD AUTO: 23.2 % (ref 36–46)
HCT VFR BLD AUTO: 25.9 % (ref 36–46)
HGB BLD-MCNC: 8 G/DL (ref 12–16)
HGB BLD-MCNC: 8.8 G/DL (ref 12–16)
MAGNESIUM SERPL-MCNC: 1.6 MG/DL (ref 1.6–2.4)
MCH RBC QN AUTO: 31.1 PG (ref 26–34)
MCH RBC QN AUTO: 31.4 PG (ref 26–34)
MCHC RBC AUTO-ENTMCNC: 34 G/DL (ref 32–36)
MCHC RBC AUTO-ENTMCNC: 34.5 G/DL (ref 32–36)
MCV RBC AUTO: 91 FL (ref 80–100)
MCV RBC AUTO: 92 FL (ref 80–100)
NRBC BLD-RTO: 0 /100 WBCS (ref 0–0)
NRBC BLD-RTO: 0.2 /100 WBCS (ref 0–0)
PLATELET # BLD AUTO: 84 X10*3/UL (ref 150–450)
PLATELET # BLD AUTO: 94 X10*3/UL (ref 150–450)
POTASSIUM SERPL-SCNC: 3.6 MMOL/L (ref 3.5–5.3)
RBC # BLD AUTO: 2.55 X10*6/UL (ref 4–5.2)
RBC # BLD AUTO: 2.83 X10*6/UL (ref 4–5.2)
SODIUM SERPL-SCNC: 136 MMOL/L (ref 136–145)
WBC # BLD AUTO: 10.7 X10*3/UL (ref 4.4–11.3)
WBC # BLD AUTO: 9.3 X10*3/UL (ref 4.4–11.3)

## 2025-07-13 PROCEDURE — 2500000004 HC RX 250 GENERAL PHARMACY W/ HCPCS (ALT 636 FOR OP/ED): Mod: IPSPLIT | Performed by: NURSE PRACTITIONER

## 2025-07-13 PROCEDURE — 85027 COMPLETE CBC AUTOMATED: CPT | Mod: IPSPLIT | Performed by: NURSE PRACTITIONER

## 2025-07-13 PROCEDURE — 2500000001 HC RX 250 WO HCPCS SELF ADMINISTERED DRUGS (ALT 637 FOR MEDICARE OP): Mod: IPSPLIT | Performed by: NURSE PRACTITIONER

## 2025-07-13 PROCEDURE — 99233 SBSQ HOSP IP/OBS HIGH 50: CPT | Performed by: NURSE PRACTITIONER

## 2025-07-13 PROCEDURE — 1100000001 HC PRIVATE ROOM DAILY: Mod: IPSPLIT

## 2025-07-13 PROCEDURE — 94760 N-INVAS EAR/PLS OXIMETRY 1: CPT | Mod: IPSPLIT

## 2025-07-13 PROCEDURE — 2500000002 HC RX 250 W HCPCS SELF ADMINISTERED DRUGS (ALT 637 FOR MEDICARE OP, ALT 636 FOR OP/ED): Mod: IPSPLIT | Performed by: NURSE PRACTITIONER

## 2025-07-13 PROCEDURE — 80048 BASIC METABOLIC PNL TOTAL CA: CPT | Mod: IPSPLIT | Performed by: NURSE PRACTITIONER

## 2025-07-13 PROCEDURE — 83735 ASSAY OF MAGNESIUM: CPT | Mod: IPSPLIT | Performed by: NURSE PRACTITIONER

## 2025-07-13 RX ORDER — POTASSIUM CHLORIDE 20 MEQ/1
20 TABLET, EXTENDED RELEASE ORAL ONCE
Status: COMPLETED | OUTPATIENT
Start: 2025-07-13 | End: 2025-07-13

## 2025-07-13 RX ORDER — MENTHOL AND ZINC OXIDE .44; 20.625 G/100G; G/100G
1 OINTMENT TOPICAL 4 TIMES DAILY PRN
Status: DISCONTINUED | OUTPATIENT
Start: 2025-07-13 | End: 2025-07-14 | Stop reason: HOSPADM

## 2025-07-13 RX ORDER — LANOLIN ALCOHOL/MO/W.PET/CERES
400 CREAM (GRAM) TOPICAL DAILY
Status: DISCONTINUED | OUTPATIENT
Start: 2025-07-13 | End: 2025-07-14 | Stop reason: HOSPADM

## 2025-07-13 RX ADMIN — SODIUM CHLORIDE 10 ML/HR: 9 INJECTION INTRAMUSCULAR; INTRAVENOUS; SUBCUTANEOUS at 09:28

## 2025-07-13 RX ADMIN — Medication 10 ML: at 22:24

## 2025-07-13 RX ADMIN — LOSARTAN POTASSIUM 100 MG: 50 TABLET, FILM COATED ORAL at 09:25

## 2025-07-13 RX ADMIN — ROSUVASTATIN CALCIUM 10 MG: 10 TABLET, FILM COATED ORAL at 22:24

## 2025-07-13 RX ADMIN — PROPRANOLOL HYDROCHLORIDE 120 MG: 60 CAPSULE, EXTENDED RELEASE ORAL at 22:24

## 2025-07-13 RX ADMIN — SUCRALFATE 1 G: 1 SUSPENSION ORAL at 17:26

## 2025-07-13 RX ADMIN — MUPIROCIN: 20 OINTMENT TOPICAL at 22:27

## 2025-07-13 RX ADMIN — ACETAMINOPHEN 650 MG: 325 TABLET, FILM COATED ORAL at 16:28

## 2025-07-13 RX ADMIN — FUROSEMIDE 20 MG: 20 TABLET ORAL at 09:25

## 2025-07-13 RX ADMIN — Medication 10 ML: at 09:29

## 2025-07-13 RX ADMIN — PANTOPRAZOLE SODIUM 40 MG: 40 INJECTION, POWDER, LYOPHILIZED, FOR SOLUTION INTRAVENOUS at 09:28

## 2025-07-13 RX ADMIN — CEFUROXIME AXETIL 500 MG: 250 TABLET, FILM COATED ORAL at 22:24

## 2025-07-13 RX ADMIN — METRONIDAZOLE 500 MG: 500 TABLET ORAL at 22:24

## 2025-07-13 RX ADMIN — SUCRALFATE 1 G: 1 SUSPENSION ORAL at 05:37

## 2025-07-13 RX ADMIN — SUCRALFATE 1 G: 1 SUSPENSION ORAL at 12:41

## 2025-07-13 RX ADMIN — Medication 10 MG: at 22:24

## 2025-07-13 RX ADMIN — CEFUROXIME AXETIL 500 MG: 250 TABLET, FILM COATED ORAL at 09:25

## 2025-07-13 RX ADMIN — METRONIDAZOLE 500 MG: 500 TABLET ORAL at 09:25

## 2025-07-13 RX ADMIN — POTASSIUM CHLORIDE 20 MEQ: 1500 TABLET, EXTENDED RELEASE ORAL at 12:41

## 2025-07-13 RX ADMIN — FOLIC ACID 1 MG: 1 TABLET ORAL at 09:25

## 2025-07-13 RX ADMIN — Medication 1 TABLET: at 12:41

## 2025-07-13 RX ADMIN — MUPIROCIN: 20 OINTMENT TOPICAL at 09:28

## 2025-07-13 RX ADMIN — PANTOPRAZOLE SODIUM 40 MG: 40 INJECTION, POWDER, LYOPHILIZED, FOR SOLUTION INTRAVENOUS at 22:34

## 2025-07-13 RX ADMIN — AMLODIPINE BESYLATE 10 MG: 10 TABLET ORAL at 09:24

## 2025-07-13 ASSESSMENT — PAIN - FUNCTIONAL ASSESSMENT
PAIN_FUNCTIONAL_ASSESSMENT: 0-10

## 2025-07-13 ASSESSMENT — COGNITIVE AND FUNCTIONAL STATUS - GENERAL
STANDING UP FROM CHAIR USING ARMS: A LITTLE
DRESSING REGULAR LOWER BODY CLOTHING: A LITTLE
MOVING TO AND FROM BED TO CHAIR: A LITTLE
EATING MEALS: A LITTLE
TOILETING: A LITTLE
PERSONAL GROOMING: A LITTLE
MOBILITY SCORE: 19
WALKING IN HOSPITAL ROOM: A LITTLE
DAILY ACTIVITIY SCORE: 18
DRESSING REGULAR UPPER BODY CLOTHING: A LITTLE
HELP NEEDED FOR BATHING: A LITTLE
CLIMB 3 TO 5 STEPS WITH RAILING: A LOT

## 2025-07-13 ASSESSMENT — PAIN SCALES - GENERAL
PAINLEVEL_OUTOF10: 4
PAINLEVEL_OUTOF10: 0 - NO PAIN
PAINLEVEL_OUTOF10: 7

## 2025-07-13 NOTE — PROGRESS NOTES
Elisha Flores is a 79 y.o. female on day 7 of admission presenting with Diarrhea, unspecified type.      Subjective   She is sitting up in a chair this morning, breakfast tolerated well.  She states she feels much better and we discussed discharge to rehab facility tomorrow.  She is agreeable and would like some strength training.  We also discussed her lab results.  All questions answered, continue to monitor.     Objective     Last Recorded Vitals  /62 (BP Location: Right arm, Patient Position: Sitting)   Pulse 68   Temp 36.3 °C (97.3 °F) (Temporal)   Resp 18   Wt 129 kg (284 lb 6.3 oz)   SpO2 100%   Intake/Output last 3 Shifts:    Intake/Output Summary (Last 24 hours) at 7/13/2025 1210  Last data filed at 7/13/2025 0922  Gross per 24 hour   Intake 1088.58 ml   Output --   Net 1088.58 ml     Admission Weight  Weight: 112 kg (246 lb 14.6 oz) (07/06/25 1131)    Daily Weight  07/13/25 : 129 kg (284 lb 6.3 oz)      Physical Exam  Constitutional:       General: She is not in acute distress.     Appearance: Normal appearance. She is obese. She is not toxic-appearing.   HENT:      Head: Normocephalic and atraumatic.      Mouth/Throat:      Mouth: Mucous membranes are moist.   Eyes:      Extraocular Movements: Extraocular movements intact.      Pupils: Pupils are equal, round, and reactive to light.   Cardiovascular:      Rate and Rhythm: Normal rate and regular rhythm.      Pulses: Normal pulses.      Heart sounds: Normal heart sounds. No murmur heard.     No gallop.   Pulmonary:      Effort: Pulmonary effort is normal. No respiratory distress.      Breath sounds: Normal breath sounds. No wheezing, rhonchi or rales.      Comments: Diminished bilaterally  Abdominal:      General: Bowel sounds are normal. There is no distension.      Palpations: Abdomen is soft.      Tenderness: There is no abdominal tenderness. There is no guarding or rebound.   Musculoskeletal:         General: No swelling, tenderness,  deformity or signs of injury. Normal range of motion.      Cervical back: Normal range of motion and neck supple.   Skin:     General: Skin is warm and dry.      Capillary Refill: Capillary refill takes less than 2 seconds.      Coloration: Skin is pale. Skin is not jaundiced.      Findings: No bruising or rash.   Neurological:      General: No focal deficit present.      Mental Status: She is alert. Mental status is at baseline.      Cranial Nerves: No cranial nerve deficit.      Sensory: No sensory deficit.      Motor: No weakness.      Gait: Gait normal.   Psychiatric:         Mood and Affect: Mood normal.         Behavior: Behavior normal.         Thought Content: Thought content normal.         Judgment: Judgment normal.     Scheduled medications  Scheduled Medications[1]  Continuous medications  Continuous Medications[2]  PRN medications  PRN Medications[3]    Relevant Results:  Esophagogastroduodenoscopy (EGD)  Addendum Date: 7/10/2025  Result Date: 7/10/2025  Table formatting from the original result was not included. Impression 3 ulcers in the duodenal bulb and 1st part of the duodenum with clean base (Dave III); performed cold forceps biopsy Performed forceps biopsies in the antrum Findings 3 deep, round, benign-appearing ulcers in the duodenal bulb and 1st part of the duodenum with clean base (Dave III); performed cold forceps biopsy Performed forceps biopsies in the antrum Regular Z-line 38 cm from the incisors Recommendation Await pathology results Repeat EGD in 3 months, due: 10/8/2025 Protonix 40 iv bid for 5 days Carafate 1 gm qid for 1 month  Indication Gastrointestinal hemorrhage with melena Post-Op Diagnosis None Staff Staff Role Jigar Pérez MD Proceduralist Medications See Anesthesia Record. Preprocedure A history and physical has been performed, and patient medication allergies have been reviewed. The patient's tolerance of previous anesthesia has been reviewed. The risks and benefits  of the procedure and the sedation options and risks were discussed with the patient. All questions were answered and informed consent obtained. Details of the Procedure The patient underwent monitored anesthesia care, which was administered by an anesthesia professional. The patient's blood pressure, ECG, ETCO2, heart rate, level of consciousness, oxygen and respirations were monitored throughout the procedure. The scope was introduced through the mouth and advanced to the second part of the duodenum. Retroflexion was performed in the cardia. Prior to the procedure, the patient's H. Pylori status was unknown. The patient experienced no blood loss. The procedure was not difficult. The patient tolerated the procedure well. There were no apparent adverse events. Events Procedure Events Event Event Time ENDO SCOPE IN TIME 7/10/2025  8:57 AM ENDO SCOPE OUT TIME 7/10/2025  9:02 AM Specimens ID Type Source Tests Collected by Time 1 : via cold bx Tissue DUODENAL BULB  BIOPSY SURGICAL PATHOLOGY EXAM Jigar Pérez MD 7/10/2025 0859 2 : via cold bx Tissue STOMACH ANTRUM BIOPSY SURGICAL PATHOLOGY EXAM Jigar Pérez MD 7/10/2025 0900 Procedure Location [facility] [address] [facility] [id number] [phone] Referring Provider Jigar Pérez MD Procedure Provider Jigar Pérez MD     ECG 12 lead  Result Date: 7/8/2025  Normal sinus rhythm Normal ECG When compared with ECG of 19-JUN-2025 13:12, Nonspecific T wave abnormality has replaced inverted T waves in Inferior leads Nonspecific T wave abnormality no longer evident in Anterior leads    CT chest abdomen pelvis w IV contrast  Result Date: 7/6/2025  CHEST:   No CT evidence of acute chest process. Interval right anterior chest wall port placement since 06/03/2025. additional findings as above similar to the previous exam.   ABDOMEN AND PELVIS:   Rectal air-fluid level consistent with diarrhea and probable distal sigmoid wall  thickening/enhancement consistent with nonspecific colitis. Clinical correlation suggested.   Multiple bladder calculi near the right UVJ again seen with increased now moderate hydroureteronephrosis.   Flattening of the upper abdominal IVC which can be seen with dehydration.   Other findings as described above.   MACRO: None.   Signed by: Rosario Herr 7/6/2025 1:21 PM Dictation workstation:   [id number]    CT abdomen pelvis w IV contrast  Result Date: 7/1/2025  Partially duplicated right renal collecting system with moderate right-sided hydronephrosis and hydroureter secondary to obstructing 1.1 cm and 1.9 cm calculi at the right ureterovesicular junction. Minimal distal colonic diverticulosis. Additional chronic changes as detailed in the body of the report. Signed by Tay Arroyo     Latest Reference Range & Units 07/12/25 06:06 07/12/25 14:01 07/12/25 18:03 07/12/25 23:39 07/13/25 05:38   GLUCOSE 74 - 99 mg/dL 99    96   SODIUM 136 - 145 mmol/L 136    136   POTASSIUM 3.5 - 5.3 mmol/L 3.1 (L)    3.6   CHLORIDE 98 - 107 mmol/L 107    107   Bicarbonate 21 - 32 mmol/L 24    25   Anion Gap 10 - 20 mmol/L 8 (L)    8 (L)   Blood Urea Nitrogen 6 - 23 mg/dL 4 (L)    4 (L)   Creatinine 0.50 - 1.05 mg/dL 0.40 (L)    0.47 (L)   EGFR >60 mL/min/1.73m*2 >90    >90   Calcium 8.6 - 10.3 mg/dL 7.1 (L)    7.4 (L)   MAGNESIUM 1.60 - 2.40 mg/dL 1.63    1.60   WBC 4.4 - 11.3 x10*3/uL 11.3 16.5 (H) 15.7 (H) 10.7 9.3   nRBC 0.0 - 0.0 /100 WBCs 0.2 (H) 0.2 (H) 0.1 (H) 0.0 0.2 (H)   RBC 4.00 - 5.20 x10*6/uL 2.55 (L) 3.28 (L) 3.22 (L) 2.55 (L) 2.83 (L)   HEMOGLOBIN 12.0 - 16.0 g/dL 7.9 (L) 10.4 (L) 9.9 (L) 8.0 (L) 8.8 (L)   HEMATOCRIT 36.0 - 46.0 % 23.0 (L) 31.8 (L) 29.0 (L) 23.2 (L) 25.9 (L)   MCV 80 - 100 fL 90 97 90 91 92   MCH 26.0 - 34.0 pg 31.0 31.7 30.7 31.4 31.1   MCHC 32.0 - 36.0 g/dL 34.3 32.7 34.1 34.5 34.0   RED CELL DISTRIBUTION WIDTH 11.5 - 14.5 % 16.7 (H) 18.6 (H) 18.5 (H) 18.0 (H) 18.6 (H)   Platelets 150 - 450 x10*3/uL  77 (L) 96 (L) 109 (L) 84 (L) 94 (L)       Assessment & Plan  Diarrhea, unspecified type    Benign essential hypertension    GERD (gastroesophageal reflux disease)    Hyperlipidemia    Hyperglycemia    Hypokalemia    Weakness    Kidney stones    Dehydration    Hypomagnesemia    Rectal bleeding    Diverticulosis of large intestine with hemorrhage    Colitis    Hypophosphatemia    Melena    Malignant neoplasm of overlapping sites of left breast in female, estrogen receptor positive    Duodenal ulcer    Diarrhea  Weakness  Kidney stone  Dehydration  Colitis  Breast cancer  - Currently on chemotherapy for invasive ductal carcinoma of the left breast  - CT abdomen pelvis: Rectal air-fluid level consistent with diarrhea and probable distal sigmoid wall thickening/enhancement consistent with nonspecific colitis. Clinical correlation suggested. Multiple bladder calculi near the right UVJ again seen with increased now moderate hydroureteronephrosis. Flattening of the upper abdominal IVC which can be seen with dehydration.  - Right UVJ stones- can follow up with DR. Zapata- urology  OP   - C-diff negative  - Stool pathogens negative  - 1 L NS given in the ED  - Cefepime, Flagyl, vancomycin given in the ED  - discontinued Cefepime and Vancomycin   - Continue metroNIDAZOLE (Flagyl) 500 mg PO  - Continue cefuroxime 500 mg  PO  - WBC 26.1 > 20.7 > 42.3 > 26.4 > 14.9 > 9.3  - ID consulted, appreciate recommendations  - Intake and Output, Daily weights  - discontinued Rectal tube  - Urine culture No growth  - discontinued Nieto 7/11/25  - Blood culture; negative to date  - Monitor fever and WBC  - Daily CBC  - discontinued sodium chloride 0.9% infusion  150 ml/hr   - PT/OT evaluations  - stabe, CTM      GI Bleed  Duodenal ulcers  Normocytic Anemia  - Began to have profuse bleeding  - Dr. Pérez consulted  - Hemoglobin 9.4 > 7.0 >>8.8  - given 500ml NS bolus   - discontinued enoxaparin and aspirin  - PT/ INR 18.4/ 1.7 > 17.0/ 1.5  -  Vitamin K given  - received a total of 5 units of  PRBC and 1 unit PLT  - Continue folic acid 1 mg daily  - EGD with Dr. Pérez has 3 large duodenal bleeding ulcers, Protonix twice daily and Carafate QID for 1 month  - PLT 47>77 > 94  - monitor     Essential Hypertension  Hyperlipidemia  - resumed furosemide, amlodipine, losartan, propranolol  - Hold aspirin, enoxaparin  - Continue rosuvastatin 10 mg daily   - monitor BP and HR      Hyperglycemia  -Monitor   -Daily BMP  -start metformin when orals tolerated for BG>200 consistently     Hypomagnesemia, resolved  Hypokalemia, resolved  Hypophosphatemia  -Magnesium 1.17 > 1.83 > 1.42 > 1.70 > 1.60  -replete as needed  -K 2.7 > 3.1 > 3.3 > 2.9 > 3.1 > 3.6  -Phosphorous 1.5 > 2.2 > 2.3  -Daily BMP      DVT ppx: hold Enoxaparin, continue SCD's   Code Status: Full    Disposition: Medically stable for discharge. Awaiting rehab transfer.     Fang Abernathy, MINDA-CNP         [1] amLODIPine, 10 mg, oral, Daily  [Held by provider] aspirin, 81 mg, oral, Daily  cefuroxime, 500 mg, oral, BID  [Held by provider] enoxaparin, 40 mg, subcutaneous, q12h MARIOLA  folic acid, 1 mg, oral, Daily  furosemide, 20 mg, oral, Daily  losartan, 100 mg, oral, Daily  metroNIDAZOLE, 500 mg, oral, q12h MARIOLA  mupirocin, , Each Nostril, BID  pantoprazole, 40 mg, intravenous, BID  propranolol LA, 120 mg, oral, Nightly  rosuvastatin, 10 mg, oral, Nightly  sodium chloride 0.9%, 10 mL, intravenous, q12h MARIOLA  sucralfate, 1 g, oral, q6h MARIOLA     [2] sodium chloride 0.9%, 10 mL/hr, Last Rate: 10 mL/hr (07/13/25 0928)     [3] PRN medications: acetaminophen **OR** acetaminophen **OR** acetaminophen, alum-mag hydroxide-simeth, benzocaine-menthol, diazePAM, melatonin, metoclopramide, ondansetron, sodium chloride 0.9%, sodium chloride 0.9%

## 2025-07-13 NOTE — CARE PLAN
The patient's goals for the shift include      The clinical goals for the shift include Pt will have no s/s of active bleed this shift.    Over the shift, the patient did not make progress toward the following goals. Barriers to progression include increase diarrhea. Recommendations to address these barriers include monitor bowel movements this shift.

## 2025-07-13 NOTE — CARE PLAN
Problem: Pain - Adult  Goal: Verbalizes/displays adequate comfort level or baseline comfort level  Outcome: Progressing     Problem: Fall/Injury  Goal: Verbalize understanding of personal risk factors for fall in the hospital  Outcome: Progressing  Goal: Verbalize understanding of risk factor reduction measures to prevent injury from fall in the home  Outcome: Progressing     Problem: Safety - Adult  Goal: Free from fall injury  Outcome: Met     Problem: Fall/Injury  Goal: Not fall by end of shift  Outcome: Met  Goal: Be free from injury by end of the shift  Outcome: Met     Problem: Skin  Goal: Participates in plan/prevention/treatment measures  Flowsheets (Taken 7/13/2025 0601)  Participates in plan/prevention/treatment measures:   Discuss with provider PT/OT consult   Elevate heels  Goal: Prevent/manage excess moisture  Flowsheets (Taken 7/13/2025 0601)  Prevent/manage excess moisture:   Moisturize dry skin   Cleanse incontinence/protect with barrier cream   The patient's goals for the shift include      The clinical goals for the shift include Pt will have no s/s of active bleed this shift.    Over the shift, the patient did make progress toward the following goals.

## 2025-07-14 ENCOUNTER — PHARMACY VISIT (OUTPATIENT)
Dept: PHARMACY | Facility: CLINIC | Age: 80
End: 2025-07-14
Payer: COMMERCIAL

## 2025-07-14 VITALS
OXYGEN SATURATION: 94 % | DIASTOLIC BLOOD PRESSURE: 63 MMHG | TEMPERATURE: 97.3 F | SYSTOLIC BLOOD PRESSURE: 119 MMHG | HEIGHT: 65 IN | WEIGHT: 290.35 LBS | BODY MASS INDEX: 48.37 KG/M2 | RESPIRATION RATE: 16 BRPM | HEART RATE: 71 BPM

## 2025-07-14 VITALS
HEIGHT: 65 IN | HEART RATE: 66 BPM | RESPIRATION RATE: 18 BRPM | OXYGEN SATURATION: 99 % | BODY MASS INDEX: 47.38 KG/M2 | TEMPERATURE: 96.8 F | SYSTOLIC BLOOD PRESSURE: 124 MMHG | WEIGHT: 284.39 LBS | DIASTOLIC BLOOD PRESSURE: 66 MMHG

## 2025-07-14 LAB
ANION GAP SERPL CALC-SCNC: <7 MMOL/L (ref 10–20)
BUN SERPL-MCNC: 5 MG/DL (ref 6–23)
CALCIUM SERPL-MCNC: 7.2 MG/DL (ref 8.6–10.3)
CHLORIDE SERPL-SCNC: 108 MMOL/L (ref 98–107)
CO2 SERPL-SCNC: 25 MMOL/L (ref 21–32)
CREAT SERPL-MCNC: 0.41 MG/DL (ref 0.5–1.05)
EGFRCR SERPLBLD CKD-EPI 2021: >90 ML/MIN/1.73M*2
ERYTHROCYTE [DISTWIDTH] IN BLOOD BY AUTOMATED COUNT: 19 % (ref 11.5–14.5)
GLUCOSE SERPL-MCNC: 98 MG/DL (ref 74–99)
HCT VFR BLD AUTO: 24.3 % (ref 36–46)
HGB BLD-MCNC: 8.2 G/DL (ref 12–16)
HOLD SPECIMEN: NORMAL
MAGNESIUM SERPL-MCNC: 1.48 MG/DL (ref 1.6–2.4)
MCH RBC QN AUTO: 31.2 PG (ref 26–34)
MCHC RBC AUTO-ENTMCNC: 33.7 G/DL (ref 32–36)
MCV RBC AUTO: 92 FL (ref 80–100)
NRBC BLD-RTO: 0 /100 WBCS (ref 0–0)
PLATELET # BLD AUTO: 76 X10*3/UL (ref 150–450)
POTASSIUM SERPL-SCNC: 3.1 MMOL/L (ref 3.5–5.3)
RBC # BLD AUTO: 2.63 X10*6/UL (ref 4–5.2)
SODIUM SERPL-SCNC: 136 MMOL/L (ref 136–145)
WBC # BLD AUTO: 6.5 X10*3/UL (ref 4.4–11.3)

## 2025-07-14 PROCEDURE — 83735 ASSAY OF MAGNESIUM: CPT | Mod: IPSPLIT | Performed by: NURSE PRACTITIONER

## 2025-07-14 PROCEDURE — 2500000004 HC RX 250 GENERAL PHARMACY W/ HCPCS (ALT 636 FOR OP/ED): Mod: IPSPLIT | Performed by: NURSE PRACTITIONER

## 2025-07-14 PROCEDURE — 2500000002 HC RX 250 W HCPCS SELF ADMINISTERED DRUGS (ALT 637 FOR MEDICARE OP, ALT 636 FOR OP/ED): Mod: IPSPLIT | Performed by: NURSE PRACTITIONER

## 2025-07-14 PROCEDURE — 2500000001 HC RX 250 WO HCPCS SELF ADMINISTERED DRUGS (ALT 637 FOR MEDICARE OP): Mod: IPSPLIT | Performed by: NURSE PRACTITIONER

## 2025-07-14 PROCEDURE — 99239 HOSP IP/OBS DSCHRG MGMT >30: CPT | Performed by: NURSE PRACTITIONER

## 2025-07-14 PROCEDURE — 80048 BASIC METABOLIC PNL TOTAL CA: CPT | Mod: IPSPLIT | Performed by: NURSE PRACTITIONER

## 2025-07-14 PROCEDURE — 97535 SELF CARE MNGMENT TRAINING: CPT | Mod: GO,IPSPLIT

## 2025-07-14 PROCEDURE — RXMED WILLOW AMBULATORY MEDICATION CHARGE

## 2025-07-14 PROCEDURE — 85027 COMPLETE CBC AUTOMATED: CPT | Mod: IPSPLIT | Performed by: NURSE PRACTITIONER

## 2025-07-14 RX ORDER — LOPERAMIDE HYDROCHLORIDE 2 MG/1
2 CAPSULE ORAL 4 TIMES DAILY PRN
Status: DISCONTINUED | OUTPATIENT
Start: 2025-07-14 | End: 2025-07-14 | Stop reason: HOSPADM

## 2025-07-14 RX ORDER — PANTOPRAZOLE SODIUM 40 MG/1
40 TABLET, DELAYED RELEASE ORAL 2 TIMES DAILY
Qty: 56 TABLET | Refills: 0 | Status: SHIPPED | OUTPATIENT
Start: 2025-07-14 | End: 2025-08-11

## 2025-07-14 RX ORDER — POTASSIUM CHLORIDE 20 MEQ/1
20 TABLET, EXTENDED RELEASE ORAL DAILY
Qty: 30 TABLET | Refills: 0 | Status: SHIPPED | OUTPATIENT
Start: 2025-07-14 | End: 2025-08-13

## 2025-07-14 RX ORDER — HEPARIN 100 UNIT/ML
5 SYRINGE INTRAVENOUS ONCE
Status: COMPLETED | OUTPATIENT
Start: 2025-07-14 | End: 2025-07-14

## 2025-07-14 RX ORDER — PANTOPRAZOLE SODIUM 40 MG/1
40 TABLET, DELAYED RELEASE ORAL
Qty: 30 TABLET | Refills: 0 | Status: SHIPPED | OUTPATIENT
Start: 2025-08-12 | End: 2025-09-11

## 2025-07-14 RX ORDER — POTASSIUM CHLORIDE 20 MEQ/1
40 TABLET, EXTENDED RELEASE ORAL 2 TIMES DAILY
Status: DISCONTINUED | OUTPATIENT
Start: 2025-07-14 | End: 2025-07-14 | Stop reason: HOSPADM

## 2025-07-14 RX ORDER — SUCRALFATE 1 G/1
1 TABLET ORAL
Qty: 120 TABLET | Refills: 0 | Status: SHIPPED | OUTPATIENT
Start: 2025-07-14 | End: 2025-08-13

## 2025-07-14 RX ORDER — DIPHENOXYLATE HYDROCHLORIDE AND ATROPINE SULFATE 2.5; .025 MG/1; MG/1
1 TABLET ORAL 4 TIMES DAILY PRN
Qty: 40 TABLET | Refills: 0 | Status: SHIPPED | OUTPATIENT
Start: 2025-07-14 | End: 2025-07-24

## 2025-07-14 RX ORDER — LANOLIN ALCOHOL/MO/W.PET/CERES
400 CREAM (GRAM) TOPICAL DAILY
Qty: 30 TABLET | Refills: 0 | Status: SHIPPED | OUTPATIENT
Start: 2025-07-14 | End: 2025-08-13

## 2025-07-14 RX ORDER — MAGNESIUM SULFATE HEPTAHYDRATE 40 MG/ML
2 INJECTION, SOLUTION INTRAVENOUS ONCE
Status: COMPLETED | OUTPATIENT
Start: 2025-07-14 | End: 2025-07-14

## 2025-07-14 RX ADMIN — SUCRALFATE 1 G: 1 SUSPENSION ORAL at 00:02

## 2025-07-14 RX ADMIN — MUPIROCIN: 20 OINTMENT TOPICAL at 12:02

## 2025-07-14 RX ADMIN — FOLIC ACID 1 MG: 1 TABLET ORAL at 11:34

## 2025-07-14 RX ADMIN — Medication 10 ML: at 08:38

## 2025-07-14 RX ADMIN — SODIUM CHLORIDE 10 ML/HR: 9 INJECTION INTRAMUSCULAR; INTRAVENOUS; SUBCUTANEOUS at 07:13

## 2025-07-14 RX ADMIN — SUCRALFATE 1 G: 1 SUSPENSION ORAL at 11:35

## 2025-07-14 RX ADMIN — MAGNESIUM SULFATE HEPTAHYDRATE 2 G: 40 INJECTION, SOLUTION INTRAVENOUS at 08:38

## 2025-07-14 RX ADMIN — LOPERAMIDE HYDROCHLORIDE 2 MG: 2 CAPSULE ORAL at 13:27

## 2025-07-14 RX ADMIN — HEPARIN 500 UNITS: 100 SYRINGE at 15:33

## 2025-07-14 RX ADMIN — PANTOPRAZOLE SODIUM 40 MG: 40 INJECTION, POWDER, LYOPHILIZED, FOR SOLUTION INTRAVENOUS at 11:36

## 2025-07-14 RX ADMIN — SUCRALFATE 1 G: 1 SUSPENSION ORAL at 05:56

## 2025-07-14 RX ADMIN — Medication 1 TABLET: at 11:59

## 2025-07-14 RX ADMIN — POTASSIUM CHLORIDE 40 MEQ: 1500 TABLET, EXTENDED RELEASE ORAL at 11:35

## 2025-07-14 ASSESSMENT — COGNITIVE AND FUNCTIONAL STATUS - GENERAL
HELP NEEDED FOR BATHING: A LITTLE
DRESSING REGULAR UPPER BODY CLOTHING: A LOT
TOILETING: A LOT
DRESSING REGULAR LOWER BODY CLOTHING: A LOT
PERSONAL GROOMING: A LITTLE
DAILY ACTIVITIY SCORE: 16

## 2025-07-14 ASSESSMENT — ACTIVITIES OF DAILY LIVING (ADL): HOME_MANAGEMENT_TIME_ENTRY: 53

## 2025-07-14 ASSESSMENT — PAIN SCALES - GENERAL: PAINLEVEL_OUTOF10: 0 - NO PAIN

## 2025-07-14 ASSESSMENT — PAIN - FUNCTIONAL ASSESSMENT: PAIN_FUNCTIONAL_ASSESSMENT: 0-10

## 2025-07-14 NOTE — CARE PLAN
Problem: Pain - Adult  Goal: Verbalizes/displays adequate comfort level or baseline comfort level  Outcome: Progressing     Problem: Fall/Injury  Goal: Verbalize understanding of personal risk factors for fall in the hospital  Outcome: Progressing     Problem: Skin  Goal: Participates in plan/prevention/treatment measures  Flowsheets (Taken 7/14/2025 0401)  Participates in plan/prevention/treatment measures:   Discuss with provider PT/OT consult   Elevate heels  Goal: Prevent/manage excess moisture  Flowsheets (Taken 7/14/2025 0401)  Prevent/manage excess moisture:   Moisturize dry skin   Cleanse incontinence/protect with barrier cream  Goal: Prevent/minimize sheer/friction injuries  Flowsheets (Taken 7/14/2025 0401)  Prevent/minimize sheer/friction injuries: Use pull sheet  Goal: Promote/optimize nutrition  Flowsheets (Taken 7/14/2025 0401)  Promote/optimize nutrition: Monitor/record intake including meals   The patient's goals for the shift include      The clinical goals for the shift include Pt will have no s/s of active bleeding this shift.    Over the shift, the patient did make progress toward the following goals.

## 2025-07-14 NOTE — PROGRESS NOTES
Occupational Therapy    Occupational Therapy Treatment    Name: Elisha Flores  MRN: [id number]  Department: [facility]  Room: [address] [place] [address]  Date: 07/14/25  Time Calculation  Start Time: 0935  Stop Time: 1033  Time Calculation (min): 58 min    Assessment:  OT Assessment: RN cleared. At this date, pt displays continued progress towards goals however due to noted increased fatigue session adapted to complete desired grooming tasks/ADLs at sink from a seated position instead of standing-- additionally pt remained in recliner to wheel over to sink as unable to perform functional mobility. From seated position, pt did complete grooming ADLs from setup/intermittent SUP with no struggle observed. Pt struggled to complete STS from recliner today (x2 with one person assist) however was able to complete with additional assist-- CGA/Min A x2 to allow for repositioning in chair prior to departure. At end, pt positioned into recliner- alarm on. NA notified.  Prognosis: Good  Barriers to Discharge Home: Caregiver assistance, Physical needs  Caregiver Assistance: Caregiver assistance needed per identified barriers - however, level of patient's required assistance exceeds assistance available at home  Physical Needs: Stair navigation into home limited by function/safety, 24hr mobility assistance needed, 24hr ADL assistance needed, High falls risk due to function or environment  Evaluation/Treatment Tolerance: Patient limited by fatigue  Medical Staff Made Aware: Yes  End of Session Communication: PCT/NA/CTA  End of Session Patient Position: Up in chair, Alarm on    Plan:  Treatment Interventions: ADL retraining, Functional transfer training, Endurance training, Patient/family training, Equipment evaluation/education, Neuromuscular reeducation, Compensatory technique education  OT Frequency: 3 times per week (During this acute inpatient hospitalization)  OT Discharge Recommendations: Moderate intensity level of continued care (Based on  current functional status and rehab potential, patient is anticipated to tolerate and benefit from 5 or more days per week of skilled rehabilitative therapy after discharge from this acute inpatient hospitalization.)  Equipment Recommended upon Discharge: Bedside commode  OT Recommended Transfer Status: Moderate assist (& cues with a FWW)  OT - OK to Discharge: Yes    Subjective     OT Visit Info:  OT Received On: 07/14/25  General:  General  Reason for Referral: Impaired ADLs  Referred By: Danyelle Guo, APRN-CNP  Past Medical History Relevant to Rehab: L breast cancer, s/p chemotherapy, last dose on 6/27/2025, vertigo, CHF, psoriasis, HTN, CHF, blepharitis R eye, eye surgery, back surgery, breast biopsy, isomnia  Family/Caregiver Present: No  Prior to Session Communication: Bedside nurse  Patient Position Received: Up in chair, Alarm on  Preferred Learning Style: verbal, visual  General Comment: Pt seated in recliner, agreeable to work with therapy-- very cooperative and pleasant throughout. Reports increased fatigue today compared to entire admission.    Precautions:  Medical Precautions: Fall precautions, Cardiac precautions  Precautions Comment: med port R upper chest, tele, masimo     Date/Time Vitals Session Patient Position Pulse Resp SpO2 BP MAP (mmHg)    07/14/25 0900 --  --  60  --  100 %  --  --     07/14/25 1000 --  --  64  --  98 %  --  --           Pain Assessment:  Pain Assessment  Pain Assessment: 0-10  0-10 (Numeric) Pain Score: 0 - No pain    Objective   Cognition:  Overall Cognitive Status: Within Functional Limits  Orientation Level: Oriented X4  Attention: Within Functional Limits  Memory: Within Funtional Limits  Problem Solving: Within Functional Limits  Abstract Reasoning: Within Functional Limits  Safety/Judgement: Within Functional Limits  Insight: Within function limits    Activities of Daily Living:   Grooming  Grooming Level of Assistance: Setup, Distant supervision  Grooming Where  Assessed: Recliner, Sitting sinkside  Grooming Comments: oral care/wash face and head    UE Bathing  UE Bathing Level of Assistance: Setup, Distant supervision  UE Bathing Where Assessed: Sitting sinkside  UE Bathing Comments: BUE/upper chest sponge bath at sink from seated position    Bed Mobility/Transfers:   Transfers  Transfer: Yes  Transfer 1  Transfer From 1: Chair with arms to  Transfer to 1: Stand  Technique 1: Sit to stand, Stand to sit  Transfer Device 1: Walker, Gait belt  Transfer Level of Assistance 1: Contact guard, Minimum assistance, +2 (x3 trials, 1/3 successful with additional person)  Trials/Comments 1: Initial x2 STS from recliner not completed as pt unable to fully rise, decreased strength/endurance noted. Pt becoming frustrated with self. With aide in room, x3 STS at end to reposition pt completed CGA/MIN Ax2    Sitting Balance:  Static Sitting Balance  Static Sitting-Balance Support: Feet supported  Static Sitting-Level of Assistance: Distant supervision  Dynamic Sitting Balance  Dynamic Sitting-Balance Support: Feet supported  Dynamic Sitting-Level of Assistance: Close supervision    Standing Balance:  Static Standing Balance  Static Standing-Balance Support: Bilateral upper extremity supported  Static Standing-Level of Assistance: Contact guard    Outcome Measures:  [facility] Daily Activity  Putting on and taking off regular lower body clothing: A lot  Bathing (including washing, rinsing, drying): A little  Putting on and taking off regular upper body clothing: A lot  Toileting, which includes using toilet, bedpan or urinal: A lot  Taking care of personal grooming such as brushing teeth: A little  Eating Meals: None  Daily Activity - Total Score: 16    Education Documentation  No documentation found.  Education Comments  No comments found.      Goals:  Encounter Problems       Encounter Problems (Active)       ADLs       Patient will perform UB and LB bathing seated/or sponge bath with stand by  assist level of assistance. (Progressing)       Start:  07/07/25    Expected End:  07/21/25            Patient with complete upper body dressing with stand by assist level of assistance donning and doffing all UE clothes with PRN adaptive equipment while supported sitting       Start:  07/07/25    Expected End:  07/21/25            Patient with complete lower body dressing with stand by assist level of assistance donning and doffing all LE clothes  with PRN adaptive equipment while supported sitting (Progressing)       Start:  07/07/25    Expected End:  07/21/25            Patient will complete daily grooming tasks with set-up level of assistance and PRN adaptive equipment while supported sitting. (Progressing)       Start:  07/07/25    Expected End:  07/21/25            Patient will complete toileting including hygiene clothing management/hygiene with stand by assist level of assistance and raised toilet seat, grab bars, and bedside commode. (Progressing)       Start:  07/07/25    Expected End:  07/21/25               BALANCE       Pt will maintain dynamic standing balance during ADL task with supervision level of assistance in order to demonstrate decreased risk of falling and improved postural control. (Progressing)       Start:  07/07/25    Expected End:  07/21/25               TRANSFERS       Patient will perform bed mobility modified independent level of assistance and bed rails in order to improve safety and independence with mobility (Progressing)       Start:  07/07/25    Expected End:  07/21/25            Patient will complete sit to stand transfer with supervision level of assistance and least restrictive device in order to improve safety and prepare for out of bed mobility. (Progressing)       Start:  07/07/25    Expected End:  07/21/25

## 2025-07-14 NOTE — PROGRESS NOTES
Physical Therapy                 Therapy Communication Note    Patient Name: Elisha Flores  MRN: [id number]  Department: [facility]  [facility]  Today's Date: 7/14/2025     Discipline: Physical Therapy    Missed Visit: Yes      Missed Visit Reason: Missed Visit Reason: Other (Comment) (Pt reported feeling fatigued from previous session. Pt is discharging soon and pt did not want to be too fatigued to perform transfers when leaving and arriving to next location.)    Missed Time: Attempt

## 2025-07-14 NOTE — CARE PLAN
The patient's goals for the shift include  go to SNF today    The clinical goals for the shift include Pt will be discharged to Rehab during shift    Discharge to [facility]  Report called to Benny [phone]; update called [facility] for transport, report given

## 2025-07-14 NOTE — PROGRESS NOTES
"   07/14/25 1126   Discharge Planning   Living Arrangements Children   Support Systems Children   Assistance Needed On admission TCC was informed that patient lives in a 1 story house with her son and cats. She says she is independent with ADLS, IADLS, ambulates with a cane sometimes and doesn't drive. She says she is usually \"healthy as a horse\". Patient doesn't use home oxygen or CPAP/BiPAP. Her son helps with transportation and should be able to pick her up on discharge.   Type of Residence Private residence   Number of Stairs to Enter Residence 3   Number of Stairs Within Residence 0   Do you have animals or pets at home? Yes   Type of Animals or Pets cats   Home or Post Acute Services Post acute facilities (Rehab/SNF/etc)   Type of Post Acute Facility Services Skilled nursing  ([facility] accepted patient)   Expected Discharge Disposition SNF  (Patient medically ready for discharge.  Patient will be discharging to [facility].)   Does the patient need discharge transport arranged? Yes   [facility] [employer] coordination needed? Yes   Has discharge transport been arranged? Yes   What day is the transport expected? 07/14/25   Patient Choice   Provider Choice list and CMS website (https://medicare.gov/care-compare#search) for post-acute Quality and Resource Measure Data were provided and reviewed with: Patient;Family   Stroke Family Assessment   Stroke Family Assessment Needed No   Intensity of Service   Intensity of Service 0-30 min     Patient medically ready for discharge.  Patient follow up information on discharge instructions.  Patient will be discharging to [facility] SNF. Patient is in agreement with discharge plan.  DSC to complete HENS;  TCC sending final orders and setting up transportation.     "

## 2025-07-15 NOTE — DISCHARGE SUMMARY
Discharge Diagnosis  Diarrhea, unspecified type       Discharge Meds     Medication List      START taking these medications     Klor-Con M20 20 mEq ER tablet; Generic drug: potassium chloride CR; Take   1 tablet (20 mEq) by mouth once daily. Do not crush or chew. Take with   food   magnesium oxide 400 mg (241.3 mg elemental) tablet; Commonly known as:   Mag-Ox; Take 1 tablet by mouth once daily. Take with food   * pantoprazole 40 mg EC tablet; Commonly known as: ProtoNix; Take 1   tablet (40 mg) by mouth 2 times a day for 28 days. Do not crush, chew, or   split.   * pantoprazole 40 mg EC tablet; Commonly known as: ProtoNix; Take 1   tablet (40 mg) by mouth once daily in the morning. Take before meals. Do   not crush, chew, or split. Do not fill before August 12, 2025.; Start   taking on: August 12, 2025   sucralfate 1 gram tablet; Commonly known as: Carafate; Take 1 tablet (1   g) by mouth 4 times a day before meals.  * This list has 2 medication(s) that are the same as other medications   prescribed for you. Read the directions carefully, and ask your doctor or   other care provider to review them with you.     CONTINUE taking these medications     amLODIPine 10 mg tablet; Commonly known as: Norvasc; Take 1 tablet (10   mg) by mouth once daily.   diphenoxylate-atropine 2.5-0.025 mg tablet; Commonly known as: Lomotil;   Take 1 tablet by mouth 4 times a day as needed for diarrhea for up to 10   days.   folic acid 1 mg tablet; Commonly known as: Folvite   furosemide 20 mg tablet; Commonly known as: Lasix; Take 1 tablet (20 mg)   by mouth once daily.   losartan 100 mg tablet; Commonly known as: Cozaar; Take 1 tablet (100   mg) by mouth once daily.   propranolol  mg 24 hr capsule; Commonly known as: Inderal LA; Take   1 capsule (120 mg) by mouth once daily at bedtime.   rosuvastatin 10 mg tablet; Commonly known as: Crestor; Take 1 tablet (10   mg) by mouth once daily.     STOP taking these medications      "cetirizine 10 mg tablet; Commonly known as: ZyrTEC   doxycycline 100 mg tablet; Commonly known as: Adoxa   fluticasone 50 mcg/actuation nasal spray; Commonly known as: Flonase       Test Results Pending At Discharge  Pending Labs       Order Current Status    Surgical Pathology Exam In process            Hospital Course   HPI: \"Elisha Flores is a 79-year-old female with past medical history of breast cancer, undergoing chemotherapy with last chemo treatment 6/27/2025, congestive heart failure, depression, and hypertension.  Presents to the emergency department with diarrhea, weakness, lightheadedness with walking and standing and fatigue.\"    Elisha was admitted to Medicine and treated for weakness in the setting of diarrhea and dehydration.  CT abdomen and pelvis showed nonspecific colitis.  She was treated with IV fluids and IV antibiotics.  She had a rectal tube inserted for continuous liquid stool.  She then developed melena stool and had a significant drop in hemoglobin.  GEN surge was consulted and she was transfused 3 units of blood then 2 units with a unit of platelets afterwards.  She had EGD with Dr. Pérez which found 3 large bleeding ulcers and she was treated with Protonix and Carafate afterwards.  Chronic medical conditions were also addressed and monitored. PT/OT evals were done. Labs were closely monitored.  She was discharged in stable condition with the above medication changes and/or additions.  Her labs will be followed every 3 days. See full inpatient plan below.     Problem list:  Diarrhea  Weakness  Kidney stone  Dehydration  Colitis  Breast cancer  - Currently on chemotherapy for invasive ductal carcinoma of the left breast  - CT abdomen pelvis: Rectal air-fluid level consistent with diarrhea and probable distal sigmoid wall thickening/enhancement consistent with nonspecific colitis. Clinical correlation suggested. Multiple bladder calculi near the right UVJ again seen with increased now " moderate hydroureteronephrosis. Flattening of the upper abdominal IVC which can be seen with dehydration.  - Right UVJ stones- can follow up with DR. Zapata- urology  OP   - C-diff negative  - Stool pathogens negative  - 1 L NS given in the ED  - Cefepime, Flagyl, vancomycin given in the ED  - discontinued Cefepime and Vancomycin   - Continue metroNIDAZOLE (Flagyl) 500 mg PO  - Continue cefuroxime 500 mg  PO  - WBC 26.1 > 20.7 > 42.3 > 26.4 > 14.9 > 9.3  - ID consulted, appreciate recommendations  - Intake and Output, Daily weights  - discontinued Rectal tube  - Urine culture No growth  - discontinued Nieto 7/11/25  - Blood culture; negative to date  - Monitor fever and WBC  - Daily CBC  - discontinued sodium chloride 0.9% infusion  150 ml/hr   - PT/OT evaluation     GI Bleed  Duodenal ulcers  Normocytic Anemia  - Began to have profuse bleeding  - Dr. Pérez consulted  - Hemoglobin 9.4 > 7.0 >>8.8  - given 500ml NS bolus   - discontinued enoxaparin and aspirin  - PT/ INR 18.4/ 1.7 > 17.0/ 1.5  - Vitamin K given  - received a total of 5 units of  PRBC and 1 unit PLT  - Continue folic acid 1 mg daily  - EGD with Dr. Pérez has 3 large duodenal bleeding ulcers, Protonix twice daily and Carafate QID for 1 month  - PLT 47>77 > 94  --- Stable CBC at time of discharge, follow-up with every 3 days labs     Essential Hypertension  Hyperlipidemia  Hyperglycemia  --- Continue chronic medical therapies, follow-up as needed with PCP     Hypomagnesemia, resolved  Hypokalemia, resolved  Hypophosphatemia  --- Stable      Pertinent Physical Exam At Time of Discharge  Physical Exam  Constitutional:       General: She is not in acute distress.     Appearance: Normal appearance. She is obese. She is not toxic-appearing.   HENT:      Head: Normocephalic and atraumatic.      Mouth/Throat:      Mouth: Mucous membranes are moist.   Eyes:      Extraocular Movements: Extraocular movements intact.      Pupils: Pupils are equal, round, and  reactive to light.   Cardiovascular:      Rate and Rhythm: Normal rate and regular rhythm.      Pulses: Normal pulses.      Heart sounds: Normal heart sounds. No murmur heard.     No gallop.   Pulmonary:      Effort: Pulmonary effort is normal. No respiratory distress.      Breath sounds: Normal breath sounds. No wheezing, rhonchi or rales.      Comments: Diminished bilaterally  Abdominal:      General: Bowel sounds are normal. There is no distension.      Palpations: Abdomen is soft.      Tenderness: There is no abdominal tenderness. There is no guarding or rebound.   Musculoskeletal:         General: No swelling, tenderness, deformity or signs of injury. Normal range of motion.      Cervical back: Normal range of motion and neck supple.   Skin:     General: Skin is warm and dry.      Capillary Refill: Capillary refill takes less than 2 seconds.      Coloration: Skin is pale. Skin is not jaundiced.      Findings: No bruising or rash.   Neurological:      General: No focal deficit present.      Mental Status: She is alert. Mental status is at baseline.      Cranial Nerves: No cranial nerve deficit.      Sensory: No sensory deficit.      Motor: No weakness.      Gait: Gait normal.   Psychiatric:         Mood and Affect: Mood normal.         Behavior: Behavior normal.         Thought Content: Thought content normal.         Judgment: Judgment normal.        Stable for discharge to SNF.  Total cumulative time spent in preparation of this discharge including documentation review, coordination of care with the medical team including PT/SW/care coordinators and treating consultants, discussion with patient and pertinent family members and finalization of prescriptions, follow-up appointments, and this discharge summary was approximately 45 minutes.    Outpatient Follow-Up  Future Appointments   Date Time Provider Department [place]   7/29/2025 11:30 AM Shun Hall MD [id number] [facility]   8/8/2025  8:30 AM Faustino  MD Tressa [id number] [facility]         Fang Abernathy, APRN-CNP

## 2025-07-16 ENCOUNTER — NURSING HOME VISIT (OUTPATIENT)
Dept: POST ACUTE CARE | Facility: EXTERNAL LOCATION | Age: 80
End: 2025-07-16
Payer: MEDICARE

## 2025-07-16 DIAGNOSIS — N39.0 URINARY TRACT INFECTION WITHOUT HEMATURIA, SITE UNSPECIFIED: ICD-10-CM

## 2025-07-16 DIAGNOSIS — Z91.81 AT RISK FOR FALLING: ICD-10-CM

## 2025-07-16 DIAGNOSIS — K62.5 RECTAL BLEED: ICD-10-CM

## 2025-07-16 DIAGNOSIS — N20.0 KIDNEY STONE: ICD-10-CM

## 2025-07-16 DIAGNOSIS — K92.2 GASTROINTESTINAL HEMORRHAGE, UNSPECIFIED GASTROINTESTINAL HEMORRHAGE TYPE: Primary | ICD-10-CM

## 2025-07-16 DIAGNOSIS — E78.5 HYPERLIPIDEMIA, UNSPECIFIED HYPERLIPIDEMIA TYPE: ICD-10-CM

## 2025-07-16 DIAGNOSIS — I10 HYPERTENSION, UNSPECIFIED TYPE: ICD-10-CM

## 2025-07-16 DIAGNOSIS — R19.7 DIARRHEA, UNSPECIFIED TYPE: ICD-10-CM

## 2025-07-16 DIAGNOSIS — C50.919 MALIGNANT NEOPLASM OF FEMALE BREAST, UNSPECIFIED ESTROGEN RECEPTOR STATUS, UNSPECIFIED LATERALITY, UNSPECIFIED SITE OF BREAST: ICD-10-CM

## 2025-07-16 DIAGNOSIS — Z92.21 HISTORY OF CHEMOTHERAPY: ICD-10-CM

## 2025-07-16 DIAGNOSIS — E86.0 DEHYDRATION: ICD-10-CM

## 2025-07-16 DIAGNOSIS — E87.6 HYPOKALEMIA: ICD-10-CM

## 2025-07-16 DIAGNOSIS — K21.9 GASTROESOPHAGEAL REFLUX DISEASE, UNSPECIFIED WHETHER ESOPHAGITIS PRESENT: ICD-10-CM

## 2025-07-16 DIAGNOSIS — R53.1 WEAKNESS: ICD-10-CM

## 2025-07-16 PROCEDURE — 99306 1ST NF CARE HIGH MDM 50: CPT | Performed by: INTERNAL MEDICINE

## 2025-07-16 NOTE — LETTER
Patient: Elisha Flores  : 1945    Encounter Date: 2025    PLACE OF SERVICE:  [facility]     This is new/initial history and physical.    Subjective  Patient ID: Elisha Flores is a 79 y.o. female who presents for Annual Exam and new/initial history.    Ms. Elisha Flores is a 79-year-old female with history of breast cancer undergoing chemotherapy.  She has developed an upper GI bleed as well as diarrhea.  She is unable to care for herself and requires supportive care.    Review of Systems   Constitutional:  Negative for chills and fever.   Cardiovascular:  Negative for chest pain.   All other systems reviewed and are negative.    Objective  /80   Pulse 82   Temp 36.7 °C (98.1 °F)   Resp 16     Physical Exam  Vitals reviewed.   Constitutional:       Comments: This is a well-developed, well-nourished female, lying in bed, appearing weak.   HENT:      Right Ear: Tympanic membrane, ear canal and external ear normal.      Left Ear: Tympanic membrane, ear canal and external ear normal.     Eyes:      General: No scleral icterus.     Pupils: Pupils are equal, round, and reactive to light.     Neck:      Vascular: No carotid bruit.     Cardiovascular:      Heart sounds: Normal heart sounds, S1 normal and S2 normal. No murmur heard.     No friction rub.   Pulmonary:      Effort: Pulmonary effort is normal.      Breath sounds: Normal breath sounds and air entry.   Abdominal:      Palpations: There is no hepatomegaly, splenomegaly or mass.     Musculoskeletal:         General: No swelling or deformity. Normal range of motion.      Cervical back: Neck supple.      Right lower leg: No edema.      Left lower leg: No edema.   Lymphadenopathy:      Cervical: No cervical adenopathy.      Upper Body:      Right upper body: No axillary adenopathy.      Left upper body: No axillary adenopathy.      Lower Body: No right inguinal adenopathy. No left inguinal  adenopathy.     Neurological:      Mental Status: She is oriented to person, place, and time. She is lethargic.      Cranial Nerves: Cranial nerves 2-12 are intact. No cranial nerve deficit.      Sensory: No sensory deficit.      Motor: Motor function is intact. No weakness.      Gait: Gait is intact.      Deep Tendon Reflexes: Reflexes normal.     Psychiatric:         Mood and Affect: Mood normal. Mood is not anxious or depressed. Affect is not angry.         Behavior: Behavior is not agitated.         Thought Content: Thought content normal.         Judgment: Judgment normal.     LAB WORK: Laboratory studies reviewed.    Assessment/Plan  Problem List Items Addressed This Visit           ICD-10-CM    GERD (gastroesophageal reflux disease) K21.9    Hyperlipidemia E78.5    Hypokalemia E87.6    Diarrhea, unspecified type R19.7    Urinary tract infection N39.0    Weakness R53.1    Dehydration E86.0     Other Visit Diagnoses         Codes      Gastrointestinal hemorrhage, unspecified gastrointestinal hemorrhage type    -  Primary K92.2      Malignant neoplasm of female breast, unspecified estrogen receptor status, unspecified laterality, unspecified site of breast     C50.919      History of chemotherapy     Z92.21      Hypertension, unspecified type     I10      Kidney stone     N20.0      Rectal bleed     K62.5      At risk for falling     Z91.81        1. Recent GI bleed, continue to monitor.  2. GERD, on PPI.  3. Breast cancer, continue to monitor.  4. On chemotherapy, follow with Oncology.  5. Diarrhea, continue to monitor.  6. Hypertension, med controlled.  7. Hyperlipidemia, on statin.  8. Recent hypokalemia, monitor potassium level.  9. Urinary tract infection, has finished antibiotic.  10. Kidney stone, follow with Urology.  11. Dehydration, on oral replacement.  12. Recent rectal bleed, continue to monitor.  13. Weakness, on PT/OT.  14. Fall risk, fall precautions.    Scribe Attestation  By signing my name  below, I, [place] Rodríguez attest that this documentation has been prepared under the direction and in the presence of Fredis Shaffer MD.     All medical record entries made by the lindsayibe were personally dictated by me I have reviewed the chart and agree the record accurately reflects my personal performance of his history physical examination and management      Electronically Signed By: Fredis Shaffer MD   7/24/25 11:12 PM

## 2025-07-17 ENCOUNTER — APPOINTMENT (OUTPATIENT)
Dept: HEMATOLOGY/ONCOLOGY | Facility: HOSPITAL | Age: 80
End: 2025-07-17
Payer: MEDICARE

## 2025-07-17 ENCOUNTER — NURSING HOME VISIT (OUTPATIENT)
Dept: POST ACUTE CARE | Facility: EXTERNAL LOCATION | Age: 80
End: 2025-07-17
Payer: MEDICARE

## 2025-07-17 DIAGNOSIS — R60.0 LOWER LEG EDEMA: ICD-10-CM

## 2025-07-17 DIAGNOSIS — R53.1 WEAKNESS: ICD-10-CM

## 2025-07-17 DIAGNOSIS — E87.6 HYPOKALEMIA: ICD-10-CM

## 2025-07-17 DIAGNOSIS — K52.9 COLITIS: Primary | ICD-10-CM

## 2025-07-17 DIAGNOSIS — R53.81 PHYSICAL DECONDITIONING: ICD-10-CM

## 2025-07-17 DIAGNOSIS — I10 HYPERTENSION, UNSPECIFIED TYPE: ICD-10-CM

## 2025-07-17 DIAGNOSIS — N20.0 KIDNEY STONE: ICD-10-CM

## 2025-07-17 DIAGNOSIS — C50.919 MALIGNANT NEOPLASM OF FEMALE BREAST, UNSPECIFIED ESTROGEN RECEPTOR STATUS, UNSPECIFIED LATERALITY, UNSPECIFIED SITE OF BREAST: ICD-10-CM

## 2025-07-17 DIAGNOSIS — K92.2 GASTROINTESTINAL HEMORRHAGE, UNSPECIFIED GASTROINTESTINAL HEMORRHAGE TYPE: ICD-10-CM

## 2025-07-17 DIAGNOSIS — E83.42 HYPOMAGNESEMIA: ICD-10-CM

## 2025-07-17 DIAGNOSIS — E78.5 HYPERLIPIDEMIA, UNSPECIFIED HYPERLIPIDEMIA TYPE: ICD-10-CM

## 2025-07-17 DIAGNOSIS — K26.9 DUODENAL ULCER: ICD-10-CM

## 2025-07-17 LAB
LABORATORY COMMENT REPORT: NORMAL
PATH REPORT.FINAL DX SPEC: NORMAL
PATH REPORT.GROSS SPEC: NORMAL
PATH REPORT.RELEVANT HX SPEC: NORMAL
PATH REPORT.TOTAL CANCER: NORMAL

## 2025-07-17 PROCEDURE — 99310 SBSQ NF CARE HIGH MDM 45: CPT | Performed by: NURSE PRACTITIONER

## 2025-07-18 ENCOUNTER — APPOINTMENT (OUTPATIENT)
Dept: HEMATOLOGY/ONCOLOGY | Facility: CLINIC | Age: 80
End: 2025-07-18
Payer: MEDICARE

## 2025-07-18 ENCOUNTER — RESULTS FOLLOW-UP (OUTPATIENT)
Dept: SURGERY | Facility: HOSPITAL | Age: 80
End: 2025-07-18
Payer: MEDICARE

## 2025-07-18 NOTE — TELEPHONE ENCOUNTER
----- Message from Jigar Pérez sent at 7/18/2025  6:50 AM EDT -----  D/D - Let her know that the biopsies of her stomach and area around her ulcer did not show any infection   She will need a follow up EGD in 6 to 8 weeks and I have sent a note to Dr Covington about that   ----- Message -----  From: Lab, Background User  Sent: 7/17/2025   8:57 AM EDT  To: Jigar Pérez MD

## 2025-07-20 ENCOUNTER — NURSING HOME VISIT (OUTPATIENT)
Dept: POST ACUTE CARE | Facility: EXTERNAL LOCATION | Age: 80
End: 2025-07-20
Payer: MEDICARE

## 2025-07-20 DIAGNOSIS — K62.5 RECTAL BLEED: ICD-10-CM

## 2025-07-20 DIAGNOSIS — Z92.21 HISTORY OF CHEMOTHERAPY: ICD-10-CM

## 2025-07-20 DIAGNOSIS — I10 HYPERTENSION, UNSPECIFIED TYPE: ICD-10-CM

## 2025-07-20 DIAGNOSIS — N39.0 URINARY TRACT INFECTION WITHOUT HEMATURIA, SITE UNSPECIFIED: ICD-10-CM

## 2025-07-20 DIAGNOSIS — C50.919 MALIGNANT NEOPLASM OF FEMALE BREAST, UNSPECIFIED ESTROGEN RECEPTOR STATUS, UNSPECIFIED LATERALITY, UNSPECIFIED SITE OF BREAST: ICD-10-CM

## 2025-07-20 DIAGNOSIS — R53.1 WEAKNESS: ICD-10-CM

## 2025-07-20 DIAGNOSIS — Z91.81 AT RISK FOR FALLING: ICD-10-CM

## 2025-07-20 DIAGNOSIS — E78.5 HYPERLIPIDEMIA, UNSPECIFIED HYPERLIPIDEMIA TYPE: ICD-10-CM

## 2025-07-20 DIAGNOSIS — E87.6 HYPOKALEMIA: ICD-10-CM

## 2025-07-20 DIAGNOSIS — E86.0 DEHYDRATION: ICD-10-CM

## 2025-07-20 DIAGNOSIS — K92.2 GASTROINTESTINAL HEMORRHAGE, UNSPECIFIED GASTROINTESTINAL HEMORRHAGE TYPE: Primary | ICD-10-CM

## 2025-07-20 DIAGNOSIS — N20.0 KIDNEY STONE: ICD-10-CM

## 2025-07-20 PROCEDURE — 99309 SBSQ NF CARE MODERATE MDM 30: CPT | Performed by: INTERNAL MEDICINE

## 2025-07-20 NOTE — LETTER
Patient: Elisha Flores  : 1945    Encounter Date: 2025    PLACE OF SERVICE:  [facility]    This is a subsequent visit.    Subjective  Patient ID: Elisha Flores is a 79 y.o. female who presents for Follow-up.    Ms. Elisha Flores is a 79-year-old female with recent GI bleed.  She does have diarrhea post chemotherapy.  She is unable to care for herself and requires supportive care.    Review of Systems   Constitutional:  Negative for chills and fever.   Cardiovascular:  Negative for chest pain.   All other systems reviewed and are negative.    Objective  /82   Pulse 80   Temp 36.8 °C (98.2 °F)   Resp 16     Physical Exam  Vitals reviewed.   Constitutional:       Comments: This is a well-developed, well-nourished female, lying in bed, appearing weak.   HENT:      Right Ear: Tympanic membrane, ear canal and external ear normal.      Left Ear: Tympanic membrane, ear canal and external ear normal.     Eyes:      General: No scleral icterus.     Pupils: Pupils are equal, round, and reactive to light.     Neck:      Vascular: No carotid bruit.     Cardiovascular:      Heart sounds: Normal heart sounds, S1 normal and S2 normal. No murmur heard.     No friction rub.   Pulmonary:      Effort: Pulmonary effort is normal.      Breath sounds: Normal breath sounds and air entry.   Abdominal:      Palpations: There is no hepatomegaly, splenomegaly or mass.     Musculoskeletal:         General: No swelling or deformity. Normal range of motion.      Cervical back: Neck supple.      Right lower leg: No edema.      Left lower leg: No edema.   Lymphadenopathy:      Cervical: No cervical adenopathy.      Upper Body:      Right upper body: No axillary adenopathy.      Left upper body: No axillary adenopathy.      Lower Body: No right inguinal adenopathy. No left inguinal adenopathy.     Neurological:      Mental Status: She is oriented to person, place, and time. She  is lethargic.      Cranial Nerves: Cranial nerves 2-12 are intact. No cranial nerve deficit.      Sensory: No sensory deficit.      Motor: Motor function is intact. No weakness.      Gait: Gait is intact.      Deep Tendon Reflexes: Reflexes normal.     Psychiatric:         Mood and Affect: Mood normal. Mood is not anxious or depressed. Affect is not angry.         Behavior: Behavior is not agitated.         Thought Content: Thought content normal.         Judgment: Judgment normal.     LAB WORK: Laboratory studies reviewed.    Assessment/Plan  Problem List Items Addressed This Visit           ICD-10-CM    Hyperlipidemia E78.5    Hypokalemia E87.6    Urinary tract infection N39.0    Weakness R53.1    Dehydration E86.0     Other Visit Diagnoses         Codes      Gastrointestinal hemorrhage, unspecified gastrointestinal hemorrhage type    -  Primary K92.2      Rectal bleed     K62.5      Malignant neoplasm of female breast, unspecified estrogen receptor status, unspecified laterality, unspecified site of breast     C50.919      History of chemotherapy     Z92.21      Hypertension, unspecified type     I10      Kidney stone     N20.0      At risk for falling     Z91.81        1. Recent upper GI bleed, continue to monitor.  2. Recent rectal bleed, continue to monitor.  3. Breast cancer, follow with Oncology.  4. On chemotherapy, follow with Oncology.  5. Dehydration, on oral replacement.  6. Hypertension, med controlled.  7. Hypokalemia, monitor potassium level.  8. Recent UTI, continue to monitor.  9. Kidney stone, follow with Urology.  10. Hyperlipidemia, on statin.  11. Weakness, on PT/OT.  12. Fall risk, fall precautions.    Scribe Attestation  By signing my name below, ISissy Scribe attest that this documentation has been prepared under the direction and in the presence of Fredis Shaffer MD.     All medical record entries made by the scribroland were personally dictated by me I have reviewed the chart and agree  the record accurately reflects my personal performance of his history physical examination and management      Electronically Signed By: Fredis Shaffer MD   7/24/25 11:11 PM

## 2025-07-21 ENCOUNTER — TELEPHONE (OUTPATIENT)
Dept: SURGERY | Facility: CLINIC | Age: 80
End: 2025-07-21
Payer: MEDICARE

## 2025-07-21 NOTE — TELEPHONE ENCOUNTER
Son is aware of results [facility] for [facility] to call back [phone] give the nurse the results

## 2025-07-23 VITALS
RESPIRATION RATE: 16 BRPM | SYSTOLIC BLOOD PRESSURE: 126 MMHG | HEART RATE: 80 BPM | DIASTOLIC BLOOD PRESSURE: 82 MMHG | TEMPERATURE: 98.2 F

## 2025-07-23 VITALS
TEMPERATURE: 98.1 F | DIASTOLIC BLOOD PRESSURE: 80 MMHG | RESPIRATION RATE: 16 BRPM | SYSTOLIC BLOOD PRESSURE: 126 MMHG | HEART RATE: 82 BPM

## 2025-07-23 ASSESSMENT — ENCOUNTER SYMPTOMS
CHILLS: 0
FEVER: 0
FEVER: 0
CHILLS: 0

## 2025-07-23 NOTE — PROGRESS NOTES
PLACE OF SERVICE:  [facility]     This is new/initial history and physical.    Subjective   Patient ID: Elisha Flores is a 79 y.o. female who presents for Annual Exam and new/initial history.    Ms. Elisha Flores is a 79-year-old female with history of breast cancer undergoing chemotherapy.  She has developed an upper GI bleed as well as diarrhea.  She is unable to care for herself and requires supportive care.    Review of Systems   Constitutional:  Negative for chills and fever.   Cardiovascular:  Negative for chest pain.   All other systems reviewed and are negative.    Objective   /80   Pulse 82   Temp 36.7 °C (98.1 °F)   Resp 16     Physical Exam  Vitals reviewed.   Constitutional:       Comments: This is a well-developed, well-nourished female, lying in bed, appearing weak.   HENT:      Right Ear: Tympanic membrane, ear canal and external ear normal.      Left Ear: Tympanic membrane, ear canal and external ear normal.     Eyes:      General: No scleral icterus.     Pupils: Pupils are equal, round, and reactive to light.     Neck:      Vascular: No carotid bruit.     Cardiovascular:      Heart sounds: Normal heart sounds, S1 normal and S2 normal. No murmur heard.     No friction rub.   Pulmonary:      Effort: Pulmonary effort is normal.      Breath sounds: Normal breath sounds and air entry.   Abdominal:      Palpations: There is no hepatomegaly, splenomegaly or mass.     Musculoskeletal:         General: No swelling or deformity. Normal range of motion.      Cervical back: Neck supple.      Right lower leg: No edema.      Left lower leg: No edema.   Lymphadenopathy:      Cervical: No cervical adenopathy.      Upper Body:      Right upper body: No axillary adenopathy.      Left upper body: No axillary adenopathy.      Lower Body: No right inguinal adenopathy. No left inguinal adenopathy.     Neurological:      Mental Status: She is oriented to person,  place, and time. She is lethargic.      Cranial Nerves: Cranial nerves 2-12 are intact. No cranial nerve deficit.      Sensory: No sensory deficit.      Motor: Motor function is intact. No weakness.      Gait: Gait is intact.      Deep Tendon Reflexes: Reflexes normal.     Psychiatric:         Mood and Affect: Mood normal. Mood is not anxious or depressed. Affect is not angry.         Behavior: Behavior is not agitated.         Thought Content: Thought content normal.         Judgment: Judgment normal.     LAB WORK: Laboratory studies reviewed.    Assessment/Plan   Problem List Items Addressed This Visit           ICD-10-CM    GERD (gastroesophageal reflux disease) K21.9    Hyperlipidemia E78.5    Hypokalemia E87.6    Diarrhea, unspecified type R19.7    Urinary tract infection N39.0    Weakness R53.1    Dehydration E86.0     Other Visit Diagnoses         Codes      Gastrointestinal hemorrhage, unspecified gastrointestinal hemorrhage type    -  Primary K92.2      Malignant neoplasm of female breast, unspecified estrogen receptor status, unspecified laterality, unspecified site of breast     C50.919      History of chemotherapy     Z92.21      Hypertension, unspecified type     I10      Kidney stone     N20.0      Rectal bleed     K62.5      At risk for falling     Z91.81        1. Recent GI bleed, continue to monitor.  2. GERD, on PPI.  3. Breast cancer, continue to monitor.  4. On chemotherapy, follow with Oncology.  5. Diarrhea, continue to monitor.  6. Hypertension, med controlled.  7. Hyperlipidemia, on statin.  8. Recent hypokalemia, monitor potassium level.  9. Urinary tract infection, has finished antibiotic.  10. Kidney stone, follow with Urology.  11. Dehydration, on oral replacement.  12. Recent rectal bleed, continue to monitor.  13. Weakness, on PT/OT.  14. Fall risk, fall precautions.    Scribe Attestation  By signing my name below, ISissy, [place] attest that this documentation has been prepared  under the direction and in the presence of Fredis Shaffer MD.     All medical record entries made by the scribe were personally dictated by me I have reviewed the chart and agree the record accurately reflects my personal performance of his history physical examination and management

## 2025-07-23 NOTE — PROGRESS NOTES
PLACE OF SERVICE:  \[facility]\"" [facility] [place]    This is a subsequent visit.    Subjective   Patient ID: Elisha Flores is a 79 y.o. female who presents for Follow-up.    Ms. Elisha Flores is a 79-year-old female with recent GI bleed.  She does have diarrhea post chemotherapy.  She is unable to care for herself and requires supportive care.    Review of Systems   Constitutional:  Negative for chills and fever.   Cardiovascular:  Negative for chest pain.   All other systems reviewed and are negative.    Objective   /82   Pulse 80   Temp 36.8 °C (98.2 °F)   Resp 16     Physical Exam  Vitals reviewed.   Constitutional:       Comments: This is a well-developed, well-nourished female, lying in bed, appearing weak.   HENT:      Right Ear: Tympanic membrane, ear canal and external ear normal.      Left Ear: Tympanic membrane, ear canal and external ear normal.     Eyes:      General: No scleral icterus.     Pupils: Pupils are equal, round, and reactive to light.     Neck:      Vascular: No carotid bruit.     Cardiovascular:      Heart sounds: Normal heart sounds, S1 normal and S2 normal. No murmur heard.     No friction rub.   Pulmonary:      Effort: Pulmonary effort is normal.      Breath sounds: Normal breath sounds and air entry.   Abdominal:      Palpations: There is no hepatomegaly, splenomegaly or mass.     Musculoskeletal:         General: No swelling or deformity. Normal range of motion.      Cervical back: Neck supple.      Right lower leg: No edema.      Left lower leg: No edema.   Lymphadenopathy:      Cervical: No cervical adenopathy.      Upper Body:      Right upper body: No axillary adenopathy.      Left upper body: No axillary adenopathy.      Lower Body: No right inguinal adenopathy. No left inguinal adenopathy.     Neurological:      Mental Status: She is oriented to person, place, and time. She is lethargic.      Cranial Nerves: Cranial nerves 2-12 are intact. No  cranial nerve deficit.      Sensory: No sensory deficit.      Motor: Motor function is intact. No weakness.      Gait: Gait is intact.      Deep Tendon Reflexes: Reflexes normal.     Psychiatric:         Mood and Affect: Mood normal. Mood is not anxious or depressed. Affect is not angry.         Behavior: Behavior is not agitated.         Thought Content: Thought content normal.         Judgment: Judgment normal.     LAB WORK: Laboratory studies reviewed.    Assessment/Plan   Problem List Items Addressed This Visit           ICD-10-CM    Hyperlipidemia E78.5    Hypokalemia E87.6    Urinary tract infection N39.0    Weakness R53.1    Dehydration E86.0     Other Visit Diagnoses         Codes      Gastrointestinal hemorrhage, unspecified gastrointestinal hemorrhage type    -  Primary K92.2      Rectal bleed     K62.5      Malignant neoplasm of female breast, unspecified estrogen receptor status, unspecified laterality, unspecified site of breast     C50.919      History of chemotherapy     Z92.21      Hypertension, unspecified type     I10      Kidney stone     N20.0      At risk for falling     Z91.81        1. Recent upper GI bleed, continue to monitor.  2. Recent rectal bleed, continue to monitor.  3. Breast cancer, follow with Oncology.  4. On chemotherapy, follow with Oncology.  5. Dehydration, on oral replacement.  6. Hypertension, med controlled.  7. Hypokalemia, monitor potassium level.  8. Recent UTI, continue to monitor.  9. Kidney stone, follow with Urology.  10. Hyperlipidemia, on statin.  11. Weakness, on PT/OT.  12. Fall risk, fall precautions.    Scribe Attestation  By signing my name below, ISissy Scribe attest that this documentation has been prepared under the direction and in the presence of Fredis Shaffer MD.     All medical record entries made by the scribe were personally dictated by me I have reviewed the chart and agree the record accurately reflects my personal performance of his  history physical examination and management

## 2025-07-27 ENCOUNTER — NURSING HOME VISIT (OUTPATIENT)
Dept: POST ACUTE CARE | Facility: EXTERNAL LOCATION | Age: 80
End: 2025-07-27
Payer: MEDICARE

## 2025-07-27 DIAGNOSIS — E87.6 HYPOKALEMIA: ICD-10-CM

## 2025-07-27 DIAGNOSIS — D64.9 ANEMIA, UNSPECIFIED TYPE: ICD-10-CM

## 2025-07-27 DIAGNOSIS — E78.5 HYPERLIPIDEMIA, UNSPECIFIED HYPERLIPIDEMIA TYPE: ICD-10-CM

## 2025-07-27 DIAGNOSIS — I10 HYPERTENSION, UNSPECIFIED TYPE: ICD-10-CM

## 2025-07-27 DIAGNOSIS — Z91.81 AT RISK FOR FALLING: ICD-10-CM

## 2025-07-27 DIAGNOSIS — K62.5 RECTAL BLEED: ICD-10-CM

## 2025-07-27 DIAGNOSIS — C50.919 MALIGNANT NEOPLASM OF FEMALE BREAST, UNSPECIFIED ESTROGEN RECEPTOR STATUS, UNSPECIFIED LATERALITY, UNSPECIFIED SITE OF BREAST: Primary | ICD-10-CM

## 2025-07-27 DIAGNOSIS — N39.0 URINARY TRACT INFECTION WITHOUT HEMATURIA, SITE UNSPECIFIED: ICD-10-CM

## 2025-07-27 DIAGNOSIS — E86.0 DEHYDRATION: ICD-10-CM

## 2025-07-27 DIAGNOSIS — R53.1 WEAKNESS: ICD-10-CM

## 2025-07-27 DIAGNOSIS — N20.0 KIDNEY STONE: ICD-10-CM

## 2025-07-27 DIAGNOSIS — K92.2 GASTROINTESTINAL HEMORRHAGE, UNSPECIFIED GASTROINTESTINAL HEMORRHAGE TYPE: ICD-10-CM

## 2025-07-27 PROCEDURE — 99309 SBSQ NF CARE MODERATE MDM 30: CPT | Performed by: INTERNAL MEDICINE

## 2025-07-27 NOTE — LETTER
Patient: Elisha Flores  : 1945    Encounter Date: 2025    PLACE OF SERVICE:  [facility].    This is a subsequent visit.    Subjective  Patient ID: Elisha Flores is a 79 y.o. female who presents for Follow-up.    Ms. Elisha Flores is a 79-year-old female with history of breast cancer, on chemotherapy.  She has developed a rectal bleed as well as upper GI bleed.  She is unable to care for herself and requires supportive care.    Review of Systems   Constitutional:  Negative for chills and fever.   Cardiovascular:  Negative for chest pain.   All other systems reviewed and are negative.    Objective  /80   Pulse 78   Temp 36.6 °C (97.8 °F)   Resp 16     Physical Exam  Vitals reviewed.   Constitutional:       Comments: This is a well-developed, well-nourished female, lying in bed, appearing weak.   HENT:      Right Ear: Tympanic membrane, ear canal and external ear normal.      Left Ear: Tympanic membrane, ear canal and external ear normal.     Eyes:      General: No scleral icterus.     Pupils: Pupils are equal, round, and reactive to light.     Neck:      Vascular: No carotid bruit.     Cardiovascular:      Heart sounds: Normal heart sounds, S1 normal and S2 normal. No murmur heard.     No friction rub.   Pulmonary:      Effort: Pulmonary effort is normal.      Breath sounds: Normal breath sounds and air entry.   Abdominal:      Palpations: There is no hepatomegaly, splenomegaly or mass.     Musculoskeletal:         General: No swelling or deformity. Normal range of motion.      Cervical back: Neck supple.      Right lower leg: No edema.      Left lower leg: No edema.   Lymphadenopathy:      Cervical: No cervical adenopathy.      Upper Body:      Right upper body: No axillary adenopathy.      Left upper body: No axillary adenopathy.      Lower Body: No right inguinal adenopathy. No left inguinal adenopathy.     Neurological:      Mental Status: She  is oriented to person, place, and time. She is lethargic.      Cranial Nerves: Cranial nerves 2-12 are intact. No cranial nerve deficit.      Sensory: No sensory deficit.      Motor: Motor function is intact. No weakness.      Gait: Gait is intact.      Deep Tendon Reflexes: Reflexes normal.     Psychiatric:         Mood and Affect: Mood normal. Mood is not anxious or depressed. Affect is not angry.         Behavior: Behavior is not agitated.         Thought Content: Thought content normal.         Judgment: Judgment normal.     LAB WORK: Laboratory studies reviewed.    Assessment/Plan  Problem List Items Addressed This Visit           ICD-10-CM       Cardiac and Vasculature    Hyperlipidemia E78.5       Genitourinary and Reproductive    Hypokalemia E87.6    Urinary tract infection N39.0    Dehydration E86.0       Symptoms and Signs    Weakness R53.1     Other Visit Diagnoses         Codes      Malignant neoplasm of female breast, unspecified estrogen receptor status, unspecified laterality, unspecified site of breast    -  Primary C50.919      Rectal bleed     K62.5      Gastrointestinal hemorrhage, unspecified gastrointestinal hemorrhage type     K92.2      Anemia, unspecified type     D64.9      Hypertension, unspecified type     I10      Kidney stone     N20.0      At risk for falling     Z91.81        1. Breast cancer, follow with Oncology.  2. Recent rectal bleed, continue to monitor.  3. Recent upper GI bleed, continue to monitor.  4. Anemia, follow CBC.  5. Hypokalemia, monitor potassium level.  6. Hypertension, med controlled.  7. Dehydration, on oral replacement.  8. Kidney stone, follow with Urology.  9. Recent UTI, continue to monitor.  10. Hyperlipidemia, on statin.  11. Weakness, on PT/OT.  12. Fall risk, fall precautions.    Scribe Attestation  By signing my name below, ILinda Scribe attest that this documentation has been prepared under the direction and in the presence of Fredis Shaffer MD.      All medical record entries made by the scribe were personally dictated by me I have reviewed the chart and agree the record accurately reflects my personal performance of his history physical examination and management      Electronically Signed By: Fredis Shaffer MD   8/2/25  1:38 AM

## 2025-07-28 VITALS
TEMPERATURE: 97.8 F | SYSTOLIC BLOOD PRESSURE: 126 MMHG | DIASTOLIC BLOOD PRESSURE: 80 MMHG | HEART RATE: 78 BPM | RESPIRATION RATE: 16 BRPM

## 2025-07-28 ASSESSMENT — ENCOUNTER SYMPTOMS
FEVER: 0
CHILLS: 0

## 2025-07-28 NOTE — PROGRESS NOTES
PLACE OF SERVICE:  [facility] [place].    This is a subsequent visit.    Subjective   Patient ID: Elisha Flores is a 79 y.o. female who presents for Follow-up.    Ms. Elisha Flores is a 79-year-old female with history of breast cancer, on chemotherapy.  She has developed a rectal bleed as well as upper GI bleed.  She is unable to care for herself and requires supportive care.    Review of Systems   Constitutional:  Negative for chills and fever.   Cardiovascular:  Negative for chest pain.   All other systems reviewed and are negative.    Objective   /80   Pulse 78   Temp 36.6 °C (97.8 °F)   Resp 16     Physical Exam  Vitals reviewed.   Constitutional:       Comments: This is a well-developed, well-nourished female, lying in bed, appearing weak.   HENT:      Right Ear: Tympanic membrane, ear canal and external ear normal.      Left Ear: Tympanic membrane, ear canal and external ear normal.     Eyes:      General: No scleral icterus.     Pupils: Pupils are equal, round, and reactive to light.     Neck:      Vascular: No carotid bruit.     Cardiovascular:      Heart sounds: Normal heart sounds, S1 normal and S2 normal. No murmur heard.     No friction rub.   Pulmonary:      Effort: Pulmonary effort is normal.      Breath sounds: Normal breath sounds and air entry.   Abdominal:      Palpations: There is no hepatomegaly, splenomegaly or mass.     Musculoskeletal:         General: No swelling or deformity. Normal range of motion.      Cervical back: Neck supple.      Right lower leg: No edema.      Left lower leg: No edema.   Lymphadenopathy:      Cervical: No cervical adenopathy.      Upper Body:      Right upper body: No axillary adenopathy.      Left upper body: No axillary adenopathy.      Lower Body: No right inguinal adenopathy. No left inguinal adenopathy.     Neurological:      Mental Status: She is oriented to person, place, and time. She is lethargic.      Cranial  Nerves: Cranial nerves 2-12 are intact. No cranial nerve deficit.      Sensory: No sensory deficit.      Motor: Motor function is intact. No weakness.      Gait: Gait is intact.      Deep Tendon Reflexes: Reflexes normal.     Psychiatric:         Mood and Affect: Mood normal. Mood is not anxious or depressed. Affect is not angry.         Behavior: Behavior is not agitated.         Thought Content: Thought content normal.         Judgment: Judgment normal.     LAB WORK: Laboratory studies reviewed.    Assessment/Plan   Problem List Items Addressed This Visit           ICD-10-CM       Cardiac and Vasculature    Hyperlipidemia E78.5       Genitourinary and Reproductive    Hypokalemia E87.6    Urinary tract infection N39.0    Dehydration E86.0       Symptoms and Signs    Weakness R53.1     Other Visit Diagnoses         Codes      Malignant neoplasm of female breast, unspecified estrogen receptor status, unspecified laterality, unspecified site of breast    -  Primary C50.919      Rectal bleed     K62.5      Gastrointestinal hemorrhage, unspecified gastrointestinal hemorrhage type     K92.2      Anemia, unspecified type     D64.9      Hypertension, unspecified type     I10      Kidney stone     N20.0      At risk for falling     Z91.81        1. Breast cancer, follow with Oncology.  2. Recent rectal bleed, continue to monitor.  3. Recent upper GI bleed, continue to monitor.  4. Anemia, follow CBC.  5. Hypokalemia, monitor potassium level.  6. Hypertension, med controlled.  7. Dehydration, on oral replacement.  8. Kidney stone, follow with Urology.  9. Recent UTI, continue to monitor.  10. Hyperlipidemia, on statin.  11. Weakness, on PT/OT.  12. Fall risk, fall precautions.    Scribe Attestation  By signing my name below, ILinda Scribe attest that this documentation has been prepared under the direction and in the presence of Fredis Shaffer MD.     All medical record entries made by the scribe were personally  dictated by me I have reviewed the chart and agree the record accurately reflects my personal performance of his history physical examination and management

## 2025-07-29 ENCOUNTER — APPOINTMENT (OUTPATIENT)
Dept: PRIMARY CARE | Facility: CLINIC | Age: 80
End: 2025-07-29
Payer: MEDICARE

## 2025-07-30 LAB
ATRIAL RATE: 60 BPM
P AXIS: 39 DEGREES
P OFFSET: 186 MS
P ONSET: 133 MS
PR INTERVAL: 176 MS
Q ONSET: 221 MS
QRS COUNT: 10 BEATS
QRS DURATION: 98 MS
QT INTERVAL: 450 MS
QTC CALCULATION(BAZETT): 450 MS
QTC FREDERICIA: 450 MS
R AXIS: -12 DEGREES
T AXIS: 38 DEGREES
T OFFSET: 446 MS
VENTRICULAR RATE: 60 BPM

## 2025-08-02 ENCOUNTER — NURSING HOME VISIT (OUTPATIENT)
Dept: POST ACUTE CARE | Facility: EXTERNAL LOCATION | Age: 80
End: 2025-08-02
Payer: MEDICARE

## 2025-08-02 DIAGNOSIS — E78.5 HYPERLIPIDEMIA, UNSPECIFIED HYPERLIPIDEMIA TYPE: ICD-10-CM

## 2025-08-02 DIAGNOSIS — C50.919 MALIGNANT NEOPLASM OF FEMALE BREAST, UNSPECIFIED ESTROGEN RECEPTOR STATUS, UNSPECIFIED LATERALITY, UNSPECIFIED SITE OF BREAST: ICD-10-CM

## 2025-08-02 DIAGNOSIS — R53.1 WEAKNESS: ICD-10-CM

## 2025-08-02 DIAGNOSIS — N39.0 URINARY TRACT INFECTION WITHOUT HEMATURIA, SITE UNSPECIFIED: ICD-10-CM

## 2025-08-02 DIAGNOSIS — K62.5 RECTAL BLEED: Primary | ICD-10-CM

## 2025-08-02 DIAGNOSIS — E87.6 HYPOKALEMIA: ICD-10-CM

## 2025-08-02 DIAGNOSIS — K92.2 GASTROINTESTINAL HEMORRHAGE, UNSPECIFIED GASTROINTESTINAL HEMORRHAGE TYPE: ICD-10-CM

## 2025-08-02 DIAGNOSIS — E86.0 DEHYDRATION: ICD-10-CM

## 2025-08-02 DIAGNOSIS — I10 HYPERTENSION, UNSPECIFIED TYPE: ICD-10-CM

## 2025-08-02 DIAGNOSIS — D64.9 ANEMIA, UNSPECIFIED TYPE: ICD-10-CM

## 2025-08-02 DIAGNOSIS — N20.0 KIDNEY STONE: ICD-10-CM

## 2025-08-02 DIAGNOSIS — Z91.81 AT RISK FOR FALLING: ICD-10-CM

## 2025-08-02 PROCEDURE — 99309 SBSQ NF CARE MODERATE MDM 30: CPT | Performed by: INTERNAL MEDICINE

## 2025-08-07 ENCOUNTER — APPOINTMENT (OUTPATIENT)
Dept: HEMATOLOGY/ONCOLOGY | Facility: HOSPITAL | Age: 80
End: 2025-08-07
Payer: MEDICARE

## 2025-08-08 ENCOUNTER — APPOINTMENT (OUTPATIENT)
Dept: HEMATOLOGY/ONCOLOGY | Facility: CLINIC | Age: 80
End: 2025-08-08
Payer: MEDICARE

## 2025-08-17 ENCOUNTER — NURSING HOME VISIT (OUTPATIENT)
Dept: POST ACUTE CARE | Facility: EXTERNAL LOCATION | Age: 80
End: 2025-08-17
Payer: MEDICARE

## 2025-08-17 DIAGNOSIS — R53.1 WEAKNESS: ICD-10-CM

## 2025-08-17 DIAGNOSIS — E86.0 DEHYDRATION: ICD-10-CM

## 2025-08-17 DIAGNOSIS — I10 HYPERTENSION, UNSPECIFIED TYPE: ICD-10-CM

## 2025-08-17 DIAGNOSIS — N39.0 URINARY TRACT INFECTION WITHOUT HEMATURIA, SITE UNSPECIFIED: ICD-10-CM

## 2025-08-17 DIAGNOSIS — N20.0 KIDNEY STONE: ICD-10-CM

## 2025-08-17 DIAGNOSIS — E78.5 HYPERLIPIDEMIA, UNSPECIFIED HYPERLIPIDEMIA TYPE: ICD-10-CM

## 2025-08-17 DIAGNOSIS — Z91.81 AT RISK FOR FALLING: ICD-10-CM

## 2025-08-17 DIAGNOSIS — K92.2 GASTROINTESTINAL HEMORRHAGE, UNSPECIFIED GASTROINTESTINAL HEMORRHAGE TYPE: ICD-10-CM

## 2025-08-17 DIAGNOSIS — C50.919 MALIGNANT NEOPLASM OF FEMALE BREAST, UNSPECIFIED ESTROGEN RECEPTOR STATUS, UNSPECIFIED LATERALITY, UNSPECIFIED SITE OF BREAST: ICD-10-CM

## 2025-08-17 DIAGNOSIS — E87.6 HYPOKALEMIA: ICD-10-CM

## 2025-08-17 DIAGNOSIS — D64.9 ANEMIA, UNSPECIFIED TYPE: Primary | ICD-10-CM

## 2025-08-17 PROCEDURE — 99309 SBSQ NF CARE MODERATE MDM 30: CPT | Performed by: INTERNAL MEDICINE

## 2025-08-18 VITALS
HEART RATE: 80 BPM | DIASTOLIC BLOOD PRESSURE: 82 MMHG | TEMPERATURE: 98 F | SYSTOLIC BLOOD PRESSURE: 124 MMHG | RESPIRATION RATE: 16 BRPM

## 2025-08-18 ASSESSMENT — ENCOUNTER SYMPTOMS
CHILLS: 0
FEVER: 0

## 2025-08-20 VITALS
TEMPERATURE: 97.9 F | RESPIRATION RATE: 16 BRPM | DIASTOLIC BLOOD PRESSURE: 78 MMHG | SYSTOLIC BLOOD PRESSURE: 128 MMHG | HEART RATE: 76 BPM

## 2025-08-20 ASSESSMENT — ENCOUNTER SYMPTOMS
FEVER: 0
CHILLS: 0

## 2025-08-24 ENCOUNTER — NURSING HOME VISIT (OUTPATIENT)
Dept: POST ACUTE CARE | Facility: EXTERNAL LOCATION | Age: 80
End: 2025-08-24
Payer: MEDICARE

## 2025-08-24 DIAGNOSIS — C50.919 MALIGNANT NEOPLASM OF FEMALE BREAST, UNSPECIFIED ESTROGEN RECEPTOR STATUS, UNSPECIFIED LATERALITY, UNSPECIFIED SITE OF BREAST: ICD-10-CM

## 2025-08-24 DIAGNOSIS — E86.0 DEHYDRATION: ICD-10-CM

## 2025-08-24 DIAGNOSIS — E78.5 HYPERLIPIDEMIA, UNSPECIFIED HYPERLIPIDEMIA TYPE: ICD-10-CM

## 2025-08-24 DIAGNOSIS — Z91.81 AT RISK FOR FALLING: ICD-10-CM

## 2025-08-24 DIAGNOSIS — I10 HYPERTENSION, UNSPECIFIED TYPE: ICD-10-CM

## 2025-08-24 DIAGNOSIS — E87.6 HYPOKALEMIA: ICD-10-CM

## 2025-08-24 DIAGNOSIS — N20.0 KIDNEY STONE: ICD-10-CM

## 2025-08-24 DIAGNOSIS — D64.9 ANEMIA, UNSPECIFIED TYPE: ICD-10-CM

## 2025-08-24 DIAGNOSIS — K92.2 GASTROINTESTINAL HEMORRHAGE, UNSPECIFIED GASTROINTESTINAL HEMORRHAGE TYPE: Primary | ICD-10-CM

## 2025-08-24 DIAGNOSIS — N39.0 URINARY TRACT INFECTION WITHOUT HEMATURIA, SITE UNSPECIFIED: ICD-10-CM

## 2025-08-24 DIAGNOSIS — R53.1 WEAKNESS: ICD-10-CM

## 2025-08-24 PROCEDURE — 99309 SBSQ NF CARE MODERATE MDM 30: CPT | Performed by: INTERNAL MEDICINE

## 2025-08-27 VITALS
TEMPERATURE: 97.8 F | SYSTOLIC BLOOD PRESSURE: 124 MMHG | RESPIRATION RATE: 16 BRPM | HEART RATE: 82 BPM | DIASTOLIC BLOOD PRESSURE: 78 MMHG

## 2025-08-27 ASSESSMENT — ENCOUNTER SYMPTOMS
FEVER: 0
CHILLS: 0

## 2025-08-28 PROBLEM — D17.0 LIPOMA OF FACE: Status: RESOLVED | Noted: 2023-05-31 | Resolved: 2025-08-28

## 2025-08-30 ENCOUNTER — NURSING HOME VISIT (OUTPATIENT)
Dept: POST ACUTE CARE | Facility: EXTERNAL LOCATION | Age: 80
End: 2025-08-30
Payer: MEDICARE

## 2025-08-30 DIAGNOSIS — N20.0 KIDNEY STONE: ICD-10-CM

## 2025-08-30 DIAGNOSIS — E78.5 HYPERLIPIDEMIA, UNSPECIFIED HYPERLIPIDEMIA TYPE: ICD-10-CM

## 2025-08-30 DIAGNOSIS — I10 HYPERTENSION, UNSPECIFIED TYPE: ICD-10-CM

## 2025-08-30 DIAGNOSIS — D64.9 ANEMIA, UNSPECIFIED TYPE: Primary | ICD-10-CM

## 2025-08-30 DIAGNOSIS — Z91.81 AT RISK FOR FALLING: ICD-10-CM

## 2025-08-30 DIAGNOSIS — E86.0 DEHYDRATION: ICD-10-CM

## 2025-08-30 DIAGNOSIS — E87.6 HYPOKALEMIA: ICD-10-CM

## 2025-08-30 DIAGNOSIS — R53.1 WEAKNESS: ICD-10-CM

## 2025-08-30 DIAGNOSIS — N39.0 URINARY TRACT INFECTION WITHOUT HEMATURIA, SITE UNSPECIFIED: ICD-10-CM

## 2025-08-30 DIAGNOSIS — C50.919 MALIGNANT NEOPLASM OF FEMALE BREAST, UNSPECIFIED ESTROGEN RECEPTOR STATUS, UNSPECIFIED LATERALITY, UNSPECIFIED SITE OF BREAST: ICD-10-CM

## 2025-08-30 DIAGNOSIS — K92.2 GASTROINTESTINAL HEMORRHAGE, UNSPECIFIED GASTROINTESTINAL HEMORRHAGE TYPE: ICD-10-CM

## 2025-08-30 PROCEDURE — 99309 SBSQ NF CARE MODERATE MDM 30: CPT | Performed by: INTERNAL MEDICINE

## 2025-09-01 ENCOUNTER — APPOINTMENT (OUTPATIENT)
Dept: RADIOLOGY | Facility: HOSPITAL | Age: 80
End: 2025-09-01
Payer: MEDICARE

## 2025-09-01 ENCOUNTER — HOSPITAL ENCOUNTER (INPATIENT)
Facility: HOSPITAL | Age: 80
LOS: 5 days | Discharge: NURSING, REHAB OR LONG-TERM CARE | End: 2025-09-06
Attending: STUDENT IN AN ORGANIZED HEALTH CARE EDUCATION/TRAINING PROGRAM | Admitting: STUDENT IN AN ORGANIZED HEALTH CARE EDUCATION/TRAINING PROGRAM
Payer: MEDICARE

## 2025-09-01 ENCOUNTER — HOSPITAL ENCOUNTER (EMERGENCY)
Facility: HOSPITAL | Age: 80
Discharge: ANOTHER HOSPITAL | End: 2025-09-01
Attending: FAMILY MEDICINE
Payer: MEDICARE

## 2025-09-01 VITALS
OXYGEN SATURATION: 100 % | HEART RATE: 60 BPM | HEIGHT: 66 IN | RESPIRATION RATE: 18 BRPM | DIASTOLIC BLOOD PRESSURE: 60 MMHG | WEIGHT: 225.53 LBS | SYSTOLIC BLOOD PRESSURE: 101 MMHG | TEMPERATURE: 97.9 F | BODY MASS INDEX: 36.25 KG/M2

## 2025-09-01 DIAGNOSIS — Z22.322 MRSA COLONIZATION: ICD-10-CM

## 2025-09-01 DIAGNOSIS — Z92.21 STATUS POST CHEMOTHERAPY: ICD-10-CM

## 2025-09-01 DIAGNOSIS — N13.2 HYDRONEPHROSIS WITH URINARY OBSTRUCTION DUE TO URETERAL CALCULUS: ICD-10-CM

## 2025-09-01 DIAGNOSIS — N13.9 OBSTRUCTIVE UROPATHY: ICD-10-CM

## 2025-09-01 DIAGNOSIS — N39.0 URINARY TRACT INFECTION WITHOUT HEMATURIA, SITE UNSPECIFIED: ICD-10-CM

## 2025-09-01 DIAGNOSIS — N20.1 CALCULUS OF URETEROVESICAL JUNCTION (UVJ): Primary | ICD-10-CM

## 2025-09-01 DIAGNOSIS — N30.00 ACUTE CYSTITIS WITHOUT HEMATURIA: ICD-10-CM

## 2025-09-01 DIAGNOSIS — R53.1 WEAKNESS: ICD-10-CM

## 2025-09-01 DIAGNOSIS — I10 BENIGN ESSENTIAL HYPERTENSION: ICD-10-CM

## 2025-09-01 DIAGNOSIS — B37.2 YEAST INFECTION OF THE SKIN: ICD-10-CM

## 2025-09-01 DIAGNOSIS — Z85.3 HISTORY OF LEFT BREAST CANCER: Primary | ICD-10-CM

## 2025-09-01 DIAGNOSIS — N20.1 URETERAL STONE: ICD-10-CM

## 2025-09-01 LAB
ALBUMIN SERPL BCP-MCNC: 2.8 G/DL (ref 3.4–5)
ALP SERPL-CCNC: 95 U/L (ref 33–136)
ALT SERPL W P-5'-P-CCNC: 22 U/L (ref 7–45)
ANION GAP SERPL CALC-SCNC: 11 MMOL/L (ref 10–20)
APPEARANCE UR: ABNORMAL
APTT PPP: 31 SECONDS (ref 26–36)
AST SERPL W P-5'-P-CCNC: 15 U/L (ref 9–39)
BACTERIA #/AREA URNS AUTO: ABNORMAL /HPF
BASOPHILS # BLD MANUAL: 0 X10*3/UL (ref 0–0.1)
BASOPHILS NFR BLD MANUAL: 0 %
BILIRUB DIRECT SERPL-MCNC: 0.1 MG/DL (ref 0–0.3)
BILIRUB SERPL-MCNC: 0.5 MG/DL (ref 0–1.2)
BILIRUB UR STRIP.AUTO-MCNC: NEGATIVE MG/DL
BUN SERPL-MCNC: 10 MG/DL (ref 6–23)
CALCIUM SERPL-MCNC: 8.4 MG/DL (ref 8.6–10.3)
CARDIAC TROPONIN I PNL SERPL HS: 6 NG/L (ref 0–13)
CHLORIDE SERPL-SCNC: 107 MMOL/L (ref 98–107)
CO2 SERPL-SCNC: 22 MMOL/L (ref 21–32)
COLOR UR: COLORLESS
CREAT SERPL-MCNC: 0.85 MG/DL (ref 0.5–1.05)
EGFRCR SERPLBLD CKD-EPI 2021: 70 ML/MIN/1.73M*2
EOSINOPHIL # BLD MANUAL: 0.34 X10*3/UL (ref 0–0.4)
EOSINOPHIL NFR BLD MANUAL: 4 %
ERYTHROCYTE [DISTWIDTH] IN BLOOD BY AUTOMATED COUNT: 15.8 % (ref 11.5–14.5)
GLUCOSE SERPL-MCNC: 103 MG/DL (ref 74–99)
GLUCOSE UR STRIP.AUTO-MCNC: NORMAL MG/DL
HCT VFR BLD AUTO: 29.3 % (ref 36–46)
HGB BLD-MCNC: 9.3 G/DL (ref 12–16)
IMM GRANULOCYTES # BLD AUTO: 0.66 X10*3/UL (ref 0–0.5)
IMM GRANULOCYTES NFR BLD AUTO: 7.8 % (ref 0–0.9)
INR PPP: 1.3 (ref 0.9–1.1)
KETONES UR STRIP.AUTO-MCNC: NEGATIVE MG/DL
LACTATE SERPL-SCNC: 0.8 MMOL/L (ref 0.4–2)
LEUKOCYTE ESTERASE UR QL STRIP.AUTO: ABNORMAL
LYMPHOCYTES # BLD MANUAL: 1.28 X10*3/UL (ref 0.8–3)
LYMPHOCYTES NFR BLD MANUAL: 15 %
MCH RBC QN AUTO: 30.7 PG (ref 26–34)
MCHC RBC AUTO-ENTMCNC: 31.7 G/DL (ref 32–36)
MCV RBC AUTO: 97 FL (ref 80–100)
METAMYELOCYTES # BLD MANUAL: 0.09 X10*3/UL
METAMYELOCYTES NFR BLD MANUAL: 1 %
MONOCYTES # BLD MANUAL: 0.17 X10*3/UL (ref 0.05–0.8)
MONOCYTES NFR BLD MANUAL: 2 %
NEUTROPHILS # BLD MANUAL: 5.44 X10*3/UL (ref 1.6–5.5)
NEUTS BAND # BLD MANUAL: 0.68 X10*3/UL (ref 0–0.5)
NEUTS BAND NFR BLD MANUAL: 8 %
NEUTS SEG # BLD MANUAL: 4.76 X10*3/UL (ref 1.6–5)
NEUTS SEG NFR BLD MANUAL: 56 %
NITRITE UR QL STRIP.AUTO: ABNORMAL
NRBC BLD-RTO: 0 /100 WBCS (ref 0–0)
PH UR STRIP.AUTO: 7 [PH]
PLATELET # BLD AUTO: 262 X10*3/UL (ref 150–450)
POTASSIUM SERPL-SCNC: 4.2 MMOL/L (ref 3.5–5.3)
PROT SERPL-MCNC: 5.1 G/DL (ref 6.4–8.2)
PROT UR STRIP.AUTO-MCNC: NEGATIVE MG/DL
PROTHROMBIN TIME: 14.3 SECONDS (ref 9.8–12.4)
RBC # BLD AUTO: 3.03 X10*6/UL (ref 4–5.2)
RBC # UR STRIP.AUTO: ABNORMAL MG/DL
RBC #/AREA URNS AUTO: ABNORMAL /HPF
RBC MORPH BLD: ABNORMAL
SODIUM SERPL-SCNC: 136 MMOL/L (ref 136–145)
SP GR UR STRIP.AUTO: 1.01
SQUAMOUS #/AREA URNS AUTO: ABNORMAL /HPF
TOTAL CELLS COUNTED BLD: 100
UROBILINOGEN UR STRIP.AUTO-MCNC: NORMAL MG/DL
VARIANT LYMPHS # BLD MANUAL: 1.19 X10*3/UL (ref 0–0.3)
VARIANT LYMPHS NFR BLD: 14 %
WBC # BLD AUTO: 8.5 X10*3/UL (ref 4.4–11.3)
WBC #/AREA URNS AUTO: >50 /HPF
WBC CLUMPS #/AREA URNS AUTO: ABNORMAL /HPF
YEAST BUDDING #/AREA UR COMP ASSIST: PRESENT /HPF

## 2025-09-01 PROCEDURE — 1100000001 HC PRIVATE ROOM DAILY

## 2025-09-01 PROCEDURE — 2550000001 HC RX 255 CONTRASTS: Performed by: FAMILY MEDICINE

## 2025-09-01 PROCEDURE — 80053 COMPREHEN METABOLIC PANEL: CPT | Performed by: FAMILY MEDICINE

## 2025-09-01 PROCEDURE — 81001 URINALYSIS AUTO W/SCOPE: CPT | Performed by: FAMILY MEDICINE

## 2025-09-01 PROCEDURE — 84484 ASSAY OF TROPONIN QUANT: CPT | Performed by: FAMILY MEDICINE

## 2025-09-01 PROCEDURE — 83605 ASSAY OF LACTIC ACID: CPT | Performed by: FAMILY MEDICINE

## 2025-09-01 PROCEDURE — 2500000004 HC RX 250 GENERAL PHARMACY W/ HCPCS (ALT 636 FOR OP/ED): Performed by: FAMILY MEDICINE

## 2025-09-01 PROCEDURE — 85027 COMPLETE CBC AUTOMATED: CPT | Performed by: FAMILY MEDICINE

## 2025-09-01 PROCEDURE — 87075 CULTR BACTERIA EXCEPT BLOOD: CPT | Mod: GENLAB | Performed by: FAMILY MEDICINE

## 2025-09-01 PROCEDURE — 96366 THER/PROPH/DIAG IV INF ADDON: CPT

## 2025-09-01 PROCEDURE — 82248 BILIRUBIN DIRECT: CPT | Performed by: FAMILY MEDICINE

## 2025-09-01 PROCEDURE — 99223 1ST HOSP IP/OBS HIGH 75: CPT | Performed by: STUDENT IN AN ORGANIZED HEALTH CARE EDUCATION/TRAINING PROGRAM

## 2025-09-01 PROCEDURE — 85610 PROTHROMBIN TIME: CPT | Performed by: FAMILY MEDICINE

## 2025-09-01 PROCEDURE — 36415 COLL VENOUS BLD VENIPUNCTURE: CPT | Performed by: FAMILY MEDICINE

## 2025-09-01 PROCEDURE — 96365 THER/PROPH/DIAG IV INF INIT: CPT | Mod: 59

## 2025-09-01 PROCEDURE — 99285 EMERGENCY DEPT VISIT HI MDM: CPT | Mod: 25 | Performed by: FAMILY MEDICINE

## 2025-09-01 PROCEDURE — 74177 CT ABD & PELVIS W/CONTRAST: CPT | Performed by: STUDENT IN AN ORGANIZED HEALTH CARE EDUCATION/TRAINING PROGRAM

## 2025-09-01 PROCEDURE — 96361 HYDRATE IV INFUSION ADD-ON: CPT

## 2025-09-01 PROCEDURE — 74177 CT ABD & PELVIS W/CONTRAST: CPT

## 2025-09-01 PROCEDURE — 85007 BL SMEAR W/DIFF WBC COUNT: CPT | Performed by: FAMILY MEDICINE

## 2025-09-01 PROCEDURE — 85730 THROMBOPLASTIN TIME PARTIAL: CPT | Performed by: FAMILY MEDICINE

## 2025-09-01 PROCEDURE — 83735 ASSAY OF MAGNESIUM: CPT | Performed by: STUDENT IN AN ORGANIZED HEALTH CARE EDUCATION/TRAINING PROGRAM

## 2025-09-01 RX ORDER — SODIUM CHLORIDE 9 MG/ML
125 INJECTION, SOLUTION INTRAVENOUS CONTINUOUS
Status: DISCONTINUED | OUTPATIENT
Start: 2025-09-01 | End: 2025-09-01 | Stop reason: HOSPADM

## 2025-09-01 RX ORDER — CEFTRIAXONE 1 G/50ML
1 INJECTION, SOLUTION INTRAVENOUS ONCE
Status: COMPLETED | OUTPATIENT
Start: 2025-09-01 | End: 2025-09-01

## 2025-09-01 RX ADMIN — SODIUM CHLORIDE 125 ML/HR: 0.9 INJECTION, SOLUTION INTRAVENOUS at 19:27

## 2025-09-01 RX ADMIN — IOHEXOL 75 ML: 350 INJECTION, SOLUTION INTRAVENOUS at 17:54

## 2025-09-01 RX ADMIN — CEFTRIAXONE 1 G: 1 INJECTION, SOLUTION INTRAVENOUS at 17:41

## 2025-09-01 RX ADMIN — SODIUM CHLORIDE 500 ML: 0.9 INJECTION, SOLUTION INTRAVENOUS at 17:39

## 2025-09-01 ASSESSMENT — PAIN SCALES - GENERAL
PAINLEVEL_OUTOF10: 0 - NO PAIN

## 2025-09-01 ASSESSMENT — PAIN - FUNCTIONAL ASSESSMENT: PAIN_FUNCTIONAL_ASSESSMENT: 0-10

## 2025-09-02 ENCOUNTER — APPOINTMENT (OUTPATIENT)
Dept: CARDIOLOGY | Facility: HOSPITAL | Age: 80
End: 2025-09-02
Payer: MEDICARE

## 2025-09-02 VITALS
RESPIRATION RATE: 16 BRPM | HEART RATE: 82 BPM | TEMPERATURE: 98 F | SYSTOLIC BLOOD PRESSURE: 124 MMHG | DIASTOLIC BLOOD PRESSURE: 76 MMHG

## 2025-09-02 PROBLEM — N20.1 CALCULUS OF URETEROVESICAL JUNCTION (UVJ): Status: ACTIVE | Noted: 2025-09-02
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
## 2025-09-02 PROBLEM — N13.2 HYDRONEPHROSIS WITH URINARY OBSTRUCTION DUE TO URETERAL CALCULUS: Status: ACTIVE | Noted: 2025-09-02

## 2025-09-02 PROBLEM — Z85.3 HISTORY OF BREAST CANCER: Status: ACTIVE | Noted: 2025-09-02

## 2025-09-02 LAB
ANION GAP SERPL CALCULATED.3IONS-SCNC: 9 MMOL/L (ref 10–20)
APPEARANCE UR: ABNORMAL
BILIRUB UR STRIP.AUTO-MCNC: NEGATIVE MG/DL
BUN SERPL-MCNC: 9 MG/DL (ref 6–23)
CALCIUM SERPL-MCNC: 7.9 MG/DL (ref 8.6–10.3)
CHLORIDE SERPL-SCNC: 111 MMOL/L (ref 98–107)
CO2 SERPL-SCNC: 21 MMOL/L (ref 21–32)
COLOR UR: ABNORMAL
CREAT SERPL-MCNC: 0.86 MG/DL (ref 0.5–1.05)
EGFRCR SERPLBLD CKD-EPI 2021: 69 ML/MIN/1.73M*2
ERYTHROCYTE [DISTWIDTH] IN BLOOD BY AUTOMATED COUNT: 15.8 % (ref 11.5–14.5)
GLUCOSE SERPL-MCNC: 90 MG/DL (ref 74–99)
GLUCOSE UR STRIP.AUTO-MCNC: NORMAL MG/DL
HCT VFR BLD AUTO: 29.7 % (ref 36–46)
HGB BLD-MCNC: 9.6 G/DL (ref 12–16)
HOLD SPECIMEN: NORMAL
KETONES UR STRIP.AUTO-MCNC: NEGATIVE MG/DL
LEUKOCYTE ESTERASE UR QL STRIP.AUTO: ABNORMAL
MAGNESIUM SERPL-MCNC: 1.9 MG/DL (ref 1.6–2.4)
MCH RBC QN AUTO: 30.7 PG (ref 26–34)
MCHC RBC AUTO-ENTMCNC: 32.3 G/DL (ref 32–36)
MCV RBC AUTO: 95 FL (ref 80–100)
NITRITE UR QL STRIP.AUTO: ABNORMAL
NRBC BLD-RTO: 0 /100 WBCS (ref 0–0)
PH UR STRIP.AUTO: 7 [PH]
PLATELET # BLD AUTO: 192 X10*3/UL (ref 150–450)
POTASSIUM SERPL-SCNC: 3.9 MMOL/L (ref 3.5–5.3)
PROT UR STRIP.AUTO-MCNC: ABNORMAL MG/DL
RBC # BLD AUTO: 3.13 X10*6/UL (ref 4–5.2)
RBC # UR STRIP.AUTO: ABNORMAL MG/DL
RBC #/AREA URNS AUTO: ABNORMAL /HPF
SODIUM SERPL-SCNC: 137 MMOL/L (ref 136–145)
SP GR UR STRIP.AUTO: 1.02
UROBILINOGEN UR STRIP.AUTO-MCNC: NORMAL MG/DL
WBC # BLD AUTO: 6.4 X10*3/UL (ref 4.4–11.3)
WBC #/AREA URNS AUTO: >50 /HPF
WBC CLUMPS #/AREA URNS AUTO: ABNORMAL /HPF

## 2025-09-02 PROCEDURE — 97165 OT EVAL LOW COMPLEX 30 MIN: CPT | Mod: GO

## 2025-09-02 PROCEDURE — 87086 URINE CULTURE/COLONY COUNT: CPT | Mod: TRILAB | Performed by: NURSE PRACTITIONER

## 2025-09-02 PROCEDURE — 97116 GAIT TRAINING THERAPY: CPT | Mod: GP

## 2025-09-02 PROCEDURE — 2500000001 HC RX 250 WO HCPCS SELF ADMINISTERED DRUGS (ALT 637 FOR MEDICARE OP): Performed by: NURSE PRACTITIONER

## 2025-09-02 PROCEDURE — 2500000004 HC RX 250 GENERAL PHARMACY W/ HCPCS (ALT 636 FOR OP/ED): Performed by: STUDENT IN AN ORGANIZED HEALTH CARE EDUCATION/TRAINING PROGRAM

## 2025-09-02 PROCEDURE — 93005 ELECTROCARDIOGRAM TRACING: CPT

## 2025-09-02 PROCEDURE — 87081 CULTURE SCREEN ONLY: CPT | Mod: TRILAB | Performed by: NURSE PRACTITIONER

## 2025-09-02 PROCEDURE — 1200000002 HC GENERAL ROOM WITH TELEMETRY DAILY

## 2025-09-02 PROCEDURE — 2500000002 HC RX 250 W HCPCS SELF ADMINISTERED DRUGS (ALT 637 FOR MEDICARE OP, ALT 636 FOR OP/ED): Performed by: STUDENT IN AN ORGANIZED HEALTH CARE EDUCATION/TRAINING PROGRAM

## 2025-09-02 PROCEDURE — 80048 BASIC METABOLIC PNL TOTAL CA: CPT | Performed by: STUDENT IN AN ORGANIZED HEALTH CARE EDUCATION/TRAINING PROGRAM

## 2025-09-02 PROCEDURE — 97530 THERAPEUTIC ACTIVITIES: CPT | Mod: GP

## 2025-09-02 PROCEDURE — 2500000001 HC RX 250 WO HCPCS SELF ADMINISTERED DRUGS (ALT 637 FOR MEDICARE OP): Performed by: STUDENT IN AN ORGANIZED HEALTH CARE EDUCATION/TRAINING PROGRAM

## 2025-09-02 PROCEDURE — 81001 URINALYSIS AUTO W/SCOPE: CPT | Performed by: NURSE PRACTITIONER

## 2025-09-02 PROCEDURE — 97535 SELF CARE MNGMENT TRAINING: CPT | Mod: GO

## 2025-09-02 PROCEDURE — 99232 SBSQ HOSP IP/OBS MODERATE 35: CPT | Performed by: NURSE PRACTITIONER

## 2025-09-02 PROCEDURE — 97162 PT EVAL MOD COMPLEX 30 MIN: CPT | Mod: GP

## 2025-09-02 PROCEDURE — 85027 COMPLETE CBC AUTOMATED: CPT | Performed by: STUDENT IN AN ORGANIZED HEALTH CARE EDUCATION/TRAINING PROGRAM

## 2025-09-02 RX ORDER — SODIUM CHLORIDE 9 MG/ML
60 INJECTION, SOLUTION INTRAVENOUS CONTINUOUS
Status: ACTIVE | OUTPATIENT
Start: 2025-09-02 | End: 2025-09-03

## 2025-09-02 RX ORDER — FUROSEMIDE 20 MG/1
20 TABLET ORAL DAILY
Status: DISCONTINUED | OUTPATIENT
Start: 2025-09-02 | End: 2025-09-06 | Stop reason: HOSPADM

## 2025-09-02 RX ORDER — FAMOTIDINE 10 MG/ML
20 INJECTION, SOLUTION INTRAVENOUS 2 TIMES DAILY
Status: DISCONTINUED | OUTPATIENT
Start: 2025-09-02 | End: 2025-09-06 | Stop reason: HOSPADM

## 2025-09-02 RX ORDER — OLANZAPINE 10 MG/2ML
5 INJECTION, POWDER, FOR SOLUTION INTRAMUSCULAR ONCE
Status: COMPLETED | OUTPATIENT
Start: 2025-09-03 | End: 2025-09-02

## 2025-09-02 RX ORDER — ACETAMINOPHEN 650 MG/1
650 SUPPOSITORY RECTAL EVERY 4 HOURS PRN
Status: DISCONTINUED | OUTPATIENT
Start: 2025-09-02 | End: 2025-09-06 | Stop reason: HOSPADM

## 2025-09-02 RX ORDER — ROSUVASTATIN CALCIUM 10 MG/1
10 TABLET, COATED ORAL DAILY
Status: DISCONTINUED | OUTPATIENT
Start: 2025-09-02 | End: 2025-09-06 | Stop reason: HOSPADM

## 2025-09-02 RX ORDER — ACETAMINOPHEN 325 MG/1
650 TABLET ORAL EVERY 4 HOURS PRN
Status: DISCONTINUED | OUTPATIENT
Start: 2025-09-02 | End: 2025-09-06 | Stop reason: HOSPADM

## 2025-09-02 RX ORDER — FOLIC ACID 1 MG/1
1 TABLET ORAL DAILY
Status: DISCONTINUED | OUTPATIENT
Start: 2025-09-02 | End: 2025-09-06 | Stop reason: HOSPADM

## 2025-09-02 RX ORDER — DIPHENHYDRAMINE HYDROCHLORIDE 50 MG/ML
25 INJECTION, SOLUTION INTRAMUSCULAR; INTRAVENOUS ONCE
Status: COMPLETED | OUTPATIENT
Start: 2025-09-03 | End: 2025-09-02

## 2025-09-02 RX ORDER — PROCHLORPERAZINE 25 MG/1
25 SUPPOSITORY RECTAL EVERY 12 HOURS PRN
Status: DISCONTINUED | OUTPATIENT
Start: 2025-09-02 | End: 2025-09-06 | Stop reason: HOSPADM

## 2025-09-02 RX ORDER — NYSTATIN 100000 [USP'U]/G
1 POWDER TOPICAL 2 TIMES DAILY
Status: DISCONTINUED | OUTPATIENT
Start: 2025-09-02 | End: 2025-09-06 | Stop reason: HOSPADM

## 2025-09-02 RX ORDER — PROPRANOLOL HYDROCHLORIDE 60 MG/1
120 CAPSULE, EXTENDED RELEASE ORAL NIGHTLY
Status: DISCONTINUED | OUTPATIENT
Start: 2025-09-02 | End: 2025-09-05

## 2025-09-02 RX ORDER — PROCHLORPERAZINE MALEATE 10 MG
10 TABLET ORAL EVERY 6 HOURS PRN
Status: DISCONTINUED | OUTPATIENT
Start: 2025-09-02 | End: 2025-09-06 | Stop reason: HOSPADM

## 2025-09-02 RX ORDER — AMLODIPINE BESYLATE 10 MG/1
10 TABLET ORAL DAILY
Status: DISCONTINUED | OUTPATIENT
Start: 2025-09-02 | End: 2025-09-05

## 2025-09-02 RX ORDER — CEFTRIAXONE 1 G/50ML
1 INJECTION, SOLUTION INTRAVENOUS EVERY 24 HOURS
Status: DISCONTINUED | OUTPATIENT
Start: 2025-09-02 | End: 2025-09-05

## 2025-09-02 RX ORDER — PROCHLORPERAZINE EDISYLATE 5 MG/ML
10 INJECTION INTRAMUSCULAR; INTRAVENOUS EVERY 6 HOURS PRN
Status: DISCONTINUED | OUTPATIENT
Start: 2025-09-02 | End: 2025-09-06 | Stop reason: HOSPADM

## 2025-09-02 RX ORDER — HEPARIN 100 UNIT/ML
500 SYRINGE INTRAVENOUS ONCE AS NEEDED
Status: DISCONTINUED | OUTPATIENT
Start: 2025-09-02 | End: 2025-09-06 | Stop reason: HOSPADM

## 2025-09-02 RX ORDER — PANTOPRAZOLE SODIUM 40 MG/1
40 TABLET, DELAYED RELEASE ORAL
Status: DISCONTINUED | OUTPATIENT
Start: 2025-09-02 | End: 2025-09-06 | Stop reason: HOSPADM

## 2025-09-02 RX ORDER — POLYETHYLENE GLYCOL 3350 17 G/17G
17 POWDER, FOR SOLUTION ORAL DAILY
Status: DISCONTINUED | OUTPATIENT
Start: 2025-09-02 | End: 2025-09-06 | Stop reason: HOSPADM

## 2025-09-02 RX ORDER — TALC
3 POWDER (GRAM) TOPICAL NIGHTLY PRN
Status: DISCONTINUED | OUTPATIENT
Start: 2025-09-02 | End: 2025-09-06 | Stop reason: HOSPADM

## 2025-09-02 RX ORDER — FAMOTIDINE 20 MG/1
20 TABLET, FILM COATED ORAL 2 TIMES DAILY
Status: DISCONTINUED | OUTPATIENT
Start: 2025-09-02 | End: 2025-09-06 | Stop reason: HOSPADM

## 2025-09-02 RX ORDER — LOSARTAN POTASSIUM 100 MG/1
100 TABLET ORAL DAILY
Status: DISCONTINUED | OUTPATIENT
Start: 2025-09-02 | End: 2025-09-06 | Stop reason: HOSPADM

## 2025-09-02 RX ORDER — ACETAMINOPHEN 160 MG/5ML
650 SOLUTION ORAL EVERY 4 HOURS PRN
Status: DISCONTINUED | OUTPATIENT
Start: 2025-09-02 | End: 2025-09-06 | Stop reason: HOSPADM

## 2025-09-02 RX ADMIN — ROSUVASTATIN 10 MG: 10 TABLET, FILM COATED ORAL at 10:08

## 2025-09-02 RX ADMIN — CEFTRIAXONE 1 G: 1 INJECTION, SOLUTION INTRAVENOUS at 17:36

## 2025-09-02 RX ADMIN — NYSTATIN 1 APPLICATION: 100000 POWDER TOPICAL at 10:09

## 2025-09-02 RX ADMIN — PROPRANOLOL HYDROCHLORIDE 120 MG: 60 CAPSULE, EXTENDED RELEASE ORAL at 20:04

## 2025-09-02 RX ADMIN — FAMOTIDINE 20 MG: 20 TABLET, FILM COATED ORAL at 06:09

## 2025-09-02 RX ADMIN — PANTOPRAZOLE SODIUM 40 MG: 40 TABLET, DELAYED RELEASE ORAL at 10:09

## 2025-09-02 RX ADMIN — FAMOTIDINE 20 MG: 20 TABLET, FILM COATED ORAL at 20:04

## 2025-09-02 RX ADMIN — SODIUM CHLORIDE 60 ML/HR: 900 INJECTION, SOLUTION INTRAVENOUS at 02:04

## 2025-09-02 RX ADMIN — OLANZAPINE 5 MG: 10 INJECTION, POWDER, FOR SOLUTION INTRAMUSCULAR at 23:38

## 2025-09-02 RX ADMIN — NYSTATIN 1 APPLICATION: 100000 POWDER TOPICAL at 20:08

## 2025-09-02 RX ADMIN — FOLIC ACID 1 MG: 1 TABLET ORAL at 10:08

## 2025-09-02 RX ADMIN — DIPHENHYDRAMINE HYDROCHLORIDE 25 MG: 50 INJECTION, SOLUTION INTRAMUSCULAR; INTRAVENOUS at 23:59

## 2025-09-02 RX ADMIN — FAMOTIDINE 20 MG: 10 INJECTION, SOLUTION INTRAVENOUS at 08:39

## 2025-09-02 RX ADMIN — POLYETHYLENE GLYCOL 3350 17 G: 17 POWDER, FOR SOLUTION ORAL at 10:08

## 2025-09-02 RX ADMIN — SODIUM CHLORIDE 60 ML/HR: 900 INJECTION, SOLUTION INTRAVENOUS at 17:41

## 2025-09-02 RX ADMIN — PROPRANOLOL HYDROCHLORIDE 120 MG: 60 CAPSULE, EXTENDED RELEASE ORAL at 06:09

## 2025-09-02 SDOH — SOCIAL STABILITY: SOCIAL INSECURITY: DOES ANYONE TRY TO KEEP YOU FROM HAVING/CONTACTING OTHER FRIENDS OR DOING THINGS OUTSIDE YOUR HOME?: NO

## 2025-09-02 SDOH — SOCIAL STABILITY: SOCIAL INSECURITY: WERE YOU ABLE TO COMPLETE ALL THE BEHAVIORAL HEALTH SCREENINGS?: YES

## 2025-09-02 SDOH — SOCIAL STABILITY: SOCIAL INSECURITY: ABUSE: ADULT

## 2025-09-02 SDOH — ECONOMIC STABILITY: FOOD INSECURITY: WITHIN THE PAST 12 MONTHS, THE FOOD YOU BOUGHT JUST DIDN'T LAST AND YOU DIDN'T HAVE MONEY TO GET MORE.: NEVER TRUE

## 2025-09-02 SDOH — SOCIAL STABILITY: SOCIAL INSECURITY: HAS ANYONE EVER THREATENED TO HURT YOUR FAMILY OR YOUR PETS?: NO

## 2025-09-02 SDOH — SOCIAL STABILITY: SOCIAL INSECURITY: DO YOU FEEL ANYONE HAS EXPLOITED OR TAKEN ADVANTAGE OF YOU FINANCIALLY OR OF YOUR PERSONAL PROPERTY?: NO

## 2025-09-02 SDOH — SOCIAL STABILITY: SOCIAL INSECURITY: ARE YOU OR HAVE YOU BEEN THREATENED OR ABUSED PHYSICALLY, EMOTIONALLY, OR SEXUALLY BY ANYONE?: NO

## 2025-09-02 SDOH — SOCIAL STABILITY: SOCIAL INSECURITY: HAVE YOU HAD THOUGHTS OF HARMING ANYONE ELSE?: NO

## 2025-09-02 SDOH — SOCIAL STABILITY: SOCIAL INSECURITY: WITHIN THE LAST YEAR, HAVE YOU BEEN AFRAID OF YOUR PARTNER OR EX-PARTNER?: NO

## 2025-09-02 SDOH — SOCIAL STABILITY: SOCIAL INSECURITY: WITHIN THE LAST YEAR, HAVE YOU BEEN HUMILIATED OR EMOTIONALLY ABUSED IN OTHER WAYS BY YOUR PARTNER OR EX-PARTNER?: NO

## 2025-09-02 SDOH — ECONOMIC STABILITY: INCOME INSECURITY: IN THE PAST 12 MONTHS HAS THE ELECTRIC, GAS, OIL, OR WATER COMPANY THREATENED TO SHUT OFF SERVICES IN YOUR HOME?: NO

## 2025-09-02 SDOH — SOCIAL STABILITY: SOCIAL INSECURITY: DO YOU FEEL UNSAFE GOING BACK TO THE PLACE WHERE YOU ARE LIVING?: NO

## 2025-09-02 SDOH — ECONOMIC STABILITY: FOOD INSECURITY: WITHIN THE PAST 12 MONTHS, YOU WORRIED THAT YOUR FOOD WOULD RUN OUT BEFORE YOU GOT THE MONEY TO BUY MORE.: NEVER TRUE

## 2025-09-02 SDOH — SOCIAL STABILITY: SOCIAL INSECURITY: ARE THERE ANY APPARENT SIGNS OF INJURIES/BEHAVIORS THAT COULD BE RELATED TO ABUSE/NEGLECT?: NO

## 2025-09-02 SDOH — SOCIAL STABILITY: SOCIAL INSECURITY: HAVE YOU HAD ANY THOUGHTS OF HARMING ANYONE ELSE?: NO

## 2025-09-02 ASSESSMENT — COGNITIVE AND FUNCTIONAL STATUS - GENERAL
DAILY ACTIVITIY SCORE: 19
STANDING UP FROM CHAIR USING ARMS: A LITTLE
TURNING FROM BACK TO SIDE WHILE IN FLAT BAD: A LITTLE
EATING MEALS: A LITTLE
MOVING FROM LYING ON BACK TO SITTING ON SIDE OF FLAT BED WITH BEDRAILS: A LITTLE
CLIMB 3 TO 5 STEPS WITH RAILING: A LITTLE
MOBILITY SCORE: 14
PERSONAL GROOMING: A LITTLE
STANDING UP FROM CHAIR USING ARMS: A LOT
DRESSING REGULAR UPPER BODY CLOTHING: A LITTLE
TURNING FROM BACK TO SIDE WHILE IN FLAT BAD: A LITTLE
DRESSING REGULAR LOWER BODY CLOTHING: A LITTLE
MOVING TO AND FROM BED TO CHAIR: A LITTLE
TOILETING: A LITTLE
TOILETING: A LOT
PERSONAL GROOMING: A LITTLE
WALKING IN HOSPITAL ROOM: A LITTLE
HELP NEEDED FOR BATHING: A LITTLE
MOVING FROM LYING ON BACK TO SITTING ON SIDE OF FLAT BED WITH BEDRAILS: A LITTLE
TURNING FROM BACK TO SIDE WHILE IN FLAT BAD: A LITTLE
DAILY ACTIVITIY SCORE: 23
MOVING TO AND FROM BED TO CHAIR: A LITTLE
TOILETING: A LITTLE
MOBILITY SCORE: 18
MOVING TO AND FROM BED TO CHAIR: A LITTLE
CLIMB 3 TO 5 STEPS WITH RAILING: TOTAL
CLIMB 3 TO 5 STEPS WITH RAILING: TOTAL
DRESSING REGULAR UPPER BODY CLOTHING: A LITTLE
DRESSING REGULAR LOWER BODY CLOTHING: A LOT
WALKING IN HOSPITAL ROOM: A LITTLE
DAILY ACTIVITIY SCORE: 15
MOVING FROM LYING ON BACK TO SITTING ON SIDE OF FLAT BED WITH BEDRAILS: A LITTLE
PATIENT BASELINE BEDBOUND: NO
WALKING IN HOSPITAL ROOM: A LOT
HELP NEEDED FOR BATHING: A LOT
MOBILITY SCORE: 15
STANDING UP FROM CHAIR USING ARMS: A LOT

## 2025-09-02 ASSESSMENT — ACTIVITIES OF DAILY LIVING (ADL)
ASSISTIVE_DEVICE: WALKER;WHEELCHAIR
ADL_ASSISTANCE: INDEPENDENT
TOILETING: NEEDS ASSISTANCE
WALKS IN HOME: NEEDS ASSISTANCE
JUDGMENT_ADEQUATE_SAFELY_COMPLETE_DAILY_ACTIVITIES: YES
FEEDING YOURSELF: INDEPENDENT
BATHING_ASSISTANCE: MAXIMAL
ADEQUATE_TO_COMPLETE_ADL: YES
HEARING - LEFT EAR: FUNCTIONAL
HOME_MANAGEMENT_TIME_ENTRY: 14
LACK_OF_TRANSPORTATION: NO
PATIENT'S MEMORY ADEQUATE TO SAFELY COMPLETE DAILY ACTIVITIES?: YES
HEARING - RIGHT EAR: FUNCTIONAL
BATHING: NEEDS ASSISTANCE
DRESSING YOURSELF: INDEPENDENT
GROOMING: INDEPENDENT
ADL_ASSISTANCE: INDEPENDENT

## 2025-09-02 ASSESSMENT — PAIN - FUNCTIONAL ASSESSMENT
PAIN_FUNCTIONAL_ASSESSMENT: 0-10

## 2025-09-02 ASSESSMENT — PAIN SCALES - GENERAL
PAINLEVEL_OUTOF10: 0 - NO PAIN

## 2025-09-02 ASSESSMENT — PATIENT HEALTH QUESTIONNAIRE - PHQ9
SUM OF ALL RESPONSES TO PHQ9 QUESTIONS 1 & 2: 1
2. FEELING DOWN, DEPRESSED OR HOPELESS: SEVERAL DAYS
1. LITTLE INTEREST OR PLEASURE IN DOING THINGS: NOT AT ALL

## 2025-09-02 ASSESSMENT — LIFESTYLE VARIABLES
HOW MANY STANDARD DRINKS CONTAINING ALCOHOL DO YOU HAVE ON A TYPICAL DAY: PATIENT DOES NOT DRINK
AUDIT-C TOTAL SCORE: 0
SUBSTANCE_ABUSE_PAST_12_MONTHS: NO
PRESCIPTION_ABUSE_PAST_12_MONTHS: NO
AUDIT-C TOTAL SCORE: 0
HOW OFTEN DO YOU HAVE A DRINK CONTAINING ALCOHOL: NEVER
HOW OFTEN DO YOU HAVE 6 OR MORE DRINKS ON ONE OCCASION: NEVER
SKIP TO QUESTIONS 9-10: 1

## 2025-09-02 ASSESSMENT — ENCOUNTER SYMPTOMS
FEVER: 0
CHILLS: 0

## 2025-09-03 ENCOUNTER — ANESTHESIA EVENT (OUTPATIENT)
Dept: OPERATING ROOM | Facility: HOSPITAL | Age: 80
End: 2025-09-03
Payer: MEDICARE

## 2025-09-03 ENCOUNTER — APPOINTMENT (OUTPATIENT)
Dept: RADIOLOGY | Facility: HOSPITAL | Age: 80
End: 2025-09-03
Payer: MEDICARE

## 2025-09-03 ENCOUNTER — ANESTHESIA (OUTPATIENT)
Dept: OPERATING ROOM | Facility: HOSPITAL | Age: 80
End: 2025-09-03
Payer: MEDICARE

## 2025-09-03 LAB
ANION GAP SERPL CALCULATED.3IONS-SCNC: 9 MMOL/L (ref 10–20)
BACTERIA UR CULT: NORMAL
BUN SERPL-MCNC: 12 MG/DL (ref 6–23)
CALCIUM SERPL-MCNC: 8 MG/DL (ref 8.6–10.3)
CHLORIDE SERPL-SCNC: 111 MMOL/L (ref 98–107)
CO2 SERPL-SCNC: 20 MMOL/L (ref 21–32)
CREAT SERPL-MCNC: 0.75 MG/DL (ref 0.5–1.05)
EGFRCR SERPLBLD CKD-EPI 2021: 82 ML/MIN/1.73M*2
ERYTHROCYTE [DISTWIDTH] IN BLOOD BY AUTOMATED COUNT: 15.9 % (ref 11.5–14.5)
GLUCOSE SERPL-MCNC: 91 MG/DL (ref 74–99)
HCT VFR BLD AUTO: 26.2 % (ref 36–46)
HGB BLD-MCNC: 8.4 G/DL (ref 12–16)
MCH RBC QN AUTO: 31 PG (ref 26–34)
MCHC RBC AUTO-ENTMCNC: 32.1 G/DL (ref 32–36)
MCV RBC AUTO: 97 FL (ref 80–100)
NRBC BLD-RTO: 0.2 /100 WBCS (ref 0–0)
PLATELET # BLD AUTO: 214 X10*3/UL (ref 150–450)
POTASSIUM SERPL-SCNC: 3.7 MMOL/L (ref 3.5–5.3)
RBC # BLD AUTO: 2.71 X10*6/UL (ref 4–5.2)
SODIUM SERPL-SCNC: 136 MMOL/L (ref 136–145)
WBC # BLD AUTO: 8.7 X10*3/UL (ref 4.4–11.3)

## 2025-09-03 PROCEDURE — C1769 GUIDE WIRE: HCPCS | Performed by: UROLOGY

## 2025-09-03 PROCEDURE — C2617 STENT, NON-COR, TEM W/O DEL: HCPCS | Performed by: UROLOGY

## 2025-09-03 PROCEDURE — 2780000003 HC OR 278 NO HCPCS: Performed by: UROLOGY

## 2025-09-03 PROCEDURE — 2720000007 HC OR 272 NO HCPCS: Performed by: UROLOGY

## 2025-09-03 PROCEDURE — 7100000002 HC RECOVERY ROOM TIME - EACH INCREMENTAL 1 MINUTE: Performed by: UROLOGY

## 2025-09-03 PROCEDURE — 3700000002 HC GENERAL ANESTHESIA TIME - EACH INCREMENTAL 1 MINUTE: Performed by: UROLOGY

## 2025-09-03 PROCEDURE — 82365 CALCULUS SPECTROSCOPY: CPT | Performed by: UROLOGY

## 2025-09-03 PROCEDURE — 85027 COMPLETE CBC AUTOMATED: CPT | Performed by: STUDENT IN AN ORGANIZED HEALTH CARE EDUCATION/TRAINING PROGRAM

## 2025-09-03 PROCEDURE — 2500000004 HC RX 250 GENERAL PHARMACY W/ HCPCS (ALT 636 FOR OP/ED): Performed by: NURSE ANESTHETIST, CERTIFIED REGISTERED

## 2025-09-03 PROCEDURE — 1200000002 HC GENERAL ROOM WITH TELEMETRY DAILY

## 2025-09-03 PROCEDURE — 2500000001 HC RX 250 WO HCPCS SELF ADMINISTERED DRUGS (ALT 637 FOR MEDICARE OP): Performed by: UROLOGY

## 2025-09-03 PROCEDURE — 2500000004 HC RX 250 GENERAL PHARMACY W/ HCPCS (ALT 636 FOR OP/ED): Performed by: NURSE PRACTITIONER

## 2025-09-03 PROCEDURE — 80048 BASIC METABOLIC PNL TOTAL CA: CPT | Performed by: STUDENT IN AN ORGANIZED HEALTH CARE EDUCATION/TRAINING PROGRAM

## 2025-09-03 PROCEDURE — 7100000001 HC RECOVERY ROOM TIME - INITIAL BASE CHARGE: Performed by: UROLOGY

## 2025-09-03 PROCEDURE — 99232 SBSQ HOSP IP/OBS MODERATE 35: CPT | Performed by: NURSE PRACTITIONER

## 2025-09-03 PROCEDURE — 3700000001 HC GENERAL ANESTHESIA TIME - INITIAL BASE CHARGE: Performed by: UROLOGY

## 2025-09-03 PROCEDURE — 2500000004 HC RX 250 GENERAL PHARMACY W/ HCPCS (ALT 636 FOR OP/ED): Performed by: UROLOGY

## 2025-09-03 PROCEDURE — 2500000004 HC RX 250 GENERAL PHARMACY W/ HCPCS (ALT 636 FOR OP/ED): Performed by: STUDENT IN AN ORGANIZED HEALTH CARE EDUCATION/TRAINING PROGRAM

## 2025-09-03 PROCEDURE — 2500000002 HC RX 250 W HCPCS SELF ADMINISTERED DRUGS (ALT 637 FOR MEDICARE OP, ALT 636 FOR OP/ED): Performed by: UROLOGY

## 2025-09-03 PROCEDURE — C1758 CATHETER, URETERAL: HCPCS | Performed by: UROLOGY

## 2025-09-03 PROCEDURE — 3600000008 HC OR TIME - EACH INCREMENTAL 1 MINUTE - PROCEDURE LEVEL THREE: Performed by: UROLOGY

## 2025-09-03 PROCEDURE — 3600000003 HC OR TIME - INITIAL BASE CHARGE - PROCEDURE LEVEL THREE: Performed by: UROLOGY

## 2025-09-03 PROCEDURE — 2500000004 HC RX 250 GENERAL PHARMACY W/ HCPCS (ALT 636 FOR OP/ED): Performed by: REGISTERED NURSE

## 2025-09-03 PROCEDURE — 2500000005 HC RX 250 GENERAL PHARMACY W/O HCPCS: Performed by: NURSE ANESTHETIST, CERTIFIED REGISTERED

## 2025-09-03 DEVICE — STENT, INLAY OPTIMA, 6FR X 26CM: Type: IMPLANTABLE DEVICE | Site: URETER | Status: FUNCTIONAL

## 2025-09-03 RX ORDER — SODIUM CHLORIDE, SODIUM LACTATE, POTASSIUM CHLORIDE, CALCIUM CHLORIDE 600; 310; 30; 20 MG/100ML; MG/100ML; MG/100ML; MG/100ML
100 INJECTION, SOLUTION INTRAVENOUS CONTINUOUS
Status: DISCONTINUED | OUTPATIENT
Start: 2025-09-03 | End: 2025-09-03 | Stop reason: HOSPADM

## 2025-09-03 RX ORDER — HYDROMORPHONE HYDROCHLORIDE 0.2 MG/ML
0.2 INJECTION INTRAMUSCULAR; INTRAVENOUS; SUBCUTANEOUS EVERY 5 MIN PRN
Status: DISCONTINUED | OUTPATIENT
Start: 2025-09-03 | End: 2025-09-03 | Stop reason: HOSPADM

## 2025-09-03 RX ORDER — ACETAMINOPHEN 10 MG/ML
INJECTION, SOLUTION INTRAVENOUS AS NEEDED
Status: DISCONTINUED | OUTPATIENT
Start: 2025-09-03 | End: 2025-09-03

## 2025-09-03 RX ORDER — FENTANYL CITRATE 50 UG/ML
12.5 INJECTION, SOLUTION INTRAMUSCULAR; INTRAVENOUS EVERY 5 MIN PRN
Status: DISCONTINUED | OUTPATIENT
Start: 2025-09-03 | End: 2025-09-03 | Stop reason: HOSPADM

## 2025-09-03 RX ORDER — KETOROLAC TROMETHAMINE 30 MG/ML
INJECTION, SOLUTION INTRAMUSCULAR; INTRAVENOUS AS NEEDED
Status: DISCONTINUED | OUTPATIENT
Start: 2025-09-03 | End: 2025-09-03

## 2025-09-03 RX ORDER — NORETHINDRONE AND ETHINYL ESTRADIOL 0.5-0.035
KIT ORAL AS NEEDED
Status: DISCONTINUED | OUTPATIENT
Start: 2025-09-03 | End: 2025-09-03

## 2025-09-03 RX ORDER — LIDOCAINE HYDROCHLORIDE 20 MG/ML
INJECTION, SOLUTION EPIDURAL; INFILTRATION; INTRACAUDAL; PERINEURAL AS NEEDED
Status: DISCONTINUED | OUTPATIENT
Start: 2025-09-03 | End: 2025-09-03

## 2025-09-03 RX ORDER — LIDOCAINE HYDROCHLORIDE 10 MG/ML
0.1 INJECTION, SOLUTION INFILTRATION; PERINEURAL ONCE
Status: DISCONTINUED | OUTPATIENT
Start: 2025-09-03 | End: 2025-09-03 | Stop reason: HOSPADM

## 2025-09-03 RX ORDER — PROPOFOL 10 MG/ML
INJECTION, EMULSION INTRAVENOUS AS NEEDED
Status: DISCONTINUED | OUTPATIENT
Start: 2025-09-03 | End: 2025-09-03

## 2025-09-03 RX ORDER — FENTANYL CITRATE 50 UG/ML
INJECTION, SOLUTION INTRAMUSCULAR; INTRAVENOUS AS NEEDED
Status: DISCONTINUED | OUTPATIENT
Start: 2025-09-03 | End: 2025-09-03

## 2025-09-03 RX ORDER — PROCHLORPERAZINE EDISYLATE 5 MG/ML
5 INJECTION INTRAMUSCULAR; INTRAVENOUS ONCE AS NEEDED
Status: DISCONTINUED | OUTPATIENT
Start: 2025-09-03 | End: 2025-09-03 | Stop reason: HOSPADM

## 2025-09-03 RX ORDER — ONDANSETRON HYDROCHLORIDE 2 MG/ML
4 INJECTION, SOLUTION INTRAVENOUS ONCE AS NEEDED
Status: DISCONTINUED | OUTPATIENT
Start: 2025-09-03 | End: 2025-09-03 | Stop reason: HOSPADM

## 2025-09-03 RX ORDER — SODIUM CHLORIDE 9 MG/ML
60 INJECTION, SOLUTION INTRAVENOUS CONTINUOUS
Status: DISCONTINUED | OUTPATIENT
Start: 2025-09-03 | End: 2025-09-04

## 2025-09-03 RX ORDER — ONDANSETRON HYDROCHLORIDE 2 MG/ML
INJECTION, SOLUTION INTRAVENOUS AS NEEDED
Status: DISCONTINUED | OUTPATIENT
Start: 2025-09-03 | End: 2025-09-03

## 2025-09-03 RX ORDER — OXYCODONE HYDROCHLORIDE 5 MG/1
5 TABLET ORAL EVERY 4 HOURS PRN
Status: DISCONTINUED | OUTPATIENT
Start: 2025-09-03 | End: 2025-09-03 | Stop reason: HOSPADM

## 2025-09-03 RX ADMIN — EPHEDRINE SULFATE 5 MG: 50 INJECTION, SOLUTION INTRAVENOUS at 14:16

## 2025-09-03 RX ADMIN — NYSTATIN 1 APPLICATION: 100000 POWDER TOPICAL at 10:59

## 2025-09-03 RX ADMIN — FAMOTIDINE 20 MG: 20 TABLET, FILM COATED ORAL at 20:15

## 2025-09-03 RX ADMIN — SODIUM CHLORIDE 60 ML/HR: 900 INJECTION, SOLUTION INTRAVENOUS at 08:52

## 2025-09-03 RX ADMIN — CEFTRIAXONE 1 G: 1 INJECTION, SOLUTION INTRAVENOUS at 17:23

## 2025-09-03 RX ADMIN — PROPOFOL 80 MG: 10 INJECTION, EMULSION INTRAVENOUS at 14:16

## 2025-09-03 RX ADMIN — KETOROLAC TROMETHAMINE 15 MG: 30 INJECTION, SOLUTION INTRAMUSCULAR at 15:24

## 2025-09-03 RX ADMIN — ONDANSETRON 4 MG: 2 INJECTION, SOLUTION INTRAMUSCULAR; INTRAVENOUS at 15:20

## 2025-09-03 RX ADMIN — FENTANYL CITRATE 100 MCG: 50 INJECTION, SOLUTION INTRAMUSCULAR; INTRAVENOUS at 14:16

## 2025-09-03 RX ADMIN — EPHEDRINE SULFATE 10 MG: 50 INJECTION, SOLUTION INTRAVENOUS at 14:21

## 2025-09-03 RX ADMIN — PROPRANOLOL HYDROCHLORIDE 120 MG: 60 CAPSULE, EXTENDED RELEASE ORAL at 20:15

## 2025-09-03 RX ADMIN — NYSTATIN 1 APPLICATION: 100000 POWDER TOPICAL at 20:16

## 2025-09-03 RX ADMIN — FAMOTIDINE 20 MG: 10 INJECTION, SOLUTION INTRAVENOUS at 08:53

## 2025-09-03 RX ADMIN — ACETAMINOPHEN 1000 MG: 10 INJECTION INTRAVENOUS at 14:45

## 2025-09-03 RX ADMIN — SODIUM CHLORIDE, POTASSIUM CHLORIDE, SODIUM LACTATE AND CALCIUM CHLORIDE: 600; 310; 30; 20 INJECTION, SOLUTION INTRAVENOUS at 14:03

## 2025-09-03 RX ADMIN — LIDOCAINE HYDROCHLORIDE 50 MG: 20 INJECTION, SOLUTION EPIDURAL; INFILTRATION; INTRACAUDAL; PERINEURAL at 14:16

## 2025-09-03 RX ADMIN — SODIUM CHLORIDE 60 ML/HR: 900 INJECTION, SOLUTION INTRAVENOUS at 17:01

## 2025-09-03 SDOH — HEALTH STABILITY: MENTAL HEALTH: CURRENT SMOKER: 0

## 2025-09-03 ASSESSMENT — COGNITIVE AND FUNCTIONAL STATUS - GENERAL
MOBILITY SCORE: 17
TURNING FROM BACK TO SIDE WHILE IN FLAT BAD: A LITTLE
CLIMB 3 TO 5 STEPS WITH RAILING: A LOT
DRESSING REGULAR UPPER BODY CLOTHING: A LITTLE
HELP NEEDED FOR BATHING: A LITTLE
MOVING TO AND FROM BED TO CHAIR: A LITTLE
TOILETING: A LITTLE
STANDING UP FROM CHAIR USING ARMS: A LITTLE
DRESSING REGULAR LOWER BODY CLOTHING: A LITTLE
MOVING FROM LYING ON BACK TO SITTING ON SIDE OF FLAT BED WITH BEDRAILS: A LITTLE
WALKING IN HOSPITAL ROOM: A LITTLE
DAILY ACTIVITIY SCORE: 19
PERSONAL GROOMING: A LITTLE

## 2025-09-03 ASSESSMENT — PAIN - FUNCTIONAL ASSESSMENT
PAIN_FUNCTIONAL_ASSESSMENT: 0-10
PAIN_FUNCTIONAL_ASSESSMENT: FLACC (FACE, LEGS, ACTIVITY, CRY, CONSOLABILITY)
PAIN_FUNCTIONAL_ASSESSMENT: 0-10

## 2025-09-03 ASSESSMENT — PAIN SCALES - GENERAL
PAINLEVEL_OUTOF10: 0 - NO PAIN

## 2025-09-04 ENCOUNTER — APPOINTMENT (OUTPATIENT)
Dept: SURGERY | Facility: CLINIC | Age: 80
End: 2025-09-04
Payer: MEDICARE

## 2025-09-04 LAB
ANION GAP SERPL CALCULATED.3IONS-SCNC: 11 MMOL/L (ref 10–20)
BUN SERPL-MCNC: 14 MG/DL (ref 6–23)
CALCIUM SERPL-MCNC: 7.6 MG/DL (ref 8.6–10.3)
CHLORIDE SERPL-SCNC: 110 MMOL/L (ref 98–107)
CO2 SERPL-SCNC: 21 MMOL/L (ref 21–32)
CREAT SERPL-MCNC: 0.64 MG/DL (ref 0.5–1.05)
EGFRCR SERPLBLD CKD-EPI 2021: >90 ML/MIN/1.73M*2
ERYTHROCYTE [DISTWIDTH] IN BLOOD BY AUTOMATED COUNT: 15.9 % (ref 11.5–14.5)
GLUCOSE SERPL-MCNC: 82 MG/DL (ref 74–99)
HCT VFR BLD AUTO: 26.9 % (ref 36–46)
HGB BLD-MCNC: 8.5 G/DL (ref 12–16)
MCH RBC QN AUTO: 30.7 PG (ref 26–34)
MCHC RBC AUTO-ENTMCNC: 31.6 G/DL (ref 32–36)
MCV RBC AUTO: 97 FL (ref 80–100)
NRBC BLD-RTO: 0 /100 WBCS (ref 0–0)
PLATELET # BLD AUTO: 204 X10*3/UL (ref 150–450)
POTASSIUM SERPL-SCNC: 3.5 MMOL/L (ref 3.5–5.3)
RBC # BLD AUTO: 2.77 X10*6/UL (ref 4–5.2)
SODIUM SERPL-SCNC: 138 MMOL/L (ref 136–145)
STAPHYLOCOCCUS SPEC CULT: ABNORMAL
WBC # BLD AUTO: 8.5 X10*3/UL (ref 4.4–11.3)

## 2025-09-04 PROCEDURE — 2500000001 HC RX 250 WO HCPCS SELF ADMINISTERED DRUGS (ALT 637 FOR MEDICARE OP): Performed by: UROLOGY

## 2025-09-04 PROCEDURE — 97535 SELF CARE MNGMENT TRAINING: CPT | Mod: GO,CO

## 2025-09-04 PROCEDURE — 99232 SBSQ HOSP IP/OBS MODERATE 35: CPT | Performed by: NURSE PRACTITIONER

## 2025-09-04 PROCEDURE — 2500000002 HC RX 250 W HCPCS SELF ADMINISTERED DRUGS (ALT 637 FOR MEDICARE OP, ALT 636 FOR OP/ED): Performed by: UROLOGY

## 2025-09-04 PROCEDURE — 99222 1ST HOSP IP/OBS MODERATE 55: CPT | Performed by: SURGERY

## 2025-09-04 PROCEDURE — 51701 INSERT BLADDER CATHETER: CPT

## 2025-09-04 PROCEDURE — 2500000004 HC RX 250 GENERAL PHARMACY W/ HCPCS (ALT 636 FOR OP/ED): Performed by: UROLOGY

## 2025-09-04 PROCEDURE — 80048 BASIC METABOLIC PNL TOTAL CA: CPT | Performed by: UROLOGY

## 2025-09-04 PROCEDURE — 97110 THERAPEUTIC EXERCISES: CPT | Mod: GO,CO

## 2025-09-04 PROCEDURE — 97116 GAIT TRAINING THERAPY: CPT | Mod: GP

## 2025-09-04 PROCEDURE — 97530 THERAPEUTIC ACTIVITIES: CPT | Mod: GP

## 2025-09-04 PROCEDURE — 2500000005 HC RX 250 GENERAL PHARMACY W/O HCPCS: Performed by: NURSE PRACTITIONER

## 2025-09-04 PROCEDURE — 85027 COMPLETE CBC AUTOMATED: CPT | Performed by: UROLOGY

## 2025-09-04 PROCEDURE — 1200000002 HC GENERAL ROOM WITH TELEMETRY DAILY

## 2025-09-04 PROCEDURE — 97110 THERAPEUTIC EXERCISES: CPT | Mod: GP

## 2025-09-04 RX ORDER — MUPIROCIN 20 MG/G
OINTMENT TOPICAL 2 TIMES DAILY
Status: DISCONTINUED | OUTPATIENT
Start: 2025-09-04 | End: 2025-09-06 | Stop reason: HOSPADM

## 2025-09-04 RX ADMIN — FAMOTIDINE 20 MG: 20 TABLET, FILM COATED ORAL at 09:08

## 2025-09-04 RX ADMIN — FAMOTIDINE 20 MG: 20 TABLET, FILM COATED ORAL at 20:44

## 2025-09-04 RX ADMIN — PANTOPRAZOLE SODIUM 40 MG: 40 TABLET, DELAYED RELEASE ORAL at 05:51

## 2025-09-04 RX ADMIN — MUPIROCIN: 20 OINTMENT TOPICAL at 20:44

## 2025-09-04 RX ADMIN — CEFTRIAXONE 1 G: 1 INJECTION, SOLUTION INTRAVENOUS at 16:39

## 2025-09-04 RX ADMIN — ROSUVASTATIN 10 MG: 10 TABLET, FILM COATED ORAL at 09:08

## 2025-09-04 RX ADMIN — FOLIC ACID 1 MG: 1 TABLET ORAL at 09:08

## 2025-09-04 RX ADMIN — POLYETHYLENE GLYCOL 3350 17 G: 17 POWDER, FOR SOLUTION ORAL at 09:08

## 2025-09-04 RX ADMIN — PROPRANOLOL HYDROCHLORIDE 120 MG: 60 CAPSULE, EXTENDED RELEASE ORAL at 20:44

## 2025-09-04 ASSESSMENT — PAIN SCALES - GENERAL
PAINLEVEL_OUTOF10: 0 - NO PAIN

## 2025-09-04 ASSESSMENT — ENCOUNTER SYMPTOMS
WEAKNESS: 1
GASTROINTESTINAL NEGATIVE: 1
CARDIOVASCULAR NEGATIVE: 1
RESPIRATORY NEGATIVE: 1
ENDOCRINE NEGATIVE: 1
PSYCHIATRIC NEGATIVE: 1
EYES NEGATIVE: 1
FATIGUE: 1
MUSCULOSKELETAL NEGATIVE: 1

## 2025-09-04 ASSESSMENT — COGNITIVE AND FUNCTIONAL STATUS - GENERAL
STANDING UP FROM CHAIR USING ARMS: A LITTLE
TURNING FROM BACK TO SIDE WHILE IN FLAT BAD: A LITTLE
PERSONAL GROOMING: A LITTLE
EATING MEALS: A LITTLE
CLIMB 3 TO 5 STEPS WITH RAILING: TOTAL
DAILY ACTIVITIY SCORE: 15
MOVING FROM LYING ON BACK TO SITTING ON SIDE OF FLAT BED WITH BEDRAILS: A LITTLE
WALKING IN HOSPITAL ROOM: A LITTLE
TOILETING: A LITTLE
MOBILITY SCORE: 15
MOBILITY SCORE: 18
TURNING FROM BACK TO SIDE WHILE IN FLAT BAD: A LITTLE
WALKING IN HOSPITAL ROOM: A LITTLE
MOVING TO AND FROM BED TO CHAIR: A LITTLE
DRESSING REGULAR LOWER BODY CLOTHING: A LOT
HELP NEEDED FOR BATHING: A LOT
MOVING TO AND FROM BED TO CHAIR: A LITTLE
DRESSING REGULAR UPPER BODY CLOTHING: A LITTLE
TOILETING: A LOT
STANDING UP FROM CHAIR USING ARMS: A LOT
CLIMB 3 TO 5 STEPS WITH RAILING: A LITTLE
DAILY ACTIVITIY SCORE: 19
DRESSING REGULAR UPPER BODY CLOTHING: A LITTLE
DRESSING REGULAR LOWER BODY CLOTHING: A LITTLE
MOVING FROM LYING ON BACK TO SITTING ON SIDE OF FLAT BED WITH BEDRAILS: A LITTLE
HELP NEEDED FOR BATHING: A LITTLE
PERSONAL GROOMING: A LITTLE

## 2025-09-04 ASSESSMENT — PAIN - FUNCTIONAL ASSESSMENT
PAIN_FUNCTIONAL_ASSESSMENT: 0-10

## 2025-09-04 ASSESSMENT — ACTIVITIES OF DAILY LIVING (ADL): HOME_MANAGEMENT_TIME_ENTRY: 28

## 2025-09-05 LAB
ANION GAP SERPL CALCULATED.3IONS-SCNC: 7 MMOL/L (ref 10–20)
BUN SERPL-MCNC: 15 MG/DL (ref 6–23)
CALCIUM SERPL-MCNC: 7.7 MG/DL (ref 8.6–10.3)
CHLORIDE SERPL-SCNC: 110 MMOL/L (ref 98–107)
CO2 SERPL-SCNC: 24 MMOL/L (ref 21–32)
CREAT SERPL-MCNC: 0.52 MG/DL (ref 0.5–1.05)
EGFRCR SERPLBLD CKD-EPI 2021: >90 ML/MIN/1.73M*2
ERYTHROCYTE [DISTWIDTH] IN BLOOD BY AUTOMATED COUNT: 16.1 % (ref 11.5–14.5)
GLUCOSE SERPL-MCNC: 98 MG/DL (ref 74–99)
HCT VFR BLD AUTO: 25.9 % (ref 36–46)
HGB BLD-MCNC: 8.2 G/DL (ref 12–16)
MCH RBC QN AUTO: 30.5 PG (ref 26–34)
MCHC RBC AUTO-ENTMCNC: 31.7 G/DL (ref 32–36)
MCV RBC AUTO: 96 FL (ref 80–100)
NRBC BLD-RTO: 0.3 /100 WBCS (ref 0–0)
PLATELET # BLD AUTO: 196 X10*3/UL (ref 150–450)
POTASSIUM SERPL-SCNC: 3.3 MMOL/L (ref 3.5–5.3)
RBC # BLD AUTO: 2.69 X10*6/UL (ref 4–5.2)
SODIUM SERPL-SCNC: 138 MMOL/L (ref 136–145)
WBC # BLD AUTO: 9 X10*3/UL (ref 4.4–11.3)

## 2025-09-05 PROCEDURE — 2500000002 HC RX 250 W HCPCS SELF ADMINISTERED DRUGS (ALT 637 FOR MEDICARE OP, ALT 636 FOR OP/ED): Performed by: NURSE PRACTITIONER

## 2025-09-05 PROCEDURE — 2500000004 HC RX 250 GENERAL PHARMACY W/ HCPCS (ALT 636 FOR OP/ED): Performed by: NURSE PRACTITIONER

## 2025-09-05 PROCEDURE — 2500000001 HC RX 250 WO HCPCS SELF ADMINISTERED DRUGS (ALT 637 FOR MEDICARE OP): Performed by: UROLOGY

## 2025-09-05 PROCEDURE — 80048 BASIC METABOLIC PNL TOTAL CA: CPT | Performed by: UROLOGY

## 2025-09-05 PROCEDURE — 2500000004 HC RX 250 GENERAL PHARMACY W/ HCPCS (ALT 636 FOR OP/ED): Performed by: UROLOGY

## 2025-09-05 PROCEDURE — 51701 INSERT BLADDER CATHETER: CPT

## 2025-09-05 PROCEDURE — 85027 COMPLETE CBC AUTOMATED: CPT | Performed by: UROLOGY

## 2025-09-05 PROCEDURE — 99222 1ST HOSP IP/OBS MODERATE 55: CPT | Performed by: INTERNAL MEDICINE

## 2025-09-05 PROCEDURE — 97110 THERAPEUTIC EXERCISES: CPT | Mod: GP,CQ

## 2025-09-05 PROCEDURE — 97116 GAIT TRAINING THERAPY: CPT | Mod: GP,CQ

## 2025-09-05 PROCEDURE — 2500000001 HC RX 250 WO HCPCS SELF ADMINISTERED DRUGS (ALT 637 FOR MEDICARE OP): Performed by: INTERNAL MEDICINE

## 2025-09-05 PROCEDURE — 2500000002 HC RX 250 W HCPCS SELF ADMINISTERED DRUGS (ALT 637 FOR MEDICARE OP, ALT 636 FOR OP/ED): Performed by: UROLOGY

## 2025-09-05 PROCEDURE — 97535 SELF CARE MNGMENT TRAINING: CPT | Mod: GO

## 2025-09-05 PROCEDURE — 1200000002 HC GENERAL ROOM WITH TELEMETRY DAILY

## 2025-09-05 PROCEDURE — 99232 SBSQ HOSP IP/OBS MODERATE 35: CPT | Performed by: NURSE PRACTITIONER

## 2025-09-05 RX ORDER — CEFTRIAXONE 2 G/50ML
2 INJECTION, SOLUTION INTRAVENOUS EVERY 24 HOURS
Status: DISCONTINUED | OUTPATIENT
Start: 2025-09-05 | End: 2025-09-06 | Stop reason: HOSPADM

## 2025-09-05 RX ORDER — AMLODIPINE BESYLATE 2.5 MG/1
2.5 TABLET ORAL DAILY
Status: DISCONTINUED | OUTPATIENT
Start: 2025-09-06 | End: 2025-09-06 | Stop reason: HOSPADM

## 2025-09-05 RX ORDER — POTASSIUM CHLORIDE 20 MEQ/1
40 TABLET, EXTENDED RELEASE ORAL ONCE
Status: COMPLETED | OUTPATIENT
Start: 2025-09-05 | End: 2025-09-05

## 2025-09-05 RX ORDER — CEFTRIAXONE 2 G/50ML
2 INJECTION, SOLUTION INTRAVENOUS EVERY 24 HOURS
Qty: 550 ML | Refills: 0 | Status: SHIPPED | OUTPATIENT
Start: 2025-09-05 | End: 2025-09-16

## 2025-09-05 RX ORDER — PROPRANOLOL HYDROCHLORIDE 80 MG/1
160 CAPSULE, EXTENDED RELEASE ORAL NIGHTLY
Status: DISCONTINUED | OUTPATIENT
Start: 2025-09-05 | End: 2025-09-06 | Stop reason: HOSPADM

## 2025-09-05 RX ADMIN — MUPIROCIN: 20 OINTMENT TOPICAL at 09:41

## 2025-09-05 RX ADMIN — FAMOTIDINE 20 MG: 20 TABLET, FILM COATED ORAL at 20:55

## 2025-09-05 RX ADMIN — PANTOPRAZOLE SODIUM 40 MG: 40 TABLET, DELAYED RELEASE ORAL at 05:20

## 2025-09-05 RX ADMIN — MUPIROCIN: 20 OINTMENT TOPICAL at 20:55

## 2025-09-05 RX ADMIN — FAMOTIDINE 20 MG: 20 TABLET, FILM COATED ORAL at 09:09

## 2025-09-05 RX ADMIN — CEFTRIAXONE 2 G: 2 INJECTION, SOLUTION INTRAVENOUS at 16:45

## 2025-09-05 RX ADMIN — ACETAMINOPHEN 650 MG: 325 TABLET ORAL at 19:05

## 2025-09-05 RX ADMIN — ROSUVASTATIN 10 MG: 10 TABLET, FILM COATED ORAL at 09:10

## 2025-09-05 RX ADMIN — POLYETHYLENE GLYCOL 3350 17 G: 17 POWDER, FOR SOLUTION ORAL at 09:09

## 2025-09-05 RX ADMIN — POTASSIUM CHLORIDE 40 MEQ: 1500 TABLET, EXTENDED RELEASE ORAL at 09:09

## 2025-09-05 RX ADMIN — PROPRANOLOL HYDROCHLORIDE 160 MG: 80 CAPSULE, EXTENDED RELEASE ORAL at 19:05

## 2025-09-05 RX ADMIN — NYSTATIN 1 APPLICATION: 100000 POWDER TOPICAL at 20:55

## 2025-09-05 RX ADMIN — FOLIC ACID 1 MG: 1 TABLET ORAL at 09:10

## 2025-09-05 RX ADMIN — NYSTATIN 1 APPLICATION: 100000 POWDER TOPICAL at 09:40

## 2025-09-05 ASSESSMENT — PAIN - FUNCTIONAL ASSESSMENT
PAIN_FUNCTIONAL_ASSESSMENT: 0-10

## 2025-09-05 ASSESSMENT — COGNITIVE AND FUNCTIONAL STATUS - GENERAL
HELP NEEDED FOR BATHING: A LOT
DRESSING REGULAR UPPER BODY CLOTHING: A LITTLE
TOILETING: A LITTLE
PERSONAL GROOMING: A LITTLE
DRESSING REGULAR LOWER BODY CLOTHING: A LITTLE
STANDING UP FROM CHAIR USING ARMS: A LITTLE
MOVING TO AND FROM BED TO CHAIR: A LITTLE
DRESSING REGULAR LOWER BODY CLOTHING: A LOT
TURNING FROM BACK TO SIDE WHILE IN FLAT BAD: A LITTLE
CLIMB 3 TO 5 STEPS WITH RAILING: TOTAL
EATING MEALS: A LITTLE
HELP NEEDED FOR BATHING: A LITTLE
TOILETING: A LOT
MOBILITY SCORE: 18
STANDING UP FROM CHAIR USING ARMS: A LITTLE
STANDING UP FROM CHAIR USING ARMS: A LOT
WALKING IN HOSPITAL ROOM: A LITTLE
TOILETING: A LITTLE
MOBILITY SCORE: 15
DAILY ACTIVITIY SCORE: 19
PERSONAL GROOMING: A LITTLE
MOVING FROM LYING ON BACK TO SITTING ON SIDE OF FLAT BED WITH BEDRAILS: A LITTLE
DAILY ACTIVITIY SCORE: 15
DRESSING REGULAR UPPER BODY CLOTHING: A LITTLE
DAILY ACTIVITIY SCORE: 19
CLIMB 3 TO 5 STEPS WITH RAILING: A LITTLE
WALKING IN HOSPITAL ROOM: A LITTLE
DRESSING REGULAR LOWER BODY CLOTHING: A LITTLE
MOVING TO AND FROM BED TO CHAIR: A LITTLE
HELP NEEDED FOR BATHING: A LITTLE
MOVING FROM LYING ON BACK TO SITTING ON SIDE OF FLAT BED WITH BEDRAILS: A LITTLE
DRESSING REGULAR UPPER BODY CLOTHING: A LITTLE
WALKING IN HOSPITAL ROOM: A LITTLE
MOVING FROM LYING ON BACK TO SITTING ON SIDE OF FLAT BED WITH BEDRAILS: A LITTLE
MOBILITY SCORE: 18
MOVING TO AND FROM BED TO CHAIR: A LITTLE
TURNING FROM BACK TO SIDE WHILE IN FLAT BAD: A LITTLE
CLIMB 3 TO 5 STEPS WITH RAILING: A LITTLE
TURNING FROM BACK TO SIDE WHILE IN FLAT BAD: A LITTLE
PERSONAL GROOMING: A LITTLE

## 2025-09-05 ASSESSMENT — PAIN DESCRIPTION - LOCATION: LOCATION: PELVIS

## 2025-09-05 ASSESSMENT — PAIN SCALES - GENERAL
PAINLEVEL_OUTOF10: 5 - MODERATE PAIN
PAINLEVEL_OUTOF10: 0 - NO PAIN
PAINLEVEL_OUTOF10: 0 - NO PAIN
PAINLEVEL_OUTOF10: 3
PAINLEVEL_OUTOF10: 0 - NO PAIN
PAINLEVEL_OUTOF10: 0 - NO PAIN

## 2025-09-05 ASSESSMENT — ACTIVITIES OF DAILY LIVING (ADL)
HOME_MANAGEMENT_TIME_ENTRY: 32
BATHING_WHERE_ASSESSED: SITTING SINKSIDE
BATHING_LEVEL_OF_ASSISTANCE: MAXIMUM ASSISTANCE

## 2025-09-06 VITALS
HEART RATE: 79 BPM | WEIGHT: 225 LBS | OXYGEN SATURATION: 100 % | DIASTOLIC BLOOD PRESSURE: 60 MMHG | TEMPERATURE: 97.5 F | BODY MASS INDEX: 36.16 KG/M2 | RESPIRATION RATE: 17 BRPM | HEIGHT: 66 IN | SYSTOLIC BLOOD PRESSURE: 130 MMHG

## 2025-09-06 LAB
ANION GAP SERPL CALCULATED.3IONS-SCNC: 8 MMOL/L (ref 10–20)
BACTERIA BLD CULT: NORMAL
BACTERIA BLD CULT: NORMAL
BUN SERPL-MCNC: 12 MG/DL (ref 6–23)
CALCIUM SERPL-MCNC: 7.6 MG/DL (ref 8.6–10.3)
CHLORIDE SERPL-SCNC: 110 MMOL/L (ref 98–107)
CO2 SERPL-SCNC: 25 MMOL/L (ref 21–32)
CREAT SERPL-MCNC: 0.48 MG/DL (ref 0.5–1.05)
EGFRCR SERPLBLD CKD-EPI 2021: >90 ML/MIN/1.73M*2
ERYTHROCYTE [DISTWIDTH] IN BLOOD BY AUTOMATED COUNT: 16.5 % (ref 11.5–14.5)
GLUCOSE SERPL-MCNC: 82 MG/DL (ref 74–99)
HCT VFR BLD AUTO: 25.1 % (ref 36–46)
HGB BLD-MCNC: 8 G/DL (ref 12–16)
MCH RBC QN AUTO: 31.1 PG (ref 26–34)
MCHC RBC AUTO-ENTMCNC: 31.9 G/DL (ref 32–36)
MCV RBC AUTO: 98 FL (ref 80–100)
NRBC BLD-RTO: 0 /100 WBCS (ref 0–0)
PLATELET # BLD AUTO: 187 X10*3/UL (ref 150–450)
POTASSIUM SERPL-SCNC: 3.6 MMOL/L (ref 3.5–5.3)
RBC # BLD AUTO: 2.57 X10*6/UL (ref 4–5.2)
SODIUM SERPL-SCNC: 139 MMOL/L (ref 136–145)
WBC # BLD AUTO: 7.9 X10*3/UL (ref 4.4–11.3)

## 2025-09-06 PROCEDURE — 2500000002 HC RX 250 W HCPCS SELF ADMINISTERED DRUGS (ALT 637 FOR MEDICARE OP, ALT 636 FOR OP/ED): Performed by: UROLOGY

## 2025-09-06 PROCEDURE — 2500000004 HC RX 250 GENERAL PHARMACY W/ HCPCS (ALT 636 FOR OP/ED): Performed by: UROLOGY

## 2025-09-06 PROCEDURE — 2500000001 HC RX 250 WO HCPCS SELF ADMINISTERED DRUGS (ALT 637 FOR MEDICARE OP): Performed by: UROLOGY

## 2025-09-06 PROCEDURE — 99239 HOSP IP/OBS DSCHRG MGMT >30: CPT | Performed by: NURSE PRACTITIONER

## 2025-09-06 PROCEDURE — 80048 BASIC METABOLIC PNL TOTAL CA: CPT | Performed by: UROLOGY

## 2025-09-06 PROCEDURE — 85027 COMPLETE CBC AUTOMATED: CPT | Performed by: UROLOGY

## 2025-09-06 PROCEDURE — 2500000001 HC RX 250 WO HCPCS SELF ADMINISTERED DRUGS (ALT 637 FOR MEDICARE OP): Performed by: NURSE PRACTITIONER

## 2025-09-06 PROCEDURE — 2500000001 HC RX 250 WO HCPCS SELF ADMINISTERED DRUGS (ALT 637 FOR MEDICARE OP): Performed by: INTERNAL MEDICINE

## 2025-09-06 RX ORDER — PROPRANOLOL HYDROCHLORIDE 160 MG/1
160 CAPSULE, EXTENDED RELEASE ORAL NIGHTLY
Start: 2025-09-06

## 2025-09-06 RX ORDER — AMLODIPINE BESYLATE 2.5 MG/1
2.5 TABLET ORAL DAILY
Start: 2025-09-07

## 2025-09-06 RX ORDER — NYSTATIN 100000 [USP'U]/G
1 POWDER TOPICAL 2 TIMES DAILY
Start: 2025-09-06

## 2025-09-06 RX ORDER — MUPIROCIN 20 MG/G
OINTMENT TOPICAL 2 TIMES DAILY
Start: 2025-09-06 | End: 2025-09-09

## 2025-09-06 RX ADMIN — LOSARTAN POTASSIUM 100 MG: 100 TABLET, FILM COATED ORAL at 09:26

## 2025-09-06 RX ADMIN — PANTOPRAZOLE SODIUM 40 MG: 40 TABLET, DELAYED RELEASE ORAL at 06:32

## 2025-09-06 RX ADMIN — FOLIC ACID 1 MG: 1 TABLET ORAL at 09:26

## 2025-09-06 RX ADMIN — AMLODIPINE BESYLATE 2.5 MG: 2.5 TABLET ORAL at 09:26

## 2025-09-06 RX ADMIN — MUPIROCIN: 20 OINTMENT TOPICAL at 09:30

## 2025-09-06 RX ADMIN — ROSUVASTATIN 10 MG: 10 TABLET, FILM COATED ORAL at 09:26

## 2025-09-06 RX ADMIN — NYSTATIN 1 APPLICATION: 100000 POWDER TOPICAL at 09:30

## 2025-09-06 RX ADMIN — FAMOTIDINE 20 MG: 20 TABLET, FILM COATED ORAL at 09:26

## 2025-09-06 RX ADMIN — POLYETHYLENE GLYCOL 3350 17 G: 17 POWDER, FOR SOLUTION ORAL at 09:27

## 2025-09-06 ASSESSMENT — PAIN DESCRIPTION - DESCRIPTORS: DESCRIPTORS: SORE

## 2025-09-06 ASSESSMENT — PAIN - FUNCTIONAL ASSESSMENT: PAIN_FUNCTIONAL_ASSESSMENT: 0-10

## 2025-09-06 ASSESSMENT — PAIN SCALES - GENERAL: PAINLEVEL_OUTOF10: 6

## (undated) DEVICE — SLEEVE, VASO PRESS, CALF GARMENT, MEDIUM, GREEN

## (undated) DEVICE — Device

## (undated) DEVICE — HOLMIUM LASER

## (undated) DEVICE — BASKET, STONE 1.9 X 120 SKYLITE TIPLESS 12MM BASKET

## (undated) DEVICE — GLOVE, SURGICAL, PROTEXIS PI MICRO, 7.0, PF, LF

## (undated) DEVICE — CATHETER, URETERAL, POLLACK, OPEN END, 5.5 FR, 70 CM

## (undated) DEVICE — GUIDEWIRE, NITINOL, 0.038 150CM, STRAIGHT TIP

## (undated) DEVICE — HOLMIUM FIBER

## (undated) DEVICE — SOLUTION, IRRIGATION, USP, SODIUM CHLORIDE 0.9%, 3000 ML